# Patient Record
Sex: FEMALE | Race: WHITE | NOT HISPANIC OR LATINO | Employment: FULL TIME | URBAN - METROPOLITAN AREA
[De-identification: names, ages, dates, MRNs, and addresses within clinical notes are randomized per-mention and may not be internally consistent; named-entity substitution may affect disease eponyms.]

---

## 2017-05-08 ENCOUNTER — TRANSFERRED RECORDS (OUTPATIENT)
Dept: HEALTH INFORMATION MANAGEMENT | Facility: CLINIC | Age: 42
End: 2017-05-08

## 2018-02-11 ENCOUNTER — HEALTH MAINTENANCE LETTER (OUTPATIENT)
Age: 43
End: 2018-02-11

## 2018-05-25 ENCOUNTER — OFFICE VISIT (OUTPATIENT)
Dept: FAMILY MEDICINE | Facility: CLINIC | Age: 43
End: 2018-05-25
Payer: COMMERCIAL

## 2018-05-25 VITALS
SYSTOLIC BLOOD PRESSURE: 126 MMHG | OXYGEN SATURATION: 97 % | BODY MASS INDEX: 24.17 KG/M2 | WEIGHT: 154 LBS | DIASTOLIC BLOOD PRESSURE: 80 MMHG | HEART RATE: 95 BPM | HEIGHT: 67 IN | TEMPERATURE: 99.3 F

## 2018-05-25 DIAGNOSIS — L98.9 SKIN LESION: ICD-10-CM

## 2018-05-25 DIAGNOSIS — K58.0 IRRITABLE BOWEL SYNDROME WITH DIARRHEA: Primary | ICD-10-CM

## 2018-05-25 LAB — TSH SERPL DL<=0.005 MIU/L-ACNC: 1.33 MU/L (ref 0.4–4)

## 2018-05-25 PROCEDURE — 36415 COLL VENOUS BLD VENIPUNCTURE: CPT | Performed by: INTERNAL MEDICINE

## 2018-05-25 PROCEDURE — 99213 OFFICE O/P EST LOW 20 MIN: CPT | Performed by: INTERNAL MEDICINE

## 2018-05-25 PROCEDURE — 84443 ASSAY THYROID STIM HORMONE: CPT | Performed by: INTERNAL MEDICINE

## 2018-05-25 RX ORDER — OLOPATADINE HYDROCHLORIDE 1 MG/ML
1 SOLUTION/ DROPS OPHTHALMIC 2 TIMES DAILY
COMMUNITY
End: 2019-07-16

## 2018-05-25 RX ORDER — HYDROXYZINE HYDROCHLORIDE 10 MG/1
10 TABLET, FILM COATED ORAL PRN
COMMUNITY
End: 2019-04-05

## 2018-05-25 RX ORDER — KETOCONAZOLE 20 MG/G
CREAM TOPICAL PRN
COMMUNITY
End: 2022-08-23

## 2018-05-25 RX ORDER — AZELAIC ACID 0.15 G/G
0.5 GEL TOPICAL
COMMUNITY
End: 2019-04-05

## 2018-05-25 RX ORDER — CETIRIZINE HYDROCHLORIDE 10 MG/1
10 TABLET ORAL DAILY
COMMUNITY

## 2018-05-25 RX ORDER — MOMETASONE FUROATE MONOHYDRATE 50 UG/1
2 SPRAY, METERED NASAL PRN
COMMUNITY
End: 2020-09-28

## 2018-05-25 RX ORDER — KETOCONAZOLE 20 MG/ML
SHAMPOO TOPICAL PRN
COMMUNITY
End: 2019-07-16

## 2018-05-25 RX ORDER — LANOLIN ALCOHOL/MO/W.PET/CERES
CREAM (GRAM) TOPICAL
COMMUNITY
End: 2022-08-23

## 2018-05-25 NOTE — PROGRESS NOTES
"  SUBJECTIVE:   Sondra Sorensen is a 42 year old female who presents to clinic today for the following health issues:      New Patient discuss GI issues-Diarrhea,gas,bloating x 4 years ... Symptoms on and off four years.   Colonoscopy 4 years ago, no microscopic colitis.    Has had blood test negative for Sprue.   She still has a gallbladder.      Made this appointment today because she had an \"attack\" where she suddenly had urgency and stool incontinence.   Stools tend to be loose to normal, tends to get bloated when she eats.    Does not think she ever uses sugar free prod       Problem list and histories reviewed & adjusted, as indicated.  Additional history: as documented    There is no problem list on file for this patient.    History reviewed. No pertinent surgical history.    Social History   Substance Use Topics     Smoking status: Never Smoker     Smokeless tobacco: Never Used     Alcohol use Yes      Comment: occasional      Family History   Problem Relation Age of Onset     DIABETES Father          Current Outpatient Prescriptions   Medication Sig Dispense Refill     Azelaic Acid (FINACEA) 15 % gel Apply 0.5 inches topically       cetirizine (ZYRTEC) 10 MG tablet Take 10 mg by mouth daily       Desogestrel-Ethinyl Estradiol (APRI PO) 28 day skipping placebo pills       hydrOXYzine (ATARAX) 10 MG tablet Take 10 mg by mouth as needed for itching       IBUPROFEN PO Take 200 mg by mouth       ketoconazole (NIZORAL) 2 % cream Apply topically as needed for itching       ketoconazole (NIZORAL) 2 % shampoo Apply topically as needed for itching or irritation       Loperamide HCl (IMODIUM OR) Prn       melatonin (CVS MELATONIN) 3 MG tablet Take by mouth nightly as needed for sleep Takes 3-12 mg Nightly       Methylcellulose, Laxative, (CITRUCEL PO) Take by mouth 2 times daily       mometasone (NASONEX) 50 MCG/ACT spray Spray 2 sprays into both nostrils as needed       olopatadine (PATANOL) 0.1 % ophthalmic solution 1 " "drop 2 times daily       Allergies   Allergen Reactions     Doxycycline      Penicillins      No lab results found.   BP Readings from Last 3 Encounters:   05/25/18 126/80    Wt Readings from Last 3 Encounters:   05/25/18 154 lb (69.9 kg)                  Labs reviewed in EPIC    Reviewed and updated as needed this visit by clinical staff  Tobacco  Allergies  Meds  Med Hx  Surg Hx  Fam Hx  Soc Hx      Reviewed and updated as needed this visit by Provider         ROS:  Constitutional, HEENT, cardiovascular, pulmonary, gi and gu systems are negative, except as otherwise noted.    OBJECTIVE:     /80 (BP Location: Left arm, Patient Position: Sitting, Cuff Size: Adult Regular)  Pulse 95  Temp 99.3  F (37.4  C) (Oral)  Ht 5' 6.5\" (1.689 m)  Wt 154 lb (69.9 kg)  SpO2 97%  BMI 24.48 kg/m2  Body mass index is 24.48 kg/(m^2).  GENERAL: healthy, alert and no distress  EYES: Eyes grossly normal to inspection, PERRL and conjunctivae and sclerae normal  NECK: no adenopathy, no asymmetry, masses, or scars and thyroid normal to palpation  RESP: lungs clear to auscultation - no rales, rhonchi or wheezes  CV: regular rate and rhythm, normal S1 S2, no S3 or S4, no murmur, click or rub, no peripheral edema and peripheral pulses strong  ABDOMEN: soft, nontender, no hepatosplenomegaly, no masses and bowel sounds normal  MS: no gross musculoskeletal defects noted, no edema  SKIN: small vascular lesion at nose tip, could be BCC  NEURO: Normal strength and tone, mentation intact and speech normal  PSYCH: mentation appears normal, affect normal/bright    Diagnostic Test Results:   TSH pending   No results found for this or any previous visit (from the past 24 hour(s)).    ASSESSMENT/PLAN:             ICD-10-CM    1. Irritable bowel syndrome with diarrhea K58.0 TSH with free T4 reflex     GASTROENTEROLOGY ADULT REF CONSULT ONLY     GASTROENTEROLOGY ADULT REF CONSULT ONLY   2. Skin lesion L98.9      New patient to system " (insurance change)    Patient new to system with IBS like symptoms, diarrhea predominant.  Per previous evaluations, sprue ruled out (blood tests) and microcytic colitis ruled out (colonoscopy 2014).  She has tried FODMAPs and Gluten free to no avail    She will try to avoid sucrose and high fructose products.  Wants a GI consult, a couple of options are presented to her.      Has skin lesion, should consider removal if colleague thinks suspicious.         Ashley Davey MD  [facility]

## 2018-05-25 NOTE — MR AVS SNAPSHOT
After Visit Summary   5/25/2018    Sondra Sorensen    MRN: [id number]           Patient Information     Date Of Birth          1975        Visit Information        Provider Department      5/25/2018 2:00 PM Ashley Davey MD [facility]        Today's Diagnoses     Irritable bowel syndrome with diarrhea    -  1      Care Instructions    Avoid sucroses (sugar free) and fructose (certain fruits)    Avoid supplements with magnesium    Okay to try daily lomitil    Carry pepto bismol tabs for urgency    GI consult     [facility] [phone]     : [facility] [phone]                    Follow-ups after your visit        Additional Services     GASTROENTEROLOGY ADULT REF CONSULT ONLY       Preferred Location: [facility] [phone]      Please be aware that coverage of these services is subject to the terms and limitations of your health insurance plan.  Call member services at your health plan with any benefit or coverage questions.  Any procedures must be performed at a [facility] facility OR coordinated by your clinic's referral office.    Please bring the following with you to your appointment:    (1) Any X-Rays, CTs or MRIs which have been performed.  Contact the facility where they were done to arrange for  prior to your scheduled appointment.    (2) List of current medications   (3) This referral request   (4) Any documents/labs given to you for this referral            GASTROENTEROLOGY ADULT REF CONSULT ONLY       Preferred Location: [facility] [phone]      Please be aware that coverage of these services is subject to the terms and limitations of your health insurance plan.  Call member services at your health plan with any benefit or coverage questions.  Any procedures must be performed at a [facility] facility OR coordinated by your clinic's referral office.    Please bring the following with you to your appointment:    (1) Any X-Rays, CTs or  MRIs which have been performed.  Contact the facility where they were done to arrange for  prior to your scheduled appointment.    (2) List of current medications   (3) This referral request   (4) Any documents/labs given to you for this referral                  Your next 10 appointments already scheduled     Jul 20, 2018  1:00 PM CDT   New Patient Visit with Radha Weeks MD   [facility])    [address], [address]  [facility] [address]   [phone]              Who to contact     If you have questions or need follow up information about today's clinic visit or your schedule please contact [facility] directly at [phone].  Normal or non-critical lab and imaging results will be communicated to you by Sionexhart, letter or phone within 4 business days after the clinic has received the results. If you do not hear from us within 7 days, please contact the clinic through Sionexhart or phone. If you have a critical or abnormal lab result, we will notify you by phone as soon as possible.  Submit refill requests through Exabre or call your pharmacy and they will forward the refill request to us. Please allow 3 business days for your refill to be completed.          Additional Information About Your Visit        Exabre Information     Exabre gives you secure access to your electronic health record. If you see a primary care provider, you can also send messages to your care team and make appointments. If you have questions, please call your primary care clinic.  If you do not have a primary care provider, please call [phone] and they will assist you.        Care EveryWhere ID     This is your Care EveryWhere ID. This could be used by other organizations to access your [facility] medical records  [id number]        Your Vitals Were     Pulse Temperature Height Pulse Oximetry BMI (Body Mass Index)       95 99.3  F (37.4  " C) (Oral) 5' 6.5\" (1.689 m) 97% 24.48 kg/m2        Blood Pressure from Last 3 Encounters:   05/25/18 126/80    Weight from Last 3 Encounters:   05/25/18 154 lb (69.9 kg)              We Performed the Following     GASTROENTEROLOGY ADULT REF CONSULT ONLY     GASTROENTEROLOGY ADULT REF CONSULT ONLY     TSH with free T4 reflex        Primary Care Provider Office Phone # Fax #    [facility] [phone]       [address] [place] [address]        Equal Access to Services     MICK DAY : Hadii aad ku hadasho Soomaali, waaxda luqadaha, qaybta kaalmada adeegyada, waxivan perrinin hayluis carlos waters . So [facility] [phone].    ATENCIÓN: Si habla español, tiene a acevedo disposición servicios gratuitos de asistencia lingüística. Llame al [phone].    We comply with applicable federal civil rights laws and [place] laws. We do not discriminate on the basis of race, color, national origin, age, disability, sex, sexual orientation, or gender identity.            Thank you!     Thank you for choosing [facility]  for your care. Our goal is always to provide you with excellent care. Hearing back from our patients is one way we can continue to improve our services. Please take a few minutes to complete the written survey that you may receive in the mail after your visit with us. Thank you!             Your Updated Medication List - Protect others around you: Learn how to safely use, store and throw away your medicines at www.disposemymeds.org.          This list is accurate as of 5/25/18  2:50 PM.  Always use your most recent med list.                   Brand Name Dispense Instructions for use Diagnosis    APRI PO      28 day skipping placebo pills        cetirizine 10 MG tablet    zyrTEC     Take 10 mg by mouth daily        CITRUCEL PO      Take by mouth 2 times daily        CVS MELATONIN 3 MG tablet   Generic drug:  melatonin      Take by mouth " nightly as needed for sleep Takes 3-12 mg Nightly        FINACEA 15 % gel   Generic drug:  Azelaic Acid      Apply 0.5 inches topically        hydrOXYzine 10 MG tablet    ATARAX     Take 10 mg by mouth as needed for itching        IBUPROFEN PO      Take 200 mg by mouth        IMODIUM OR      Prn        * ketoconazole 2 % shampoo    NIZORAL     Apply topically as needed for itching or irritation        * ketoconazole 2 % cream    NIZORAL     Apply topically as needed for itching        mometasone 50 MCG/ACT spray    NASONEX     Spray 2 sprays into both nostrils as needed        olopatadine 0.1 % ophthalmic solution    PATANOL     1 drop 2 times daily        * Notice:  This list has 2 medication(s) that are the same as other medications prescribed for you. Read the directions carefully, and ask your doctor or other care provider to review them with you.

## 2018-05-25 NOTE — PATIENT INSTRUCTIONS
Avoid sucroses (sugar free) and fructose (certain fruits)    Avoid supplements with magnesium    Okay to try daily lomitil    Carry pepto bismol tabs for urgency    GI consult     [facility] [phone]     : [facility] [phone]

## 2018-06-01 ENCOUNTER — OFFICE VISIT (OUTPATIENT)
Dept: FAMILY MEDICINE | Facility: CLINIC | Age: 43
End: 2018-06-01
Payer: COMMERCIAL

## 2018-06-01 VITALS
TEMPERATURE: 98.5 F | OXYGEN SATURATION: 96 % | DIASTOLIC BLOOD PRESSURE: 73 MMHG | HEART RATE: 96 BPM | WEIGHT: 155 LBS | SYSTOLIC BLOOD PRESSURE: 120 MMHG | BODY MASS INDEX: 24.64 KG/M2

## 2018-06-01 DIAGNOSIS — L98.9 SKIN LESION OF FACE: Primary | ICD-10-CM

## 2018-06-01 DIAGNOSIS — I78.1 TELANGIECTASIA OF FACE: ICD-10-CM

## 2018-06-01 PROCEDURE — 99000 SPECIMEN HANDLING OFFICE-LAB: CPT | Performed by: FAMILY MEDICINE

## 2018-06-01 PROCEDURE — 88305 TISSUE EXAM BY PATHOLOGIST: CPT | Performed by: FAMILY MEDICINE

## 2018-06-01 PROCEDURE — 99213 OFFICE O/P EST LOW 20 MIN: CPT | Mod: 25 | Performed by: FAMILY MEDICINE

## 2018-06-01 PROCEDURE — 11100 HC BIOPSY SKIN/SUBQ/MUC MEM, SINGLE LESION: CPT | Performed by: FAMILY MEDICINE

## 2018-06-01 ASSESSMENT — PAIN SCALES - GENERAL: PAINLEVEL: NO PAIN (0)

## 2018-06-01 NOTE — PROGRESS NOTES
SUBJECTIVE:                                                    Sondra Sorensen is a 42 year old female who presents to clinic today for the following health issues:      Pt is here to have a spot on her nose checked per Tamica.  The patient has had a small red spot on her nose that has been increasing in recent months.  It has become more prominent.  There is some concern about possible basal cell carcinoma, so she was referred to me for further evaluation.    Problem list and histories reviewed & adjusted, as indicated.  Additional history: as documented    There is no problem list on file for this patient.    History reviewed. No pertinent surgical history.    Social History   Substance Use Topics     Smoking status: Never Smoker     Smokeless tobacco: Never Used     Alcohol use Yes      Comment: occasional      Family History   Problem Relation Age of Onset     DIABETES Father          Current Outpatient Prescriptions   Medication Sig Dispense Refill     Azelaic Acid (FINACEA) 15 % gel Apply 0.5 inches topically       cetirizine (ZYRTEC) 10 MG tablet Take 10 mg by mouth daily       Desogestrel-Ethinyl Estradiol (APRI PO) 28 day skipping placebo pills       hydrOXYzine (ATARAX) 10 MG tablet Take 10 mg by mouth as needed for itching       IBUPROFEN PO Take 200 mg by mouth       ketoconazole (NIZORAL) 2 % cream Apply topically as needed for itching       ketoconazole (NIZORAL) 2 % shampoo Apply topically as needed for itching or irritation       Loperamide HCl (IMODIUM OR) Prn       melatonin (CVS MELATONIN) 3 MG tablet Take by mouth nightly as needed for sleep Takes 3-12 mg Nightly       Methylcellulose, Laxative, (CITRUCEL PO) Take by mouth 2 times daily       mometasone (NASONEX) 50 MCG/ACT spray Spray 2 sprays into both nostrils as needed       olopatadine (PATANOL) 0.1 % ophthalmic solution 1 drop 2 times daily       Allergies   Allergen Reactions     Doxycycline      Penicillins        ROS:  No other skin  lesions of concern.    OBJECTIVE:     /73 (BP Location: Right arm, Patient Position: Chair, Cuff Size: Adult Regular)  Pulse 96  Temp 98.5  F (36.9  C) (Oral)  Wt 155 lb (70.3 kg)  LMP  (LMP Unknown)  SpO2 96%  Breastfeeding? No  BMI 24.64 kg/m2  Body mass index is 24.64 kg/(m^2).  GENERAL: healthy, alert and no distress  SKIN: She has a small area of telangiectasia on the left side near the tip of her nose.  I do not see any definite skin cancer, but after discussion with the patient, we elected to perform a very thin shave biopsy and treat the lesion with electrodesiccation and curettage.  The lesion was cleansed, anesthetized, and then a very thin shave biopsy was obtained.  The base was then treated with electrodesiccation and curettage.    Diagnostic Test Results:  none     ASSESSMENT/PLAN:       ICD-10-CM    1. Skin lesion of face L98.9 Surgical pathology exam     HANDLING CHARGE TO REF LAB     BIOPSY SKIN/SUBQ/MUC MEM, SINGLE LESION   2. 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

## 2018-06-01 NOTE — MR AVS SNAPSHOT
After Visit Summary   6/1/2018    Sondra Sorensen    MRN: [id number]           Patient Information     Date Of Birth          1975        Visit Information        Provider Department      6/1/2018 2:40 PM Alejandro Fontaine MD [facility]        Today's Diagnoses     Skin lesion of face    -  1    Telangiectasia of face           Follow-ups after your visit        Your next 10 appointments already scheduled     Jul 20, 2018  1:00 PM CDT   New Patient Visit with Radha Weeks MD   [facility] ([facility])    [address]  [facility] [address]   [phone]              Who to contact     If you have questions or need follow up information about today's clinic visit or your schedule please contact [facility] directly at [phone].  Normal or non-critical lab and imaging results will be communicated to you by MyChart, letter or phone within 4 business days after the clinic has received the results. If you do not hear from us within 7 days, please contact the clinic through MyChart or phone. If you have a critical or abnormal lab result, we will notify you by phone as soon as possible.  Submit refill requests through Preen.Me or call your pharmacy and they will forward the refill request to us. Please allow 3 business days for your refill to be completed.          Additional Information About Your Visit        MyChart Information     Preen.Me gives you secure access to your electronic health record. If you see a primary care provider, you can also send messages to your care team and make appointments. If you have questions, please call your primary care clinic.  If you do not have a primary care provider, please call [phone] and they will assist you.        Care EveryWhere ID     This is your Care EveryWhere ID. This could be used by other organizations to access your [facility]  records  [id number]        Your Vitals Were     Pulse Temperature Last Period Pulse Oximetry Breastfeeding? BMI (Body Mass Index)    96 98.5  F (36.9  C) (Oral) (LMP Unknown) 96% No 24.64 kg/m2       Blood Pressure from Last 3 Encounters:   06/01/18 120/73   05/25/18 126/80    Weight from Last 3 Encounters:   06/01/18 155 lb (70.3 kg)   05/25/18 154 lb (69.9 kg)              Today, you had the following     No orders found for display       Primary Care Provider Office Phone # Fax #    [facility] [phone]       [address] [place] [address]        Equal Access to Services     MICK DAY : Heather Alonzo, waamelia shore, qaybta kaalmada nandayavale, nedra vo. So [facility] [phone].    ATENCIÓN: Si habla español, tiene a acevedo disposición servicios gratuitos de asistencia lingüística. Llame al [phone].    We comply with applicable federal civil rights laws and [place] laws. We do not discriminate on the basis of race, color, national origin, age, disability, sex, sexual orientation, or gender identity.            Thank you!     Thank you for choosing [facility]  for your care. Our goal is always to provide you with excellent care. Hearing back from our patients is one way we can continue to improve our services. Please take a few minutes to complete the written survey that you may receive in the mail after your visit with us. Thank you!             Your Updated Medication List - Protect others around you: Learn how to safely use, store and throw away your medicines at www.disposemymeds.org.          This list is accurate as of 6/1/18  3:26 PM.  Always use your most recent med list.                   Brand Name Dispense Instructions for use Diagnosis    APRI PO      28 day skipping placebo pills        cetirizine 10 MG tablet    zyrTEC     Take 10 mg by mouth daily        CITRUCEL PO       Take by mouth 2 times daily        CVS MELATONIN 3 MG tablet   Generic drug:  melatonin      Take by mouth nightly as needed for sleep Takes 3-12 mg Nightly        FINACEA 15 % gel   Generic drug:  Azelaic Acid      Apply 0.5 inches topically        hydrOXYzine 10 MG tablet    ATARAX     Take 10 mg by mouth as needed for itching        IBUPROFEN PO      Take 200 mg by mouth        IMODIUM OR      Prn        * ketoconazole 2 % shampoo    NIZORAL     Apply topically as needed for itching or irritation        * ketoconazole 2 % cream    NIZORAL     Apply topically as needed for itching        mometasone 50 MCG/ACT spray    NASONEX     Spray 2 sprays into both nostrils as needed        olopatadine 0.1 % ophthalmic solution    PATANOL     1 drop 2 times daily        * Notice:  This list has 2 medication(s) that are the same as other medications prescribed for you. Read the directions carefully, and ask your doctor or other care provider to review them with you.

## 2018-06-05 LAB — COPATH REPORT: NORMAL

## 2018-06-06 NOTE — PROGRESS NOTES
Sondra,  Good news -- the pathologist did not see any skin cancer in the biopsy sample from your nose.  No further treatment should be necessary for that.  I hope the site is healing up well for you.    Alejandro Fontaine MD

## 2018-06-08 ENCOUNTER — TRANSFERRED RECORDS (OUTPATIENT)
Dept: HEALTH INFORMATION MANAGEMENT | Facility: CLINIC | Age: 43
End: 2018-06-08

## 2018-06-14 ENCOUNTER — TRANSFERRED RECORDS (OUTPATIENT)
Dept: HEALTH INFORMATION MANAGEMENT | Facility: CLINIC | Age: 43
End: 2018-06-14

## 2018-06-29 ENCOUNTER — TELEPHONE (OUTPATIENT)
Dept: OTHER | Facility: CLINIC | Age: 43
End: 2018-06-29

## 2018-07-01 RX ORDER — CHOLESTYRAMINE LIGHT 4 G/5.7G
4 POWDER, FOR SUSPENSION ORAL 2 TIMES DAILY
Status: CANCELLED | OUTPATIENT
Start: 2018-07-01

## 2018-07-05 ENCOUNTER — OFFICE VISIT (OUTPATIENT)
Dept: FAMILY MEDICINE | Facility: CLINIC | Age: 43
End: 2018-07-05
Payer: COMMERCIAL

## 2018-07-05 ENCOUNTER — RADIANT APPOINTMENT (OUTPATIENT)
Dept: GENERAL RADIOLOGY | Facility: CLINIC | Age: 43
End: 2018-07-05
Attending: INTERNAL MEDICINE
Payer: COMMERCIAL

## 2018-07-05 VITALS
HEART RATE: 84 BPM | WEIGHT: 154 LBS | TEMPERATURE: 98.9 F | SYSTOLIC BLOOD PRESSURE: 120 MMHG | DIASTOLIC BLOOD PRESSURE: 77 MMHG | BODY MASS INDEX: 24.48 KG/M2 | OXYGEN SATURATION: 97 %

## 2018-07-05 DIAGNOSIS — L72.3 SEBACEOUS CYST: ICD-10-CM

## 2018-07-05 DIAGNOSIS — M25.551 HIP PAIN, RIGHT: ICD-10-CM

## 2018-07-05 DIAGNOSIS — M16.11 PRIMARY OSTEOARTHRITIS OF RIGHT HIP: Primary | ICD-10-CM

## 2018-07-05 PROCEDURE — 73502 X-RAY EXAM HIP UNI 2-3 VIEWS: CPT | Mod: FY

## 2018-07-05 PROCEDURE — 99214 OFFICE O/P EST MOD 30 MIN: CPT | Performed by: INTERNAL MEDICINE

## 2018-07-05 RX ORDER — CEPHALEXIN 500 MG/1
500 CAPSULE ORAL 3 TIMES DAILY
Qty: 30 CAPSULE | Refills: 0 | Status: SHIPPED | OUTPATIENT
Start: 2018-07-05 | End: 2018-10-03

## 2018-07-05 NOTE — PATIENT INSTRUCTIONS
Low impact exercise     Use anti inflammatories/tylenol/ glucosamine chondroitin (all or a combination) as needed for pain.

## 2018-07-05 NOTE — PROGRESS NOTES
SUBJECTIVE:   Sondra Sorensen is a 42 year old female who presents to clinic today for the following health issues:    41 y/o F with h/o insomnia (recent trial hydroxazine&melatonin) and IBS-diarrhea predominant (recently negative repeat work up), Lactose Intolerance test is pending.      Joint Pain    Onset: 3 months at least    Description:   Location: right hip  Character: Dull ache    Intensity: mild, moderate    Progression of Symptoms: same    Accompanying Signs & Symptoms:  Other symptoms: none    History:   Previous similar pain: no       Precipitating factors:   Trauma or overuse: no     Alleviating factors:  Improved by: low activity,     Therapies Tried and outcome: ibuprofen         Lump in groin area x 3-4 weeks that is a  little sore occasionally     Problem list and histories reviewed & adjusted, as indicated.  Additional history: as documented    There is no problem list on file for this patient.    No past surgical history on file.    Social History   Substance Use Topics     Smoking status: Never Smoker     Smokeless tobacco: Never Used     Alcohol use Yes      Comment: occasional      Family History   Problem Relation Age of Onset     Diabetes Father          Current Outpatient Prescriptions   Medication Sig Dispense Refill     Azelaic Acid (FINACEA) 15 % gel Apply 0.5 inches topically       Calcium Citrate-Vitamin D (CALCIUM + D PO)        cephALEXin (KEFLEX) 500 MG capsule Take 1 capsule (500 mg) by mouth 3 times daily 30 capsule 0     cetirizine (ZYRTEC) 10 MG tablet Take 10 mg by mouth daily       Desogestrel-Ethinyl Estradiol (APRI PO) 28 day skipping placebo pills       hydrOXYzine (ATARAX) 10 MG tablet Take 10 mg by mouth as needed for itching       IBUPROFEN PO Take 200 mg by mouth       ketoconazole (NIZORAL) 2 % cream Apply topically as needed for itching       ketoconazole (NIZORAL) 2 % shampoo Apply topically as needed for itching or irritation       Loperamide HCl (IMODIUM OR) Prn        melatonin (CVS MELATONIN) 3 MG tablet Take by mouth nightly as needed for sleep Takes 3-12 mg Nightly       Methylcellulose, Laxative, (CITRUCEL PO) Take by mouth 2 times daily       mometasone (NASONEX) 50 MCG/ACT spray Spray 2 sprays into both nostrils as needed       olopatadine (PATANOL) 0.1 % ophthalmic solution 1 drop 2 times daily       Probiotic Product (ALIGN) CAPS Take by mouth daily       Allergies   Allergen Reactions     Doxycycline      Penicillins Rash     rash     Recent Labs   Lab Test  05/25/18   1452   TSH  1.33      BP Readings from Last 3 Encounters:   07/05/18 120/77   06/01/18 120/73   05/25/18 126/80    Wt Readings from Last 3 Encounters:   07/05/18 154 lb (69.9 kg)   06/01/18 155 lb (70.3 kg)   05/25/18 154 lb (69.9 kg)                  Labs reviewed in EPIC    Reviewed and updated as needed this visit by clinical staff  Tobacco  Allergies  Meds       Reviewed and updated as needed this visit by Provider         ROS:  Constitutional, HEENT, cardiovascular, pulmonary,   and gu systems are negative, except as otherwise noted.    OBJECTIVE:     /77 (BP Location: Right arm, Patient Position: Sitting, Cuff Size: Adult Regular)  Pulse 84  Temp 98.9  F (37.2  C) (Oral)  Wt 154 lb (69.9 kg)  SpO2 97%  BMI 24.48 kg/m2  Body mass index is 24.48 kg/(m^2).  GENERAL: healthy, alert and no distress  EYES: Eyes grossly normal to inspection, PERRL and conjunctivae and sclerae normal  MS: no gross musculoskeletal defects noted, no edema  :  Small sebaceous cyst at right labia, soft fluctuance.    NEURO: Normal strength and tone, mentation intact and speech normal  PSYCH: mentation appears normal, affect normal/bright    Diagnostic Test Results:  Results for orders placed or performed in visit on 06/01/18   Surgical pathology exam   Result Value Ref Range    Copath Report       Patient Name: RUTH VAZQUEZ  MR#: [id number]  Specimen #: [id number]  Collected: 6/1/2018  Received:  "6/4/2018  Reported: 6/5/2018 17:15  Ordering Phy(s): ANNA RODNEY    For improved result formatting, select 'View Enhanced Report Format' under   Linked Documents section.    SPECIMEN(S):  Skin, biopsy, left side near tip of nose    FINAL DIAGNOSIS:  Skin biopsy from left side near tip of nose:  - Negative for malignancy, benign skin with solar elastosis (see   description).    Electronically signed out by:    SB Farah M.D.    CLINICAL HISTORY:   42-year-old female with small area of telangiectasia, very thin shave   biopsy, rule out basal cell carcinoma.    GROSS:  A single specimen container with formalin is received labeled with the   patient's name, date of birth, and  medical record number. Information on the requisition slip, container, and   associated labels is confirmed.    The specimen is designated \"skin biopsy left side and near tip of nose\"   consisting of a 0.1 x le ss than 0.1 x  less than 0.1 cm minute brown skin fragment.  The resection margin is   inked black.  The specimen is submitted  intact in one  Cassette. (Dictated by: Tessy Patel 6/4/2018 09:12 AM)    MICROSCOPIC:  The sections show portions which are somewhat fragmented showing solar   elastosis without epithelial or  atypical stromal proliferations.  No appreciable inflammation is seen.  A   small amount of blood is present  within the stroma which shows solar elastosis but no discrete   telangiectatic vascular proliferation on initial  deeper levels. (Dictated by: LEXIE Farah MD 06/05/2018)    CPT Codes:  A: 95789-TJ2    TESTING LAB LOCATION:  [address] [id number] [phone]    COLLECTION SITE:  Client: [facility]  Location: [facility] (B)         ASSESSMENT/PLAN:             ICD-10-CM    1. Primary osteoarthritis of right hip M16.11    2. Sebaceous cyst L72.3 cephALEXin (KEFLEX) 500 MG capsule   3. " Hip pain, right M25.551 XR Hip Right 2-3 Views     Later entry:  Hip not read as dejenerative joint arthritis, but normal.  Letter sent to patient, consider sports medicine referral    Patient Instructions   Low impact exercise     Use anti inflammatories/tylenol/ glucosamine chondroitin (all or a combination) as needed for pain.  L hip pain with ambulation      She will apply heat to sebaceous cyst.  If does not improve, trial keflex.     Ashley Davey MD  [facility]

## 2018-07-05 NOTE — MR AVS SNAPSHOT
After Visit Summary   7/5/2018    Sondra Sorensen    MRN: [id number]           Patient Information     Date Of Birth          1975        Visit Information        Provider Department      7/5/2018 6:20 PM Ashley Davey MD [facility]        Today's Diagnoses     Primary osteoarthritis of right hip    -  1    Sebaceous cyst        Hip pain, right          Care Instructions    Low impact exercise     Use anti inflammatories/tylenol/ glucosamine chondroitin (all or a combination) as needed for pain.              Follow-ups after your visit        Your next 10 appointments already scheduled     Jul 20, 2018  1:00 PM CDT   New Patient Visit with Radha Weeks MD   [facility] ([facility])    [address]  [place] [address]   [phone]              Who to contact     If you have questions or need follow up information about today's clinic visit or your schedule please contact [facility] directly at [phone].  Normal or non-critical lab and imaging results will be communicated to you by [employer], letter or phone within 4 business days after the clinic has received the results. If you do not hear from us within 7 days, please contact the clinic through [employer] or phone. If you have a critical or abnormal lab result, we will notify you by phone as soon as possible.  Submit refill requests through [employer] or call your pharmacy and they will forward the refill request to us. Please allow 3 business days for your refill to be completed.          Additional Information About Your Visit        MyChart Information     [employer] gives you secure access to your electronic health record. If you see a primary care provider, you can also send messages to your care team and make appointments. If you have questions, please call your primary care clinic.  If you do not have a primary care provider,  please call [phone] and they will assist you.        Care EveryWhere ID     This is your Care EveryWhere ID. This could be used by other organizations to access your [place] medical records  [id number]        Your Vitals Were     Pulse Temperature Pulse Oximetry BMI (Body Mass Index)          84 98.9  F (37.2  C) (Oral) 97% 24.48 kg/m2         Blood Pressure from Last 3 Encounters:   07/05/18 120/77   06/01/18 120/73   05/25/18 126/80    Weight from Last 3 Encounters:   07/05/18 154 lb (69.9 kg)   06/01/18 155 lb (70.3 kg)   05/25/18 154 lb (69.9 kg)                 Today's Medication Changes          These changes are accurate as of 7/5/18  6:51 PM.  If you have any questions, ask your nurse or doctor.               Start taking these medicines.        Dose/Directions    cephALEXin 500 MG capsule   Commonly known as:  KEFLEX   Used for:  Sebaceous cyst   Started by:  Ashley Davey MD        Dose:  500 mg   Take 1 capsule (500 mg) by mouth 3 times daily   Quantity:  30 capsule   Refills:  0            Where to get your medicines      Some of these will need a paper prescription and others can be bought over the counter.  Ask your nurse if you have questions.     Bring a paper prescription for each of these medications     cephALEXin 500 MG capsule                Primary Care Provider Office Phone # Fax #    [facility] [phone]       [address] [place], [place] [id number]        Equal Access to Services     MICK DAY AH: Heather zelaya Soblaine, waaxda luqadaha, qaybta kaalmada asia, waxivan lucille vo. So [facility] [phone].    ATENCIÓN: Si habla español, tiene a acevedo disposición servicios gratuitos de asistencia lingüística. Llame al [phone].    We comply with applicable federal civil rights laws and [place] laws. We do not discriminate on the basis of race, color, national origin, age, disability, sex, sexual  orientation, or gender identity.            Thank you!     Thank you for choosing [facility]  for your care. Our goal is always to provide you with excellent care. Hearing back from our patients is one way we can continue to improve our services. Please take a few minutes to complete the written survey that you may receive in the mail after your visit with us. Thank you!             Your Updated Medication List - Protect others around you: Learn how to safely use, store and throw away your medicines at www.disposemymeds.org.          This list is accurate as of 7/5/18  6:51 PM.  Always use your most recent med list.                   Brand Name Dispense Instructions for use Diagnosis    ALIGN Caps      Take by mouth daily        APRI PO      28 day skipping placebo pills        CALCIUM + D PO           cephALEXin 500 MG capsule    KEFLEX    30 capsule    Take 1 capsule (500 mg) by mouth 3 times daily    Sebaceous cyst       cetirizine 10 MG tablet    zyrTEC     Take 10 mg by mouth daily        CITRUCEL PO      Take by mouth 2 times daily        CVS MELATONIN 3 MG tablet   Generic drug:  melatonin      Take by mouth nightly as needed for sleep Takes 3-12 mg Nightly        FINACEA 15 % gel   Generic drug:  Azelaic Acid      Apply 0.5 inches topically        hydrOXYzine 10 MG tablet    ATARAX     Take 10 mg by mouth as needed for itching        IBUPROFEN PO      Take 200 mg by mouth        IMODIUM OR      Prn        * ketoconazole 2 % shampoo    NIZORAL     Apply topically as needed for itching or irritation        * ketoconazole 2 % cream    NIZORAL     Apply topically as needed for itching        mometasone 50 MCG/ACT spray    NASONEX     Spray 2 sprays into both nostrils as needed        olopatadine 0.1 % ophthalmic solution    PATANOL     1 drop 2 times daily        * Notice:  This list has 2 medication(s) that are the same as other medications prescribed for you. Read the directions  carefully, and ask your doctor or other care provider to review them with you.

## 2018-07-06 DIAGNOSIS — M25.551 HIP PAIN, RIGHT: Primary | ICD-10-CM

## 2018-07-06 NOTE — PROGRESS NOTES
Sondra Sorensen    The radiologist did not think there is any arthritis in your hip.  If you continue to have problems with activity due to pain in that area, consider referral to sports medicine clinic (we have one in [place]).  The number is  [phone].  I placed a referral in case you decide to do this, and if you don't it is okay (the referral will  in about 3 months)    Sincerely,     MINNIE MITCHELL M.D.

## 2018-07-17 NOTE — TELEPHONE ENCOUNTER
FUTURE VISIT INFORMATION      FUTURE VISIT INFORMATION:    Date: 7/19/18    Time:     Location[place] [facility]  REFERRAL INFORMATION:    Referring provider:  Ashley Davey    Referring providers clinic:      Reason for visit/diagnosis  Internal Referral - Hip pain, right - Schedule Per PT    RECORDS REQUESTED FROM:       Clinic name Comments Records Status Imaging Status   Page Davey internal internal                                   RECORDS STATUS

## 2018-07-19 ENCOUNTER — OFFICE VISIT (OUTPATIENT)
Dept: ORTHOPEDICS | Facility: CLINIC | Age: 43
End: 2018-07-19
Payer: COMMERCIAL

## 2018-07-19 ENCOUNTER — PRE VISIT (OUTPATIENT)
Dept: ORTHOPEDICS | Facility: CLINIC | Age: 43
End: 2018-07-19

## 2018-07-19 VITALS — HEIGHT: 67 IN | RESPIRATION RATE: 16 BRPM | BODY MASS INDEX: 24.17 KG/M2 | WEIGHT: 154 LBS

## 2018-07-19 DIAGNOSIS — M25.851 HIP IMPINGEMENT SYNDROME, RIGHT: Primary | ICD-10-CM

## 2018-07-19 NOTE — MR AVS SNAPSHOT
After Visit Summary   7/19/2018    Sondra Sorensen    MRN: [id number]           Patient Information     Date Of Birth          1975        Visit Information        Provider Department      7/19/2018 7:45 AM August Mayorga MD [facility] Sports Medicine        Today's Diagnoses     Hip impingement syndrome, right    -  1       Follow-ups after your visit        Additional Services     [facility] PT, HAND, AND CHIROPRACTIC REFERRAL       === This order will print in the [facility] Scheduling  Office ===    Physical therapy, hand therapy and chiropractic care are available through:    [facility]  [facility]    Call one easy number to schedule at any of the above locations:  [phone].    Your provider has referred you to Physical Therapy at [facility] or [facility] PT with Sondra Art    Indication/Reason for Referral: Hip Pain  Onset of Illness:  Right hip impingement.  Xray no djd noted.  Therapy Orders:  Evaluate and Treat  Special Programs:  None  Special Request:  None    Additional Comments for the therapist or chiropractor:  Hip impingement protocol.      Please be aware that coverage of these services is subject to the terms and limitations of your health insurance plan.  Call member services at your health plan with any benefit or coverage questions.      Please bring the following to your appointment:    >>   Your personal calendar for scheduling future appointments  >>   Comfortable clothing                  Your next 10 appointments already scheduled     Jul 24, 2018  9:40 AM CDT   (Arrive by 9:25 AM)   [facility] Extremity with BRIANDA Arnold [facility] Physical Therapy [facility] ([facility])    [address] [place] [address] [phone]            Aug 02, 2018  9:40 AM CDT   [facility] Extremity with Sondra Uriarte PT   [facility] Physical Therapy [facility] ([facility])    [address] [address] [phone]            Aug 09, 2018  8:20 AM CDT   [facility] Extremity with Sondra Uriarte PT    [facility] Physical [facility] ([facility])    [address] [place] [address] [phone]              Who to contact     Please call your clinic at [phone] to:    Ask questions about your health    Make or cancel appointments    Discuss your medicines    Learn about your test results    Speak to your doctor            Additional Information About Your Visit        [employer] Information     [employer] gives you secure access to your electronic health record. If you see a primary care provider, you can also send messages to your care team and make appointments. If you have questions, please call your primary care clinic.  If you do not have a primary care provider, please call [phone] and they will assist you.      [employer] is an electronic gateway that provides easy, online access to your medical records. With [employer], you can request a clinic appointment, read your test results, renew a prescription or communicate with your care team.     To access your existing account, please contact your [facility] or call [phone] for assistance.        Care EveryWhere ID     This is your Care EveryWhere ID. This could be used by other organizations to access your [place] medical records  [id number]        Your Vitals Were     Respirations Height BMI (Body Mass Index)             16 1.689 m (5' 6.5\") 24.48 kg/m2          Blood Pressure from Last 3 Encounters:   07/05/18 120/77   06/01/18 120/73   05/25/18 126/80    Weight from Last 3 Encounters:   07/19/18 69.9 kg (154 lb)   07/05/18 69.9 kg (154 lb)   06/01/18 70.3 kg (155 lb)              We Performed the Following     [facility] PT, HAND, AND CHIROPRACTIC REFERRAL        Primary Care Provider Office Phone # Fax # "    [facility] [phone]       [address] [place] [address]        Equal Access to Services     MICK DAY : Hadii aad ku hadrigobertoessie Alonzo, jamesvale adameaureaha, cecilia onelapolinar betancourt, nedra vergaramichael brodyjoann parker evelin vo. So [facility] [phone].    ATENCIÓN: Si habla español, tiene a acevedo disposición servicios gratuitos de asistencia lingüística. Llame al [phone].    We comply with applicable federal civil rights laws and [place] laws. We do not discriminate on the basis of race, color, national origin, age, disability, sex, sexual orientation, or gender identity.            Thank you!     Thank you for choosing [facility]  for your care. Our goal is always to provide you with excellent care. Hearing back from our patients is one way we can continue to improve our services. Please take a few minutes to complete the written survey that you may receive in the mail after your visit with us. Thank you!             Your Updated Medication List - Protect others around you: Learn how to safely use, store and throw away your medicines at www.disposemymeds.org.          This list is accurate as of 7/19/18 11:59 PM.  Always use your most recent med list.                   Brand Name Dispense Instructions for use Diagnosis    ALIGN Caps      Take by mouth daily        APRI PO      28 day skipping placebo pills        CALCIUM + D PO           cephALEXin 500 MG capsule    KEFLEX    30 capsule    Take 1 capsule (500 mg) by mouth 3 times daily    Sebaceous cyst       cetirizine 10 MG tablet    zyrTEC     Take 10 mg by mouth daily        CITRUCEL PO      Take by mouth 2 times daily        CVS MELATONIN 3 MG tablet   Generic drug:  melatonin      Take by mouth nightly as needed for sleep Takes 3-12 mg Nightly        FINACEA 15 % gel   Generic drug:  Azelaic Acid      Apply 0.5 inches topically        hydrOXYzine 10 MG tablet    ATARAX     Take 10 mg by  mouth as needed for itching        IBUPROFEN PO      Take 200 mg by mouth        IMODIUM OR      Prn        * ketoconazole 2 % shampoo    NIZORAL     Apply topically as needed for itching or irritation        * ketoconazole 2 % cream    NIZORAL     Apply topically as needed for itching        mometasone 50 MCG/ACT spray    NASONEX     Spray 2 sprays into both nostrils as needed        olopatadine 0.1 % ophthalmic solution    PATANOL     1 drop 2 times daily        * Notice:  This list has 2 medication(s) that are the same as other medications prescribed for you. Read the directions carefully, and ask your doctor or other care provider to review them with you.

## 2018-07-19 NOTE — PROGRESS NOTES
"Sports Medicine Clinic Visit    PCP: Clinic, [facility]    Sondra Sorensen is a 42 year old female who is seen  in consultation at the request of Dr. Davey presenting with right hip pain. The pt reports that she first started noticing pain after taking long walks this spring, after being sedentary over the winter.  Pain is intermittent, often at right groin.  Notes it rotating hip to exit the car.    HIP RIGHT TWO TO THREE VIEWS   7/5/2018 6:41 PM      HISTORY: Hip pain, right     COMPARISON: None.         IMPRESSION: Normal.     WOJCIECH SELBY MD          Injury: None    Location of Pain: right anterolateral hip  Duration of Pain: 2-3 month(s)  Rating of Pain: 5/10 at worst   Pain is better with: Ibuprofen  Pain is worse with: Abduction, walking  Additional Features: None  Treatment so far consists of: Ibuprofen  Prior History of related problems: None    Resp 16  Ht 5' 6.5\" (1.689 m)  Wt 154 lb (69.9 kg)  BMI 24.48 kg/m2         PMH:    Surgical History    Surgery Date Laterality Comments   LASIK 1/5/09   left eye with Dr. Lemus   CUSTOM LASEK 1/19/09   right eye with Dr. Alejandro Capellan   Medical History    Medical History Date Comments   Anxiety (HRC)       Depression (HRC)   Dental Conversion    History of oral surgery   Dental Conversion    Sensitive dentin   Dental Conversion    Temporomandibular disorder   Dental Conversion    Shingles         Social phobia (HRC) 10/20/2016   Screening for malignant neoplasm of cervix 07/14/2015   Overview:     2009 NILM  2010 NILM  2012 NILM  2015 NILM, neg HPV   Plan: Co-test 7/2020  ICD 10     Major depression, recurrent, full remission (HRC) 11/04/2014   Overview:     Noted at PCP visit.      Generalized anxiety disorder (HRC) 02/07/2012   Insomnia 03/01/2011   Diarrhea 10/26/2009   Blepharitis 05/13/2009   Overview:     Epic      Decreased diffusion capacity of lung 12/09/2008   Restrictive lung disease 12/09/2008   Myopia 11/14/2008   Overview:   "   Myopia - LasEk both 2009     Episodic mood disorder (HRC) 11/15/2004   Overview:     Epic      Allergic rhinitis 2003   Overview:     Epic          Active problem list:  There is no problem list on file for this patient.      FH:  Family History   Problem Relation Age of Onset     Diabetes Father    Family History    Medical History Relation Name Comments   Diabetes, Type II Birth Father       Hypertension Birth Father       Cataract Birth Mother       Anxiety Maternal Grandmother       Bleeding Disorder Maternal Grandmother       Cataract Maternal Grandmother       Diabetes, Type II Maternal Grandmother       Glaucoma No Family History       Macular Degeneration No Family History       Retinal Detachment No Family History       Dementia Paternal Grandfather         Relation Name Status Comments   Birth Father   Alive     Birth Mother   Alive     Brother   Alive     Maternal Grandfather    heart attack   Maternal Grandmother   Alive     No Family History         Paternal Grandfather    dementia   Paternal Grandmother            SH:  Social History     Social History     Marital status: Single     Spouse name: N/A     Number of children: N/A     Years of education: N/A     Occupational History     Not on file.     Social History Main Topics     Smoking status: Never Smoker     Smokeless tobacco: Never Used     Alcohol use Yes      Comment: occasional      Drug use: No     Sexual activity: Not Currently     Other Topics Concern     Not on file     Social History Narrative       MEDS:  See EMR, reviewed  ALL:  See EMR, reviewed    REVIEW OF SYSTEMS:  CONSTITUTIONAL:NEGATIVE for fever, chills, change in weight  INTEGUMENTARY/SKIN: NEGATIVE for worrisome rashes, moles or lesions  EYES: NEGATIVE for vision changes or irritation  ENT/MOUTH: NEGATIVE for ear, mouth and throat problems  RESP:NEGATIVE for significant cough or SOB  BREAST: NEGATIVE for masses, tenderness or discharge  CV: NEGATIVE for  chest pain, palpitations or peripheral edema  GI: NEGATIVE for nausea, abdominal pain, heartburn, or change in bowel habits  :NEGATIVE for frequency, dysuria, or hematuria  :NEGATIVE for frequency, dysuria, or hematuria  NEURO: NEGATIVE for weakness, dizziness or paresthesias  ENDOCRINE: NEGATIVE for temperature intolerance, skin/hair changes  HEME/ALLERGY/IMMUNE: NEGATIVE for bleeding problems  PSYCHIATRIC: NEGATIVE for changes in mood or affect        OBJECTIVE:  In the MARLENI position she does have decreased range of motion on the affected side compared to her non-affected side.  She does have pain-free range of motion in the right hip, however, to internal rotation, external rotation and abduction.  She is nontender over the pubic symphysis or the adductor tubercle, nontender at the anterior superior iliac spine, nontender at the sacroiliac joint.  Spring testing of the lumbar spine is without discomfort.  She is nontender over the greater trochanter on the right.  Straight leg raise is negative.  Lower extremity strength to flexion/extension at hip, knee, ankle and foot is normal, 5 out of 5 bilaterally.  Distal pulses on the right are 2+.  Sensation is normal in the extremity.      IMAGING:  An x-ray shows no DJD at the hip.  There may be some slight fullness at the femoral head-neck junction that could be consistent with some impingement.      ASSESSMENT:  Right-sided impingement syndrome.      PLAN:  We discussed the nature of the labrum and the hip.  It is possible that if an MRI were done with contrast injected it would show some labral wear.  She is looking at maximizing function.  She will see a physical therapist for hip impingement protocol and we discussed options of outpatient training that she can do in addition to the walking and biking that she enjoys.  These training options that focus on some relative hip range of motion, proper stance work, pelvic relaxation and supportive strength.  She  knows if this is not helpful enough, she can return for advanced imaging and if necessary injections could be tried.

## 2018-07-19 NOTE — Clinical Note
Thank you for allowing me to see your patient in Sports Medicine Clinic.  Please see the attached copy of our visit.  Sincerely,  August Mayorga MD

## 2018-07-19 NOTE — LETTER
"  7/19/2018      RE: Sondra Sorensen  [address]  [facility] [address]       Sports Medicine Clinic Visit    PCP: Clinic, [facility]    Sondra Sorensen is a 42 year old female who is seen  in consultation at the request of Dr. Davey presenting with right hip pain. The pt reports that she first started noticing pain after taking long walks this spring, after being sedentary over the winter.  Pain is intermittent, often at right groin.  Notes it rotating hip to exit the car.    HIP RIGHT TWO TO THREE VIEWS   7/5/2018 6:41 PM      HISTORY: Hip pain, right     COMPARISON: None.         IMPRESSION: Normal.     WOJCIECH SELBY MD          Injury: None    Location of Pain: right anterolateral hip  Duration of Pain: 2-3 month(s)  Rating of Pain: 5/10 at worst   Pain is better with: Ibuprofen  Pain is worse with: Abduction, walking  Additional Features: None  Treatment so far consists of: Ibuprofen  Prior History of related problems: None    Resp 16  Ht 5' 6.5\" (1.689 m)  Wt 154 lb (69.9 kg)  BMI 24.48 kg/m2         PMH:    Surgical History    Surgery Date Laterality Comments   LASIK 1/5/09   left eye with Dr. Lemus   CUSTOM LASEK 1/19/09   right eye with Dr. Alejandro Capellan   Medical History    Medical History Date Comments   Anxiety (HRC)       Depression (HRC)   Dental Conversion    History of oral surgery   Dental Conversion    Sensitive dentin   Dental Conversion    Temporomandibular disorder   Dental Conversion    Shingles         Social phobia (HRC) 10/20/2016   Screening for malignant neoplasm of cervix 07/14/2015   Overview:     2009 NILM  2010 NILM  2012 NILM  2015 NILM, neg HPV   Plan: Co-test 7/2020  ICD 10     Major depression, recurrent, full remission (HRC) 11/04/2014   Overview:     Noted at PCP visit.      Generalized anxiety disorder (HRC) 02/07/2012   Insomnia 03/01/2011   Diarrhea 10/26/2009   Blepharitis 05/13/2009   Overview:     Epic      Decreased diffusion capacity of lung " 2008   Restrictive lung disease 2008   Myopia 2008   Overview:     Myopia - LasEk both 2009     Episodic mood disorder (HRC) 11/15/2004   Overview:     Epic      Allergic rhinitis 2003   Overview:     Epic          Active problem list:  There is no problem list on file for this patient.      FH:  Family History   Problem Relation Age of Onset     Diabetes Father    Family History    Medical History Relation Name Comments   Diabetes, Type II Birth Father       Hypertension Birth Father       Cataract Birth Mother       Anxiety Maternal Grandmother       Bleeding Disorder Maternal Grandmother       Cataract Maternal Grandmother       Diabetes, Type II Maternal Grandmother       Glaucoma No Family History       Macular Degeneration No Family History       Retinal Detachment No Family History       Dementia Paternal Grandfather         Relation Name Status Comments   Birth Father   Alive     Birth Mother   Alive     Brother   Alive     Maternal Grandfather    heart attack   Maternal Grandmother   Alive     No Family History         Paternal Grandfather    dementia   Paternal Grandmother            SH:  Social History     Social History     Marital status: Single     Spouse name: N/A     Number of children: N/A     Years of education: N/A     Occupational History     Not on file.     Social History Main Topics     Smoking status: Never Smoker     Smokeless tobacco: Never Used     Alcohol use Yes      Comment: occasional      Drug use: No     Sexual activity: Not Currently     Other Topics Concern     Not on file     Social History Narrative       MEDS:  See EMR, reviewed  ALL:  See EMR, reviewed    REVIEW OF SYSTEMS:  CONSTITUTIONAL:NEGATIVE for fever, chills, change in weight  INTEGUMENTARY/SKIN: NEGATIVE for worrisome rashes, moles or lesions  EYES: NEGATIVE for vision changes or irritation  ENT/MOUTH: NEGATIVE for ear, mouth and throat problems  RESP:NEGATIVE for significant  cough or SOB  BREAST: NEGATIVE for masses, tenderness or discharge  CV: NEGATIVE for chest pain, palpitations or peripheral edema  GI: NEGATIVE for nausea, abdominal pain, heartburn, or change in bowel habits  :NEGATIVE for frequency, dysuria, or hematuria  :NEGATIVE for frequency, dysuria, or hematuria  NEURO: NEGATIVE for weakness, dizziness or paresthesias  ENDOCRINE: NEGATIVE for temperature intolerance, skin/hair changes  HEME/ALLERGY/IMMUNE: NEGATIVE for bleeding problems  PSYCHIATRIC: NEGATIVE for changes in mood or affect        OBJECTIVE:  In the MARLENI position she does have decreased range of motion on the affected side compared to her non-affected side.  She does have pain-free range of motion in the right hip, however, to internal rotation, external rotation and abduction.  She is nontender over the pubic symphysis or the adductor tubercle, nontender at the anterior superior iliac spine, nontender at the sacroiliac joint.  Spring testing of the lumbar spine is without discomfort.  She is nontender over the greater trochanter on the right.  Straight leg raise is negative.  Lower extremity strength to flexion/extension at hip, knee, ankle and foot is normal, 5 out of 5 bilaterally.  Distal pulses on the right are 2+.  Sensation is normal in the extremity.      IMAGING:  An x-ray shows no DJD at the hip.  There may be some slight fullness at the femoral head-neck junction that could be consistent with some impingement.      ASSESSMENT:  Right-sided impingement syndrome.      PLAN:  We discussed the nature of the labrum and the hip.  It is possible that if an MRI were done with contrast injected it would show some labral wear.  She is looking at maximizing function.  She will see a physical therapist for hip impingement protocol and we discussed options of outpatient training that she can do in addition to the walking and biking that she enjoys.  These training options that focus on some relative hip  range of motion, proper stance work, pelvic relaxation and supportive strength.  She knows if this is not helpful enough, she can return for advanced imaging and if necessary injections could be tried.         August Mayorga MD

## 2018-07-24 ENCOUNTER — THERAPY VISIT (OUTPATIENT)
Dept: PHYSICAL THERAPY | Facility: CLINIC | Age: 43
End: 2018-07-24
Payer: COMMERCIAL

## 2018-07-24 DIAGNOSIS — M25.551 HIP PAIN, RIGHT: ICD-10-CM

## 2018-07-24 PROCEDURE — 97530 THERAPEUTIC ACTIVITIES: CPT | Mod: GP | Performed by: PHYSICAL THERAPIST

## 2018-07-24 PROCEDURE — 97161 PT EVAL LOW COMPLEX 20 MIN: CPT | Mod: GP | Performed by: PHYSICAL THERAPIST

## 2018-07-24 ASSESSMENT — ACTIVITIES OF DAILY LIVING (ADL)
WALKING_15_MINUTES_OR_GREATER: MODERATE DIFFICULTY
TWISTING/PIVOTING_ON_INVOLVED_LEG: SLIGHT DIFFICULTY
GOING_DOWN_1_FLIGHT_OF_STAIRS: NO DIFFICULTY AT ALL
LIGHT_TO_MODERATE_WORK: SLIGHT DIFFICULTY
HOW_WOULD_YOU_RATE_YOUR_CURRENT_LEVEL_OF_FUNCTION_DURING_YOUR_USUAL_ACTIVITIES_OF_DAILY_LIVING_FROM_0_TO_100_WITH_100_BEING_YOUR_LEVEL_OF_FUNCTION_PRIOR_TO_YOUR_HIP_PROBLEM_AND_0_BEING_THE_INABILITY_TO_PERFORM_ANY_OF_YOUR_USUAL_DAILY_ACTIVITIES?: 90
HOS_ADL_HIGHEST_POTENTIAL_SCORE: 52
RECREATIONAL_ACTIVITIES: SLIGHT DIFFICULTY
HEAVY_WORK: SLIGHT DIFFICULTY
GETTING_INTO_AND_OUT_OF_AN_AVERAGE_CAR: SLIGHT DIFFICULTY
STEPPING_UP_AND_DOWN_CURBS: NO DIFFICULTY AT ALL
HOS_ADL_COUNT: 13
WALKING_INITIALLY: NO DIFFICULTY AT ALL
ROLLING_OVER_IN_BED: NO DIFFICULTY AT ALL
WALKING_APPROXIMATELY_10_MINUTES: SLIGHT DIFFICULTY
PUTTING_ON_SOCKS_AND_SHOES: NO DIFFICULTY AT ALL
GOING_UP_1_FLIGHT_OF_STAIRS: NO DIFFICULTY AT ALL
STANDING_FOR_15_MINUTES: NO DIFFICULTY AT ALL
HOS_ADL_ITEM_SCORE_TOTAL: 44
SITTING_FOR_15_MINUTES: NO DIFFICULTY AT ALL

## 2018-07-24 NOTE — PROGRESS NOTES
KEY PT FINDINGS:  1) Significant weakness of the proximal hip and core  2) Pain at end range flexion and IR but - FADIR  3) Restricted ROM into IR and ER    Physical Therapy Initial Evaluation: Subjective History     Injury/Condition Details:  Presenting Complaint Right hip   Onset Timing/Date April 2018   Mechanism Was more sedentary over the winter and then began to increase her walking in the spring and began to notice more pain in her right hip with walking.   Has more pain when she goes out to the side.   In the morning and at night, sore.      Symptom Behavior Details    Primary Symptoms Sporadic symptoms; Activity/position dependent, pain (Location: Anterior right hip, groin line, Quality: Aching/Throbbing), stiffness, feels like her pants were too tight around her hip   Worst Pain 4/10 (with walking 3 miles)   Symptom Provocators Walking, biking, being still makes it more stiff   Best Pain 0/10    Symptom Relievers Modifying activities.    Time of day dependent? Worse in evening after activity and Stiffness in morning   Recent symptom change? symptoms worsening slowly     Prior Testing/Intervention for current condition:  Prior Tests  x-ray   Prior Treatment none     Lifestyle & General Medical History:  Employment Clerical work - In writing studies   Usual physical activities  (within past year) Walking to work (1 mile), walking for exercise (3 mile loop - too much). Bike   Orthopaedic history See Epic Chart   Notable medical history See Epic Chart     Lower Extremity Physical Therapy Examination    Dynamic Movement Screen:  2 leg squat: increased trunk flexion    1 leg stance: Right: Proprioceptive deficits; Left: Proprioceptive deficits  1 leg squat:   Right: Poor control of trunk, increased frontal plane trunk and knee motion  Left: Poor control of trunk, increased frontal plane trunk and knee motion    Gait: Non-antalgic gait pattern, increased frontal plane trunk motion           Hip Joint ROM   Flex  ER IR   Right 1000 deg 56 deg 42 deg   Left 100 deg 53 deg 52 deg     Lower Extremity Muscle Strength (x/5)   Hip IR Hip ER Hip ABD Hip Ext   Right  4+/5 4-/5 3+/5 3+/5   Left 4+/5 4-/5 3+/5 3+/5     Basic Muscle Activation:  Transversus Abdominus: Impaired trunk control, note pelvic drop with DL bridge test    Lower Extremity Flexibility Screen:  Hamstrings (Supine SLR): Right: ++; Left: ++  Gastroc (Supine Active DF): Right: +; Left: +  Quadriceps (Prone KF): Right: -; Left: -    Hip Special Testing:  Test Right Left   FADDIR (-) (-)   MARLENI (+) (-)   Log Roll (ER) (-) (-)   Resisted SLR (-) (-)     Resisted Movement Testing:  Test Right Left   Adduction Squeeze (-) (-)   Psoas (-) (-)   TFL (-) (-)   ABD (-) (-)       Assessment/Plan:  Sondra presents to physical therapy with complaints of right hip pain. Her primary findings are noted above. She would benefit from proximal hip strengthening and manual therapy to improve her range of motion.     Patient is a 42 year old female with right side hip complaints.    Patient has the following significant findings with corresponding treatment plan.                Diagnosis 1:  Right hip impingement  Pain -  hot/cold therapy, manual therapy, STS, splint/taping/bracing/orthotics, self management and education  Decreased ROM/flexibility - manual therapy, therapeutic exercise and home program  Decreased joint mobility - manual therapy, therapeutic exercise and home program  Decreased strength - therapeutic exercise, therapeutic activities and home program  Impaired balance - neuro re-education, therapeutic activities and home program  Decreased proprioception - neuro re-education, therapeutic activities and home program  Decreased function - therapeutic activities and home program    Therapy Evaluation Codes:   1) History comprised of:   Personal factors that impact the plan of care:      Overall behavior pattern and Time since onset of symptoms.    Comorbidity factors  that impact the plan of care are:      Weakness.     Medications impacting care: None.  2) Examination of Body Systems comprised of:   Body structures and functions that impact the plan of care:      Hip.   Activity limitations that impact the plan of care are:      Squatting/kneeling and Walking.  3) Clinical presentation characteristics are:   Stable/Uncomplicated.  4) Decision-Making    Low complexity using standardized patient assessment instrument and/or measureable assessment of functional outcome.  Cumulative Therapy Evaluation is: Low complexity.    Previous and current functional limitations:  (See Goal Flow Sheet for this information)    Short term and Long term goals: (See Goal Flow Sheet for this information)     Communication ability:  Patient appears to be able to clearly communicate and understand verbal and written communication and follow directions correctly.  Treatment Explanation - The following has been discussed with the patient:   RX ordered/plan of care  Anticipated outcomes  Possible risks and side effects  This patient would benefit from PT intervention to resume normal activities.   Rehab potential is good.    Frequency:  1 X week, once daily  Duration:  for 6 weeks  Discharge Plan:  Achieve all LTG.  Independent in home treatment program.  Reach maximal therapeutic benefit.    Please refer to the daily flowsheet for treatment today, total treatment time and time spent performing 1:1 timed codes.

## 2018-07-24 NOTE — MR AVS SNAPSHOT
After Visit Summary   7/24/2018    Sondra Sorensen    MRN: [id number]           Patient Information     Date Of Birth          1975        Visit Information        Provider Department      7/24/2018 9:40 AM Sondra Uriarte, PT [facility]        Today's Diagnoses     Hip pain, right           Follow-ups after your visit        Your next 10 appointments already scheduled     Aug 02, 2018  9:40 AM CDT   [facility] Extremity with [facility] ([facility]    [address] [place] [address] [phone]            Aug 09, 2018  8:20 AM CDT   [facility] Extremity with Sondra Uriarte PT   [facility] ([facility])    [address] [place] [address] [phone]              Who to contact     If you have questions or need follow up information about today's clinic visit or your schedule please contact [facility] directly at [phone].  Normal or non-critical lab and imaging results will be communicated to you by [employer], letter or phone within 4 business days after the clinic has received the results. If you do not hear from us within 7 days, please contact the clinic through [employer] or phone. If you have a critical or abnormal lab result, we will notify you by phone as soon as possible.  Submit refill requests through [employer] or call your pharmacy and they will forward the refill request to us. Please allow 3 business days for your refill to be completed.          Additional Information About Your Visit        [employer] Information     [employer] gives you secure access to your electronic health record. If you see a primary care provider, you can also send messages to your care team and make appointments. If you have questions, please call your primary care clinic.  If you do not have a primary care provider,  please call [phone] and they will assist you.        Care EveryWhere ID     This is your Care EveryWhere ID. This could be used by other organizations to access your [facility] medical records  [id number]         Blood Pressure from Last 3 Encounters:   07/05/18 120/77   06/01/18 120/73   05/25/18 126/80    Weight from Last 3 Encounters:   07/19/18 69.9 kg (154 lb)   07/05/18 69.9 kg (154 lb)   06/01/18 70.3 kg (155 lb)              We Performed the Following     HC PT EVAL, LOW COMPLEXITY     [facility] INITIAL EVAL REPORT     THERAPEUTIC ACTIVITIES        Primary Care Provider Office Phone # Fax #    [facility] [place] [phone]       [address] [place] [id number]        Equal Access to Services     MICK DAY : Hadii steve zelaya Soblaine, waaxda luqadaha, qaybta kaalmada adeegyavale, nedra waters . So [facility] [phone].    ATENCIÓN: Si habla español, tiene a acevedo disposición servicios gratuitos de asistencia lingüística. Angela al [phone].    We comply with applicable federal civil rights laws and [place] laws. We do not discriminate on the basis of race, color, national origin, age, disability, sex, sexual orientation, or gender identity.            Thank you!     Thank you for choosing [facility]  for your care. Our goal is always to provide you with excellent care. Hearing back from our patients is one way we can continue to improve our services. Please take a few minutes to complete the written survey that you may receive in the mail after your visit with us. Thank you!             Your Updated Medication List - Protect others around you: Learn how to safely use, store and throw away your medicines at www.disposemymeds.org.          This list is accurate as of 7/24/18  5:46 PM.  Always use your most recent med list.                   Brand Name Dispense Instructions for use Diagnosis    ALIGN Caps      Take  by mouth daily        APRI PO      28 day skipping placebo pills        CALCIUM + D PO           cephALEXin 500 MG capsule    KEFLEX    30 capsule    Take 1 capsule (500 mg) by mouth 3 times daily    Sebaceous cyst       cetirizine 10 MG tablet    zyrTEC     Take 10 mg by mouth daily        CITRUCEL PO      Take by mouth 2 times daily        [employer] MELATONIN 3 MG tablet   Generic drug:  melatonin      Take by mouth nightly as needed for sleep Takes 3-12 mg Nightly        FINACEA 15 % gel   Generic drug:  Azelaic Acid      Apply 0.5 inches topically        hydrOXYzine 10 MG tablet    ATARAX     Take 10 mg by mouth as needed for itching        IBUPROFEN PO      Take 200 mg by mouth        IMODIUM OR      Prn        * ketoconazole 2 % shampoo    NIZORAL     Apply topically as needed for itching or irritation        * ketoconazole 2 % cream    NIZORAL     Apply topically as needed for itching        mometasone 50 MCG/ACT spray    NASONEX     Spray 2 sprays into both nostrils as needed        olopatadine 0.1 % ophthalmic solution    PATANOL     1 drop 2 times daily        * Notice:  This list has 2 medication(s) that are the same as other medications prescribed for you. Read the directions carefully, and ask your doctor or other care provider to review them with you.

## 2018-08-02 ENCOUNTER — THERAPY VISIT (OUTPATIENT)
Dept: PHYSICAL THERAPY | Facility: CLINIC | Age: 43
End: 2018-08-02
Payer: COMMERCIAL

## 2018-08-02 DIAGNOSIS — M25.551 HIP PAIN, RIGHT: ICD-10-CM

## 2018-08-02 PROCEDURE — 97110 THERAPEUTIC EXERCISES: CPT | Mod: GP | Performed by: PHYSICAL THERAPIST

## 2018-08-02 PROCEDURE — 97140 MANUAL THERAPY 1/> REGIONS: CPT | Mod: GP | Performed by: PHYSICAL THERAPIST

## 2018-08-24 ENCOUNTER — THERAPY VISIT (OUTPATIENT)
Dept: PHYSICAL THERAPY | Facility: CLINIC | Age: 43
End: 2018-08-24
Payer: COMMERCIAL

## 2018-08-24 DIAGNOSIS — M25.551 HIP PAIN, RIGHT: ICD-10-CM

## 2018-08-24 PROCEDURE — 97112 NEUROMUSCULAR REEDUCATION: CPT | Mod: GP | Performed by: PHYSICAL THERAPIST

## 2018-08-24 PROCEDURE — 97530 THERAPEUTIC ACTIVITIES: CPT | Mod: GP | Performed by: PHYSICAL THERAPIST

## 2018-08-24 PROCEDURE — 97140 MANUAL THERAPY 1/> REGIONS: CPT | Mod: GP | Performed by: PHYSICAL THERAPIST

## 2018-09-12 ENCOUNTER — HEALTH MAINTENANCE LETTER (OUTPATIENT)
Age: 43
End: 2018-09-12

## 2018-09-19 ENCOUNTER — THERAPY VISIT (OUTPATIENT)
Dept: PHYSICAL THERAPY | Facility: CLINIC | Age: 43
End: 2018-09-19
Payer: COMMERCIAL

## 2018-09-19 DIAGNOSIS — M25.551 HIP PAIN, RIGHT: ICD-10-CM

## 2018-09-19 PROCEDURE — 97530 THERAPEUTIC ACTIVITIES: CPT | Mod: GP | Performed by: PHYSICAL THERAPIST

## 2018-09-19 PROCEDURE — 97110 THERAPEUTIC EXERCISES: CPT | Mod: GP | Performed by: PHYSICAL THERAPIST

## 2018-09-19 ASSESSMENT — ACTIVITIES OF DAILY LIVING (ADL)
TWISTING/PIVOTING_ON_INVOLVED_LEG: NO DIFFICULTY AT ALL
HOS_ADL_COUNT: 17
SITTING_FOR_15_MINUTES: NO DIFFICULTY AT ALL
HEAVY_WORK: NO DIFFICULTY AT ALL
ROLLING_OVER_IN_BED: NO DIFFICULTY AT ALL
WALKING_DOWN_STEEP_HILLS: NO DIFFICULTY AT ALL
RECREATIONAL_ACTIVITIES: NO DIFFICULTY AT ALL
LIGHT_TO_MODERATE_WORK: NO DIFFICULTY AT ALL
HOS_ADL_SCORE(%): 100
GOING_UP_1_FLIGHT_OF_STAIRS: NO DIFFICULTY AT ALL
GETTING_INTO_AND_OUT_OF_A_BATHTUB: NO DIFFICULTY AT ALL
PUTTING_ON_SOCKS_AND_SHOES: NO DIFFICULTY AT ALL
DEEP_SQUATTING: NO DIFFICULTY AT ALL
STEPPING_UP_AND_DOWN_CURBS: NO DIFFICULTY AT ALL
GOING_DOWN_1_FLIGHT_OF_STAIRS: NO DIFFICULTY AT ALL
WALKING_UP_STEEP_HILLS: NO DIFFICULTY AT ALL
WALKING_INITIALLY: NO DIFFICULTY AT ALL
STANDING_FOR_15_MINUTES: NO DIFFICULTY AT ALL
HOW_WOULD_YOU_RATE_YOUR_CURRENT_LEVEL_OF_FUNCTION_DURING_YOUR_USUAL_ACTIVITIES_OF_DAILY_LIVING_FROM_0_TO_100_WITH_100_BEING_YOUR_LEVEL_OF_FUNCTION_PRIOR_TO_YOUR_HIP_PROBLEM_AND_0_BEING_THE_INABILITY_TO_PERFORM_ANY_OF_YOUR_USUAL_DAILY_ACTIVITIES?: 95
WALKING_15_MINUTES_OR_GREATER: NO DIFFICULTY AT ALL
HOS_ADL_ITEM_SCORE_TOTAL: 68
WALKING_APPROXIMATELY_10_MINUTES: NO DIFFICULTY AT ALL
HOS_ADL_HIGHEST_POTENTIAL_SCORE: 68
GETTING_INTO_AND_OUT_OF_AN_AVERAGE_CAR: NO DIFFICULTY AT ALL

## 2018-10-03 PROBLEM — E74.10 FRUCTOSE INTOLERANCE: Status: ACTIVE | Noted: 2018-10-03

## 2018-10-03 PROBLEM — Z13.6 CARDIOVASCULAR SCREENING; LDL GOAL LESS THAN 130: Status: ACTIVE | Noted: 2018-10-03

## 2018-10-03 PROBLEM — K58.0 IRRITABLE BOWEL SYNDROME WITH DIARRHEA: Status: ACTIVE | Noted: 2018-10-03

## 2018-10-04 ENCOUNTER — OFFICE VISIT (OUTPATIENT)
Dept: FAMILY MEDICINE | Facility: CLINIC | Age: 43
End: 2018-10-04
Payer: COMMERCIAL

## 2018-10-04 VITALS
OXYGEN SATURATION: 96 % | WEIGHT: 155 LBS | DIASTOLIC BLOOD PRESSURE: 76 MMHG | SYSTOLIC BLOOD PRESSURE: 125 MMHG | HEIGHT: 67 IN | HEART RATE: 96 BPM | TEMPERATURE: 98.7 F | BODY MASS INDEX: 24.33 KG/M2

## 2018-10-04 DIAGNOSIS — E74.10 FRUCTOSE INTOLERANCE: ICD-10-CM

## 2018-10-04 DIAGNOSIS — Z12.31 VISIT FOR SCREENING MAMMOGRAM: ICD-10-CM

## 2018-10-04 DIAGNOSIS — K58.0 IRRITABLE BOWEL SYNDROME WITH DIARRHEA: ICD-10-CM

## 2018-10-04 DIAGNOSIS — F33.8 SEASONAL AFFECTIVE DISORDER (H): ICD-10-CM

## 2018-10-04 DIAGNOSIS — Z23 NEED FOR PROPHYLACTIC VACCINATION AND INOCULATION AGAINST INFLUENZA: ICD-10-CM

## 2018-10-04 DIAGNOSIS — Z00.00 ROUTINE GENERAL MEDICAL EXAMINATION AT A HEALTH CARE FACILITY: Primary | ICD-10-CM

## 2018-10-04 PROCEDURE — 90471 IMMUNIZATION ADMIN: CPT | Performed by: INTERNAL MEDICINE

## 2018-10-04 PROCEDURE — 99396 PREV VISIT EST AGE 40-64: CPT | Mod: 25 | Performed by: INTERNAL MEDICINE

## 2018-10-04 PROCEDURE — 90686 IIV4 VACC NO PRSV 0.5 ML IM: CPT | Performed by: INTERNAL MEDICINE

## 2018-10-04 ASSESSMENT — ENCOUNTER SYMPTOMS
NAUSEA: 0
HEARTBURN: 0
HEADACHES: 0
JOINT SWELLING: 0
EYE PAIN: 0
PALPITATIONS: 0
DIZZINESS: 0
CHILLS: 0
SORE THROAT: 0
CONSTIPATION: 0
DIARRHEA: 1
NERVOUS/ANXIOUS: 1
DYSURIA: 0
MYALGIAS: 0
ARTHRALGIAS: 0
WEAKNESS: 0
PARESTHESIAS: 0
BREAST MASS: 0
HEMATOCHEZIA: 0
COUGH: 0
ABDOMINAL PAIN: 0
SHORTNESS OF BREATH: 0
HEMATURIA: 0
FREQUENCY: 0
FEVER: 0

## 2018-10-04 NOTE — PATIENT INSTRUCTIONS
[facility] [phone] -- Nutritionist is Leesa Bella. .     Call to schedule imaging   -- mammogram if you would like to start screening.

## 2018-10-04 NOTE — MR AVS SNAPSHOT
After Visit Summary   10/4/2018    Sondra Sorensen    MRN: [id number]           Patient Information     Date Of Birth          1975        Visit Information        Provider Department      10/4/2018 7:20 AM Ashley Davey MD [facility]        Today's Diagnoses     Routine general medical examination at a health care facility    -  1    Fructose intolerance        Irritable bowel syndrome with diarrhea        Need for prophylactic vaccination and inoculation against influenza        Visit for screening mammogram          Care Instructions     [facility] [phone] -- Nutritionist is Leesa Bella. .     Call to schedule imaging   -- mammogram if you would like to start screening.             Follow-ups after your visit        Additional Services     NUTRITION REFERRAL       Your provider has referred you to: [facility]: [facility] [phone]   [email or web address]    Please be aware that coverage of these services is subject to the terms and limitations of your health insurance plan.  Call member services at your health plan with any benefit or coverage questions.      Please bring the following with you to your appointment:    (1) This referral request  (2) Any documents given to you regarding this referral  (3) Any specific questions you have about diet and/or food choices                  Follow-up notes from your care team     Return in about 1 year (around 10/4/2019), or or sooner if needed. .      Future tests that were ordered for you today     Open Future Orders        Priority Expected Expires Ordered    *MA Screening Digital Bilateral Routine  10/4/2019 10/4/2018            Who to contact     If you have questions or need follow up information about today's clinic visit or your schedule please contact [facility] directly  "at [phone].  Normal or non-critical lab and imaging results will be communicated to you by MyChart, letter or phone within 4 business days after the clinic has received the results. If you do not hear from us within 7 days, please contact the clinic through [employer] or phone. If you have a critical or abnormal lab result, we will notify you by phone as soon as possible.  Submit refill requests through [employer] Â® or call your pharmacy and they will forward the refill request to us. Please allow 3 business days for your refill to be completed.          Additional Information About Your Visit        [employer] Information     [employer] Â® gives you secure access to your electronic health record. If you see a primary care provider, you can also send messages to your care team and make appointments. If you have questions, please call your primary care clinic.  If you do not have a primary care provider, please call [phone] and they will assist you.        Care EveryWhere ID     This is your Care EveryWhere ID. This could be used by other organizations to access your [place] medical records  [id number]        Your Vitals Were     Pulse Temperature Height Pulse Oximetry BMI (Body Mass Index)       96 98.7  F (37.1  C) (Oral) 5' 7\" (1.702 m) 96% 24.28 kg/m2        Blood Pressure from Last 3 Encounters:   10/04/18 125/76   07/05/18 120/77   06/01/18 120/73    Weight from Last 3 Encounters:   10/04/18 155 lb (70.3 kg)   07/19/18 154 lb (69.9 kg)   07/05/18 154 lb (69.9 kg)              We Performed the Following     FLU VACCINE, SPLIT VIRUS, IM (QUADRIVALENT) [44814]- >3 YRS     NUTRITION REFERRAL     Vaccine Administration, Initial [[id number]]        Primary Care Provider Office Phone # Fax #    [facility] [phone]       [address] [place] [address]        Equal Access to Services     MICK DAY AH: Heather Alonzo, artemio shore, cecilia montes de oca " nedra betancourtjoann flowersaamichael ah. [facility] [phone].    ATENCIÓN: Si tamrala steve, tiene a acevedo disposición servicios gratuitos de asistencia lingüística. Angela al [phone].    We comply with applicable federal civil rights laws and [place] laws. We do not discriminate on the basis of race, color, national origin, age, disability, sex, sexual orientation, or gender identity.            Thank you!     Thank you for choosing [facility]  for your care. Our goal is always to provide you with excellent care. Hearing back from our patients is one way we can continue to improve our services. Please take a few minutes to complete the written survey that you may receive in the mail after your visit with us. Thank you!             Your Updated Medication List - Protect others around you: Learn how to safely use, store and throw away your medicines at www.disposemymeds.org.          This list is accurate as of 10/4/18  8:33 AM.  Always use your most recent med list.                   Brand Name Dispense Instructions for use Diagnosis    ALIGN Caps      Take by mouth daily        APRI PO      28 day skipping placebo pills        CALCIUM + D PO           cetirizine 10 MG tablet    zyrTEC     Take 10 mg by mouth daily        CITRUCEL PO      Take by mouth 2 times daily        CVS MELATONIN 3 MG tablet   Generic drug:  melatonin      Take by mouth nightly as needed for sleep Takes 3-12 mg Nightly        FINACEA 15 % gel   Generic drug:  Azelaic Acid      Apply 0.5 inches topically        hydrOXYzine 10 MG tablet    ATARAX     Take 10 mg by mouth as needed for itching        IBUPROFEN PO      Take 200 mg by mouth        IMODIUM OR      Prn        * ketoconazole 2 % shampoo    NIZORAL     Apply topically as needed for itching or irritation        * ketoconazole 2 % cream    NIZORAL     Apply topically as needed for itching        mometasone 50 MCG/ACT spray    NASONEX     Spray 2  sprays into both nostrils as needed        olopatadine 0.1 % ophthalmic solution    PATANOL     1 drop 2 times daily        * Notice:  This list has 2 medication(s) that are the same as other medications prescribed for you. Read the directions carefully, and ask your doctor or other care provider to review them with you.

## 2018-10-04 NOTE — PROGRESS NOTES
Answers for HPI/ROS submitted by the patient on 10/4/2018   Annual Exam:  Getting at least 3 servings of Calcium per day:: NO  Bi-annual eye exam:: Yes  Dental care twice a year:: Yes  Sleep apnea or symptoms of sleep apnea:: Daytime drowsiness  Diet:: Other  Frequency of exercise:: 4-5 days/week  Positive for the following: diarrhea, nervous/anxious  Negative for the following: abdominal pain, Blood in stool, Blood in urine, chest pain, chills, congestion, constipation, cough, dizziness, ear pain, eye pain, fever, frequency, genital sores, headaches, hearing loss, heartburn, arthralgias, joint swelling, peripheral edema, mood changes, myalgias, nausea, dysuria, palpitations, Skin sensation changes, sore throat, urgency, rash, shortness of breath, visual disturbance, weakness  pelvic pain: Yes  vaginal bleeding: No  vaginal discharge: No  tenderness: No  breast mass: No  breast discharge: No  Taking medications regularly:: Yes  Medication side effects:: None  Additional concerns today:: YES  PHQ-2 Score: 1  Duration of exercise:: 15-30 minutes      Injectable Influenza Immunization Documentation    1.  Is the person to be vaccinated sick today?   No    2. Does the person to be vaccinated have an allergy to a component   of the vaccine?   No  Egg Allergy Algorithm Link    3. Has the person to be vaccinated ever had a serious reaction   to influenza vaccine in the past?   No    4. Has the person to be vaccinated ever had Guillain-Barré syndrome?   No    Form completed by Judit Rodas ma

## 2018-10-04 NOTE — PROGRESS NOTES
SUBJECTIVE:   CC: Sondra Sorensen is an 42 year old woman who presents for preventive health visit.     41 y/o F with h/o insomnia (recent trial hydroxazine&melatonin) and IBS-diarrhea predominant.  Via GI referral, it was determined she has fructose intolerance.  She would like to see a dietician (referral).......      *     Has not seen nutrition consult yet.  Has avoided fructose and some stools have been formed now.      She is having SAD symptoms and will see psychiatrist soon. Going to therapy regularly.     She is wondering about mammography and is at average risk.      Physical   Annual:     Getting at least 3 servings of Calcium per day:  NO    Bi-annual eye exam:  Yes    Dental care twice a year:  Yes    Sleep apnea or symptoms of sleep apnea:  Daytime drowsiness    Diet:  Other    Frequency of exercise:  4-5 days/week    Duration of exercise:  15-30 minutes    Taking medications regularly:  Yes    Medication side effects:  None    Additional concerns today:  YES        Referral for a dietician     Today's PHQ-2 Score:   PHQ-2 ( 1999 Pfizer) 10/4/2018   Q1: Little interest or pleasure in doing things 1   Q2: Feeling down, depressed or hopeless 0   PHQ-2 Score 1   Q1: Little interest or pleasure in doing things Several days   Q2: Feeling down, depressed or hopeless Not at all   PHQ-2 Score 1       Abuse: Current or Past(Physical, Sexual or Emotional)- No  Do you feel safe in your environment - Yes    Social History   Substance Use Topics     Smoking status: Never Smoker     Smokeless tobacco: Never Used     Alcohol use Yes      Comment: occasional      Alcohol Use 10/4/2018   If you drink alcohol do you typically have greater than 3 drinks per day OR greater than 7 drinks per week? No       Reviewed orders with patient.  Reviewed health maintenance and updated orders accordingly - Yes  Labs reviewed in EPIC  BP Readings from Last 3 Encounters:   10/04/18 125/76   07/05/18 120/77   06/01/18 120/73    Wt  Readings from Last 3 Encounters:   10/04/18 155 lb (70.3 kg)   07/19/18 154 lb (69.9 kg)   07/05/18 154 lb (69.9 kg)                  Patient Active Problem List   Diagnosis     Fructose intolerance     CARDIOVASCULAR SCREENING; LDL GOAL LESS THAN 130     Irritable bowel syndrome with diarrhea     Seasonal affective disorder (H)     History reviewed. No pertinent surgical history.    Social History   Substance Use Topics     Smoking status: Never Smoker     Smokeless tobacco: Never Used     Alcohol use Yes      Comment: occasional      Family History   Problem Relation Age of Onset     Diabetes Father          Current Outpatient Prescriptions   Medication Sig Dispense Refill     Calcium Citrate-Vitamin D (CALCIUM + D PO)        cetirizine (ZYRTEC) 10 MG tablet Take 10 mg by mouth daily       Desogestrel-Ethinyl Estradiol (APRI PO) 28 day skipping placebo pills       hydrOXYzine (ATARAX) 10 MG tablet Take 10 mg by mouth as needed for itching       IBUPROFEN PO Take 200 mg by mouth       ketoconazole (NIZORAL) 2 % cream Apply topically as needed for itching       ketoconazole (NIZORAL) 2 % shampoo Apply topically as needed for itching or irritation       Loperamide HCl (IMODIUM OR) Prn       melatonin (CVS MELATONIN) 3 MG tablet Take by mouth nightly as needed for sleep Takes 3-12 mg Nightly       Methylcellulose, Laxative, (CITRUCEL PO) Take by mouth 2 times daily       mometasone (NASONEX) 50 MCG/ACT spray Spray 2 sprays into both nostrils as needed       olopatadine (PATANOL) 0.1 % ophthalmic solution 1 drop 2 times daily       Probiotic Product (ALIGN) CAPS Take by mouth daily       Azelaic Acid (FINACEA) 15 % gel Apply 0.5 inches topically       Allergies   Allergen Reactions     Doxycycline      Penicillins Rash     rash     Recent Labs   Lab Test  05/25/18   1452   TSH  1.33        Patient under age 50, mutual decision reflected in health maintenance.      Pertinent mammograms are reviewed under the imaging  tab.  History of abnormal Pap smear: NO - age 30-65 PAP every 5 years with negative HPV co-testing recommended     Reviewed and updated as needed this visit by clinical staff  Tobacco  Allergies  Meds  Med Hx  Surg Hx  Fam Hx  Soc Hx        Reviewed and updated as needed this visit by Provider          History reviewed. No pertinent past medical history.     Review of Systems   Constitutional: Negative for chills and fever.   HENT: Negative for congestion, ear pain, hearing loss and sore throat.    Eyes: Negative for pain and visual disturbance.   Respiratory: Negative for cough and shortness of breath.    Cardiovascular: Negative for chest pain, palpitations and peripheral edema.   Gastrointestinal: Positive for diarrhea. Negative for abdominal pain, constipation, heartburn, hematochezia and nausea.   Breasts:  Negative for tenderness, breast mass and discharge.   Genitourinary: Positive for pelvic pain. Negative for dysuria, frequency, genital sores, hematuria, urgency, vaginal bleeding and vaginal discharge.   Musculoskeletal: Negative for arthralgias, joint swelling and myalgias.   Skin: Negative for rash.   Neurological: Negative for dizziness, weakness, headaches and paresthesias.   Psychiatric/Behavioral: Negative for mood changes. The patient is nervous/anxious.      CONSTITUTIONAL: NEGATIVE for fever, chills, change in weight  INTEGUMENTARU/SKIN: NEGATIVE for worrisome rashes, moles or lesions  EYES: NEGATIVE for vision changes or irritation  ENT: NEGATIVE for ear, mouth and throat problems  RESP: NEGATIVE for significant cough or SOB  BREAST: NEGATIVE for masses, tenderness or discharge  CV: NEGATIVE for chest pain, palpitations or peripheral edema  GI: NEGATIVE for nausea, abdominal pain, heartburn, or change in bowel habits--IBS symptoms might be improved with dietary changes...   : NEGATIVE for unusual urinary or vaginal symptoms. Periods are regular. On OCP to help with cramps.     MUSCULOSKELETAL: NEGATIVE for significant arthralgias or myalgia  NEURO: NEGATIVE for weakness, dizziness or paresthesias  ENDOCRINE: NEGATIVE for temperature intolerance, skin/hair changes  PSYCHIATRIC: NEGATIVE for changes in mood or affect  -- tends toward dysthymia in winter months.       OBJECTIVE:   /76 (BP Location: Right arm, Patient Position: Sitting, Cuff Size: Adult Regular)  Pulse 96  Temp 98.7  F (37.1  C) (Oral)  Wt 155 lb (70.3 kg)  SpO2 96%  BMI 24.64 kg/m2  Physical Exam  GENERAL APPEARANCE: healthy, alert and no distress  EYES: Eyes grossly normal to inspection, PERRL and conjunctivae and sclerae normal  HENT: ear canals and TM's normal, nose and mouth without ulcers or lesions, oropharynx clear and oral mucous membranes moist  NECK: no adenopathy, no asymmetry, masses, or scars and thyroid normal to palpation  RESP: lungs clear to auscultation - no rales, rhonchi or wheezes  BREAST: normal without masses, tenderness or nipple discharge and no palpable axillary masses or adenopathy  CV: regular rate and rhythm, normal S1 S2, no S3 or S4, no murmur, click or rub, no peripheral edema and peripheral pulses strong  ABDOMEN: soft, nontender, no hepatosplenomegaly, no masses and bowel sounds normal   (female): normal female external genitalia, normal urethral meatus, vaginal mucosal atrophy noted, normal cervix, adnexae, and uterus without masses or abnormal discharge   (female): bimanual only  MS: no musculoskeletal defects are noted and gait is age appropriate without ataxia  SKIN: no suspicious lesions or rashes  NEURO: Normal strength and tone, sensory exam grossly normal, mentation intact and speech normal  PSYCH: mentation appears normal and affect normal/somewhat flat.     Diagnostic Test Results:  none   Reviewed recent GI work up showing fructose test (+)     ASSESSMENT/PLAN:       ICD-10-CM    1. Routine general medical examination at a health care facility Z00.00    2.  "Fructose intolerance E74.10 NUTRITION REFERRAL   3. Irritable bowel syndrome with diarrhea K58.0    4. Seasonal affective disorder (H) F33.8    5. Need for prophylactic vaccination and inoculation against influenza Z23 FLU VACCINE, SPLIT VIRUS, IM (QUADRIVALENT) [03040]- >3 YRS     Vaccine Administration, Initial [49187]   6. Visit for screening mammogram Z12.31 *MA Screening Digital Bilateral       On OCP, got RF from OBGYN.  This helps with cramping    Encouraged her to use an [employer] phone application to help navigate fructose avoidance.   She will discuss with nutrition (might be oop for her)    She will see psychiatry soon to consider medication for dysthymia.  Going to counselor every 3 weeks.      Agreeable to HIV screening next time we need blood tests.      COUNSELING:  Reviewed preventive health counseling, as reflected in patient instructions       Regular exercise       Healthy diet/nutrition    BP Readings from Last 1 Encounters:   10/04/18 125/76     Estimated body mass index is 24.64 kg/(m^2) as calculated from the following:    Height as of 7/19/18: 5' 6.5\" (1.689 m).    Weight as of this encounter: 155 lb (70.3 kg).           reports that she has never smoked. She has never used smokeless tobacco.      Counseling Resources:  ATP IV Guidelines  Pooled Cohorts Equation Calculator  Breast Cancer Risk Calculator  FRAX Risk Assessment  ICSI Preventive Guidelines  Dietary Guidelines for Americans, 2010  USDA's MyPlate  ASA Prophylaxis  Lung CA Screening    Ashley Davey MD  [facility]  Answers for HPI/ROS submitted by the patient on 10/4/2018   PHQ-2 Score: 1    "

## 2019-02-12 ENCOUNTER — OFFICE VISIT (OUTPATIENT)
Dept: OPHTHALMOLOGY | Facility: CLINIC | Age: 44
End: 2019-02-12
Attending: OPHTHALMOLOGY
Payer: COMMERCIAL

## 2019-02-12 DIAGNOSIS — H04.123 DRY EYE SYNDROME OF BOTH EYES: Primary | ICD-10-CM

## 2019-02-12 PROCEDURE — G0463 HOSPITAL OUTPT CLINIC VISIT: HCPCS | Mod: ZF

## 2019-02-12 ASSESSMENT — CUP TO DISC RATIO
OD_RATIO: 0.5
OS_RATIO: 0.4

## 2019-02-12 ASSESSMENT — TONOMETRY
OS_IOP_MMHG: 12
OD_IOP_MMHG: 12
IOP_METHOD: TONOPEN

## 2019-02-12 ASSESSMENT — EXTERNAL EXAM - RIGHT EYE: OD_EXAM: NORMAL

## 2019-02-12 ASSESSMENT — CONF VISUAL FIELD
OS_NORMAL: 1
OD_NORMAL: 1

## 2019-02-12 ASSESSMENT — VISUAL ACUITY
OS_SC: 20/15
OD_SC: 20/20
METHOD: SNELLEN - LINEAR

## 2019-02-12 ASSESSMENT — EXTERNAL EXAM - LEFT EYE: OS_EXAM: NORMAL

## 2019-02-12 NOTE — NURSING NOTE
Chief Complaints and History of Present Illnesses   Patient presents with     redness     Chief Complaint(s) and History of Present Illness(es)     Scratchy, red, itchy lids LE x 4 days  Used 1 drop of prednisolone yesterday am   Jessa PUGH 8:47 AM February 12, 2019

## 2019-02-12 NOTE — PROGRESS NOTES
HPI:  Sondra Sorensen is a 43 year old female with a history of non-specific keratitis who presents four days of left eye scratchy, redness, itchy eye lids. The right eye also become scratchy and irritated at times. She had a two year old bottle of Prednisolone at home, used one drop yesterday. Denies vision changes, flashes, new floaters.     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, left eye 1/5/2009  Non-specific keratitis  Seasonal conjunctivitis   FHx: no glaucoma    SHx: no smoking    Ocular Meds: none      ASSESSMENT & PLAN:    Sondra Sorensen is a 43 year old female with the following diagnoses:     # Dry eye syndrome both eyes   Significant blepharitis with collarettes consistent with staphylococcus overgrowth; it is likely that her history of non-specific keratitis was instead staph margin keratitis, will start with blepharitis treatment and monitor     - Discussed importance of lid hygiene   - Start lid scrubs twice a day   - Start artificial tears twice a day both eyes   - Start warm compress twice a day   - Start daily fish oil 1,000 mg orally       Patient disposition: Return in about 1 year (around 2/12/2020) for Annual Visit.     Discussed with MD Marie.    Raymond Estrada MD  Ophthalmology Resident, PGY-3  [facility]       Teaching statement:  Complete documentation of historical and exam elements from today's encounter can be found in the full encounter summary report (not reduplicated in this progress note). I personally obtained the chief complaint(s) and history of present illness.  I confirmed and edited as necessary the review of systems, past medical/surgical history, family history, social history, and examination findings as documented by others; and I examined the patient myself. I personally reviewed the relevant tests, images, and reports as documented above.     I formulated and edited as necessary the assessment and plan and discussed the findings  and management plan with the patient and family.    Savana Salazar MD  Comprehensive Ophthalmology & Ocular Pathology  Department of Ophthalmology and Visual Neurosciences  [email or web address]  Pager [phone]

## 2019-04-05 ENCOUNTER — PRE VISIT (OUTPATIENT)
Dept: ORTHOPEDICS | Facility: CLINIC | Age: 44
End: 2019-04-05

## 2019-04-05 ENCOUNTER — OFFICE VISIT (OUTPATIENT)
Dept: ORTHOPEDICS | Facility: CLINIC | Age: 44
End: 2019-04-05
Payer: COMMERCIAL

## 2019-04-05 VITALS — WEIGHT: 150 LBS | BODY MASS INDEX: 23.54 KG/M2 | RESPIRATION RATE: 16 BRPM | HEIGHT: 67 IN

## 2019-04-05 DIAGNOSIS — M79.644 THUMB PAIN, RIGHT: Primary | ICD-10-CM

## 2019-04-05 RX ORDER — DOXEPIN HYDROCHLORIDE 10 MG/1
CAPSULE ORAL
Refills: 0 | COMMUNITY
Start: 2019-03-15 | End: 2019-11-06

## 2019-04-05 ASSESSMENT — MIFFLIN-ST. JEOR: SCORE: 1368.03

## 2019-04-05 NOTE — LETTER
4/5/2019      RE: Sondra Sorensen  [address]  [place] [address]        Subjective:   Sondra Sorensen is a 43 year old female who is RHD with right thumb pain. She works at the [employer], has a desk job.  She states that approximately 5 weeks ago she had insidious onset of some right thumb discomfort.  She denies any injury.  She bought a condominium recently and has been doing more fix up type activities.  She notes that this is more bothersome to her.  When she is gripping or lifting things with a closed fist she notes that she has discomfort.  After she stops the activity then the discomfort wanes.  If she is doing this repeatedly throughout the day than she notes she has aching toward the end of the day.  1-1/2 weeks ago she was seen at [facility] and was provided with a wrist thumb spica splint.  She tried to wear this for several days but noted that it was cumbersome and did not really immobilize her thumb and she was repeatedly removing it.  She has stopped wearing it altogether.  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  She does not have any significant discomfort while on the computer.  She has had no prior injury to this hand.  She is right-hand dominant.    Background:   Date of injury: NA   Duration of symptoms: 5 weeks  Mechanism of Injury: Insidious Onset; Unknown   Aggravating factors: opening jars, lifting objects, starting the car  Relieving Factors: rest, ibuprofen, ice, arnica gel, heat  Prior Evaluation: Prior Physician Evalutation: [place] and brace    PAST MEDICAL, SOCIAL, SURGICAL AND FAMILY HISTORY: She  has no past medical history on file.  She  has no past surgical history on file.  Her family history includes Diabetes in her father.  She reports that  has never smoked. she has never used smokeless tobacco. She reports that she drinks alcohol. She reports that she does not use drugs.    ALLERGIES: She is allergic  "to doxycycline and penicillins.    CURRENT MEDICATIONS: She has a current medication list which includes the following prescription(s): calcium citrate-vitamin d, cetirizine, desogestrel-ethinyl estradiol, doxepin, ibuprofen, ketoconazole, ketoconazole, loperamide hcl, melatonin, mometasone, and olopatadine.     REVIEW OF SYSTEMS: 12 point review of systems is negative except as noted above.     Exam:   Resp 16   Ht 1.702 m (5' 7\")   Wt 68 kg (150 lb)   BMI 23.49 kg/m         CONSTITUTIONAL: healthy, alert and no distress  GAIT: normal  NEUROLOGIC: Non-focal  PSYCHIATRIC: affect flat and mentation appears normal.    MUSCULOSKELETAL: RIGHT THUMB: No swelling and no deformity. Full active and passive ROM at IP and MCP joints. No triggering. Mildly ttp over the flexor tendon at the level of the A1 pulley without a nodule or triggering. IP and MCP joints nontender. No bony tenderness. Mildly ttp at the thumb CMC joint but with a negative grind test. Negative finkelstein's and nontender in the 1st dorsal compartment. No pain with resisted digit extension at the IP joint or MCP joint. No UCL tenderness or laxity.       Assessment/Plan:   (N41.077) Thumb pain, right  (primary encounter diagnosis)  Comment: Unclear diagnosis based on history and exam. May be some mild overuse. Reassured. Offered hand therapy to help to localize symptoms and she will wait for another week and evaluate and may call for referral to hand therapy. Will consider a game keeper's splint. Follow prn.      John Duran MD    "

## 2019-04-05 NOTE — PROGRESS NOTES
Subjective:   Sondra Sorensen is a 43 year old female who is RHD with right thumb pain. She works at the [employer], has a desk job.  She states that approximately 5 weeks ago she had insidious onset of some right thumb discomfort.  She denies any injury.  She bought a condominium recently and has been doing more fix up type activities.  She notes that this is more bothersome to her.  When she is gripping or lifting things with a closed fist she notes that she has discomfort.  After she stops the activity then the discomfort wanes.  If she is doing this repeatedly throughout the day than she notes she has aching toward the end of the day.  1-1/2 weeks ago she was seen at [facility] and was provided with a wrist thumb spica splint.  She tried to wear this for several days but noted that it was cumbersome and did not really immobilize her thumb and she was repeatedly removing it.  She has stopped wearing it altogether.  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  She does not have any significant discomfort while on the computer.  She has had no prior injury to this hand.  She is right-hand dominant.    Background:   Date of injury: NA   Duration of symptoms: 5 weeks  Mechanism of Injury: Insidious Onset; Unknown   Aggravating factors: opening jars, lifting objects, starting the car  Relieving Factors: rest, ibuprofen, ice, arnica gel, heat  Prior Evaluation: Prior Physician Evalutation: [place] and brace    PAST MEDICAL, SOCIAL, SURGICAL AND FAMILY HISTORY: She  has no past medical history on file.  She  has no past surgical history on file.  Her family history includes Diabetes in her father.  She reports that  has never smoked. she has never used smokeless tobacco. She reports that she drinks alcohol. She reports that she does not use drugs.    ALLERGIES: She is allergic to doxycycline and penicillins.    CURRENT MEDICATIONS: She has a current medication list  "which includes the following prescription(s): calcium citrate-vitamin d, cetirizine, desogestrel-ethinyl estradiol, doxepin, ibuprofen, ketoconazole, ketoconazole, loperamide hcl, melatonin, mometasone, and olopatadine.     REVIEW OF SYSTEMS: 12 point review of systems is negative except as noted above.     Exam:   Resp 16   Ht 1.702 m (5' 7\")   Wt 68 kg (150 lb)   BMI 23.49 kg/m        CONSTITUTIONAL: healthy, alert and no distress  GAIT: normal  NEUROLOGIC: Non-focal  PSYCHIATRIC: affect flat and mentation appears normal.    MUSCULOSKELETAL: RIGHT THUMB: No swelling and no deformity. Full active and passive ROM at IP and MCP joints. No triggering. Mildly ttp over the flexor tendon at the level of the A1 pulley without a nodule or triggering. IP and MCP joints nontender. No bony tenderness. Mildly ttp at the thumb CMC joint but with a negative grind test. Negative finkelstein's and nontender in the 1st dorsal compartment. No pain with resisted digit extension at the IP joint or MCP joint. No UCL tenderness or laxity.       Assessment/Plan:   (M22.325) Thumb pain, right  (primary encounter diagnosis)  Comment: Unclear diagnosis based on history and exam. May be some mild overuse. Reassured. Offered hand therapy to help to localize symptoms and she will wait for another week and evaluate and may call for referral to hand therapy. Will consider a game keeper's splint. Follow prn.          "

## 2019-07-12 DIAGNOSIS — Z12.31 VISIT FOR SCREENING MAMMOGRAM: ICD-10-CM

## 2019-07-16 ENCOUNTER — OFFICE VISIT (OUTPATIENT)
Dept: FAMILY MEDICINE | Facility: CLINIC | Age: 44
End: 2019-07-16
Payer: COMMERCIAL

## 2019-07-16 ENCOUNTER — NURSE TRIAGE (OUTPATIENT)
Dept: FAMILY MEDICINE | Facility: CLINIC | Age: 44
End: 2019-07-16

## 2019-07-16 VITALS
WEIGHT: 157 LBS | OXYGEN SATURATION: 95 % | BODY MASS INDEX: 24.59 KG/M2 | SYSTOLIC BLOOD PRESSURE: 124 MMHG | TEMPERATURE: 99.4 F | HEART RATE: 95 BPM | DIASTOLIC BLOOD PRESSURE: 74 MMHG

## 2019-07-16 DIAGNOSIS — L21.9 SEBORRHEIC DERMATITIS: ICD-10-CM

## 2019-07-16 DIAGNOSIS — L28.2 PRURITIC RASH: ICD-10-CM

## 2019-07-16 DIAGNOSIS — H10.10 SEASONAL ALLERGIC CONJUNCTIVITIS: ICD-10-CM

## 2019-07-16 DIAGNOSIS — L50.9 HIVES: Primary | ICD-10-CM

## 2019-07-16 PROCEDURE — 99214 OFFICE O/P EST MOD 30 MIN: CPT | Performed by: INTERNAL MEDICINE

## 2019-07-16 RX ORDER — PREDNISONE 20 MG/1
40 TABLET ORAL DAILY
Qty: 10 TABLET | Refills: 3 | Status: SHIPPED | OUTPATIENT
Start: 2019-07-16 | End: 2019-11-06

## 2019-07-16 RX ORDER — TRIAMCINOLONE ACETONIDE 1 MG/G
CREAM TOPICAL 2 TIMES DAILY
Qty: 80 G | Refills: 3 | Status: SHIPPED | OUTPATIENT
Start: 2019-07-16

## 2019-07-16 RX ORDER — KETOCONAZOLE 20 MG/ML
SHAMPOO TOPICAL PRN
Qty: 120 ML | Refills: 11 | Status: SHIPPED | OUTPATIENT
Start: 2019-07-16 | End: 2019-07-18

## 2019-07-16 RX ORDER — OLOPATADINE HYDROCHLORIDE 1 MG/ML
1 SOLUTION/ DROPS OPHTHALMIC 2 TIMES DAILY
Qty: 5 ML | Refills: 3 | Status: SHIPPED | OUTPATIENT
Start: 2019-07-16 | End: 2021-05-19

## 2019-07-16 NOTE — PROGRESS NOTES
Subjective     Sondra Sorensen is a 43 year old female who presents to clinic today for the following health issues:    44 y/o single F here for hives and stiffness.       H/o insomnia (doxepin qhs and prn gabapentin), anxiety, Fructose intolerance.  Seeing  therapist for anxiety, SAD (hv therapy).       HPI   Hives on both legs x 2-3 days they are itchy and legs feel sore   Hand and feet stiffness worse in the mornings and after sitting for a while x 1 month     She took a 20 mg prednisone this morning from previous supply 5 years ago, may be helping.      Patient Active Problem List   Diagnosis     Fructose intolerance     CARDIOVASCULAR SCREENING; LDL GOAL LESS THAN 130     Irritable bowel syndrome with diarrhea     Seasonal affective disorder (H)     History reviewed. No pertinent surgical history.    Social History     Tobacco Use     Smoking status: Never Smoker     Smokeless tobacco: Never Used   Substance Use Topics     Alcohol use: Yes     Comment: rare     Family History   Problem Relation Age of Onset     Diabetes Father          Current Outpatient Medications   Medication Sig Dispense Refill     Calcium Citrate-Vitamin D (CALCIUM + D PO)        cetirizine (ZYRTEC) 10 MG tablet Take 10 mg by mouth daily       doxepin (SINEQUAN) 10 MG capsule   0     Glucosamine-Chondroitin-MSM (TRIPLE FLEX PO)        IBUPROFEN PO Take 200 mg by mouth       ketoconazole (NIZORAL) 2 % cream Apply topically as needed for itching       ketoconazole (NIZORAL) 2 % external shampoo Apply topically as needed for itching or irritation 120 mL 11     Loperamide HCl (IMODIUM OR) Prn       melatonin ([employer]S MELATONIN) 3 MG tablet Take by mouth nightly as needed for sleep Takes 3-12 mg Nightly       mometasone (NASONEX) 50 MCG/ACT spray Spray 2 sprays into both nostrils as needed       Multiple Vitamins-Minerals (HAIR SKIN AND NAILS FORMULA PO)        olopatadine (PATANOL) 0.1 % ophthalmic solution Place 1 drop into both eyes 2 times daily  As needed 5 mL 3     predniSONE (DELTASONE) 20 MG tablet Take 2 tablets (40 mg) by mouth daily For 5 days   Repeat if needed for hives 10 tablet 3     triamcinolone (KENALOG) 0.1 % external cream Apply topically 2 times daily As needed for rash 80 g 3     Allergies   Allergen Reactions     Doxycycline      Penicillins Rash     rash     Recent Labs   Lab Test 05/25/18  1452   TSH 1.33      BP Readings from Last 3 Encounters:   07/16/19 124/74   10/04/18 125/76   07/05/18 120/77    Wt Readings from Last 3 Encounters:   07/16/19 71.2 kg (157 lb)   04/05/19 68 kg (150 lb)   10/04/18 70.3 kg (155 lb)                       Reviewed and updated as needed this visit by Provider         Review of Systems   ROS COMP: Constitutional, HEENT, cardiovascular, pulmonary, gi and gu systems are negative, except as otherwise noted.      Objective    /74 (BP Location: Right arm, Patient Position: Sitting, Cuff Size: Adult Regular)   Pulse 95   Temp 99.4  F (37.4  C) (Oral)   Wt 71.2 kg (157 lb)   SpO2 95%   BMI 24.59 kg/m    Body mass index is 24.59 kg/m .  Physical Exam   GENERAL: healthy, alert and no distress  EYES: Eyes grossly normal to inspection, PERRL and conjunctivae and sclerae normal  NECK: no adenopathy, no asymmetry, masses, or scars and thyroid normal to palpation  RESP: lungs clear to auscultation - no rales, rhonchi or wheezes  MS: no gross musculoskeletal defects noted, no edema  MS: no ulnar deviation or obvious joint inflammation  SKIN: no suspicious lesions or rashes  SKIN: some subtle, pale raised 1 mm lesions upper thighs, she states this is improved from this morning (took one prednisone tab)  NEURO: Normal strength and tone, mentation intact and speech normal  PSYCH: mentation appears normal, affect normal/bright    Diagnostic Test Results:  Labs reviewed in Epic  none         Assessment & Plan       ICD-10-CM    1. Hives L50.9 predniSONE (DELTASONE) 20 MG tablet   2. Pruritic rash L28.2  triamcinolone (KENALOG) 0.1 % external cream   3. Seasonal allergic conjunctivitis H10.10 olopatadine (PATANOL) 0.1 % ophthalmic solution   4. Seborrheic dermatitis L21.9 ketoconazole (NIZORAL) 2 % external shampoo          Patient Instructions   HIVES:  H1 blocker (Zyrtec), H2 Blocker (Zantac), Prednisone (Rx)   Side effects discussed and questions answered.     Stiffness:  Watchful waiting, consider launching work up for inflammatory disease if persists > 1 month.   At this time may be viral and may run its course.. Watchful waiting      refills of her Rx's for management of other chronic conditions given (Kenalog, patanol, nizoral shampoo)    No follow-ups on file.    Ashley Davey MD  [facility]

## 2019-07-16 NOTE — TELEPHONE ENCOUNTER
Attempt # 1  Called patient at home [phone]  Was call answered?  No answer, left message to call nurse line at [phone]    Leti Reardon RN  [employer] [facility]

## 2019-07-16 NOTE — TELEPHONE ENCOUNTER
Patient scheduled an appointment with Dr Davey at 10:40AM today for the following:    Hives, stiffness    Routing to review symptoms prior to appointment.

## 2019-07-16 NOTE — PATIENT INSTRUCTIONS
HIVES:  H1 blocker (Zyrtec), H2 Blocker (Zantac), Prednisone (Rx)    Stiffness:  Watchful waiting, consider launching work up for inflammatory disease if persists > 1 month.

## 2019-07-18 ENCOUNTER — TELEPHONE (OUTPATIENT)
Dept: FAMILY MEDICINE | Facility: CLINIC | Age: 44
End: 2019-07-18

## 2019-07-18 DIAGNOSIS — L21.9 SEBORRHEIC DERMATITIS: ICD-10-CM

## 2019-07-18 RX ORDER — KETOCONAZOLE 20 MG/ML
SHAMPOO TOPICAL PRN
Qty: 120 ML | Refills: 11 | Status: SHIPPED | OUTPATIENT
Start: 2019-07-18

## 2019-07-18 NOTE — TELEPHONE ENCOUNTER
Received faxed message from [facility]. Ph. [phone] Fax [phone]    Medication: ketoconazole (NIZORAL) 2 % external shampoo      Sig code-missing frequency-how often should patient be using shampoo maximum and duration if applicable.

## 2019-07-25 ENCOUNTER — TELEPHONE (OUTPATIENT)
Dept: FAMILY MEDICINE | Facility: CLINIC | Age: 44
End: 2019-07-25

## 2019-07-25 NOTE — TELEPHONE ENCOUNTER
[facility] Fontaine on 07/30 for the following:    Jittery, shakiness.    Routing to review symptoms prior to appointment.

## 2019-07-25 NOTE — TELEPHONE ENCOUNTER
I called patient back and advised her of [facility]' response.   She will keep her follow up for now and will cancel if she feels she is all normal before then.      Brisa White RN  [facility]

## 2019-07-25 NOTE — TELEPHONE ENCOUNTER
Yes, I am quite sure this could be prednisone Side effect.   It should fade away over the next couple of days

## 2019-07-25 NOTE — TELEPHONE ENCOUNTER
Patient returned call, last dose of prednisone Sunday.   Rash is gone, sleeping a little better. Feels like vibrating on the inside, no physical shaking, no other symptoms. Feels better today then yesterday, thinks is getting better, is wondering if is side affect of Prednisone?    Nurse advised patient to make sure drinking plenty of water, does have appointment scheduled for 7/30 in case does not feel better.     Routing to PCP to review and advise.    Leti Reardon RN  [facility]

## 2019-07-25 NOTE — TELEPHONE ENCOUNTER
Patient was seen 7/16/19 by [facility] of rash.    See meds advised for hives/rash:    Instructions     HIVES:  H1 blocker (Zyrtec), H2 Blocker (Zantac), Prednisone (Rx)              Attempted to call patient at home/mobile number, left message on voicemail; patient was instructed to return call to [facility] RN directly on the RN call back line at [phone]   Brisa White RN  [facility]

## 2019-11-06 ENCOUNTER — OFFICE VISIT (OUTPATIENT)
Dept: FAMILY MEDICINE | Facility: CLINIC | Age: 44
End: 2019-11-06
Payer: COMMERCIAL

## 2019-11-06 VITALS
SYSTOLIC BLOOD PRESSURE: 118 MMHG | BODY MASS INDEX: 24.75 KG/M2 | HEART RATE: 76 BPM | DIASTOLIC BLOOD PRESSURE: 74 MMHG | WEIGHT: 158 LBS | OXYGEN SATURATION: 97 % | TEMPERATURE: 99.1 F

## 2019-11-06 DIAGNOSIS — M65.352 TRIGGER LITTLE FINGER OF LEFT HAND: ICD-10-CM

## 2019-11-06 DIAGNOSIS — M25.649 STIFFNESS OF HAND JOINT, UNSPECIFIED LATERALITY: Primary | ICD-10-CM

## 2019-11-06 LAB — ERYTHROCYTE [SEDIMENTATION RATE] IN BLOOD BY WESTERGREN METHOD: 16 MM/H (ref 0–20)

## 2019-11-06 PROCEDURE — 85652 RBC SED RATE AUTOMATED: CPT | Performed by: FAMILY MEDICINE

## 2019-11-06 PROCEDURE — 86140 C-REACTIVE PROTEIN: CPT | Performed by: FAMILY MEDICINE

## 2019-11-06 PROCEDURE — 36415 COLL VENOUS BLD VENIPUNCTURE: CPT | Performed by: FAMILY MEDICINE

## 2019-11-06 PROCEDURE — 99213 OFFICE O/P EST LOW 20 MIN: CPT | Performed by: FAMILY MEDICINE

## 2019-11-06 PROCEDURE — 86431 RHEUMATOID FACTOR QUANT: CPT | Performed by: FAMILY MEDICINE

## 2019-11-06 RX ORDER — ERGOCALCIFEROL 1.25 MG/1
50000 CAPSULE, LIQUID FILLED ORAL WEEKLY
COMMUNITY
End: 2022-04-12

## 2019-11-06 ASSESSMENT — PAIN SCALES - GENERAL: PAINLEVEL: SEVERE PAIN (6)

## 2019-11-06 NOTE — PROGRESS NOTES
"Subjective     Sondra Sorensen is a 43 year old female who presents to clinic today for the following health issues:    HPI   Joint Pain    Onset: Couple of months, Discussed at 7/16 appointment.    Description:   Location: Hands  Character: Dull ache    Intensity: 6/10 in the morning     Progression of Symptoms: worse    Accompanying Signs & Symptoms:  Other symptoms: swelling    History:   Previous similar pain: no       Precipitating factors:   Trauma or overuse: YES- Could be overuse    Alleviating factors:  Improved by: stretching    Therapies Tried and outcome: Ibuprofen    Both hands are fairly symmetric with his symptoms.  Mainly some stiffness and achiness in the morning that gets better after about 20 minutes.  It feels better during the day.  She is not sure if there is been swelling of her joints, but it has not been dramatic.  No warmth or redness of the joints.  No known family history of rheumatoid arthritis.  She believes her mom has similar symptoms with her hands consistent with osteoarthritis.  It sounds like she does get occasional \"triggering\" of the left fifth finger.    Patient Active Problem List   Diagnosis     Fructose intolerance     CARDIOVASCULAR SCREENING; LDL GOAL LESS THAN 130     Irritable bowel syndrome with diarrhea     Seasonal affective disorder (H)     History reviewed. No pertinent surgical history.    Social History     Tobacco Use     Smoking status: Never Smoker     Smokeless tobacco: Never Used   Substance Use Topics     Alcohol use: Yes     Comment: rare     Family History   Problem Relation Age of Onset     Diabetes Father          Current Outpatient Medications   Medication Sig Dispense Refill     B Complex-C (VITAMIN B COMPLEX W/VITAMIN C) TABS tablet Take 1 tablet by mouth daily       Calcium Citrate-Vitamin D (CALCIUM + D PO)        cetirizine (ZYRTEC) 10 MG tablet Take 10 mg by mouth daily       Glucosamine-Chondroitin-MSM (TRIPLE FLEX PO)        IBUPROFEN PO Take 200 " mg by mouth       ketoconazole (NIZORAL) 2 % cream Apply topically as needed for itching       ketoconazole (NIZORAL) 2 % external shampoo Apply topically as needed for itching or irritation Shampoo/scalp once or twice weekly as needed for seborrhea symptoms 120 mL 11     melatonin ([employer]S MELATONIN) 3 MG tablet Take by mouth nightly as needed for sleep Takes 3-12 mg Nightly       mometasone (NASONEX) 50 MCG/ACT spray Spray 2 sprays into both nostrils as needed       Multiple Vitamins-Minerals (HAIR SKIN AND NAILS FORMULA PO)        olopatadine (PATANOL) 0.1 % ophthalmic solution Place 1 drop into both eyes 2 times daily As needed 5 mL 3     Omega-3 Fatty Acids (FISH OIL PO)        triamcinolone (KENALOG) 0.1 % external cream Apply topically 2 times daily As needed for rash 80 g 3     vitamin D2 (ERGOCALCIFEROL) 82469 units (1250 mcg) capsule Take 50,000 Units by mouth once a week       Allergies   Allergen Reactions     Doxycycline      Penicillins Rash     rash         Reviewed and updated as needed this visit by Provider         Review of Systems   ROS COMP: Constitutional, HEENT, cardiovascular, pulmonary, gi and gu systems are negative, except as otherwise noted.      Objective    /74 (BP Location: Right arm, Patient Position: Sitting, Cuff Size: Adult Regular)   Pulse 76   Temp 99.1  F (37.3  C) (Oral)   Wt 71.7 kg (158 lb)   SpO2 97%   BMI 24.75 kg/m    Body mass index is 24.75 kg/m .  Physical Exam   GENERAL: healthy, alert and no distress  MS: No apparent swelling of any of her fingers or hands or wrists or other joints.  No erythema or warmth.  No triggering of the fingers currently.  She has good range of motion and strength of the joints.    Diagnostic Test Results:  none         Assessment & Plan       ICD-10-CM    1. Stiffness of hand joint, unspecified laterality M25.649 ESR: Erythrocyte sedimentation rate     CRP, inflammation     Rheumatoid factor   2. Trigger little finger of left hand  M65.352      I suspect her hand stiffness represents early osteoarthritis  We will check some lab test to help rule out inflammatory disorders  Discussed symptomatic treatment for osteoarthritis  Discussed possible cortisone injection of the left fifth trigger finger becomes more symptomatic    Return for a recheck if symptoms worsen or fail to improve.    Alejandro Fontaine MD  [facility]

## 2019-11-07 LAB — CRP SERPL-MCNC: 8.1 MG/L (ref 0–8)

## 2019-11-08 LAB — RHEUMATOID FACT SER NEPH-ACNC: <20 IU/ML (ref 0–20)

## 2019-11-08 NOTE — RESULT ENCOUNTER NOTE
Sondra,  These lab results do not show any apparent significant inflammatory arthritis.    Alejandro Fontaine MD

## 2019-12-05 ENCOUNTER — OFFICE VISIT (OUTPATIENT)
Dept: FAMILY MEDICINE | Facility: CLINIC | Age: 44
End: 2019-12-05
Payer: COMMERCIAL

## 2019-12-05 VITALS
WEIGHT: 156 LBS | BODY MASS INDEX: 24.43 KG/M2 | HEART RATE: 85 BPM | DIASTOLIC BLOOD PRESSURE: 70 MMHG | TEMPERATURE: 98.8 F | SYSTOLIC BLOOD PRESSURE: 118 MMHG | OXYGEN SATURATION: 94 %

## 2019-12-05 DIAGNOSIS — N92.0 SPOTTING BETWEEN MENSES: Primary | ICD-10-CM

## 2019-12-05 DIAGNOSIS — Z12.4 SCREENING FOR MALIGNANT NEOPLASM OF CERVIX: ICD-10-CM

## 2019-12-05 DIAGNOSIS — R10.9 ABDOMINAL CRAMPING: ICD-10-CM

## 2019-12-05 DIAGNOSIS — N94.10 DYSPAREUNIA IN FEMALE: ICD-10-CM

## 2019-12-05 DIAGNOSIS — Z11.4 SCREENING FOR HIV (HUMAN IMMUNODEFICIENCY VIRUS): ICD-10-CM

## 2019-12-05 LAB
ALBUMIN UR-MCNC: NEGATIVE MG/DL
ANION GAP SERPL CALCULATED.3IONS-SCNC: 7 MMOL/L (ref 3–14)
APPEARANCE UR: CLEAR
BACTERIA #/AREA URNS HPF: ABNORMAL /HPF
BILIRUB UR QL STRIP: NEGATIVE
BUN SERPL-MCNC: 13 MG/DL (ref 7–30)
CALCIUM SERPL-MCNC: 9.1 MG/DL (ref 8.5–10.1)
CHLORIDE SERPL-SCNC: 105 MMOL/L (ref 94–109)
CO2 SERPL-SCNC: 26 MMOL/L (ref 20–32)
COLOR UR AUTO: YELLOW
CREAT SERPL-MCNC: 0.62 MG/DL (ref 0.52–1.04)
ERYTHROCYTE [DISTWIDTH] IN BLOOD BY AUTOMATED COUNT: 12.5 % (ref 10–15)
GFR SERPL CREATININE-BSD FRML MDRD: >90 ML/MIN/{1.73_M2}
GLUCOSE SERPL-MCNC: 100 MG/DL (ref 70–99)
GLUCOSE UR STRIP-MCNC: NEGATIVE MG/DL
HCT VFR BLD AUTO: 40.4 % (ref 35–47)
HGB BLD-MCNC: 13.5 G/DL (ref 11.7–15.7)
HGB UR QL STRIP: ABNORMAL
KETONES UR STRIP-MCNC: NEGATIVE MG/DL
LEUKOCYTE ESTERASE UR QL STRIP: NEGATIVE
MCH RBC QN AUTO: 30.8 PG (ref 26.5–33)
MCHC RBC AUTO-ENTMCNC: 33.4 G/DL (ref 31.5–36.5)
MCV RBC AUTO: 92 FL (ref 78–100)
NITRATE UR QL: NEGATIVE
NON-SQ EPI CELLS #/AREA URNS LPF: ABNORMAL /LPF
PH UR STRIP: 5.5 PH (ref 5–7)
PLATELET # BLD AUTO: 281 10E9/L (ref 150–450)
POTASSIUM SERPL-SCNC: 4.1 MMOL/L (ref 3.4–5.3)
RBC # BLD AUTO: 4.39 10E12/L (ref 3.8–5.2)
RBC #/AREA URNS AUTO: ABNORMAL /HPF
SODIUM SERPL-SCNC: 138 MMOL/L (ref 133–144)
SOURCE: ABNORMAL
SP GR UR STRIP: 1.02 (ref 1–1.03)
TSH SERPL DL<=0.005 MIU/L-ACNC: 0.98 MU/L (ref 0.4–4)
UROBILINOGEN UR STRIP-ACNC: 0.2 EU/DL (ref 0.2–1)
WBC # BLD AUTO: 9.6 10E9/L (ref 4–11)
WBC #/AREA URNS AUTO: ABNORMAL /HPF

## 2019-12-05 PROCEDURE — 87624 HPV HI-RISK TYP POOLED RSLT: CPT | Performed by: INTERNAL MEDICINE

## 2019-12-05 PROCEDURE — 81001 URINALYSIS AUTO W/SCOPE: CPT | Performed by: INTERNAL MEDICINE

## 2019-12-05 PROCEDURE — 85027 COMPLETE CBC AUTOMATED: CPT | Performed by: INTERNAL MEDICINE

## 2019-12-05 PROCEDURE — 87591 N.GONORRHOEAE DNA AMP PROB: CPT | Performed by: INTERNAL MEDICINE

## 2019-12-05 PROCEDURE — 99214 OFFICE O/P EST MOD 30 MIN: CPT | Performed by: INTERNAL MEDICINE

## 2019-12-05 PROCEDURE — 36415 COLL VENOUS BLD VENIPUNCTURE: CPT | Performed by: INTERNAL MEDICINE

## 2019-12-05 PROCEDURE — 87389 HIV-1 AG W/HIV-1&-2 AB AG IA: CPT | Performed by: INTERNAL MEDICINE

## 2019-12-05 PROCEDURE — 87491 CHLMYD TRACH DNA AMP PROBE: CPT | Performed by: INTERNAL MEDICINE

## 2019-12-05 PROCEDURE — 80048 BASIC METABOLIC PNL TOTAL CA: CPT | Performed by: INTERNAL MEDICINE

## 2019-12-05 PROCEDURE — 84443 ASSAY THYROID STIM HORMONE: CPT | Performed by: INTERNAL MEDICINE

## 2019-12-05 PROCEDURE — G0145 SCR C/V CYTO,THINLAYER,RESCR: HCPCS | Performed by: INTERNAL MEDICINE

## 2019-12-05 NOTE — PROGRESS NOTES
Subjective     Sondra Sorensen is a 44 year old female who presents to clinic today for the following health issues:    Had been on Estrogen BC to help with heavy periods and cramping.   Stopped BC in April due to feeling she was no longer sexually active (6 years) and thought there was a chance of early menopause  Initially no menstruation for 3 months.  Then 2 months, then q 40 days.  However she is now has midcycle spotting and has developed daily mild cramping.     Also h/o pelvic floor dysfunction, dysparunia.  She was referred to pelvic floor clinic in past but she was embarrassed and did not follow through. .      HPI   ABDOMINAL   PAIN     Onset: 2 weeks    Description:   Character: Cramping  Location: pelvic region  Radiation: None    Intensity: moderate    Progression of Symptoms:  same    Accompanying Signs & Symptoms:  Fever/Chills?: no   Gas/Bloating: no   Nausea: no   Vomitting: no   Diarrhea?: no   Constipation:YES  Dysuria or Hematuria: no    History:   Trauma: no   Previous similar pain: no    Previous tests done: none    Precipitating factors:   Does the pain change with:     Food: no      BM: no     Urination: no     Alleviating factors:      Therapies Tried and outcome:      LMP:  11/12/19     Vaginal spotting     Patient Active Problem List   Diagnosis     Fructose intolerance     CARDIOVASCULAR SCREENING; LDL GOAL LESS THAN 130     Irritable bowel syndrome with diarrhea     Seasonal affective disorder (H)     History reviewed. No pertinent surgical history.    Social History     Tobacco Use     Smoking status: Never Smoker     Smokeless tobacco: Never Used   Substance Use Topics     Alcohol use: Yes     Comment: rare     Family History   Problem Relation Age of Onset     Diabetes Father          Current Outpatient Medications   Medication Sig Dispense Refill     Calcium Citrate-Vitamin D (CALCIUM + D PO)        cetirizine (ZYRTEC) 10 MG tablet Take 10 mg by mouth daily       cholecalciferol  (VITAMIN D3) 5000 units (125 mcg) capsule Take by mouth daily       Glucosamine-Chondroitin-MSM (TRIPLE FLEX PO)        IBUPROFEN PO Take 200 mg by mouth       ketoconazole (NIZORAL) 2 % cream Apply topically as needed for itching       ketoconazole (NIZORAL) 2 % external shampoo Apply topically as needed for itching or irritation Shampoo/scalp once or twice weekly as needed for seborrhea symptoms 120 mL 11     melatonin ([employer]S MELATONIN) 3 MG tablet Take by mouth nightly as needed for sleep Takes 3-12 mg Nightly       mometasone (NASONEX) 50 MCG/ACT spray Spray 2 sprays into both nostrils as needed       Multiple Vitamins-Minerals (HAIR SKIN AND NAILS FORMULA PO)        olopatadine (PATANOL) 0.1 % ophthalmic solution Place 1 drop into both eyes 2 times daily As needed 5 mL 3     Omega-3 Fatty Acids (FISH OIL PO)        triamcinolone (KENALOG) 0.1 % external cream Apply topically 2 times daily As needed for rash 80 g 3     B Complex-C (VITAMIN B COMPLEX W/VITAMIN C) TABS tablet Take 1 tablet by mouth daily       vitamin D2 (ERGOCALCIFEROL) 07174 units (1250 mcg) capsule Take 50,000 Units by mouth once a week       Allergies   Allergen Reactions     Doxycycline      Penicillins Rash     rash     Recent Labs   Lab Test 05/25/18  1452   TSH 1.33      BP Readings from Last 3 Encounters:   12/05/19 118/70   11/06/19 118/74   07/16/19 124/74    Wt Readings from Last 3 Encounters:   12/05/19 70.8 kg (156 lb)   11/06/19 71.7 kg (158 lb)   07/16/19 71.2 kg (157 lb)                       Reviewed and updated as needed this visit by Provider         Review of Systems   ROS COMP: Constitutional, HEENT, cardiovascular, pulmonary, gi and gu systems are negative, except as otherwise noted.      Objective    /70 (BP Location: Right arm, Patient Position: Sitting, Cuff Size: Adult Regular)   Pulse 85   Temp 98.8  F (37.1  C) (Oral)   Wt 70.8 kg (156 lb)   LMP 11/12/2019 (Exact Date)   SpO2 94%   Breastfeeding No   BMI  24.43 kg/m    Body mass index is 24.43 kg/m .  Physical Exam   GENERAL: healthy, alert and no distress  EYES: Eyes grossly normal to inspection, PERRL and conjunctivae and sclerae normal  ABDOMEN: soft, nontender, no hepatosplenomegaly, no masses and bowel sounds normal   (female): normal female external genitalia, normal urethral meatus, vaginal mucosa, normal cervix/adnexa/uterus without masses or discharge   (female):  PAP done with good view of cervix.    MS: no gross musculoskeletal defects noted, no edema  SKIN: no suspicious lesions or rashes  NEURO: Normal strength and tone, mentation intact and speech normal  PSYCH: mentation appears normal, affect normal/flat.     Diagnostic Test Results:  Labs reviewed in Epic  Results for orders placed or performed in visit on 12/05/19 (from the past 24 hour(s))   CBC with platelets   Result Value Ref Range    WBC 9.6 4.0 - 11.0 10e9/L    RBC Count 4.39 3.8 - 5.2 10e12/L    Hemoglobin 13.5 11.7 - 15.7 g/dL    Hematocrit 40.4 35.0 - 47.0 %    MCV 92 78 - 100 fl    MCH 30.8 26.5 - 33.0 pg    MCHC 33.4 31.5 - 36.5 g/dL    RDW 12.5 10.0 - 15.0 %    Platelet Count 281 150 - 450 10e9/L   UA with Microscopic reflex to Culture   Result Value Ref Range    Color Urine Yellow     Appearance Urine Clear     Glucose Urine Negative NEG^Negative mg/dL    Bilirubin Urine Negative NEG^Negative    Ketones Urine Negative NEG^Negative mg/dL    Specific Gravity Urine 1.025 1.003 - 1.035    pH Urine 5.5 5.0 - 7.0 pH    Protein Albumin Urine Negative NEG^Negative mg/dL    Urobilinogen Urine 0.2 0.2 - 1.0 EU/dL    Nitrite Urine Negative NEG^Negative    Blood Urine Trace (A) NEG^Negative    Leukocyte Esterase Urine Negative NEG^Negative    Source Midstream Urine     WBC Urine 0 - 5 OTO5^0 - 5 /HPF    RBC Urine O - 2 OTO2^O - 2 /HPF    Squamous Epithelial /LPF Urine Few FEW^Few /LPF    Bacteria Urine Few (A) NEG^Negative /HPF     See orders.          Assessment & Plan       ICD-10-CM    1.  Spotting between menses N92.3 CBC with platelets     TSH with free T4 reflex     Basic metabolic panel     hCG qual urine POCT     NEISSERIA GONORRHOEA PCR     CHLAMYDIA TRACHOMATIS PCR     OB/GYN REFERRAL     Pap imaged thin layer screen with HPV - recommended age 30 - 65 years (select HPV order below)     HPV High Risk Types DNA Cervical     OB/GYN REFERRAL     NEISSERIA GONORRHOEA PCR     CHLAMYDIA TRACHOMATIS PCR   2. Abdominal cramping R10.9 hCG qual urine POCT     NEISSERIA GONORRHOEA PCR     CHLAMYDIA TRACHOMATIS PCR     UA with Microscopic reflex to Culture     Pap imaged thin layer screen with HPV - recommended age 30 - 65 years (select HPV order below)     HPV High Risk Types DNA Cervical     OB/GYN REFERRAL     NEISSERIA GONORRHOEA PCR     CHLAMYDIA TRACHOMATIS PCR   3. Screening for HIV (human immunodeficiency virus) Z11.4 HIV Antigen Antibody Combo   4. Screening for malignant neoplasm of cervix Z12.4 CANCELED: Pap imaged thin layer screen with HPV - recommended age 30 - 65 years (select HPV order below)     CANCELED: HPV High Risk Types DNA Cervical   5. Dyspareunia in female N94.10 [facility] PT, HAND, AND CHIROPRACTIC REFERRAL     OB/GYN REFERRAL          Patient Instructions   If work up negative, consider resuming the BCP regimen and re evaluate    Consider OB GYN consult      [facility] [place] [facility] [phone]                 --OR--    [facility] [phone] .. she would initiate this if she wants to consider changing the BCP, would need specialist with juan pablo in helping her make this choice    Pelvic Floor therapy will call you Hopefully my orders are correct.            Return in about 6 months (around 6/5/2020) for Physical Exam.    Ashley Davey MD  [facility]    Agreeable to HIV screening.

## 2019-12-05 NOTE — PATIENT INSTRUCTIONS
If work up negative, consider resuming the BCP regimen and re evaluate    Consider OB GYN consult      [facility] [place] [phone]                 --OR--    [facility] [place] [phone]     Pelvic Floor therapy will call you

## 2019-12-06 LAB
C TRACH DNA SPEC QL NAA+PROBE: NEGATIVE
HIV 1+2 AB+HIV1 P24 AG SERPL QL IA: NONREACTIVE
N GONORRHOEA DNA SPEC QL NAA+PROBE: NEGATIVE
SPECIMEN SOURCE: NORMAL
SPECIMEN SOURCE: NORMAL

## 2019-12-06 NOTE — RESULT ENCOUNTER NOTE
Sondra oSrensen    So far, labs are negative.  I hope you feel better on the birth control pill, let me know when a refill is needed (if it is needed).  If you feel you need to explore other cyclic estrogen products, then defer to OBGYN (referral placed)    Let me know if there are any issues with our referral to pelvic therapy, okay?  Thank you    Sincerely,     MINNIE MITCHELL M.D.

## 2019-12-09 LAB
COPATH REPORT: NORMAL
PAP: NORMAL

## 2019-12-10 LAB
FINAL DIAGNOSIS: NORMAL
HPV HR 12 DNA CVX QL NAA+PROBE: NEGATIVE
HPV16 DNA SPEC QL NAA+PROBE: NEGATIVE
HPV18 DNA SPEC QL NAA+PROBE: NEGATIVE
SPECIMEN DESCRIPTION: NORMAL
SPECIMEN SOURCE CVX/VAG CYTO: NORMAL

## 2019-12-20 ENCOUNTER — OFFICE VISIT (OUTPATIENT)
Dept: OBGYN | Facility: CLINIC | Age: 44
End: 2019-12-20
Attending: INTERNAL MEDICINE
Payer: COMMERCIAL

## 2019-12-20 VITALS
SYSTOLIC BLOOD PRESSURE: 117 MMHG | HEIGHT: 67 IN | BODY MASS INDEX: 24.48 KG/M2 | WEIGHT: 156 LBS | DIASTOLIC BLOOD PRESSURE: 78 MMHG | HEART RATE: 94 BPM

## 2019-12-20 DIAGNOSIS — N93.9 ABNORMAL UTERINE BLEEDING (AUB): Primary | ICD-10-CM

## 2019-12-20 DIAGNOSIS — N94.10 DYSPAREUNIA, FEMALE: ICD-10-CM

## 2019-12-20 ASSESSMENT — ANXIETY QUESTIONNAIRES
2. NOT BEING ABLE TO STOP OR CONTROL WORRYING: NOT AT ALL
5. BEING SO RESTLESS THAT IT IS HARD TO SIT STILL: NOT AT ALL
6. BECOMING EASILY ANNOYED OR IRRITABLE: NOT AT ALL
GAD7 TOTAL SCORE: 2
1. FEELING NERVOUS, ANXIOUS, OR ON EDGE: SEVERAL DAYS
3. WORRYING TOO MUCH ABOUT DIFFERENT THINGS: SEVERAL DAYS
7. FEELING AFRAID AS IF SOMETHING AWFUL MIGHT HAPPEN: NOT AT ALL

## 2019-12-20 ASSESSMENT — MIFFLIN-ST. JEOR: SCORE: 1390.24

## 2019-12-20 ASSESSMENT — PATIENT HEALTH QUESTIONNAIRE - PHQ9
5. POOR APPETITE OR OVEREATING: NOT AT ALL
SUM OF ALL RESPONSES TO PHQ QUESTIONS 1-9: 6

## 2019-12-20 NOTE — LETTER
2019       RE: Sondra Sorensen  [address]  [place] [address]     Dear Colleague,    Thank you for referring your patient, Sondra Sorensen, to the [facility] at [facility]. Please see a copy of my visit note below.    Progress Note    SUBJECTIVE:  Sondra Sorensen is an 44 year old, , who is here for AUB.  She was seen by Dr. Davey at [facility] 2019.  She had been on BCPs to help with heavy menses and cramps for 20 years and stopped 2019 as she was no longer sexually active and to see if her sex drive improved; which she states it may have just a bit.  She had menses after 3 months, then 2 months, then q 40 days.  Before this menses of 2019 she had a 3 week episode of midcycle spotting barely noted on tissue and daily mild cramping only when wiping after BM. This has not recurred. She is 90% sure this was from her vaginal.  CBC, TSH, BMP, HIV GC/CT done and normal.  She has never had a pelvic U/S.  Her mother went thru menopause early.    She also has a h/o pelvic floor dysfunction, dyspareunia.   She states that this is on initial penetration and sometimes caused a burning sensation. She was referred to pelvic floor clinic in past but she was embarrassed and did not follow through per review of records; today she states that an order was put in but nobody ever called her and the provider was not sure she put the order in the right way.        Menstrual History:  Menstrual History 2019   LAST MENSTRUAL PERIOD 2019       Last    Lab Results   Component Value Date    PAP NIL 2019     History of abnormal Pap smear: no    Last   Lab Results   Component Value Date    HPV16 Negative 2019     Last   Lab Results   Component Value Date    HPV18 Negative 2019     Last   Lab Results   Component Value Date    HRHPV Negative 2019           HISTORY:  Prescription Medications as of  12/20/2019       Rx Number Disp Refills Start End Last Dispensed Date Next Fill Date Owning Pharmacy    Biotin 2500 MCG CAPS    6/20/2019    [facility] [place] [address]    Class: Historical    metroNIDAZOLE (METROCREAM) 0.75 % external cream    12/4/2019    [facility] [place] [address]    Class: Historical    B Complex-C (VITAMIN B COMPLEX W/VITAMIN C) TABS tablet            Sig: Take 1 tablet by mouth daily    Class: Historical    Route: Oral    Calcium Citrate-Vitamin D (CALCIUM + D PO)            Class: Historical    Route: Oral    cetirizine (ZYRTEC) 10 MG tablet            Sig: Take 10 mg by mouth daily    Class: Historical    Route: Oral    cholecalciferol (VITAMIN D3) 5000 units (125 mcg) capsule        [facility] [place] [address]    Sig: Take by mouth daily    Class: Historical    Route: Oral    Glucosamine-Chondroitin-MSM (TRIPLE FLEX PO)        [facility] [place] [address]    Class: Historical    Route: Oral    IBUPROFEN PO            Sig: Take 200 mg by mouth    Class: Historical    Route: Oral    ketoconazole (NIZORAL) 2 % cream            Sig: Apply topically as needed for itching    Class: Historical    Route: Topical    ketoconazole (NIZORAL) 2 % external shampoo  120 mL 11 7/18/2019    [facility] [address] [place] [address]    Sig: Apply topically as needed for itching or irritation Shampoo/scalp once or twice weekly as needed for seborrhea symptoms    Class: E-Prescribe    Route: Topical    melatonin ([facility] MELATONIN) 3 MG tablet            Sig: Take by mouth nightly as needed for sleep Takes 3-12 mg Nightly    Class: Historical    Route: Oral    mometasone (NASONEX) 50 MCG/ACT spray            Sig: Spray 2 sprays into both nostrils as needed    Class: Historical    Route: Both Nostrils    Multiple Vitamins-Minerals (HAIR SKIN AND NAILS FORMULA PO)        [facility]  [address] [place] [address]    Class: Historical    Route: Oral    olopatadine (PATANOL) 0.1 % ophthalmic solution  5 mL 3 2019    [facility] [place] [address]    Sig: Place 1 drop into both eyes 2 times daily As needed    Class: E-Prescribe    Route: Both Eyes    Omega-3 Fatty Acids (FISH OIL PO)            Class: Historical    Route: Oral    triamcinolone (KENALOG) 0.1 % external cream  80 g 3 2019    [facility] [place] [address]    Sig: Apply topically 2 times daily As needed for rash    Class: E-Prescribe    Route: Topical    vitamin D2 (ERGOCALCIFEROL) 24588 units (1250 mcg) capsule            Sig: Take 50,000 Units by mouth once a week    Class: Historical    Route: Oral        Allergies   Allergen Reactions     Doxycycline      Penicillins Rash     rash     Immunization History   Administered Date(s) Administered     DTaP, Unspecified 1976, 1976, 1976, 1977, 1981     HepB-Adult 2010, 2010, 2010     Influenza Vaccine IM > 6 months Valent IIV4 2016, 10/11/2017, 10/04/2018, 2019     MMR 1977, 1995     Polio, Unspecified  1976, 1976, 1976, 1977, 1981     TDAP Vaccine (Boostrix) 2011     Td (Adult), Adsorbed 1991, 2005       OB History    Para Term  AB Living   0 0 0 0 0 0   SAB TAB Ectopic Multiple Live Births   0 0 0 0 0     Past Medical History:   Diagnosis Date     NO ACTIVE PROBLEMS      Past Surgical History:   Procedure Laterality Date     NO HISTORY OF SURGERY       Family History   Problem Relation Age of Onset     Diabetes Father      Social History     Socioeconomic History     Marital status: Single     Spouse name: None     Number of children: None     Years of education: None     Highest education level: None   Occupational History     None   Social Needs     Financial  "resource strain: None     Food insecurity:     Worry: None     Inability: None     Transportation needs:     Medical: None     Non-medical: None   Tobacco Use     Smoking status: Never Smoker     Smokeless tobacco: Never Used   Substance and Sexual Activity     Alcohol use: Yes     Comment: rare     Drug use: No     Sexual activity: Not Currently   Lifestyle     Physical activity:     Days per week: None     Minutes per session: None     Stress: None   Relationships     Social connections:     Talks on phone: None     Gets together: None     Attends Sikh service: None     Active member of club or organization: None     Attends meetings of clubs or organizations: None     Relationship status: None     Intimate partner violence:     Fear of current or ex partner: None     Emotionally abused: None     Physically abused: None     Forced sexual activity: None   Other Topics Concern     Parent/sibling w/ CABG, MI or angioplasty before 65F 55M? Not Asked   Social History Narrative     None       ROS: Hair loss weight gain fatigue insomnia allergies diarrhea bloating abdominal pain stiffness rash anxiety depression nervousness difficulty sleeping    EXAM:  Blood pressure 117/78, pulse 94, height 1.702 m (5' 7\"), weight 70.8 kg (156 lb), not currently breastfeeding. Body mass index is 24.43 kg/m .  General - pleasant female in no acute distress.  Skin - no suspicious lesions or rashes    Pelvic Exam:  EG/BUS: Normal genital architecture without lesions, erythema or abnormal secretions Bartholin's, Urethra, [place]'s normal   Urethral meatus: normal   Urethra: no masses, tenderness, or scarring   Bladder: no masses or tenderness   Vagina: moist, pink, rugae with absnet  secretions  Cervix: no lesions  Uterus: retroverted,   Adnexa: Within normal limits  Rectum:anus normal   Cul de sac: Non tender nodule of left U-S ligament.      ASSESSMENT:  Encounter Diagnoses   Name Primary?     Dyspareunia, female      Abnormal uterine " bleeding (AUB) Yes    nodule in U/S ligament: possible endometriosis    PLAN:   Orders Placed This Encounter   Procedures     PHYSICAL THERAPY REFERRAL      -Irregular menses after discontinuing birth control pills- mostly her menses have been spaced out which is not concerning in a 44-year-old.  She had one isolated episode of barely spotting associated with bowel movements which now appears to be resolved.  Thyroid test has been negative.  At this point I am not too concerned about her bleeding pattern.  She will let me know if her menses come more often than every 2 weeks, are too heavy, or she has another episode of continued spotting.  At that point we will order an ultrasound.  As far as the tender nodule this does suggest a possible diagnosis of endometriosis.  This was discussed with Sondra today.  Again at this point since fertility is not an issue and she is not having significant menstrual cramps I would not necessarily intervene.  As far as her history of dyspareunia, note she is not currently sexually active but is interested in pursuing physical therapy for this.  An order will be placed.    Lety Olivares MD

## 2019-12-20 NOTE — PROGRESS NOTES
Progress Note    SUBJECTIVE:  Sondra Sorensen is an 44 year old, , who is here for AUB.  She was seen by Dr. Davey at [facility] 2019.  She had been on BCPs to help with heavy menses and cramps for 20 years and stopped 2019 as she was no longer sexually active and to see if her sex drive improved; which she states it may have just a bit.  She had menses after 3 months, then 2 months, then q 40 days.  Before this menses of 2019 she had a 3 week episode of midcycle spotting barely noted on tissue and daily mild cramping only when wiping after BM. This has not recurred. She is 90% sure this was from her vaginal.  CBC, TSH, BMP, HIV GC/CT done and normal.  She has never had a pelvic U/S.  Her mother went thru menopause early.    She also has a h/o pelvic floor dysfunction, dyspareunia.  She states that this is on initial penetration and sometimes caused a burning sensation. She was referred to pelvic floor clinic in past but she was embarrassed and did not follow through per review of records; today she states that an order was put in but nobody ever called her and the provider was not sure she put the order in the right way.        Menstrual History:  Menstrual History 2019   LAST MENSTRUAL PERIOD 2019       Last    Lab Results   Component Value Date    PAP NIL 2019     History of abnormal Pap smear: no    Last   Lab Results   Component Value Date    HPV16 Negative 2019     Last   Lab Results   Component Value Date    HPV18 Negative 2019     Last   Lab Results   Component Value Date    HRHPV Negative 2019           HISTORY:  Prescription Medications as of 2019       Rx Number Disp Refills Start End Last Dispensed Date Next Fill Date Owning Pharmacy    Biotin 2500 MCG CAPS    2019    [facility] [place] [facility] [place], [place] - [address]    Class: Historical    metroNIDAZOLE (METROCREAM) 0.75 % external cream    2019    [facility] [place]  [address]    Class: Historical    B Complex-C (VITAMIN B COMPLEX W/VITAMIN C) TABS tablet            Sig: Take 1 tablet by mouth daily    Class: Historical    Route: Oral    Calcium Citrate-Vitamin D (CALCIUM + D PO)            Class: Historical    Route: Oral    cetirizine (ZYRTEC) 10 MG tablet            Sig: Take 10 mg by mouth daily    Class: Historical    Route: Oral    cholecalciferol (VITAMIN D3) 5000 units (125 mcg) capsule        [facility] [place] [address]    Sig: Take by mouth daily    Class: Historical    Route: Oral    Glucosamine-Chondroitin-MSM (TRIPLE FLEX PO)        [facility] [place] [address]    Class: Historical    Route: Oral    IBUPROFEN PO            Sig: Take 200 mg by mouth    Class: Historical    Route: Oral    ketoconazole (NIZORAL) 2 % cream            Sig: Apply topically as needed for itching    Class: Historical    Route: Topical    ketoconazole (NIZORAL) 2 % external shampoo  120 mL 11 7/18/2019    [facility] [place] [address]    Sig: Apply topically as needed for itching or irritation Shampoo/scalp once or twice weekly as needed for seborrhea symptoms    Class: E-Prescribe    Route: Topical    melatonin ([facility] MELATON[facility]) 3 MG tablet            Sig: Take by mouth nightly as needed for sleep Takes 3-12 mg Nightly    Class: Historical    Route: Oral    mometasone (NASONEX) 50 MCG/ACT spray            Sig: Spray 2 sprays into both nostrils as needed    Class: Historical    Route: Both Nostrils    Multiple Vitamins-Minerals (HAIR SKIN AND NAILS FORMULA PO)        [facility] [place] [address]    Class: Historical    Route: Oral    olopatadine (PATANOL) 0.1 % ophthalmic solution  5 mL 3 7/16/2019    [facility] [place] [address]    Sig: Place 1 drop into both eyes 2 times daily As needed    Class: E-Prescribe     Route: Both Eyes    Omega-3 Fatty Acids (FISH OIL PO)            Class: Historical    Route: Oral    triamcinolone (KENALOG) 0.1 % external cream  80 g 3 2019    [facility] [address] [place] [facility] - [place], [place] - [address] [place] [address]    Sig: Apply topically 2 times daily As needed for rash    Class: E-Prescribe    Route: Topical    vitamin D2 (ERGOCALCIFEROL) 76444 units (1250 mcg) capsule            Sig: Take 50,000 Units by mouth once a week    Class: Historical    Route: Oral        Allergies   Allergen Reactions     Doxycycline      Penicillins Rash     rash     Immunization History   Administered Date(s) Administered     DTaP, Unspecified 1976, 1976, 1976, 1977, 1981     HepB-Adult 2010, 2010, 2010     Influenza Vaccine IM > 6 months Valent IIV4 2016, 10/11/2017, 10/04/2018, 2019     MMR 1977, 1995     Polio, Unspecified  1976, 1976, 1976, 1977, 1981     TDAP Vaccine (Boostrix) 2011     Td (Adult), Adsorbed 1991, 2005       OB History    Para Term  AB Living   0 0 0 0 0 0   SAB TAB Ectopic Multiple Live Births   0 0 0 0 0     Past Medical History:   Diagnosis Date     NO ACTIVE PROBLEMS      Past Surgical History:   Procedure Laterality Date     NO HISTORY OF SURGERY       Family History   Problem Relation Age of Onset     Diabetes Father      Social History     Socioeconomic History     Marital status: Single     Spouse name: None     Number of children: None     Years of education: None     Highest education level: None   Occupational History     None   Social Needs     Financial resource strain: None     Food insecurity:     Worry: None     Inability: None     Transportation needs:     Medical: None     Non-medical: None   Tobacco Use     Smoking status: Never Smoker     Smokeless tobacco: Never Used   Substance and Sexual Activity     Alcohol use: Yes     Comment: rare     Drug  "use: No     Sexual activity: Not Currently   Lifestyle     Physical activity:     Days per week: None     Minutes per session: None     Stress: None   Relationships     Social connections:     Talks on phone: None     Gets together: None     Attends Baptist service: None     Active member of club or organization: None     Attends meetings of clubs or organizations: None     Relationship status: None     Intimate partner violence:     Fear of current or ex partner: None     Emotionally abused: None     Physically abused: None     Forced sexual activity: None   Other Topics Concern     Parent/sibling w/ CABG, MI or angioplasty before 65F 55M? Not Asked   Social History Narrative     None       ROS: Hair loss weight gain fatigue insomnia allergies diarrhea bloating abdominal pain stiffness rash anxiety depression nervousness difficulty sleeping    EXAM:  Blood pressure 117/78, pulse 94, height 1.702 m (5' 7\"), weight 70.8 kg (156 lb), not currently breastfeeding. Body mass index is 24.43 kg/m .  General - pleasant female in no acute distress.  Skin - no suspicious lesions or rashes    Pelvic Exam:  EG/BUS: Normal genital architecture without lesions, erythema or abnormal secretions Bartholin's, Urethra, [place]'s normal   Urethral meatus: normal   Urethra: no masses, tenderness, or scarring   Bladder: no masses or tenderness   Vagina: moist, pink, rugae with absnet  secretions  Cervix: no lesions  Uterus: retroverted,   Adnexa: Within normal limits  Rectum:anus normal   Cul de sac: Non tender nodule of left U-S ligament.      ASSESSMENT:  Encounter Diagnoses   Name Primary?     Dyspareunia, female      Abnormal uterine bleeding (AUB) Yes    nodule in U/S ligament: possible endometriosis    PLAN:   Orders Placed This Encounter   Procedures     PHYSICAL THERAPY REFERRAL      -Irregular menses after discontinuing birth control pills- mostly her menses have been spaced out which is not concerning in a 44-year-old.  She had " one isolated episode of barely spotting associated with bowel movements which now appears to be resolved.  Thyroid test has been negative.  At this point I am not too concerned about her bleeding pattern.  She will let me know if her menses come more often than every 2 weeks, are too heavy, or she has another episode of continued spotting.  At that point we will order an ultrasound.  As far as the tender nodule this does suggest a possible diagnosis of endometriosis.  This was discussed with Sondra today.  Again at this point since fertility is not an issue and she is not having significant menstrual cramps I would not necessarily intervene.  As far as her history of dyspareunia, note she is not currently sexually active but is interested in pursuing physical therapy for this.  An order will be placed.

## 2019-12-21 ASSESSMENT — ANXIETY QUESTIONNAIRES: GAD7 TOTAL SCORE: 2

## 2020-01-02 ENCOUNTER — THERAPY VISIT (OUTPATIENT)
Dept: PHYSICAL THERAPY | Facility: CLINIC | Age: 45
End: 2020-01-02
Attending: OBSTETRICS & GYNECOLOGY
Payer: COMMERCIAL

## 2020-01-02 DIAGNOSIS — N94.10 DYSPAREUNIA, FEMALE: ICD-10-CM

## 2020-01-02 PROCEDURE — 97161 PT EVAL LOW COMPLEX 20 MIN: CPT | Mod: GP | Performed by: PHYSICAL THERAPIST

## 2020-01-02 PROCEDURE — 97535 SELF CARE MNGMENT TRAINING: CPT | Mod: GP | Performed by: PHYSICAL THERAPIST

## 2020-01-02 PROCEDURE — 97112 NEUROMUSCULAR REEDUCATION: CPT | Mod: GP | Performed by: PHYSICAL THERAPIST

## 2020-01-02 NOTE — PROGRESS NOTES
[facility] Initial Evaluation  Subjective:  The history is provided by the patient. No  was used.   Sondra Sorensen being seen for Pelvic floor.   Date of Onset: 12/20/19, date of order. Where condition occurred: other.Problem occurred: ?  Pain score: only with intercourse. General health as reported by patient is good. Pertinent medical history includes:  Changes in bowel/bladder and depression.    Surgeries include:  None.  Current medications:  None.   Primary job tasks include:  Computer work and prolonged sitting.  Pain is described as burning, cramping and other and is intermittent.        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            Onset date/MD order: 12/20/19, date of order.    CC/Present symptoms: Patient presents today with dyspareunia. The pt was on birth control for many years due to abdominal cramping and went off of in April. She will have intermittent cramping. Pt also has IBS and avoids fructose, because it triggers her symptoms with IBS.  Conditioning is improving/unchanging/worsening: unchanging    Current activity: somewhat sedentary  Goals: decrease pain with intercourse and reduce urinary leakage/urgency  Red flags:none    Urination:  Do you leak on the way to the bathroom or with a strong urge to void? yes   Do you leak with cough,sneeze, jumping, running? yes  Do you have triggers that make you feel you can't wait to go to the bathroom?  What are they? Cold, proximity to bathroom, anxiety.  Type of pad and number used per day? 0  When you leak what is the amount? small  How long can you delay the need to urinate? As necessary.   Frequency of daytime urination:~4x a day  Frequency of nighttime urination:1-2x  Can you stop the flow of urine when on the toilet? yes  Is the volume of urine passed usually:  (8sec rule= 250ml with average bladder storing 400-600ml) medium  Do you strain to pass urine? no  Do  you have a slow or hesitant urinary stream? no  Do you have difficulty initiating the urine stream? no  Is urination painful? no  How many bladder infections have you had in last 12 months? 0  Fluid intake(one glass is 8oz or one cup) 30-40oz glasses/day, 0-8 caffinated glasses/day  0-8 alcohol glasses/day.    Bowel habits:  Frequency of bowel movements? 1-3 times a day  Consistancy of stool?  [place] Stool Scale varies from type 1-7  Do you ignore the urge to defecate? no  Do you strain to pass stool? No    Pelvic Pain:  Do you have any pelvic pain?  Pain with intercourse, using tampons, pelvic exams  Is initial penetration during intercourse painful? yes  Is deeper penetration painful? no  Do you use lubricant?  yes  Are you sexually active? no  Have you ever been worried for your physical safety? no  Have you practiced the PF(kegel) exercises for 4 or more weeks?no  Marinoff Scale:Level 2  (Level 3: Abstinence from intercourse because of severe pain. Level 2: Painful intercourse which limites frequency of activity. Level 1: Painful intercourse not severe enough to prevent activity.)    Treatment/Education provided this session: please see flow sheet for details                 Objective:  Standing Alignment:      Shoulder/UE:  Rounded shoulders  Lumbar:  Lordosis decr                                                   Pelvic Dysfunction Evaluation:        Flexibility:    Tightness present at:Iliopsoas and Hamstrings    Abdominal Wall:      Trigger Points:  Iliopsoas    Pelvic Clock Exam:    Ischiocavernosis pain:  -  Bulbocavernosis pain:  -    Levator ANI:  -    SI Provocation:    Positive for: ASLR        External Assessment:    Skin Condition:  Normal    Bearing Down/Coughing:  Normal  Tissue Symmetry:  Normal  Introitus:  Normal  Muscle Contraction/Perineal Mobility:  Elevation and urogential triangle descent  Internal Assessment:  Internal assessment pelvic: increased PFM tone of levator ani with slight  tenderness to palpation.  Sensory Exam:  Normal  Contraction/Grade:  Weak squeeze, 2 second hold (2)  Accessory Muscle use-Abdominals:  Present  Accessory Muscle use-Gluteals:  Absent  Accessory Muscle use-Adductors:  Present  Symmetry of Contraction Response:  Symmetrical  SEMG Biofeedback:    Equipment:  Surface EMG  Surface electrode placement--Abdominals: transverse abdominis  Suraface electrode placement--Perianal:  Levator ani  Baseline EMG PM:  2.4mV  Baseline EMG Abdominals:  1.6mV  Peak pelvic muscle contraction:  6mV    EMG interpretation to fatigue:  Less than3 seconds  Position:  Sitting, standing and supineAdditional History:    Number of Pregnancies: 0  Number of Live Births: 0            Hip Evaluation    Hip Strength:            Internal Rotation:  Left: 4+/5   +   Pain:Right: 5-/5   Pain:  External Rotation:  Left: 5-/5   Pain:  Right: 5-/5   Pain:            Hip Special Testing:      Left hip negative for the following special tests:  SLR  Right hip negative for the following special tests:  SLR                 General     ROS    Assessment/Plan:    Patient is a 44 year old female with pelvic complaints.    Patient has the following significant findings with corresponding treatment plan.                Diagnosis 1:  Pelvic floor dysfunction  Pain -  hot/cold therapy, US, electric stimulation, mechanical traction, manual therapy, STS, splint/taping/bracing/orthotics, self management, education, directional preference exercise and home program  Decreased ROM/flexibility - manual therapy, therapeutic exercise, therapeutic activity and home program  Decreased strength - therapeutic exercise, therapeutic activities and home program  Impaired muscle performance - biofeedback, electric stimulation, neuro re-education and home program  Impaired posture - neuro re-education, therapeutic activities and home program    Therapy Evaluation Codes:   Cumulative Therapy Evaluation is: Low complexity.    Previous  and current functional limitations:  (See Goal Flow Sheet for this information)    Short term and Long term goals: (See Goal Flow Sheet for this information)     Communication ability:  Patient appears to be able to clearly communicate and understand verbal and written communication and follow directions correctly.  Treatment Explanation - The following has been discussed with the patient:   RX ordered/plan of care  Anticipated outcomes  Possible risks and side effects  This patient would benefit from PT intervention to resume normal activities.   Rehab potential is good.    Frequency:  2 X a month, once daily  Duration:  for 3 months  Discharge Plan:  Achieve all LTG.  Independent in home treatment program.  Reach maximal therapeutic benefit.    Please refer to the daily flowsheet for treatment today, total treatment time and time spent performing 1:1 timed codes.

## 2020-01-23 ENCOUNTER — THERAPY VISIT (OUTPATIENT)
Dept: PHYSICAL THERAPY | Facility: CLINIC | Age: 45
End: 2020-01-23
Payer: COMMERCIAL

## 2020-01-23 DIAGNOSIS — N94.10 DYSPAREUNIA, FEMALE: Primary | ICD-10-CM

## 2020-01-23 PROCEDURE — 97112 NEUROMUSCULAR REEDUCATION: CPT | Mod: GP | Performed by: PHYSICAL THERAPIST

## 2020-01-23 PROCEDURE — 97110 THERAPEUTIC EXERCISES: CPT | Mod: GP | Performed by: PHYSICAL THERAPIST

## 2020-01-23 PROCEDURE — 97535 SELF CARE MNGMENT TRAINING: CPT | Mod: GP | Performed by: PHYSICAL THERAPIST

## 2020-01-29 ENCOUNTER — OFFICE VISIT (OUTPATIENT)
Dept: FAMILY MEDICINE | Facility: CLINIC | Age: 45
End: 2020-01-29
Payer: COMMERCIAL

## 2020-01-29 VITALS
OXYGEN SATURATION: 94 % | BODY MASS INDEX: 24.75 KG/M2 | RESPIRATION RATE: 16 BRPM | SYSTOLIC BLOOD PRESSURE: 104 MMHG | WEIGHT: 158 LBS | HEART RATE: 89 BPM | DIASTOLIC BLOOD PRESSURE: 70 MMHG | TEMPERATURE: 98.1 F

## 2020-01-29 DIAGNOSIS — J02.9 VIRAL PHARYNGITIS: Primary | ICD-10-CM

## 2020-01-29 DIAGNOSIS — R07.0 THROAT PAIN: ICD-10-CM

## 2020-01-29 LAB
DEPRECATED S PYO AG THROAT QL EIA: NORMAL
SPECIMEN SOURCE: NORMAL

## 2020-01-29 PROCEDURE — 99213 OFFICE O/P EST LOW 20 MIN: CPT | Performed by: NURSE PRACTITIONER

## 2020-01-29 PROCEDURE — 87880 STREP A ASSAY W/OPTIC: CPT | Performed by: NURSE PRACTITIONER

## 2020-01-29 PROCEDURE — 87081 CULTURE SCREEN ONLY: CPT | Performed by: NURSE PRACTITIONER

## 2020-01-29 NOTE — PATIENT INSTRUCTIONS
1.  Rapid strep was negative, will et culture back tomorrow.  Suspect this is a viral infection.  Recommend ibuprofen up to 4 tabs every 6h as needed for pain,  Salt water gargles.  Lots of fluids and rest.  Follow up if worse or not improving in 1 week.  Will call if positive culture       Patient Education     Viral Pharyngitis (Sore Throat)    You or your child have pharyngitis (sore throat). This infection is caused by a virus. It can cause throat pain that is worse when swallowing, aching all over, headache, and fever. The infection may be spread by coughing, kissing, or touching others after touching your mouth or nose. Antibiotic medicines do not work against viruses. They are not used for treating this illness.  Home care    If symptoms are severe, you or your child should rest at home. Return to work or school when you or your child feel well enough.     You or your child should drink plenty of fluids to prevent dehydration.    Use throat lozenges or numbing throat sprays to help reduce pain. Gargling with warm salt water will also help reduce throat pain. Dissolve 1/2 teaspoon of salt in 1 glass of warm water. Children can sip on juice or a popsicle. Children 5 years and older can also suck on a lollipop or hard candy.    Don t eat salty or spicy foods or give them to your child. These can be irritating to the throat.  Medicines for a child: You can give your child acetaminophen for fever, fussiness, or discomfort. In babies over 6 months of age, you may use ibuprofen instead of acetaminophen. If your child has chronic liver or kidney disease or ever had a stomach ulcer or GI bleeding, talk with your child s healthcare provider before giving these medicines. Aspirin should never be used by any child under 18 years of age who has a fever. It may cause severe liver damage.  Medicines for an adult: You may use acetaminophen or ibuprofen to control pain or fever, unless another medicine was prescribed for  this. If you have chronic liver or kidney disease or ever had a stomach ulcer or GI bleeding, talk with your healthcare provider before using these medicines.  Follow-up care  Follow up with a healthcare provider or our staff if you or your child are not getting better over the next week.  When to seek medical advice  Call your healthcare provider right away if any of these occur:    Fever as directed by your healthcare provider.  For children, seek care if:  ? Your child is of any age and has repeated fevers above 104 F (40 C).  ? Your child is younger than 2 years of age and has a fever of 100.4 F (38 C) for more than 1 day.  ? Your child is 2 years old or older and has a fever of 100.4 F (38 C) for more than 3 days.    New or worsening ear pain, sinus pain, or headache    Painful lumps in the back of neck    Stiff neck    Lymph nodes are getting larger    Can t swallow liquids, a lot of drooling, or can t open mouth wide due to throat pain    Signs of dehydration, such as very dark urine or no urine, sunken eyes, dizziness    Trouble breathing or noisy breathing    Muffled voice    New rash    Other symptoms are getting worse  Date Last Reviewed: 10/1/2017    2449-8104 [employer]. [address] [place] [address], [place] [address]. All rights reserved. This information is not intended as a substitute for professional medical care. Always follow your healthcare professional's instructions.

## 2020-01-29 NOTE — PROGRESS NOTES
Subjective     Sondra Sorensen is a 44 year old female who presents to clinic today for the following health issues:    HPI   Acute Illness   Acute illness concerns: Sore Throat   Onset: Started Friday    Fever: YES- mild    Chills/Sweats: no     Headache (location?): YES    Sinus Pressure:YES-     Conjunctivitis:  YES- mild    Ear Pain: YES: both    Rhinorrhea: YES    Congestion: YES    Sore Throat: YES     Cough: no    Wheeze: no    Decreased Appetite: no    Nausea: no     Vomiting: no     Diarrhea:  no     Dysuria/Freq.: no    Fatigue/Achiness: YES    Sick/Strep Exposure: no      Therapies Tried and outcome: ibuprofen, anti mucus med      fri started with scratchy throat.  Sunday coughed up massive glob of phlegm and felt better.  Monday felt sick again.  Tonsils and glands are really swollen.  No cough.  Mild rhinorrhea.  No facial pressure.  Teeth hurt last night.  Mild headache.  No fever.  She is fatigued, has had body aches, no chills.  Unsure if having PND.  Feels like something is stuck in back of throat.  No wheezing or shortness of breath.      Hx enlarged tonsils, strep as a kid.  No known sick contacts.        Patient Active Problem List   Diagnosis     Fructose intolerance     CARDIOVASCULAR SCREENING; LDL GOAL LESS THAN 130     Irritable bowel syndrome with diarrhea     Seasonal affective disorder (H)     Past Surgical History:   Procedure Laterality Date     NO HISTORY OF SURGERY         Social History     Tobacco Use     Smoking status: Never Smoker     Smokeless tobacco: Never Used   Substance Use Topics     Alcohol use: Yes     Comment: rare     Family History   Problem Relation Age of Onset     Diabetes Father          Current Outpatient Medications   Medication Sig Dispense Refill     B Complex-C (VITAMIN B COMPLEX W/VITAMIN C) TABS tablet Take 1 tablet by mouth daily       Biotin 2500 MCG CAPS        Calcium Citrate-Vitamin D (CALCIUM + D PO)        cetirizine (ZYRTEC) 10 MG tablet Take 10 mg by  mouth daily       cholecalciferol (VITAMIN D3) 5000 units (125 mcg) capsule Take by mouth daily       Glucosamine-Chondroitin-MSM (TRIPLE FLEX PO)        IBUPROFEN PO Take 200 mg by mouth       ketoconazole (NIZORAL) 2 % cream Apply topically as needed for itching       ketoconazole (NIZORAL) 2 % external shampoo Apply topically as needed for itching or irritation Shampoo/scalp once or twice weekly as needed for seborrhea symptoms 120 mL 11     melatonin ([employer] MELATONIN) 3 MG tablet Take by mouth nightly as needed for sleep Takes 3-12 mg Nightly       metroNIDAZOLE (METROCREAM) 0.75 % external cream        mometasone (NASONEX) 50 MCG/ACT spray Spray 2 sprays into both nostrils as needed       Multiple Vitamins-Minerals (HAIR SKIN AND NAILS FORMULA PO)        olopatadine (PATANOL) 0.1 % ophthalmic solution Place 1 drop into both eyes 2 times daily As needed 5 mL 3     Omega-3 Fatty Acids (FISH OIL PO)        triamcinolone (KENALOG) 0.1 % external cream Apply topically 2 times daily As needed for rash 80 g 3     vitamin D2 (ERGOCALCIFEROL) 02149 units (1250 mcg) capsule Take 50,000 Units by mouth once a week         Reviewed and updated as needed this visit by Provider         Review of Systems   ROS COMP: Constitutional, HEENT, cardiovascular, pulmonary, gi and gu systems are negative, except as otherwise noted.      Objective    /70 (BP Location: Left arm, Patient Position: Sitting, Cuff Size: Adult Regular)   Pulse 89   Temp 98.1  F (36.7  C) (Oral)   Resp 16   Wt 71.7 kg (158 lb)   SpO2 94%   BMI 24.75 kg/m    Body mass index is 24.75 kg/m .  Physical Exam   GENERAL APPEARANCE: healthy, alert and no distress  EYES: Eyes grossly normal to inspection and conjunctivae and sclerae normal  HENT: ear canals and TM's normal and nose and mouth without ulcers or lesions. tonsils +2 bilaterally with exudate present.  Uvula midline, soft palate symmetrical   NECK: no adenopathy  RESP: lungs clear to auscultation -  no rales, rhonchi or wheezes  CV: regular rates and rhythm, normal S1 S2, no S3 or S4 and no murmur, click or rub  PSYCH: mentation appears normal and affect normal/bright       Diagnostic Test Results:  Results for orders placed or performed in visit on 01/29/20 (from the past 24 hour(s))   Strep, Rapid Screen   Result Value Ref Range    Specimen Description Throat     Rapid Strep A Screen       NEGATIVE: No Group A streptococcal antigen detected by immunoassay, await culture report.           Assessment & Plan     (J02.9) Viral pharyngitis  (primary encounter diagnosis)  Comment: RST neg, suspect viral etiology  Plan: discussed likely viral etiology with self limited course, recommend symptomatic cares, ibuprofen, salt water gargles, see patient instructions.  Follow up if worsening or not improving in 1 week.    (R07.0) Throat pain  Comment:   Plan: Strep, Rapid Screen, Beta strep group A culture                 See Patient Instructions    No follow-ups on file.    JANINE [facility]

## 2020-01-30 LAB
BACTERIA SPEC CULT: NORMAL
SPECIMEN SOURCE: NORMAL

## 2020-02-04 ENCOUNTER — TRANSFERRED RECORDS (OUTPATIENT)
Dept: HEALTH INFORMATION MANAGEMENT | Facility: CLINIC | Age: 45
End: 2020-02-04

## 2020-02-04 ENCOUNTER — MEDICAL CORRESPONDENCE (OUTPATIENT)
Dept: HEALTH INFORMATION MANAGEMENT | Facility: CLINIC | Age: 45
End: 2020-02-04

## 2020-02-04 LAB
CREAT SERPL-MCNC: 0.8 MG/DL (ref 0.6–1)
GFR SERPL CREATININE-BSD FRML MDRD: >60 ML/MIN/1.73M2
POTASSIUM SERPL-SCNC: 4.7 MMOL/L (ref 3.4–5.3)
TSH SERPL-ACNC: 1.25 MIU/L

## 2020-02-07 NOTE — TELEPHONE ENCOUNTER
FUTURE VISIT INFORMATION      FUTURE VISIT INFORMATION:    Date: 4.3.20    Time: 8:30    Location: [facility]  REFERRAL INFORMATION:    Referring provider:  Dr. Bella    Referring providers clinic:  [facility]    Reason for visit/diagnosis  Hair Loss ref by Dr. Shayne Li    RECORDS REQUESTED FROM:       Clinic name Comments Records Status Imaging Status   [facility] 2.4.20 Dr. Bella [facility] N/A

## 2020-02-11 ENCOUNTER — TRANSFERRED RECORDS (OUTPATIENT)
Dept: HEALTH INFORMATION MANAGEMENT | Facility: CLINIC | Age: 45
End: 2020-02-11

## 2020-02-18 ENCOUNTER — TRANSFERRED RECORDS (OUTPATIENT)
Dept: HEALTH INFORMATION MANAGEMENT | Facility: CLINIC | Age: 45
End: 2020-02-18

## 2020-03-05 ENCOUNTER — THERAPY VISIT (OUTPATIENT)
Dept: PHYSICAL THERAPY | Facility: CLINIC | Age: 45
End: 2020-03-05
Payer: COMMERCIAL

## 2020-03-05 DIAGNOSIS — N94.10 DYSPAREUNIA, FEMALE: Primary | ICD-10-CM

## 2020-03-05 PROCEDURE — 97140 MANUAL THERAPY 1/> REGIONS: CPT | Mod: GP | Performed by: PHYSICAL THERAPIST

## 2020-03-05 PROCEDURE — 97535 SELF CARE MNGMENT TRAINING: CPT | Mod: GP | Performed by: PHYSICAL THERAPIST

## 2020-03-05 PROCEDURE — 97110 THERAPEUTIC EXERCISES: CPT | Mod: GP | Performed by: PHYSICAL THERAPIST

## 2020-03-11 ENCOUNTER — HEALTH MAINTENANCE LETTER (OUTPATIENT)
Age: 45
End: 2020-03-11

## 2020-03-17 LAB
ALT SERPL-CCNC: 23 U/L (ref 14–59)
AST SERPL-CCNC: 15 U/L (ref 0–45)
CREAT SERPL-MCNC: 0.8 MG/DL (ref 0.6–1)
GFR SERPL CREATININE-BSD FRML MDRD: >60 ML/MIN/1.73M2
GLUCOSE SERPL-MCNC: 114 MG/DL (ref 70–124)
INR PPP: 1
POTASSIUM SERPL-SCNC: 3.9 MMOL/L (ref 3.4–5.3)

## 2020-03-19 ENCOUNTER — TRANSFERRED RECORDS (OUTPATIENT)
Dept: HEALTH INFORMATION MANAGEMENT | Facility: CLINIC | Age: 45
End: 2020-03-19

## 2020-04-03 ENCOUNTER — PRE VISIT (OUTPATIENT)
Dept: DERMATOLOGY | Facility: CLINIC | Age: 45
End: 2020-04-03

## 2020-04-28 NOTE — PROGRESS NOTES
Discharge Note    Progress reporting period is from last progress note on   to Mar 5, 2020.    Sondra failed to follow up and current status is unknown.  Please see information below for last relevant information on current status.  Patient seen for 3 visits.    SUBJECTIVE  Subjective changes noted by patient:  The pt reports that she has not been as good keeping up with her exercises. The pt has not noticed any changes since her last session.  .  Current pain level is  .     Previous pain level was  0/10.   Changes in function:  Yes (See Goal flowsheet attached for changes in current functional level)  Adverse reaction to treatment or activity: None    OBJECTIVE  Changes noted in objective findings: bristol stool scale: type 5-type1, glute med strength: 4/5 B. Internal PFM assessment: TTP levator ani and bulobocavernosus R>L     ASSESSMENT/PLAN  Diagnosis: pelvic floor dysfunction   Updated problem list and treatment plan:   Decreased ROM/flexibility - HEP  Decreased strength - HEP  Impaired muscle performance - HEP  STG/LTGs have been met or progress has been made towards goals:  Yes, please see goal flowsheet for most current information  Assessment of Progress: current status is unknown.    Last current status: Pt is progressing slower than anticipated(not consistent with HPE)   Self Management Plans:  HEP  I have re-evaluated this patient and find that the nature, scope, duration and intensity of the therapy is appropriate for the medical condition of the patient.  Sondra continues to require the following intervention to meet STG and LTG's:  HEP.    Recommendations:  Discharge with current home program.  Patient to follow up with MD as needed.    Please refer to the daily flowsheet for treatment today, total treatment time and time spent performing 1:1 timed codes.

## 2020-07-28 ENCOUNTER — TRANSCRIBE ORDERS (OUTPATIENT)
Dept: OTHER | Age: 45
End: 2020-07-28

## 2020-07-28 ENCOUNTER — TRANSFERRED RECORDS (OUTPATIENT)
Dept: HEALTH INFORMATION MANAGEMENT | Facility: CLINIC | Age: 45
End: 2020-07-28

## 2020-07-28 DIAGNOSIS — M35.9 CONNECTIVE TISSUE DISEASE (H): Primary | ICD-10-CM

## 2020-07-28 DIAGNOSIS — R49.0 HOARSENESS: ICD-10-CM

## 2020-07-31 NOTE — TELEPHONE ENCOUNTER
FUTURE VISIT INFORMATION      FUTURE VISIT INFORMATION:    Date: 9/25/2020    Time: 10AM    Location: [facility]  REFERRAL INFORMATION:    Referring provider:  Tiara Kaur MD, MD     Referring providers clinic:  [facility]     Reason for visit/diagnosis  Hoarsness // per pt // ref by Dr. Whyte // [facility]     RECORDS REQUESTED FROM:       Clinic name Comments Records Status Imaging Status   [facility] 6/8/18 note from Dr Demetri Mccain Scanned in EPIC    [facility]  [facility]17 P[facility]  req 7/31/2020 - sent to scan     [facility]  7/28/2020, 4/22/2020 rheumatology note from Tiara Kaur MD, MD  req 7/31/2020 - sent to scan                         7/31/2020 10:50AM [facility] a [facility] for recs - Amay   *received PFT from [facility], waiting for [facility] recs - Amay   8/10/2020 12:03Pm recs received from [facility] on 8/4/2020 and sent to scan - amay

## 2020-08-10 ENCOUNTER — NURSE TRIAGE (OUTPATIENT)
Dept: NURSING | Facility: CLINIC | Age: 45
End: 2020-08-10

## 2020-08-10 DIAGNOSIS — U07.1 2019 NOVEL CORONAVIRUS DISEASE (COVID-19): Primary | ICD-10-CM

## 2020-08-10 NOTE — TELEPHONE ENCOUNTER
"Sondra is requesting to antibody testing.    Patient is calling requesting COVID serologic antibody testing.  NOTE: Serologic testing is a blood test for 'antibodies' which are made at 10-14 days after you have had symptoms of COVID or were exposed and had an asymptomatic infection.  This does NOT test you for 'active' infection or tell you if you are contagious.    Are you a healthcare worker? no  Do you currently have a cough, fever, body aches, shortness of breath, or difficulty breathing?  No  Did you previously have cough, fever, body aches, shortness of breath, or difficulty breathing that have now resolved? Has had previous covid symptoms.   Symptoms began 120 days ago.  Symptoms started > 14 days ago. Lab order placed per SARS-CoV-2 Serology test Standing Order using indication \"Previously symptomatic >14d since onset, currently asymptomatic\" and diagnosis code \"Screening for viral disease\" (Z11.59)      The patient was informed: \"Testing is limited each day and it may take time for testing to be available to everyone who has called. You will receive a call within 48-72 hours to schedule the serology testing. Please confirm the best number to reach you is [phone]. If you have any questions about scheduling, call [phone].\"         "

## 2020-08-12 DIAGNOSIS — U07.1 2019 NOVEL CORONAVIRUS DISEASE (COVID-19): ICD-10-CM

## 2020-08-12 PROCEDURE — 36415 COLL VENOUS BLD VENIPUNCTURE: CPT | Performed by: FAMILY MEDICINE

## 2020-08-12 PROCEDURE — 86769 SARS-COV-2 COVID-19 ANTIBODY: CPT | Performed by: FAMILY MEDICINE

## 2020-08-15 LAB
COVID-19 SPIKE RBD ABY TITER: NORMAL
COVID-19 SPIKE RBD ABY: NEGATIVE

## 2020-09-10 ENCOUNTER — TELEPHONE (OUTPATIENT)
Dept: OTOLARYNGOLOGY | Facility: CLINIC | Age: 45
End: 2020-09-10

## 2020-09-11 ENCOUNTER — TELEPHONE (OUTPATIENT)
Dept: OTOLARYNGOLOGY | Facility: CLINIC | Age: 45
End: 2020-09-11

## 2020-09-11 ENCOUNTER — VIRTUAL VISIT (OUTPATIENT)
Dept: OTOLARYNGOLOGY | Facility: CLINIC | Age: 45
End: 2020-09-11
Payer: COMMERCIAL

## 2020-09-11 VITALS — HEIGHT: 67 IN | BODY MASS INDEX: 23.54 KG/M2 | WEIGHT: 150 LBS

## 2020-09-11 DIAGNOSIS — R49.0 DYSPHONIA: Primary | ICD-10-CM

## 2020-09-11 ASSESSMENT — MIFFLIN-ST. JEOR: SCORE: 1355.09

## 2020-09-11 ASSESSMENT — PAIN SCALES - GENERAL: PAINLEVEL: NO PAIN (0)

## 2020-09-11 NOTE — LETTER
9/11/2020       RE: Sondra Sorensen  [address]  [place] [address]     Dear Colleague,    Thank you for referring your patient, Sondra Sorensen, to the  [facility]. Please see a copy of my visit note below.    Due to issues with technology, patient was unable to be seen in conjunction with her evaluation with Dr. Hernandez.  She will be seen in the future when she presents for laryngeal evaluation. This note was created in error, please disregard.    Robin Emery M.M., M.A., CCC-SLP  Speech-Language Pathologist  Certificate of Vocology  [phone]      Again, thank you for allowing me to participate in the care of your patient.      Sincerely,    Carlos Manuel Emery, SLP

## 2020-09-11 NOTE — Clinical Note
Amay   I never know where to look but do you see this arturo Myers office visit note from  July 28th, 2020?    Thank you!  Karla

## 2020-09-11 NOTE — PROGRESS NOTES
"Sondra Sorensen is a 44 year old female who is being evaluated via a billable video visit.      The patient has been notified of following:     \"This video visit will be conducted via a call between you and your physician/provider. We have found that certain health care needs can be provided without the need for an in-person physical exam.  This service lets us provide the care you need with a video conversation.  If a prescription is necessary we can send it directly to your pharmacy.  If lab work is needed we can place an order for that and you can then stop by our lab to have the test done at a later time.    Video visits are billed at different rates depending on your insurance coverage.  Please reach out to your insurance provider with any questions.    If during the course of the call the physician/provider feels a video visit is not appropriate, you will not be charged for this service.\"    Patient has given verbal consent for Video visit? Yes  How would you like to obtain your AVS? MyChart  If you are dropped from the video visit, the video invite should be resent to: Send to e-mail at: [email or web address]  Will anyone else be joining your video visit? No        Video-Visit Details    Type of service:  Video Visit    Video Start Time: 3:10  Video End Time: 3:35    Originating Location (pt. Location): Home    Distant Location (provider location):  [facility] EAR NO[facility] AND [facility]     Platform used for Video Visit: [facility]               [facility]   at the [facility]   Otolaryngology Clinic     Patient: Sondra Sorensen    MRN: [id number]    : 1975    Age/Gender: 44 year old female  Date of Service: 2020  Rendering Provider:   Karla Hernandez MD     Referring Provider   PCP: No Ref-Primary, Physician  Referring Physician: Lucia Dickinson Mayra  Reason for Consultation   Dysphonia  History   HISTORY OF PRESENT ILLNESS: I was asked to consult on Sondra Sorensen, by Lucia Dash Mayra " for evaluation of dysphonia. Ms. Sorensen is a 44 year old female who presents to us today with dysphonia since April.        Today she reports:   Dysphonia  ? she s been hoarse for a few months   ? It started in April  ? She had some  autoimmune things    ? Tried taking hydroxychloroquine   ? And then she started to get hoase  ? She also started working from home and lots of changes happened   ? When she raised the dose she felt a lot of heartburn  ? Both her rheumatologist thought it was acid reflux  ? She s been taking meds for it  ? She s now off hydroxychloroquine  ? It wasn t sudden   ? She thought it was spring allergies and then it didn t go away  ? Didn t pay much attention to it at first  ? It comes and goes   ? Worse with talking - over zoom  ? Not a lot of zoom meetings  ? Not a change in voice demand   ? Denies pain with talking  ? Today is a good day  ? Unclear with time of day  ? Singing voice quality is bad too - it was broken and scratchy  ? Her voice has sounded like this when she s a cold before but only for a day or two   ? In march she had a bad cold - she did have a terrible cough at that point, she did have an inhaler   ? Period of time of good voice between cold and voice change   ? No neck surgeries  ? No intubation   ? No vomiting     Autoimmune problems  ? started with some hair fall out in February  ? Alopecia areata and got steroid injections and then mid march she got dark purple spots on the legs - cutaneous vasculitis  ? Then in April she saw rheumatology   ? Her inflammatory levels are high     Dysphagia  ? Denies  ? Throat is dry if she is eating something dry it is a little bit harder    Throat clearing/coughing  ? denies     GERD/LPRD  ? The worse heartburn was at night   ? Some in the morning   ? LPRD - sore throat, a bit of mucus     PAST MEDICAL HISTORY:   Past Medical History:   Diagnosis Date     Allergic rhinitis 4/24/2003    Epic     Alopecia 9/14/2020     Arthralgia 9/14/2020      Blepharitis 5/13/2009    Epic     Decreased diffusion capacity of lung 12/9/2008     Diarrhea 10/26/2009     Episodic mood disorder (H) 11/15/2004    Epic     Fructose intolerance 10/3/2018     Generalized anxiety disorder 2/7/2012     Insomnia 3/1/2011     Irritable bowel syndrome with diarrhea 10/3/2018     Leucocytoclastic vasculitis (H) 4/22/2020     Major depression, recurrent, full remission (H) 11/4/2014    Noted at PCP visit.     Myopia 11/14/2008    Myopia - LasEk both 2009     NO ACTIVE PROBLEMS      Restrictive lung disease 12/9/2008     Seasonal affective disorder (H) 10/4/2018     Social phobia 10/20/2016       PAST SURGICAL HISTORY:   Past Surgical History:   Procedure Laterality Date     NO HISTORY OF SURGERY         CURRENT MEDICATIONS:   Current Outpatient Medications:      Calcium Citrate-Vitamin D (CALCIUM + D PO), , Disp: , Rfl:      cetirizine (ZYRTEC) 10 MG tablet, Take 10 mg by mouth daily, Disp: , Rfl:      cholecalciferol (VITAMIN D3) 5000 units (125 mcg) capsule, Take by mouth daily, Disp: , Rfl:      IBUPROFEN PO, Take 200 mg by mouth, Disp: , Rfl:      ketoconazole (NIZORAL) 2 % cream, Apply topically as needed for itching, Disp: , Rfl:      ketoconazole (NIZORAL) 2 % external shampoo, Apply topically as needed for itching or irritation Shampoo/scalp once or twice weekly as needed for seborrhea symptoms, Disp: 120 mL, Rfl: 11     melatonin ([employer] MELATONIN) 3 MG tablet, Take by mouth nightly as needed for sleep Takes 3-12 mg Nightly, Disp: , Rfl:      olopatadine (PATANOL) 0.1 % ophthalmic solution, Place 1 drop into both eyes 2 times daily As needed, Disp: 5 mL, Rfl: 3     Omega-3 Fatty Acids (FISH OIL PO), , Disp: , Rfl:      triamcinolone (KENALOG) 0.1 % external cream, Apply topically 2 times daily As needed for rash, Disp: 80 g, Rfl: 3     vitamin D2 (ERGOCALCIFEROL) 53482 units (1250 mcg) capsule, Take 50,000 Units by mouth once a week, Disp: , Rfl:      B Complex-C (VITAMIN B  COMPLEX W/VITAMIN C) TABS tablet, Take 1 tablet by mouth daily, Disp: , Rfl:      Biotin 2500 MCG CAPS, , Disp: , Rfl:      Glucosamine-Chondroitin-MSM (TRIPLE FLEX PO), , Disp: , Rfl:      metroNIDAZOLE (METROCREAM) 0.75 % external cream, , Disp: , Rfl:      mometasone (NASONEX) 50 MCG/ACT spray, Spray 2 sprays into both nostrils as needed, Disp: , Rfl:      Multiple Vitamins-Minerals (HAIR SKIN AND NAILS FORMULA PO), , Disp: , Rfl:     ALLERGIES: Doxycycline and Penicillins    SOCIAL HISTORY:    Social History     Socioeconomic History     Marital status: Single     Spouse name: Not on file     Number of children: Not on file     Years of education: Not on file     Highest education level: Not on file   Occupational History     Not on file   Social Needs     Financial resource strain: Not on file     Food insecurity     Worry: Not on file     Inability: Not on file     Transportation needs     Medical: Not on file     Non-medical: Not on file   Tobacco Use     Smoking status: Never Smoker     Smokeless tobacco: Never Used   Substance and Sexual Activity     Alcohol use: Yes     Comment: rare     Drug use: No     Sexual activity: Not Currently   Lifestyle     Physical activity     Days per week: Not on file     Minutes per session: Not on file     Stress: Not on file   Relationships     Social connections     Talks on phone: Not on file     Gets together: Not on file     Attends [employer] service: Not on file     Active member of club or organization: Not on file     Attends meetings of clubs or organizations: Not on file     Relationship status: Not on file     Intimate partner violence     Fear of current or ex partner: Not on file     Emotionally abused: Not on file     Physically abused: Not on file     Forced sexual activity: Not on file   Other Topics Concern     Parent/sibling w/ CABG, MI or angioplasty before 65F 55M? Not Asked   Social History Narrative     Not on file         FAMILY HISTORY:   Family  History   Problem Relation Age of Onset     Diabetes Father      Non-contributory for problems with anesthesia    REVIEW OF SYSTEMS:   The patient was asked a 14 point review of systems regarding constitutional symptoms, eye symptoms, ears, nose, mouth, throat symptoms, cardiovascular symptoms, respiratory symptoms, gastrointestinal symptoms, genitourinary symptoms, musculoskeletal symptoms, integumentary symptoms, neurological symptoms, psychiatric symptoms, endocrine symptoms, hematologic/lymphatic symptoms, and allergic/ immunologic symptoms.   The pertinent factors have been included in the HPI and below.  Patient Supplied Answers to Review of Systems  No flowsheet data found.    Physical Examination   The patient underwent a physical examination as described below. The pertinent positive and negative findings are summarized after the description of the examination.    Constitutional: The patient's developmental and nutritional status was assessed. The patient's voice quality was assessed.  Head and Face: The head and face were inspected for deformities. Facial muscle strength was assessed bilaterally.  Eyes: Extraocular movements and primary gaze alignment were assessed.  Ears, Nose, Mouth and Throat: The ears and nose were examined for deformities.The lips were examined for abnormalities.   Neck: The neck was visualized for abnormal neck masses  Respiratory: The nature of the breathing was observed.  Extremities: The hand extremities were examined for any clubbing or cyanosis.  Skin: The skin was examined for inflammatory or neoplastic conditions.  Neurologic: The patient's orientation, mood, and affect were noted. The cranial nerve  functions were examined.  Other pertinent positive and negative findings on physical examination:   Breathing comfortably on room air, no stridor with deep inspiration    All other physical examination findings were within normal limits and noncontributory  BEHAVIORAL &  QUALITATIVE EVALUATION OF VOICE AND RESONENCE   Comments: MPT: 10s Vocal Quality: rough    Pitch Range:  Moderately reduced   Phrase Length:  Normal  Vocal Loudness: Mildly reduced  Dysarthria: No    Review of Relevant Clinical Data     Labs:  Lab Results   Component Value Date    TSH 0.98 2019     Lab Results   Component Value Date     2019    CO2 26 2019    BUN 13 2019     Lab Results   Component Value Date    WBC 9.6 2019    HGB 13.5 2019    HCT 40.4 2019    MCV 92 2019     2019     No results found for: PT, PTT, INR  No results found for: MARIAA  No components found for: RHEUMATOIDFACTOR,  RF  Lab Results   Component Value Date    CRP 8.1 (H) 2019     No components found for: CKTOT, URICACID  No components found for: C3, C4, DSDNAAB, NDNAABIFA  No results found for: MPOAB    Patient reported Quality of Life (QOL) Measures   Patient Supplied Answers To VHI Questionnaire  No flowsheet data found.      Patient Supplied Answers To EAT Questionnaire  No flowsheet data found.      Patient Supplied Answers To CSI Questionnaire  No flowsheet data found.      Patient Supplied Answers to Dyspnea Index Questionnaire:  No flowsheet data found.    Impression & Plan     IMPRESSION: Ms. Sorensen is a 44 year old female who is being seen for the followin. 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 GERD/LPRD  - worst heartburn at night and some in th AM  - has AM LPRD with sore throat and  a bit of mucus   - now it is improved  Plan  - scope  - will consider GI  evaluation pending scope findings  RETURN VISIT: office evaluation with voice SLP in 2 week(s), or earlier as needed.     Thank you for the kind referral and for allowing me to share in the care of Ms. Sorensen. If you have any questions, please do not hesitate to contact me.    Karla Hernandez MD    Laryngology    [facility]  Department of  Otolaryngology - Head and Neck Surgery  [facility]  [address] [place] [address]  Appointment line: [phone]  Fax: [phone]

## 2020-09-11 NOTE — LETTER
"2020       RE: Sondra Sorensen  [address]  [place] [address]     Dear Colleague,    Thank you for referring your patient, Sondra Sorensen, to the [facility] at [facility]. Please see a copy of my visit note below.    Sondra Sorensen is a 44 year old female who is being evaluated via a billable video visit.      The patient has been notified of following:     \"This video visit will be conducted via a call between you and your physician/provider. We have found that certain health care needs can be provided without the need for an in-person physical exam.  This service lets us provide the care you need with a video conversation.  If a prescription is necessary we can send it directly to your pharmacy.  If lab work is needed we can place an order for that and you can then stop by our lab to have the test done at a later time.    Video visits are billed at different rates depending on your insurance coverage.  Please reach out to your insurance provider with any questions.    If during the course of the call the physician/provider feels a video visit is not appropriate, you will not be charged for this service.\"    Patient has given verbal consent for Video visit? Yes  How would you like to obtain your AVS? MyChart  If you are dropped from the video visit, the video invite should be resent to: Send to e-mail at: [email or web address]  Will anyone else be joining your video visit? No        Video-Visit Details    Type of service:  Video Visit    Video Start Time: 3:10  Video End Time: 3:35    Originating Location (pt. Location): Home    Distant Location (provider location):  [facility]     Platform used for Video Visit: Doximity               [facility]   at the [facility]   Otolaryngology Clinic     Patient: Sondra Sorensen    MRN: [id number]    : 1975    Age/Gender: 44 year old female  Date of Service: " 9/11/2020  Rendering Provider:   Karla Hernandez MD     Referring Provider   PCP: No Ref-Primary, Physician  Referring Physician: Lucia Dickinson Mayra  Reason for Consultation   Dysphonia  History   HISTORY OF PRESENT ILLNESS: I was asked to consult on Sondra Sorensen, by Lucia Dash Mayra for evaluation of dysphonia. Ms. Sorensen is a 44 year old female who presents to us today with dysphonia since April.        Today she reports:   Dysphonia  ? she s been hoarse for a few months   ? It started in April  ? She had some  autoimmune things    ? Tried taking hydroxychloroquine   ? And then she started to get hoase  ? She also started working from home and lots of changes happened   ? When she raised the dose she felt a lot of heartburn  ? Both her rheumatologist thought it was acid reflux  ? She s been taking meds for it  ? She s now off hydroxychloroquine  ? It wasn t sudden   ? She thought it was spring allergies and then it didn t go away  ? Didn t pay much attention to it at first  ? It comes and goes   ? Worse with talking - over zoom  ? Not a lot of zoom meetings  ? Not a change in voice demand   ? Denies pain with talking  ? Today is a good day  ? Unclear with time of day  ? Singing voice quality is bad too - it was broken and scratchy  ? Her voice has sounded like this when she s a cold before but only for a day or two   ? In march she had a bad cold - she did have a terrible cough at that point, she did have an inhaler   ? Period of time of good voice between cold and voice change   ? No neck surgeries  ? No intubation   ? No vomiting     Autoimmune problems  ? started with some hair fall out in February  ? Alopecia areata and got steroid injections and then mid march she got dark purple spots on the legs - cutaneous vasculitis  ? Then in April she saw rheumatology   ? Her inflammatory levels are high     Dysphagia  ? Denies  ? Throat is dry if she is eating something dry it is a little bit harder    Throat  clearing/coughing  ? denies     GERD/LPRD  ? The worse heartburn was at night   ? Some in the morning   ? LPRD - sore throat, a bit of mucus     PAST MEDICAL HISTORY:   Past Medical History:   Diagnosis Date     Allergic rhinitis 4/24/2003    Epic     Alopecia 9/14/2020     Arthralgia 9/14/2020     Blepharitis 5/13/2009    Epic     Decreased diffusion capacity of lung 12/9/2008     Diarrhea 10/26/2009     Episodic mood disorder (H) 11/15/2004    Epic     Fructose intolerance 10/3/2018     Generalized anxiety disorder 2/7/2012     Insomnia 3/1/2011     Irritable bowel syndrome with diarrhea 10/3/2018     Leucocytoclastic vasculitis (H) 4/22/2020     Major depression, recurrent, full remission (H) 11/4/2014    Noted at PCP visit.     Myopia 11/14/2008    Myopia - LasEk both 2009     NO ACTIVE PROBLEMS      Restrictive lung disease 12/9/2008     Seasonal affective disorder (H) 10/4/2018     Social phobia 10/20/2016       PAST SURGICAL HISTORY:   Past Surgical History:   Procedure Laterality Date     NO HISTORY OF SURGERY         CURRENT MEDICATIONS:   Current Outpatient Medications:      Calcium Citrate-Vitamin D (CALCIUM + D PO), , Disp: , Rfl:      cetirizine (ZYRTEC) 10 MG tablet, Take 10 mg by mouth daily, Disp: , Rfl:      cholecalciferol (VITAMIN D3) 5000 units (125 mcg) capsule, Take by mouth daily, Disp: , Rfl:      IBUPROFEN PO, Take 200 mg by mouth, Disp: , Rfl:      ketoconazole (NIZORAL) 2 % cream, Apply topically as needed for itching, Disp: , Rfl:      ketoconazole (NIZORAL) 2 % external shampoo, Apply topically as needed for itching or irritation Shampoo/scalp once or twice weekly as needed for seborrhea symptoms, Disp: 120 mL, Rfl: 11     melatonin ([employer] MELATONIN) 3 MG tablet, Take by mouth nightly as needed for sleep Takes 3-12 mg Nightly, Disp: , Rfl:      olopatadine (PATANOL) 0.1 % ophthalmic solution, Place 1 drop into both eyes 2 times daily As needed, Disp: 5 mL, Rfl: 3     Omega-3 Fatty Acids  (FISH OIL PO), , Disp: , Rfl:      triamcinolone (KENALOG) 0.1 % external cream, Apply topically 2 times daily As needed for rash, Disp: 80 g, Rfl: 3     vitamin D2 (ERGOCALCIFEROL) 10101 units (1250 mcg) capsule, Take 50,000 Units by mouth once a week, Disp: , Rfl:      B Complex-C (VITAMIN B COMPLEX W/VITAMIN C) TABS tablet, Take 1 tablet by mouth daily, Disp: , Rfl:      Biotin 2500 MCG CAPS, , Disp: , Rfl:      Glucosamine-Chondroitin-MSM (TRIPLE FLEX PO), , Disp: , Rfl:      metroNIDAZOLE (METROCREAM) 0.75 % external cream, , Disp: , Rfl:      mometasone (NASONEX) 50 MCG/ACT spray, Spray 2 sprays into both nostrils as needed, Disp: , Rfl:      Multiple Vitamins-Minerals (HAIR SKIN AND NAILS FORMULA PO), , Disp: , Rfl:     ALLERGIES: Doxycycline and Penicillins    SOCIAL HISTORY:    Social History     Socioeconomic History     Marital status: Single     Spouse name: Not on file     Number of children: Not on file     Years of education: Not on file     Highest education level: Not on file   Occupational History     Not on file   Social Needs     Financial resource strain: Not on file     Food insecurity     Worry: Not on file     Inability: Not on file     Transportation needs     Medical: Not on file     Non-medical: Not on file   Tobacco Use     Smoking status: Never Smoker     Smokeless tobacco: Never Used   Substance and Sexual Activity     Alcohol use: Yes     Comment: rare     Drug use: No     Sexual activity: Not Currently   Lifestyle     Physical activity     Days per week: Not on file     Minutes per session: Not on file     Stress: Not on file   Relationships     Social connections     Talks on phone: Not on file     Gets together: Not on file     Attends Quaker service: Not on file     Active member of club or organization: Not on file     Attends meetings of clubs or organizations: Not on file     Relationship status: Not on file     Intimate partner violence     Fear of current or ex partner: Not  on file     Emotionally abused: Not on file     Physically abused: Not on file     Forced sexual activity: Not on file   Other Topics Concern     Parent/sibling w/ CABG, MI or angioplasty before 65F 55M? Not Asked   Social History Narrative     Not on file         FAMILY HISTORY:   Family History   Problem Relation Age of Onset     Diabetes Father      Non-contributory for problems with anesthesia    REVIEW OF SYSTEMS:   The patient was asked a 14 point review of systems regarding constitutional symptoms, eye symptoms, ears, nose, mouth, throat symptoms, cardiovascular symptoms, respiratory symptoms, gastrointestinal symptoms, genitourinary symptoms, musculoskeletal symptoms, integumentary symptoms, neurological symptoms, psychiatric symptoms, endocrine symptoms, hematologic/lymphatic symptoms, and allergic/ immunologic symptoms.   The pertinent factors have been included in the HPI and below.  Patient Supplied Answers to Review of Systems  No flowsheet data found.    Physical Examination   The patient underwent a physical examination as described below. The pertinent positive and negative findings are summarized after the description of the examination.    Constitutional: The patient's developmental and nutritional status was assessed. The patient's voice quality was assessed.  Head and Face: The head and face were inspected for deformities. Facial muscle strength was assessed bilaterally.  Eyes: Extraocular movements and primary gaze alignment were assessed.  Ears, Nose, Mouth and Throat: The ears and nose were examined for deformities.The lips were examined for abnormalities.   Neck: The neck was visualized for abnormal neck masses  Respiratory: The nature of the breathing was observed.  Extremities: The hand extremities were examined for any clubbing or cyanosis.  Skin: The skin was examined for inflammatory or neoplastic conditions.  Neurologic: The patient's orientation, mood, and affect were noted. The  cranial nerve  functions were examined.  Other pertinent positive and negative findings on physical examination:   Breathing comfortably on room air, no stridor with deep inspiration    All other physical examination findings were within normal limits and noncontributory  BEHAVIORAL & QUALITATIVE EVALUATION OF VOICE AND RESONENCE   Comments: MPT: 10s Vocal Quality: rough    Pitch Range:  Moderately reduced   Phrase Length:  Normal  Vocal Loudness: Mildly reduced  Dysarthria: No    Review of Relevant Clinical Data     Labs:  Lab Results   Component Value Date    TSH 0.98 2019     Lab Results   Component Value Date     2019    CO2 26 2019    BUN 13 2019     Lab Results   Component Value Date    WBC 9.6 2019    HGB 13.5 2019    HCT 40.4 2019    MCV 92 2019     2019     No results found for: PT, PTT, INR  No results found for: MARIAA  No components found for: RHEUMATOIDFACTOR,  RF  Lab Results   Component Value Date    CRP 8.1 (H) 2019     No components found for: CKTOT, URICACID  No components found for: C3, C4, DSDNAAB, NDNAABIFA  No results found for: MPOAB    Patient reported Quality of Life (QOL) Measures   Patient Supplied Answers To VHI Questionnaire  No flowsheet data found.      Patient Supplied Answers To EAT Questionnaire  No flowsheet data found.      Patient Supplied Answers To CSI Questionnaire  No flowsheet data found.      Patient Supplied Answers to Dyspnea Index Questionnaire:  No flowsheet data found.    Impression & Plan     IMPRESSION: Ms. Sorensen is a 44 year old female who is being seen for the followin. 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 GERD/LPRD  - worst heartburn at night and some in th AM  - has AM LPRD with sore throat and  a bit of mucus   - now it is improved  Plan  - scope  - will consider GI evaluation pending scope findings  RETURN VISIT: office evaluation with voice SLP in 2 week(s), or earlier as needed.     Thank you for the kind referral and for allowing me to share in the care of Ms. Sorensen. If you have any questions, please do not hesitate to contact me.    Karla Hernandez MD    Laryngology    [facility]  Department of  Otolaryngology - Head and Neck Surgery  [facility]  [address] [place] [address]  Appointment line: [phone]  Fax: [phone]          Again, thank you for allowing me to participate in the care of your patient.      Sincerely,    Karla Hernandez MD

## 2020-09-14 PROBLEM — M25.50 ARTHRALGIA: Status: ACTIVE | Noted: 2020-09-14

## 2020-09-14 PROBLEM — M31.0 LEUCOCYTOCLASTIC VASCULITIS (H): Status: ACTIVE | Noted: 2020-04-22

## 2020-09-14 PROBLEM — L65.9 ALOPECIA: Status: ACTIVE | Noted: 2020-09-14

## 2020-09-21 ENCOUNTER — DOCUMENTATION ONLY (OUTPATIENT)
Dept: OTOLARYNGOLOGY | Facility: CLINIC | Age: 45
End: 2020-09-21

## 2020-09-21 NOTE — PROGRESS NOTES
Reviewed note  Called office to confirm that she wants me to perform a minor salivary gland lip biopsy

## 2020-09-25 ENCOUNTER — OFFICE VISIT (OUTPATIENT)
Dept: OTOLARYNGOLOGY | Facility: CLINIC | Age: 45
End: 2020-09-25
Attending: INTERNAL MEDICINE
Payer: COMMERCIAL

## 2020-09-25 ENCOUNTER — PRE VISIT (OUTPATIENT)
Dept: OTOLARYNGOLOGY | Facility: CLINIC | Age: 45
End: 2020-09-25

## 2020-09-25 VITALS
HEART RATE: 90 BPM | BODY MASS INDEX: 24.33 KG/M2 | WEIGHT: 155 LBS | HEIGHT: 67 IN | OXYGEN SATURATION: 96 % | TEMPERATURE: 98.2 F

## 2020-09-25 DIAGNOSIS — R49.0 DYSPHONIA: ICD-10-CM

## 2020-09-25 DIAGNOSIS — R49.0 DYSPHONIA: Primary | ICD-10-CM

## 2020-09-25 DIAGNOSIS — K21.9 LPRD (LARYNGOPHARYNGEAL REFLUX DISEASE): ICD-10-CM

## 2020-09-25 ASSESSMENT — MIFFLIN-ST. JEOR: SCORE: 1385.71

## 2020-09-25 ASSESSMENT — PAIN SCALES - GENERAL: PAINLEVEL: NO PAIN (0)

## 2020-09-25 NOTE — NURSING NOTE
"Chief Complaint   Patient presents with     RECHECK     scope and lip biopsy       Pulse 90, temperature 98.2  F (36.8  C), height 1.702 m (5' 7\"), weight 70.3 kg (155 lb), SpO2 96 %, not currently breastfeeding.    Kristan Mason, EMT    "

## 2020-09-25 NOTE — PROGRESS NOTES
Sondra Sorensen is a 44 year old female who is being evaluated via a billable video visit.      The patient has been notified and verbally consented to the following:     This video visit will be conducted between you and your provider.    Patient has opted to conduct today's video visit vs an in-person appointment, and is not able to attend due to possible exposure to COVID-19.      If during the course of the call the provider feels a video visit is not appropriate, you will not be charged for this service.    Call initiated at: 10:01  Type of Video Platform Used: Kaleidoscope  Location of provider: Residence  Location of patient: [facility]  Evaluation report    Clinician: Robin Emery M.M., M.A., CCC/SLP  Seen in conjunction with: Dr. Hernandez  Patient: Sondra Sorensen  Date of Visit: 9/25/2020    HISTORY  Chief complaint: Sondra Sorensen is a 44 year old woman presenting today for evaluation of voice and throat discomfort.    Salient history: Patient seen previously on 9/11/2020 by Dr. Hernandez as part of the virtual clinic.  Speech pathologist was not able to be with her on that evaluation and so this is her first meeting with our discipline.  Please see Dr. Hernandez's report from that date for full details of case history and current voice and throat complaints.  In short patient has been experiencing dysphonia since April of this year, she has had corresponding heartburn symptoms over that same timeframe, and was working with her rheumatologist on management of autoimmune issues.  Full evaluation with video stroboscopy was recommended, and patient presents to clinic today to complete this.  I am joining virtually for that examination to evaluate functional elements of her voice complaints.      Past Medical History:   Diagnosis Date     Allergic rhinitis 4/24/2003    Epic     Alopecia 9/14/2020     Arthralgia 9/14/2020     Blepharitis 5/13/2009    Epic     Decreased diffusion capacity of lung 12/9/2008      "Diarrhea 10/26/2009     Episodic mood disorder (H) 11/15/2004    Epic     Fructose intolerance 10/3/2018     Generalized anxiety disorder 2/7/2012     Insomnia 3/1/2011     Irritable bowel syndrome with diarrhea 10/3/2018     Leucocytoclastic vasculitis (H) 4/22/2020     Major depression, recurrent, full remission (H) 11/4/2014    Noted at PCP visit.     Myopia 11/14/2008    Myopia - LasEk both 2009     NO ACTIVE PROBLEMS      Restrictive lung disease 12/9/2008     Seasonal affective disorder (H) 10/4/2018     Social phobia 10/20/2016     Past Surgical History:   Procedure Laterality Date     NO HISTORY OF SURGERY         OBJECTIVE  PATIENT REPORTED MEASURES    Patient Supplied Answers To VHI Questionnaire  Voice Handicap Index (VHI-10) 9/25/2020   My voice makes it difficult for people to hear me 2   People have difficulty understanding me in a noisy room 2   My voice difficulties restrict my personal and social life.  0   I feel left out of conversations because of my voice 0   My voice problem causes me to lose income 0   I feel as though I have to strain to produce voice 2   The clarity of my voice is unpredictable 2   My voice problem upsets me 2   My voice makes me feel handicapped 0   People ask, \"What's wrong with your voice?\" 2   VHI-10 12     Patient Supplied Answers To CSI Questionnaire  Cough Severity Index (CSI) 9/25/2020   My cough is worse when I lie down 0   My coughing problem causes me to restrict my personal and social life 0   I tend to avoid places because of my cough problem 0   I feel embarrassed because of my coughing problem 0   People ask, ''What's wrong?'' because I cough a lot 1   I run out of air when I cough 0   My coughing problem affects my voice 1   My coughing problem limits my physical activity 0   My coughing problem upsets me 0   People ask me if I am sick because I cough a lot 1   CSI Score 3     Patient Supplied Answers To EAT Questionnaire  Eating Assessment Tool (EAT-10) " 9/25/2020   My swallowing problem has caused me to lose weight 0   My swallowing problem interferes with my ability to go out for meals 0   Swallowing liquids takes extra effort 0   Swallowing solids takes extra effort 1   Swallowing pills takes extra effort 1   Swallowing is painful 1   The pleasure of eating is affected by my swallowing 0   When I swallow food sticks in my throat 1   I cough when I eat 0   Swallowing is stressful 0   EAT-10 4     PERCEPTUAL EVALUATION (CPT 39734)  POSTURE / TENSION:     neck and shoulders    BREATHING:     phonation is not coordinated with respiration    talks to past end of breath stream    VOICE:    Roughness: Mild to moderate Intermittent    Breathiness: Mild Consistent    Strain: Mild to moderate Intermittent    Loudness    Conversational speech:  WNL    Projected speech:  WNL markedly increased strain is noted     Pitch:    Conversational speech:  WNL    Pitch glide:     Adequate access to loft register with increased breathiness and strain on the attempt    Resonance:    Conversational speech:  laryngeal pharyngeal resonance    Singing vs. Speech:  Consistent between contexts. Poor pitch awareness. Decreased roughness on sustained phonation    CAPE-V Overall Severity:  33/100    COUGH/THROAT CLEARING:    Not observed    THERAPY PROBES: Improvement was elicited with use of forward resonant stimuli, use of clear speech protocol and coordination of respiration and phonation    LARYNGEAL EXAMINATION  Procedure: Flexible endoscopy with chip-tip technology with stroboscopy, right nostril; topical anesthesia with 3% Lidocaine and 0.25% phenylephrine was applied.   Performed by: Dr. Hernandez   The laryngeal and pharyngeal structures were evaluated for gross appearance, mobility, function, and focal lesions / abnormalities of the associated mucosa.  Stroboscopy was warranted to evaluate closure, symmetry, and vibratory characteristics of the vocal folds.  All findings were within normal  limits with the exception of the following salient features:     Mild diffusely thickened secretions throughout the supraglottis    No focal lesions or abnormalities    With repetitive tasks sluggish range of motion is noted of the RTVF at times, though full range of motion is noted overall    With phonatory tasks:    Severe AP constricture of the supraglottis    Intermittent appearance of a spindle shaped glottal gap    Decreased hyperfunction is noted with improved phonatory respiratory coordination    Stroboscopy demonstrates mildly reduced mucosal wave of the left true vocal fold with open phase predominate closure particularly with elevated F0.       Supraglottic hyperfunction during running speech      Essentially healthy vocal fold mucosa      Occasional asymmetry of range and briskness of motion during    The laryngeal exam was reviewed with MsRobert Sorensen, and I provided pertinent explanations, as well as written and oral information.    ASSESSMENT / PLAN  IMPRESSIONS: Sondra Sorensen is presenting today with R49.0 (Dysphonia) and R07.0 (Throat Discomfort) in the context of likely Gastroesophageal Reflux Disease, an imbalance in function of the intrinsic and extrinsic muscles of the larynx and nonoptimal phonatory respiratory coordination.  Although subtle disruption of vibratory function and open phase predominant closure is noted during laryngeal evaluation patient's voice quality which is dominated by roughness and strain is largely result of functional patterns of use such as phonatory respiratory coordination and significant supraglottic recruitment versus any structural deficit, and should respond positively to behavioral intervention to address patterns of use.    RECOMMENDATIONS:     A course of speech therapy is recommended to optimize vocal technique, improve voice quality and promote reduced discomfort, effort and fatigue.    She demonstrates a Good prognosis for improvement given adherence to therapeutic  recommendations.     Positive indicators: positive response to therapy probes diagnosis is known to respond to treatment    Negative indicators: None    DURATION / FREQUENCY: 6 biweekly and 2 monthly one-hour sessions  GOALS:  Patient goal:   1. To improve and maintain a healthy voice quality  2. To understand the problem and fix it as much as possible    Short-term goal(s): Within the first 4 sessions, Ms. Sorensen:  1. will demonstrate assigned laryngeal massage techniques with 80% accuracy or better with no clinician support  2. will demonstrate semi-occluded vocal tract (SOVT) exercises with at least 80% accuracy with no clinician support  3. will accurately identify target vs. habitual voice quality during therapy tasks in 4 out of 5 trials with no clinician support  4. will demonstrate the ability to alternate between target and habitual voice quality given clinician cue 75% of the time during therapy tasks    Long-term goal(s): In 6 months, Ms. Sorensen will:  1. Report a week of typical activities, in which Dysphonia does not exceed a level of 2 out of 10, 80% of the time    This treatment plan was developed with the patient who agreed with the recommendations.    TOTAL SERVICE TIME: 40 minutes  EVALUATION OF VOICE AND RESONANCE (10538)  NO CHARGE FACILITY FEE (74165)    Robin Emery M.M., M.A., CCC-SLP  Speech-Language Pathologist  Certificate of Vocology  [phone]    *this report was created in part through the use of computerized dictation software, and though reviewed following completion, some typographic errors may persist.  If there is confusion regarding any of this notes contents, please contact me for clarification.*

## 2020-09-25 NOTE — PROGRESS NOTES
[facility]   at the [facility]   Otolaryngology Clinic     Patient: Sondra Sorensen    MRN: [id number]    : 1975    Age/Gender: 44 year old female  Date of Service: 2020  Rendering Provider:   Karla Hernandez MD     Chief Complaint   Dysphonia  Interval History   HISTORY OF PRESENT ILLNESS: Ms. Sorensen is a 44 year old female is being followed for dysphonia since April. She was initially seen on 20. Please refer to this note for full history.   Of note her symptom of dysphonia started a bit of time after she has started experiencing autoimmune related complaints.     Today, she presents for follow up. She reports persistent dysphonia. She reports that her heartburn is much improved at this time.     Dysphonia: Patient reports dysphonia. This is stable      Dysphagia: Patient denies dysphagia. This is stable     Dyspnea: Patient denies dyspnea. This is stable     Throat clearing/Chronic cough: Patient denies throat clearing/chronic cough. This is stable     LPRD/GERD: Patient reports LPRD/GERD symptoms. This is better than usual    PAST MEDICAL HISTORY:   Past Medical History:   Diagnosis Date     Allergic rhinitis 2003    Epic     Alopecia 2020     Arthralgia 2020     Blepharitis 2009    Epic     Decreased diffusion capacity of lung 2008     Diarrhea 10/26/2009     Episodic mood disorder (H) 11/15/2004    Epic     Fructose intolerance 10/3/2018     Generalized anxiety disorder 2012     Insomnia 3/1/2011     Irritable bowel syndrome with diarrhea 10/3/2018     Leucocytoclastic vasculitis (H) 2020     Major depression, recurrent, full remission (H) 2014    Noted at PCP visit.     Myopia 2008    Myopia - LasEk both      NO ACTIVE PROBLEMS      Restrictive lung disease 2008     Seasonal affective disorder (H) 10/4/2018     Social phobia 10/20/2016       PAST SURGICAL HISTORY:   Past Surgical History:   Procedure Laterality Date      NO HISTORY OF SURGERY         CURRENT MEDICATIONS:   Current Outpatient Medications:      Calcium Citrate-Vitamin D (CALCIUM + D PO), , Disp: , Rfl:      cetirizine (ZYRTEC) 10 MG tablet, Take 10 mg by mouth daily, Disp: , Rfl:      cholecalciferol (VITAMIN D3) 5000 units (125 mcg) capsule, Take by mouth daily, Disp: , Rfl:      IBUPROFEN PO, Take 200 mg by mouth, Disp: , Rfl:      ketoconazole (NIZORAL) 2 % cream, Apply topically as needed for itching, Disp: , Rfl:      ketoconazole (NIZORAL) 2 % external shampoo, Apply topically as needed for itching or irritation Shampoo/scalp once or twice weekly as needed for seborrhea symptoms, Disp: 120 mL, Rfl: 11     melatonin ([employer] MELATONIN) 3 MG tablet, Take by mouth nightly as needed for sleep Takes 3-12 mg Nightly, Disp: , Rfl:      olopatadine (PATANOL) 0.1 % ophthalmic solution, Place 1 drop into both eyes 2 times daily As needed, Disp: 5 mL, Rfl: 3     Omega-3 Fatty Acids (FISH OIL PO), , Disp: , Rfl:      triamcinolone (KENALOG) 0.1 % external cream, Apply topically 2 times daily As needed for rash, Disp: 80 g, Rfl: 3     vitamin D2 (ERGOCALCIFEROL) 08631 units (1250 mcg) capsule, Take 50,000 Units by mouth once a week, Disp: , Rfl:     ALLERGIES: Doxycycline and Penicillins    SOCIAL HISTORY:    Social History     Socioeconomic History     Marital status: Single     Spouse name: Not on file     Number of children: Not on file     Years of education: Not on file     Highest education level: Not on file   Occupational History     Not on file   Social Needs     Financial resource strain: Not on file     Food insecurity     Worry: Not on file     Inability: Not on file     Transportation needs     Medical: Not on file     Non-medical: Not on file   Tobacco Use     Smoking status: Never Smoker     Smokeless tobacco: Never Used   Substance and Sexual Activity     Alcohol use: Yes     Comment: rare     Drug use: No     Sexual activity: Not Currently   Lifestyle      Physical activity     Days per week: Not on file     Minutes per session: Not on file     Stress: Not on file   Relationships     Social connections     Talks on phone: Not on file     Gets together: Not on file     Attends Oriental orthodox service: Not on file     Active member of club or organization: Not on file     Attends meetings of clubs or organizations: Not on file     Relationship status: Not on file     Intimate partner violence     Fear of current or ex partner: Not on file     Emotionally abused: Not on file     Physically abused: Not on file     Forced sexual activity: Not on file   Other Topics Concern     Parent/sibling w/ CABG, MI or angioplasty before 65F 55M? Not Asked   Social History Narrative     Not on file         FAMILY HISTORY:   Family History   Problem Relation Age of Onset     Diabetes Father       Non-contributory for problems with anesthesia    REVIEW OF SYSTEMS:   The patient was asked a 14 point review of systems regarding constitutional symptoms, eye symptoms, ears, nose, mouth, throat symptoms, cardiovascular symptoms, respiratory symptoms, gastrointestinal symptoms, genitourinary symptoms, musculoskeletal symptoms, integumentary symptoms, neurological symptoms, psychiatric symptoms, endocrine symptoms, hematologic/lymphatic symptoms, and allergic/ immunologic symptoms.   The pertinent factors have been included in the HPI and below.  Patient Supplied Answers to Review of Systems  [facility] ROS 9/24/2020   Constitutional Weight gain, Problems with sleep   Neurology Headache   Psychology Frequently feeling depressed or sad, Frequently feeling anxious   Ears, Nose, Throat Sore throat, Hoarseness   Cardiopulmonary Chest pain   Gastrointestinal/Genitourinary Heartburn/indigestion   Musculoskeletal Sore or stiff joints, Back pain, Neck pain   Allergy/Immunology Allergies or hay fever   Other Rash       Physical Examination   The patient underwent a physical examination as described below. The  pertinent positive and negative findings are summarized after the description of the examination.  Constitutional: The patient's developmental and nutritional status was assessed. The patient's voice quality was assessed.  Head and Face: The head and face were inspected for deformities. The sinuses were palpated. The salivary glands were palpated. Facial muscle strength was assessed bilaterally.  Eyes: Extraocular movements and primary gaze alignment were assessed.  Ears, Nose, Mouth and Throat: The ears and nose were examined for deformities. The nasal septum, mucosa, and turbinates were inspected by anterior rhinoscopy. The lips, teeth, and gums were examined for abnormalities. The oral mucosa, tongue, palate, tonsils, lateral and posterior pharynx were inspected for the presence of asymmetry or mucosal lesions.    Neck: The tracheal position was noted, and the neck mass palpated to determine if there were any asymmetries, abnormal neck masses, thyromegally, or thyroid nodules.  Respiratory: The nature of the breathing and chest expansion/symmetry was observed.  Cardiovascular: The patient was examined to determine the presence of any edema or jugular venous distension.  Abdomen: The contour of the abdomen was noted.  Lymphatic: The patient was examined for infraclavicular lymphadenopathy.  Musculoskeletal: The patient was inspected for the presence of skeletal deformities.  Extremities: The extremities were examined for any clubbing or cyanosis.  Skin: The skin was examined for inflammatory or neoplastic conditions.  Neurologic: The patient's orientation, mood, and affect were noted. The cranial nerve  functions were examined.  Other pertinent positive and negative findings on physical examination:   OC/OP: no lesions, uvula midline, soft palate elevates symmetrically, FOM/BOT soft  Neck: no lesions, no TH tenderness to palpation    All other physical examination findings were within normal limits and  noncontributory.    Procedures   Video Laryngoscopy with Stroboscopy (CPT 29727) and Behavioral & Qualitative Evaluation of Voice and Resonence     Preoperative Diagnosis:  Dysphonia and throat symptoms  Postoperative Diagnosis: Dysphonia and throat symptoms  Indication:  Patient has new or persistent dysphonia and throat symptoms.  This requires evaluation by stroboscopy to fully delineate the laryngeal functioning; especially mucosal wave assessment, ultrasharp visualization of lesions on the vocal folds, and overall functioning of the larynx.  Details of Procedure: A 70 degree rigid telescopic laryngoscope or a distal chip flexible scope was used. The lighting was obtained via a light cable connected to a stroboscopic unit. The telescope was inserted either transorally or transnasally until the vocal folds could be visualized. The patient was instructed to sustain the vowel  ee  at a comfortable pitch and loudness as the voice was monitored by a microphone connected to a fundamental frequency tracker. This circuit tracked vocal periodicity, allowing the light to flash in synchrony with the glottal cycles. A setting on the stroboscope was set to change the phase of light strobing with relation to the vocal fundamental frequency, producing an image of 1 to 2 glottal cycles every second. The video images were recorded for analysis. Use of the variable speed, slow and stop scan allowed careful study of pertinent segments of laryngeal function to increase accuracy of clinical assessments of function and dysfunction.  In particular, features of glottal closure, mucosal wave on the vocal fold cover and laryngeal symmetry were analyzed. Lastly, the patient was asked to phonate speech samples and auditory/perceptual evaluation of voice and resonance were performed.  The vocal quality was carefully evaluated for hoarseness, breathiness, loudness, phrase length and intelligibility to determine the source of  dysphonia.    Findings:   A. BEHAVIORAL & QUALITATIVE EVALUATION OF VOICE AND RESONENCE   Comments: F0 220 MPT: 10s  Vocal Quality: mildy strained    Pitch Range:  Normal   Phrase Length:  Normal  Vocal Loudness: Normal  Dysarthria: No    B. LARYNGOVIDEOSTROBOSCOPY   Anatomic/Physiological Deviations:  Right paresis, mild vocal process ulceration, supraglottic hyperfunction    Mucosal wave: Right:  Little restriction     Left: No restriction  Bilateral Vocal Fold Vibration: Asymmetric  Vocal Process: Right: No restriction    Left:  No restriction  Vocal Fold closure: Complete glottal closure    Complication(s): None  Disposition: Patient tolerated the procedure well          Review of Relevant Clinical Data   Notes: AlokCynthiaLucia Tiara 7/28/20    Labs:  Lab Results   Component Value Date    TSH 0.98 12/05/2019     Lab Results   Component Value Date     12/05/2019    CO2 26 12/05/2019    BUN 13 12/05/2019     Lab Results   Component Value Date    WBC 9.6 12/05/2019    HGB 13.5 12/05/2019    HCT 40.4 12/05/2019    MCV 92 12/05/2019     12/05/2019     No results found for: PT, PTT, INR  No results found for: MARIAA  No components found for: RHEUMATOIDFACTOR,  RF  Lab Results   Component Value Date    CRP 8.1 (H) 11/06/2019     No components found for: CKTOT, URICACID  No components found for: C3, C4, DSDNAAB, NDNAABIFA  No results found for: MPOAB    Patient reported Quality of Life (QOL) Measures   Patient Supplied Answers To VHI Questionnaire  Voice Handicap Index (VHI-10) 9/25/2020   My voice makes it difficult for people to hear me 2   People have difficulty understanding me in a noisy room 2   My voice difficulties restrict my personal and social life.  0   I feel left out of conversations because of my voice 0   My voice problem causes me to lose income 0   I feel as though I have to strain to produce voice 2   The clarity of my voice is unpredictable 2   My voice problem upsets me 2   My voice makes  "me feel handicapped 0   People ask, \"What's wrong with your voice?\" 2   VHI-10 12         Patient Supplied Answers To EAT Questionnaire  Eating Assessment Tool (EAT-10) 2020   My swallowing problem has caused me to lose weight 0   My swallowing problem interferes with my ability to go out for meals 0   Swallowing liquids takes extra effort 0   Swallowing solids takes extra effort 1   Swallowing pills takes extra effort 1   Swallowing is painful 1   The pleasure of eating is affected by my swallowing 0   When I swallow food sticks in my throat 1   I cough when I eat 0   Swallowing is stressful 0   EAT-10 4         Patient Supplied Answers To CSI Questionnaire  Cough Severity Index (CSI) 2020   My cough is worse when I lie down 0   My coughing problem causes me to restrict my personal and social life 0   I tend to avoid places because of my cough problem 0   I feel embarrassed because of my coughing problem 0   People ask, ''What's wrong?'' because I cough a lot 1   I run out of air when I cough 0   My coughing problem affects my voice 1   My coughing problem limits my physical activity 0   My coughing problem upsets me 0   People ask me if I am sick because I cough a lot 1   CSI Score 3         Patient Supplied Answers to Dyspnea Index Questionnaire:  No flowsheet data found.    Impression & Plan     IMPRESSION: Ms. Sorensen is a 44 year old female who is being seen for the followin. Dysphonia  - since April   - with singing and speaking voice  - in the context of new autoimmune problems, not fully diagnosed  - with new medication trial hydroxychloroquine and new onset heartburn  - the voice change wasn t sudden   - had URI in March but then had good voice period for a while  - strobe exam with intact vocal fold mucosa, mild right vocal fold paresis and supraglottic hyperfunction - due to either primary or secondary muscle tension dysphonia (from LPRD vs postviral neuropathy)  - discussed etiology and " treatment of muscle tension dysphonia  - no evidence of autoimmune findings on her laryngeal exam - will hold of on minor salivary gland biopsy  Plan  - voice therapy     2. GERD/LPRD  - worst heartburn at night and some in th AM  - has AM LPRD with sore throat and  a bit of mucus   - now it is improved  - exam with mild LPRD changes with mild vocal process ulceration though no evidence of granuloma  Plan  - GI referral to discuss reflux symptoms/association with hydroxycholoroquine    RETURN VISIT: telemedicine evaluation  with voice SLP in 4-6 month(s), or earlier as needed.     Karla Hernandez MD    Laryngology    [facility]  Department of  Otolaryngology - Head and Neck Surgery    [facility]  [address] [place] [address]  Appointment line: [phone]  Fax: [phone]

## 2020-09-25 NOTE — LETTER
2020      RE: Sondra Sorensen  [address]  [place] [address]           [facility]   at the [facility]   Otolaryngology Clinic     Patient: Sondra Sorensen    MRN: [id number]    : 1975    Age/Gender: 44 year old female  Date of Service: 2020  Rendering Provider:   Karla Hernandez MD     Chief Complaint   Dysphonia  Interval History   HISTORY OF PRESENT ILLNESS: Ms. Sorensen is a 44 year old female is being followed for dysphonia since April. She was initially seen on 20. Please refer to this note for full history.   Of note her symptom of dysphonia started a bit of time after she has started experiencing autoimmune related complaints.     Today, she presents for follow up. She reports persistent dysphonia. She reports that her heartburn is much improved at this time.     Dysphonia: Patient reports dysphonia. This is stable      Dysphagia: Patient denies dysphagia. This is stable     Dyspnea: Patient denies dyspnea. This is stable     Throat clearing/Chronic cough: Patient denies throat clearing/chronic cough. This is stable     LPRD/GERD: Patient reports LPRD/GERD symptoms. This is better than usual    PAST MEDICAL HISTORY:   Past Medical History:   Diagnosis Date     Allergic rhinitis 2003    Epic     Alopecia 2020     Arthralgia 2020     Blepharitis 2009    Epic     Decreased diffusion capacity of lung 2008     Diarrhea 10/26/2009     Episodic mood disorder (H) 11/15/2004    Epic     Fructose intolerance 10/3/2018     Generalized anxiety disorder 2012     Insomnia 3/1/2011     Irritable bowel syndrome with diarrhea 10/3/2018     Leucocytoclastic vasculitis (H) 2020     Major depression, recurrent, full remission (H) 2014    Noted at PCP visit.     Myopia 2008    Myopia - LasEk both      NO ACTIVE PROBLEMS      Restrictive lung disease 2008     Seasonal affective disorder (H) 10/4/2018     Social phobia  10/20/2016       PAST SURGICAL HISTORY:   Past Surgical History:   Procedure Laterality Date     NO HISTORY OF SURGERY         CURRENT MEDICATIONS:   Current Outpatient Medications:      B Complex-C (VITAMIN B COMPLEX W/VITAMIN C) TABS tablet, Take 1 tablet by mouth daily, Disp: , Rfl:      Biotin 2500 MCG CAPS, , Disp: , Rfl:      Calcium Citrate-Vitamin D (CALCIUM + D PO), , Disp: , Rfl:      cetirizine (ZYRTEC) 10 MG tablet, Take 10 mg by mouth daily, Disp: , Rfl:      cholecalciferol (VITAMIN D3) 5000 units (125 mcg) capsule, Take by mouth daily, Disp: , Rfl:      Glucosamine-Chondroitin-MSM (TRIPLE FLEX PO), , Disp: , Rfl:      IBUPROFEN PO, Take 200 mg by mouth, Disp: , Rfl:      ketoconazole (NIZORAL) 2 % cream, Apply topically as needed for itching, Disp: , Rfl:      ketoconazole (NIZORAL) 2 % external shampoo, Apply topically as needed for itching or irritation Shampoo/scalp once or twice weekly as needed for seborrhea symptoms, Disp: 120 mL, Rfl: 11     melatonin ([employer] MELATONIN) 3 MG tablet, Take by mouth nightly as needed for sleep Takes 3-12 mg Nightly, Disp: , Rfl:      metroNIDAZOLE (METROCREAM) 0.75 % external cream, , Disp: , Rfl:      mometasone (NASONEX) 50 MCG/ACT spray, Spray 2 sprays into both nostrils as needed, Disp: , Rfl:      Multiple Vitamins-Minerals (HAIR SKIN AND NAILS FORMULA PO), , Disp: , Rfl:      olopatadine (PATANOL) 0.1 % ophthalmic solution, Place 1 drop into both eyes 2 times daily As needed, Disp: 5 mL, Rfl: 3     Omega-3 Fatty Acids (FISH OIL PO), , Disp: , Rfl:      triamcinolone (KENALOG) 0.1 % external cream, Apply topically 2 times daily As needed for rash, Disp: 80 g, Rfl: 3     vitamin D2 (ERGOCALCIFEROL) 66495 units (1250 mcg) capsule, Take 50,000 Units by mouth once a week, Disp: , Rfl:     ALLERGIES: Doxycycline and Penicillins    SOCIAL HISTORY:    Social History     Socioeconomic History     Marital status: Single     Spouse name: Not on file     Number of  children: Not on file     Years of education: Not on file     Highest education level: Not on file   Occupational History     Not on file   Social Needs     Financial resource strain: Not on file     Food insecurity     Worry: Not on file     Inability: Not on file     Transportation needs     Medical: Not on file     Non-medical: Not on file   Tobacco Use     Smoking status: Never Smoker     Smokeless tobacco: Never Used   Substance and Sexual Activity     Alcohol use: Yes     Comment: rare     Drug use: No     Sexual activity: Not Currently   Lifestyle     Physical activity     Days per week: Not on file     Minutes per session: Not on file     Stress: Not on file   Relationships     Social connections     Talks on phone: Not on file     Gets together: Not on file     Attends Rest[employer] service: Not on file     Active member of club or organization: Not on file     Attends meetings of clubs or organizations: Not on file     Relationship status: Not on file     Intimate partner violence     Fear of current or ex partner: Not on file     Emotionally abused: Not on file     Physically abused: Not on file     Forced sexual activity: Not on file   Other Topics Concern     Parent/sibling w/ CABG, MI or angioplasty before 65F 55M? Not Asked   Social History Narrative     Not on file         FAMILY HISTORY:   Family History   Problem Relation Age of Onset     Diabetes Father       Non-contributory for problems with anesthesia    REVIEW OF SYSTEMS:   The patient was asked a 14 point review of systems regarding constitutional symptoms, eye symptoms, ears, nose, mouth, throat symptoms, cardiovascular symptoms, respiratory symptoms, gastrointestinal symptoms, genitourinary symptoms, musculoskeletal symptoms, integumentary symptoms, neurological symptoms, psychiatric symptoms, endocrine symptoms, hematologic/lymphatic symptoms, and allergic/ immunologic symptoms.   The pertinent factors have been included in the HPI and  below.  Patient Supplied Answers to Review of Systems   ENT ROS 9/24/2020   Constitutional Weight gain, Problems with sleep   Neurology Headache   Psychology Frequently feeling depressed or sad, Frequently feeling anxious   Ears, Nose, Throat Sore throat, Hoarseness   Cardiopulmonary Chest pain   Gastrointestinal/Genitourinary Heartburn/indigestion   Musculoskeletal Sore or stiff joints, Back pain, Neck pain   Allergy/Immunology Allergies or hay fever   Other Rash       Physical Examination   The patient underwent a physical examination as described below. The pertinent positive and negative findings are summarized after the description of the examination.  Constitutional: The patient's developmental and nutritional status was assessed. The patient's voice quality was assessed.  Head and Face: The head and face were inspected for deformities. The sinuses were palpated. The salivary glands were palpated. Facial muscle strength was assessed bilaterally.  Eyes: Extraocular movements and primary gaze alignment were assessed.  Ears, Nose, Mouth and Throat: The ears and nose were examined for deformities. The nasal septum, mucosa, and turbinates were inspected by anterior rhinoscopy. The lips, teeth, and gums were examined for abnormalities. The oral mucosa, tongue, palate, tonsils, lateral and posterior pharynx were inspected for the presence of asymmetry or mucosal lesions.    Neck: The tracheal position was noted, and the neck mass palpated to determine if there were any asymmetries, abnormal neck masses, thyromegally, or thyroid nodules.  Respiratory: The nature of the breathing and chest expansion/symmetry was observed.  Cardiovascular: The patient was examined to determine the presence of any edema or jugular venous distension.  Abdomen: The contour of the abdomen was noted.  Lymphatic: The patient was examined for infraclavicular lymphadenopathy.  Musculoskeletal: The patient was inspected for the presence of  skeletal deformities.  Extremities: The extremities were examined for any clubbing or cyanosis.  Skin: The skin was examined for inflammatory or neoplastic conditions.  Neurologic: The patient's orientation, mood, and affect were noted. The cranial nerve  functions were examined.  Other pertinent positive and negative findings on physical examination:   OC/OP: no lesions, uvula midline, soft palate elevates symmetrically, FOM/BOT soft  Neck: no lesions, no TH tenderness to palpation    All other physical examination findings were within normal limits and noncontributory.    Procedures   Video Laryngoscopy with Stroboscopy (CPT 18272) and Behavioral & Qualitative Evaluation of Voice and Resonence     Preoperative Diagnosis:  Dysphonia and throat symptoms  Postoperative Diagnosis: Dysphonia and throat symptoms  Indication:  Patient has new or persistent dysphonia and throat symptoms.  This requires evaluation by stroboscopy to fully delineate the laryngeal functioning; especially mucosal wave assessment, ultrasharp visualization of lesions on the vocal folds, and overall functioning of the larynx.  Details of Procedure: A 70 degree rigid telescopic laryngoscope or a distal chip flexible scope was used. The lighting was obtained via a light cable connected to a stroboscopic unit. The telescope was inserted either transorally or transnasally until the vocal folds could be visualized. The patient was instructed to sustain the vowel  ee  at a comfortable pitch and loudness as the voice was monitored by a microphone connected to a fundamental frequency tracker. This circuit tracked vocal periodicity, allowing the light to flash in synchrony with the glottal cycles. A setting on the stroboscope was set to change the phase of light strobing with relation to the vocal fundamental frequency, producing an image of 1 to 2 glottal cycles every second. The video images were recorded for analysis. Use of the variable speed, slow  and stop scan allowed careful study of pertinent segments of laryngeal function to increase accuracy of clinical assessments of function and dysfunction.  In particular, features of glottal closure, mucosal wave on the vocal fold cover and laryngeal symmetry were analyzed. Lastly, the patient was asked to phonate speech samples and auditory/perceptual evaluation of voice and resonance were performed.  The vocal quality was carefully evaluated for hoarseness, breathiness, loudness, phrase length and intelligibility to determine the source of dysphonia.    Findings:   A. BEHAVIORAL & QUALITATIVE EVALUATION OF VOICE AND RESONENCE   Comments: F0 220 MPT: 10s  Vocal Quality: mildy strained    Pitch Range:  Normal   Phrase Length:  Normal  Vocal Loudness: Normal  Dysarthria: No    B. LARYNGOVIDEOSTROBOSCOPY   Anatomic/Physiological Deviations:  Right paresis, mild vocal process ulceration, supraglottic hyperfunction    Mucosal wave: Right:  Little restriction     Left: No restriction  Bilateral Vocal Fold Vibration: Asymmetric  Vocal Process: Right: No restriction    Left:  No restriction  Vocal Fold closure: Complete glottal closure    Complication(s): None  Disposition: Patient tolerated the procedure well          Review of Relevant Clinical Data   Notes: Tiara Lantigua 7/28/20    Labs:  Lab Results   Component Value Date    TSH 0.98 12/05/2019     Lab Results   Component Value Date     12/05/2019    CO2 26 12/05/2019    BUN 13 12/05/2019     Lab Results   Component Value Date    WBC 9.6 12/05/2019    HGB 13.5 12/05/2019    HCT 40.4 12/05/2019    MCV 92 12/05/2019     12/05/2019     No results found for: PT, PTT, INR  No results found for: MARIAA  No components found for: RHEUMATOIDFACTOR,  RF  Lab Results   Component Value Date    CRP 8.1 (H) 11/06/2019     No components found for: CKTOT, URICACID  No components found for: C3, C4, DSDNAAB, NDNAABIFA  No results found for: MPOAB    Patient reported  "Quality of Life (QOL) Measures   Patient Supplied Answers To VHI Questionnaire  Voice Handicap Index (VHI-10) 2020   My voice makes it difficult for people to hear me 2   People have difficulty understanding me in a noisy room 2   My voice difficulties restrict my personal and social life.  1   I feel left out of conversations because of my voice 1   My voice problem causes me to lose income 0   I feel as though I have to strain to produce voice 2   The clarity of my voice is unpredictable 2   My voice problem upsets me 2   My voice makes me feel handicapped 1   People ask, \"What's wrong with your voice?\" 2   VHI-10 15         Patient Supplied Answers To EAT Questionnaire  No flowsheet data found.      Patient Supplied Answers To CSI Questionnaire  Cough Severity Index (CSI) 2020   My cough is worse when I lie down 0   My coughing problem causes me to restrict my personal and social life 0   I tend to avoid places because of my cough problem 0   I feel embarrassed because of my coughing problem 0   People ask, ''What's wrong?'' because I cough a lot 1   I run out of air when I cough 0   My coughing problem affects my voice 1   My coughing problem limits my physical activity 0   My coughing problem upsets me 0   People ask me if I am sick because I cough a lot 1   CSI Score 3         Patient Supplied Answers to Dyspnea Index Questionnaire:  No flowsheet data found.    Impression & Plan     IMPRESSION: Ms. Sorensen is a 44 year old female who is being seen for the followin. Dysphonia  - since April   - with singing and speaking voice  - in the context of new autoimmune problems, not fully diagnosed  - with new medication trial hydroxychloroquine and new onset heartburn  - the voice change wasn t sudden   - had URI in March but then had good voice period for a while  - strobe exam with intact vocal fold mucosa, mild right vocal fold paresis and supraglottic hyperfunction - due to either primary or " secondary muscle tension dysphonia (from LPRD vs postviral neuropathy)  - discussed etiology and treatment of muscle tension dysphonia  - no evidence of autoimmune findings on her laryngeal exam - will hold of on minor salivary gland biopsy  Plan  - voice therapy     2. GERD/LPRD  - worst heartburn at night and some in th AM  - has AM LPRD with sore throat and  a bit of mucus   - now it is improved  - exam with mild LPRD changes with mild vocal process ulceration though no evidence of granuloma  Plan  - GI referral to discuss reflux symptoms/association with hydroxycholoroquine    RETURN VISIT: telemedicine evaluation  with voice SLP in 4-6 month(s), or earlier as needed.     Karla Hernandez MD    Laryngology    [facility]  Department of  Otolaryngology - Head and Neck Surgery    [facility] [place] [address]  Appointment line: [phone]  Fax: [phone]      Karla Hernandez MD

## 2020-09-25 NOTE — PATIENT INSTRUCTIONS
If you have any questions you may reach out to me at [email or web address] once some level of normalcy has returned you may reach me via my office phone number is [phone].    My colleagues in care coordination will be reaching out to you for scheduling; however, should you wish to move forward with scheduling therapy before you hear from them you may call the main clinic number at [phone].

## 2020-09-25 NOTE — LETTER
2020       RE: Sondra Sorensen  [address]  [place] [address]     Dear Colleague,    Thank you for referring your patient, Sondra Sorensen, to the [facility] EAR NOSE AND THROAT at [facility]. Please see a copy of my visit note below.        [facility]   at the [facility]   Otolaryngology Clinic     Patient: Sondra Sorensen    MRN: [id number]    : 1975    Age/Gender: 44 year old female  Date of Service: 2020  Rendering Provider:   Karla Hernandez MD     Chief Complaint   Dysphonia  Interval History   HISTORY OF PRESENT ILLNESS: Ms. Sorensen is a 44 year old female is being followed for dysphonia since April. She was initially seen on 20. Please refer to this note for full history.   Of note her symptom of dysphonia started a bit of time after she has started experiencing autoimmune related complaints.     Today, she presents for follow up. She reports persistent dysphonia. She reports that her heartburn is much improved at this time.     Dysphonia: Patient reports dysphonia. This is stable      Dysphagia: Patient denies dysphagia. This is stable     Dyspnea: Patient denies dyspnea. This is stable     Throat clearing/Chronic cough: Patient denies throat clearing/chronic cough. This is stable     LPRD/GERD: Patient reports LPRD/GERD symptoms. This is better than usual    PAST MEDICAL HISTORY:   Past Medical History:   Diagnosis Date     Allergic rhinitis 2003    Epic     Alopecia 2020     Arthralgia 2020     Blepharitis 2009    Epic     Decreased diffusion capacity of lung 2008     Diarrhea 10/26/2009     Episodic mood disorder (H) 11/15/2004    Epic     Fructose intolerance 10/3/2018     Generalized anxiety disorder 2012     Insomnia 3/1/2011     Irritable bowel syndrome with diarrhea 10/3/2018     Leucocytoclastic vasculitis (H) 2020     Major depression, recurrent, full remission (H) 2014     Noted at PCP visit.     Myopia 11/14/2008    Myopia - LasEk both 2009     NO ACTIVE PROBLEMS      Restrictive lung disease 12/9/2008     Seasonal affective disorder (H) 10/4/2018     Social phobia 10/20/2016       PAST SURGICAL HISTORY:   Past Surgical History:   Procedure Laterality Date     NO HISTORY OF SURGERY         CURRENT MEDICATIONS:   Current Outpatient Medications:      B Complex-C (VITAMIN B COMPLEX W/VITAMIN C) TABS tablet, Take 1 tablet by mouth daily, Disp: , Rfl:      Biotin 2500 MCG CAPS, , Disp: , Rfl:      Calcium Citrate-Vitamin D (CALCIUM + D PO), , Disp: , Rfl:      cetirizine (ZYRTEC) 10 MG tablet, Take 10 mg by mouth daily, Disp: , Rfl:      cholecalciferol (VITAMIN D3) 5000 units (125 mcg) capsule, Take by mouth daily, Disp: , Rfl:      Glucosamine-Chondroitin-MSM (TRIPLE FLEX PO), , Disp: , Rfl:      IBUPROFEN PO, Take 200 mg by mouth, Disp: , Rfl:      ketoconazole (NIZORAL) 2 % cream, Apply topically as needed for itching, Disp: , Rfl:      ketoconazole (NIZORAL) 2 % external shampoo, Apply topically as needed for itching or irritation Shampoo/scalp once or twice weekly as needed for seborrhea symptoms, Disp: 120 mL, Rfl: 11     melatonin ([employer] MELATONIN) 3 MG tablet, Take by mouth nightly as needed for sleep Takes 3-12 mg Nightly, Disp: , Rfl:      metroNIDAZOLE (METROCREAM) 0.75 % external cream, , Disp: , Rfl:      mometasone (NASONEX) 50 MCG/ACT spray, Spray 2 sprays into both nostrils as needed, Disp: , Rfl:      Multiple Vitamins-Minerals (HAIR SKIN AND NAILS FORMULA PO), , Disp: , Rfl:      olopatadine (PATANOL) 0.1 % ophthalmic solution, Place 1 drop into both eyes 2 times daily As needed, Disp: 5 mL, Rfl: 3     Omega-3 Fatty Acids (FISH OIL PO), , Disp: , Rfl:      triamcinolone (KENALOG) 0.1 % external cream, Apply topically 2 times daily As needed for rash, Disp: 80 g, Rfl: 3     vitamin D2 (ERGOCALCIFEROL) 76874 units (1250 mcg) capsule, Take 50,000 Units by mouth once a week,  Disp: , Rfl:     ALLERGIES: Doxycycline and Penicillins    SOCIAL HISTORY:    Social History     Socioeconomic History     Marital status: Single     Spouse name: Not on file     Number of children: Not on file     Years of education: Not on file     Highest education level: Not on file   Occupational History     Not on file   Social Needs     Financial resource strain: Not on file     Food insecurity     Worry: Not on file     Inability: Not on file     Transportation needs     Medical: Not on file     Non-medical: Not on file   Tobacco Use     Smoking status: Never Smoker     Smokeless tobacco: Never Used   Substance and Sexual Activity     Alcohol use: Yes     Comment: rare     Drug use: No     Sexual activity: Not Currently   Lifestyle     Physical activity     Days per week: Not on file     Minutes per session: Not on file     Stress: Not on file   Relationships     Social connections     Talks on phone: Not on file     Gets together: Not on file     Attends Worship service: Not on file     Active member of club or organization: Not on file     Attends meetings of clubs or organizations: Not on file     Relationship status: Not on file     Intimate partner violence     Fear of current or ex partner: Not on file     Emotionally abused: Not on file     Physically abused: Not on file     Forced sexual activity: Not on file   Other Topics Concern     Parent/sibling w/ CABG, MI or angioplasty before 65F 55M? Not Asked   Social History Narrative     Not on file         FAMILY HISTORY:   Family History   Problem Relation Age of Onset     Diabetes Father       Non-contributory for problems with anesthesia    REVIEW OF SYSTEMS:   The patient was asked a 14 point review of systems regarding constitutional symptoms, eye symptoms, ears, nose, mouth, throat symptoms, cardiovascular symptoms, respiratory symptoms, gastrointestinal symptoms, genitourinary symptoms, musculoskeletal symptoms, integumentary symptoms,  neurological symptoms, psychiatric symptoms, endocrine symptoms, hematologic/lymphatic symptoms, and allergic/ immunologic symptoms.   The pertinent factors have been included in the HPI and below.  Patient Supplied Answers to Review of Systems  [facility] E[facility] ROS 9/24/2020   Constitutional Weight gain, Problems with sleep   Neurology Headache   Psychology Frequently feeling depressed or sad, Frequently feeling anxious   Ears, Nose, Throat Sore throat, Hoarseness   Cardiopulmonary Chest pain   Gastrointestinal/Genitourinary Heartburn/indigestion   Musculoskeletal Sore or stiff joints, Back pain, Neck pain   Allergy/Immunology Allergies or hay fever   Other Rash       Physical Examination   The patient underwent a physical examination as described below. The pertinent positive and negative findings are summarized after the description of the examination.  Constitutional: The patient's developmental and nutritional status was assessed. The patient's voice quality was assessed.  Head and Face: The head and face were inspected for deformities. The sinuses were palpated. The salivary glands were palpated. Facial muscle strength was assessed bilaterally.  Eyes: Extraocular movements and primary gaze alignment were assessed.  Ears, Nose, Mouth and Throat: The ears and nose were examined for deformities. The nasal septum, mucosa, and turbinates were inspected by anterior rhinoscopy. The lips, teeth, and gums were examined for abnormalities. The oral mucosa, tongue, palate, tonsils, lateral and posterior pharynx were inspected for the presence of asymmetry or mucosal lesions.    Neck: The tracheal position was noted, and the neck mass palpated to determine if there were any asymmetries, abnormal neck masses, thyromegally, or thyroid nodules.  Respiratory: The nature of the breathing and chest expansion/symmetry was observed.  Cardiovascular: The patient was examined to determine the presence of any edema or jugular venous  distension.  Abdomen: The contour of the abdomen was noted.  Lymphatic: The patient was examined for infraclavicular lymphadenopathy.  Musculoskeletal: The patient was inspected for the presence of skeletal deformities.  Extremities: The extremities were examined for any clubbing or cyanosis.  Skin: The skin was examined for inflammatory or neoplastic conditions.  Neurologic: The patient's orientation, mood, and affect were noted. The cranial nerve  functions were examined.  Other pertinent positive and negative findings on physical examination:   OC/OP: no lesions, uvula midline, soft palate elevates symmetrically, FOM/BOT soft  Neck: no lesions, no TH tenderness to palpation    All other physical examination findings were within normal limits and noncontributory.    Procedures   Video Laryngoscopy with Stroboscopy (CPT 06152) and Behavioral & Qualitative Evaluation of Voice and Resonence     Preoperative Diagnosis:  Dysphonia and throat symptoms  Postoperative Diagnosis: Dysphonia and throat symptoms  Indication:  Patient has new or persistent dysphonia and throat symptoms.  This requires evaluation by stroboscopy to fully delineate the laryngeal functioning; especially mucosal wave assessment, ultrasharp visualization of lesions on the vocal folds, and overall functioning of the larynx.  Details of Procedure: A 70 degree rigid telescopic laryngoscope or a distal chip flexible scope was used. The lighting was obtained via a light cable connected to a stroboscopic unit. The telescope was inserted either transorally or transnasally until the vocal folds could be visualized. The patient was instructed to sustain the vowel  ee  at a comfortable pitch and loudness as the voice was monitored by a microphone connected to a fundamental frequency tracker. This circuit tracked vocal periodicity, allowing the light to flash in synchrony with the glottal cycles. A setting on the stroboscope was set to change the phase of  light strobing with relation to the vocal fundamental frequency, producing an image of 1 to 2 glottal cycles every second. The video images were recorded for analysis. Use of the variable speed, slow and stop scan allowed careful study of pertinent segments of laryngeal function to increase accuracy of clinical assessments of function and dysfunction.  In particular, features of glottal closure, mucosal wave on the vocal fold cover and laryngeal symmetry were analyzed. Lastly, the patient was asked to phonate speech samples and auditory/perceptual evaluation of voice and resonance were performed.  The vocal quality was carefully evaluated for hoarseness, breathiness, loudness, phrase length and intelligibility to determine the source of dysphonia.    Findings:   A. BEHAVIORAL & QUALITATIVE EVALUATION OF VOICE AND RESONENCE   Comments: F0 220 MPT: 10s  Vocal Quality: mildy strained    Pitch Range:  Normal   Phrase Length:  Normal  Vocal Loudness: Normal  Dysarthria: No    B. LARYNGOVIDEOSTROBOSCOPY   Anatomic/Physiological Deviations:  Right paresis, mild vocal process ulceration, supraglottic hyperfunction    Mucosal wave: Right:  Little restriction     Left: No restriction  Bilateral Vocal Fold Vibration: Asymmetric  Vocal Process: Right: No restriction    Left:  No restriction  Vocal Fold closure: Complete glottal closure    Complication(s): None  Disposition: Patient tolerated the procedure well          Review of Relevant Clinical Data   Notes: Tiara Lantigua 7/28/20    Labs:  Lab Results   Component Value Date    TSH 0.98 12/05/2019     Lab Results   Component Value Date     12/05/2019    CO2 26 12/05/2019    BUN 13 12/05/2019     Lab Results   Component Value Date    WBC 9.6 12/05/2019    HGB 13.5 12/05/2019    HCT 40.4 12/05/2019    MCV 92 12/05/2019     12/05/2019     No results found for: PT, PTT, INR  No results found for: MARIAA  No components found for: RHEUMATOIDFACTOR,  RF  Lab Results  "  Component Value Date    CRP 8.1 (H) 2019     No components found for: CKTOT, URICACID  No components found for: C3, C4, DSDNAAB, NDNAABIFA  No results found for: MPOAB    Patient reported Quality of Life (QOL) Measures   Patient Supplied Answers To VHI Questionnaire  Voice Handicap Index (VHI-10) 2020   My voice makes it difficult for people to hear me 2   People have difficulty understanding me in a noisy room 2   My voice difficulties restrict my personal and social life.  1   I feel left out of conversations because of my voice 1   My voice problem causes me to lose income 0   I feel as though I have to strain to produce voice 2   The clarity of my voice is unpredictable 2   My voice problem upsets me 2   My voice makes me feel handicapped 1   People ask, \"What's wrong with your voice?\" 2   VHI-10 15         Patient Supplied Answers To EAT Questionnaire  No flowsheet data found.      Patient Supplied Answers To CSI Questionnaire  Cough Severity Index (CSI) 2020   My cough is worse when I lie down 0   My coughing problem causes me to restrict my personal and social life 0   I tend to avoid places because of my cough problem 0   I feel embarrassed because of my coughing problem 0   People ask, ''What's wrong?'' because I cough a lot 1   I run out of air when I cough 0   My coughing problem affects my voice 1   My coughing problem limits my physical activity 0   My coughing problem upsets me 0   People ask me if I am sick because I cough a lot 1   CSI Score 3         Patient Supplied Answers to Dyspnea Index Questionnaire:  No flowsheet data found.    Impression & Plan     IMPRESSION: Ms. Sorensen is a 44 year old female who is being seen for the followin. Dysphonia  - since April   - with singing and speaking voice  - in the context of new autoimmune problems, not fully diagnosed  - with new medication trial hydroxychloroquine and new onset heartburn  - the voice change wasn t sudden   - had " URI in March but then had good voice period for a while  - strobe exam with intact vocal fold mucosa, mild right vocal fold paresis and supraglottic hyperfunction - due to either primary or secondary muscle tension dysphonia (from LPRD vs postviral neuropathy)  - discussed etiology and treatment of muscle tension dysphonia  - no evidence of autoimmune findings on her laryngeal exam - will hold of on minor salivary gland biopsy  Plan  - voice therapy     2. GERD/LPRD  - worst heartburn at night and some in th AM  - has AM LPRD with sore throat and  a bit of mucus   - now it is improved  - exam with mild LPRD changes with mild vocal process ulceration though no evidence of granuloma  Plan  - GI referral to discuss reflux symptoms/association with hydroxycholoroquine    RETURN VISIT: telemedicine evaluation  with voice SLP in 4-6 month(s), or earlier as needed.     Karla Hernandez MD    Laryngology    [facility]  Department of  Otolaryngology - Head and Neck Surgery    Clinics & Surgery Center  [address] [place] [address]  Appointment line: [phone]  Fax: [phone]      Again, thank you for allowing me to participate in the care of your patient.      Sincerely,    Karla Hernandez MD

## 2020-10-13 ENCOUNTER — VIRTUAL VISIT (OUTPATIENT)
Dept: OTOLARYNGOLOGY | Facility: CLINIC | Age: 45
End: 2020-10-13
Payer: COMMERCIAL

## 2020-10-13 DIAGNOSIS — R49.0 DYSPHONIA: Primary | ICD-10-CM

## 2020-10-13 DIAGNOSIS — K21.9 LPRD (LARYNGOPHARYNGEAL REFLUX DISEASE): ICD-10-CM

## 2020-10-13 PROCEDURE — 99207 PR NO CHARGE LOS: CPT | Performed by: SPEECH-LANGUAGE PATHOLOGIST

## 2020-10-13 PROCEDURE — 92507 TX SP LANG VOICE COMM INDIV: CPT | Mod: GN | Performed by: SPEECH-LANGUAGE PATHOLOGIST

## 2020-10-13 NOTE — LETTER
"10/13/2020       RE: Sondra Sorensen  [address]  [place] [address]     Dear Colleague,    Thank you for referring your patient, Sondra Sorensen, to the [facility] [place] at [facility]. Please see a copy of my visit note below.    Sondra Sorensen is a 44 year old female who is being evaluated via a billable video visit.      The patient  has been notified and verbally consented to the following:     This video visit will be conducted between you and your provider.    Patient has opted to conduct today's video visit vs an in-person appointment, and is not able to attend due to possible exposure to COVID-19.      If during the course of the call the provider feels a video visit is not appropriate, you will not be charged for this service.    Call initiated at: 9:01  Type of Video Platform Used: [employer]  Location of provider: Residence  Location of patient: [facility] VOICE CLINIC  THERAPY NOTE (CPT 91213)    Patient: Sondra Sorensen  Date of Service: 10/13/2020  Referring physician: Dr. Hernandez  Impressions from most recent evaluation:  \"Sondra Sorensen is presenting today with R49.0 (Dysphonia) and R07.0 (Throat Discomfort) in the context of likely Gastroesophageal Reflux Disease, an imbalance in function of the intrinsic and extrinsic muscles of the larynx and nonoptimal phonatory respiratory coordination.  Although subtle disruption of vibratory function and open phase predominant closure is noted during laryngeal evaluation patient's voice quality which is dominated by roughness and strain is largely result of functional patterns of use such as phonatory respiratory coordination and significant supraglottic recruitment versus any structural deficit, and should respond positively to behavioral intervention to address patterns of use.\"    SUBJECTIVE:  Since the patient's last session, they report the following:     Overall symptoms are " "improved    Still noting some hoarseness    Intermittently without clear timing    Heartburn has improved following stopping the hydroxychlorquine    Clarification of goals:    Reduced throat soreness /discomfort    Decreased effort for voice use    OBJECTIVE:  PATIENT REPORTED MEASURES:  Patient Supplied Answers To VHI Questionnaire  Voice Handicap Index (VHI-10) 10/12/2020   My voice makes it difficult for people to hear me 2   People have difficulty understanding me in a noisy room 2   My voice difficulties restrict my personal and social life.  0   I feel left out of conversations because of my voice 0   My voice problem causes me to lose income 0   I feel as though I have to strain to produce voice 1   The clarity of my voice is unpredictable 2   My voice problem upsets me 1   My voice makes me feel handicapped 0   People ask, \"What's wrong with your voice?\" 2   VHI-10 10     Patient Supplied Answers to David's Voice Assessment:  PPC Assessment 10/12/2020   There isn't much I can do to help myself feel better about this problem. 2   How I deal with the voice problem now is under my control. 2   I don't have much control over my emotional reactions to this problem. 2   When I am upset about the voice problem, I can find a way to feel better. 3   I have control over my day-to-day reactions to the voice problem. 3   There isn't much I can do to keep the voice problem from affecting me. 1   I have control over how I think about the voice problem. 3   My reaction to the voice problem is not under my control. 3   [facility] Scoring Mean 2.38     Patient Specific Goal Metrics:  Dysponia SLP Goals 10/13/2020   How much effort is it to speak, if 0 is no extra effort and 10 is maximum effort? 3   How how severe is your [Throat Discomfort - or use patient specific description], if 0 is no [discomfort] at all and 10 is the worst [discomfort]? 4   How much does your voice problem bother you? A little bit   How much does " your throat discomfort bother you?     Somewhat     THERAPEUTIC ACTIVITIES    Counseling and Education:    Asked many questions about the nature of their symptoms, and I answered all of these thoroughly.    Instructed concepts and techniques for optimal vocal hygiene including:    Systemic hydration, including strategies for increasing daily water intake    Topical hydration - Gargling, saline nasal irrigation, humidification, steam, guaifenesin    Environmental barriers to healthy voicing - noise, inhaled irritants, room acoustics    Use of electronic amplification to reduce vocal fold impact    Awareness and reduction of phonotraumatic behaviors    Moderating voice use    Substituting non-voice alternative behaviors    Avoiding cough and throat clearing    Exercises to promote optimal respiratory mechanics    I provided explanation of the anatomy and physiology of respiration for speech and singing; she found this to be helpful    She demonstrated excessive upper thoracic engagement during inhalation    Demonstrated difficulty allowing abdominal relaxation for inhalation    Practiced in a forward leaning seated posture to demonstrate improved abdominal relaxation and engagement on inhalation    Good accuracy with minimal clinician support    Semi-Occluded Vocal Tract (SOVT) exercises instructed to reduce laryngeal tension, promote vocal fold pliability, and coordinate respiration and phonation    Straw with water resistance was found to be most facilitating     Sustained phonation, and voice vs. voiceless productions used to promote easy voicing and raise awareness of laryngeal tension    Ascending and descending glides utilized to promote vocal fold pliability    Instructed to use these exercises as a warm-up / cooldown, and to re-calibrate the voice throughout the day.    Decreased roughness and strain was noted with the use of these techniques    These principles were applied to flow phonation stimuli with good  effect and this will be advanced in future sessions      A regimen for home practice was instructed.    I provided handouts of today's therapeutic activities to facilitate practice.    ASSESSMENT/PLAN  PROGRESS TOWARD LONG TERM GOALS:   Minimal at this point, as this is first session, but good learning today    IMPRESSIONS: R49.0 (Dysphonia) and R07.0 (Throat Discomfort) in the context of likely Gastroesophageal Reflux Disease, an imbalance in function of the intrinsic and extrinsic muscles of the larynx and nonoptimal phonatory respiratory coordination. Sondra was able to access significantly clear voice quality with the use of flow phonation stimuli and semi-occluded vocal tract exercises.  Vocal hygiene and particular topical hydration regimen was recommended to help address throat soreness.    PLAN: I will see Sondra in approximately 2 weeks, at which point we will continue to advance phonatory respiratory coordination or alter to resonant voice therapy as proves most amenable in the moment.   For practice goals see AVS.       TOTAL SERVICE TIME: 60 minutes  TREATMENT (16709): 60 minutes  NO CHARGE FACILITY FEE (34577)    Robin Emery M.M., M.A., CCC-SLP  Speech-Language Pathologist  Certificate of Vocology  [phone]    *this report was created in part through the use of computerized dictation software, and though reviewed following completion, some typographic errors may persist.  If there is confusion regarding any of this notes contents, please contact me for clarification.*        Again, thank you for allowing me to participate in the care of your patient.      Sincerely,    Carlos Manuel Emery, SLP

## 2020-10-13 NOTE — PROGRESS NOTES
"Sondra Sorensen is a 44 year old female who is being evaluated via a billable video visit.      The patient  has been notified and verbally consented to the following:     This video visit will be conducted between you and your provider.    Patient has opted to conduct today's video visit vs an in-person appointment, and is not able to attend due to possible exposure to COVID-19.      If during the course of the call the provider feels a video visit is not appropriate, you will not be charged for this service.    Call initiated at: 9:01  Type of Video Platform Used: Spootr  Location of provider: Residence  Location of patient: Residence    [facility] VOICE CLINIC  THERAPY NOTE (CPT 49776)    Patient: Sondra Sorensen  Date of Service: 10/13/2020  Referring physician: Dr. Hernandez  Impressions from most recent evaluation:  \"Sondra Sorensen is presenting today with R49.0 (Dysphonia) and R07.0 (Throat Discomfort) in the context of likely Gastroesophageal Reflux Disease, an imbalance in function of the intrinsic and extrinsic muscles of the larynx and nonoptimal phonatory respiratory coordination.  Although subtle disruption of vibratory function and open phase predominant closure is noted during laryngeal evaluation patient's voice quality which is dominated by roughness and strain is largely result of functional patterns of use such as phonatory respiratory coordination and significant supraglottic recruitment versus any structural deficit, and should respond positively to behavioral intervention to address patterns of use.\"    SUBJECTIVE:  Since the patient's last session, they report the following:     Overall symptoms are improved    Still noting some hoarseness    Intermittently without clear timing    Heartburn has improved following stopping the hydroxychlorquine    Clarification of goals:    Reduced throat soreness /discomfort    Decreased effort for voice use    OBJECTIVE:  PATIENT REPORTED MEASURES:  Patient Supplied Answers " "To VHI Questionnaire  Voice Handicap Index (VHI-10) 10/12/2020   My voice makes it difficult for people to hear me 2   People have difficulty understanding me in a noisy room 2   My voice difficulties restrict my personal and social life.  0   I feel left out of conversations because of my voice 0   My voice problem causes me to lose income 0   I feel as though I have to strain to produce voice 1   The clarity of my voice is unpredictable 2   My voice problem upsets me 1   My voice makes me feel handicapped 0   People ask, \"What's wrong with your voice?\" 2   VHI-10 10     Patient Supplied Answers to David's Voice Assessment:  PPC Assessment 10/12/2020   There isn't much I can do to help myself feel better about this problem. 2   How I deal with the voice problem now is under my control. 2   I don't have much control over my emotional reactions to this problem. 2   When I am upset about the voice problem, I can find a way to feel better. 3   I have control over my day-to-day reactions to the voice problem. 3   There isn't much I can do to keep the voice problem from affecting me. 1   I have control over how I think about the voice problem. 3   My reaction to the voice problem is not under my control. 3   Lion's Voice Scoring Mean 2.38     Patient Specific Goal Metrics:  Dysponia SLP Goals 10/13/2020   How much effort is it to speak, if 0 is no extra effort and 10 is maximum effort? 3   How how severe is your [Throat Discomfort - or use patient specific description], if 0 is no [discomfort] at all and 10 is the worst [discomfort]? 4   How much does your voice problem bother you? A little bit   How much does your throat discomfort bother you?     Somewhat     THERAPEUTIC ACTIVITIES    Counseling and Education:    Asked many questions about the nature of their symptoms, and I answered all of these thoroughly.    Instructed concepts and techniques for optimal vocal hygiene including:    Systemic hydration, including " strategies for increasing daily water intake    Topical hydration - Gargling, saline nasal irrigation, humidification, steam, guaifenesin    Environmental barriers to healthy voicing - noise, inhaled irritants, room acoustics    Use of electronic amplification to reduce vocal fold impact    Awareness and reduction of phonotraumatic behaviors    Moderating voice use    Substituting non-voice alternative behaviors    Avoiding cough and throat clearing    Exercises to promote optimal respiratory mechanics    I provided explanation of the anatomy and physiology of respiration for speech and singing; she found this to be helpful    She demonstrated excessive upper thoracic engagement during inhalation    Demonstrated difficulty allowing abdominal relaxation for inhalation    Practiced in a forward leaning seated posture to demonstrate improved abdominal relaxation and engagement on inhalation    Good accuracy with minimal clinician support    Semi-Occluded Vocal Tract (SOVT) exercises instructed to reduce laryngeal tension, promote vocal fold pliability, and coordinate respiration and phonation    Straw with water resistance was found to be most facilitating     Sustained phonation, and voice vs. voiceless productions used to promote easy voicing and raise awareness of laryngeal tension    Ascending and descending glides utilized to promote vocal fold pliability    Instructed to use these exercises as a warm-up / cooldown, and to re-calibrate the voice throughout the day.    Decreased roughness and strain was noted with the use of these techniques    These principles were applied to flow phonation stimuli with good effect and this will be advanced in future sessions      A regimen for home practice was instructed.    I provided handouts of today's therapeutic activities to facilitate practice.    ASSESSMENT/PLAN  PROGRESS TOWARD LONG TERM GOALS:   Minimal at this point, as this is first session, but good learning  today    IMPRESSIONS: R49.0 (Dysphonia) and R07.0 (Throat Discomfort) in the context of likely Gastroesophageal Reflux Disease, an imbalance in function of the intrinsic and extrinsic muscles of the larynx and nonoptimal phonatory respiratory coordination. Sondra was able to access significantly clear voice quality with the use of flow phonation stimuli and semi-occluded vocal tract exercises.  Vocal hygiene and particular topical hydration regimen was recommended to help address throat soreness.    PLAN: I will see Sondra in approximately 2 weeks, at which point we will continue to advance phonatory respiratory coordination or alter to resonant voice therapy as proves most amenable in the moment.   For practice goals see AVS.       TOTAL SERVICE TIME: 60 minutes  TREATMENT (06333): 60 minutes  NO CHARGE FACILITY FEE (78079)    Robin Emery M.M., M.A., CCC-SLP  Speech-Language Pathologist  Certificate of Vocology  [phone]    *this report was created in part through the use of computerized dictation software, and though reviewed following completion, some typographic errors may persist.  If there is confusion regarding any of this notes contents, please contact me for clarification.*

## 2021-01-03 ENCOUNTER — HEALTH MAINTENANCE LETTER (OUTPATIENT)
Age: 46
End: 2021-01-03

## 2021-01-21 ENCOUNTER — TRANSFERRED RECORDS (OUTPATIENT)
Dept: HEALTH INFORMATION MANAGEMENT | Facility: CLINIC | Age: 46
End: 2021-01-21

## 2021-03-06 ENCOUNTER — HEALTH MAINTENANCE LETTER (OUTPATIENT)
Age: 46
End: 2021-03-06

## 2021-03-17 ENCOUNTER — MEDICAL CORRESPONDENCE (OUTPATIENT)
Dept: HEALTH INFORMATION MANAGEMENT | Facility: CLINIC | Age: 46
End: 2021-03-17

## 2021-03-22 ENCOUNTER — TELEPHONE (OUTPATIENT)
Dept: MULTI SPECIALTY CLINIC | Facility: CLINIC | Age: 46
End: 2021-03-22

## 2021-03-22 DIAGNOSIS — I77.6 VASCULITIS (H): Primary | ICD-10-CM

## 2021-03-22 NOTE — TELEPHONE ENCOUNTER
[facility] Call Center    Phone Message    May a detailed message be left on voicemail: yes     Reason for Call: Appointment Intake    Referring Provider Name: Dr Bella  Diagnosis and/or Symptoms: Vasculitis     Internal referral by Dr. Mena Bella from [facility] Ph: [phone] Fx: [phone]. Referral received with records in Rheum consult folder on 3/18/2021 4:46PM for  Vasculitis. 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

## 2021-03-22 NOTE — TELEPHONE ENCOUNTER
Review of chart shows +skin biopsy for LCV, negative ANCA, Cr: 0.8, UA shows +trace protein and +small blood.     Ni Lugo RN  Rheumatology Clinic

## 2021-03-24 NOTE — TELEPHONE ENCOUNTER
Discussed with Dr. Newsome. He would like her to have U/A, Urine pr/cr ratio, ANCA, CMP and CBC with platelets and Diff.  If completely normal, would like her to be scheduled with Dr. Jacobson to be evaluated for possible lupus.  If abnormal, will find a time to get her scheduled with Dr. Newsome.    Ni Lugo RN  Rheumatology Clinic

## 2021-03-26 NOTE — TELEPHONE ENCOUNTER
Called and spoke with pt, she has been being seen by Dr. Alok Myers at [facility]    Pt states that she has not had any new rashes, she was on HCQ for a while, and the rash cleared up.  Was having hair fall out.  Things have quieted down, but her rheumatologist moved and since then she really hasn't had any follow up since over the summer.  She states that she did develop heartburn and it was thought to be from the HCQ, so it was stopped and she was sent to an ENT for follow up.      Will send her an DICK so we can get records from her rheumatologist.      She will get labs done at [place] next week.    Ni Lugo RN  Rheumatology Clinic

## 2021-03-30 ENCOUNTER — TRANSFERRED RECORDS (OUTPATIENT)
Dept: HEALTH INFORMATION MANAGEMENT | Facility: CLINIC | Age: 46
End: 2021-03-30

## 2021-03-30 LAB
ALBUMIN SERPL-MCNC: 4 G/DL (ref 3.3–4.6)
ALP SERPL-CCNC: 96 U/L (ref 40–150)
ALT SERPL-CCNC: 30 U/L (ref 14–59)
ANION GAP SERPL CALCULATED.3IONS-SCNC: NORMAL MMOL/L
AST SERPL-CCNC: 22 U/L (ref 0–45)
BACTERIA URINE: NORMAL
BASOPHILS NFR BLD AUTO: 0 % (ref 0–1)
BILIRUB SERPL-MCNC: 0.5 MG/DL (ref 0.2–1.3)
BILIRUB UR QL: NORMAL
BUN SERPL-MCNC: 17 MG/DL (ref 5–24)
CALCIUM SERPL-MCNC: 8.9 MG/DL (ref 8.2–10.1)
CHLORIDE SERPLBLD-SCNC: 98 MMOL/L (ref 94–109)
CLARITY, URINE: CLEAR
CO2 SERPL-SCNC: 26 MMOL/L (ref 20–32)
COLOR UR: YELLOW
CREAT SERPL-MCNC: 0.87 MG/DL (ref 0.55–1.02)
CREATININE (URINE): 39 MG/DL (ref 20–275)
EOSINOPHIL NFR BLD AUTO: 1 % (ref 0–6)
ERYTHROCYTE [DISTWIDTH] IN BLOOD BY AUTOMATED COUNT: 13 % (ref 10–17)
GFR SERPL CREATININE-BSD FRML MDRD: >60 ML/MIN/1.73M2
GLUCOSE SERPL-MCNC: 92 MG/DL (ref 70–124)
GLUCOSE URINE: NORMAL MG/DL
HCT VFR BLD AUTO: 41 % (ref 35–43)
HEMOGLOBIN: 13.7 G/DL (ref 11.7–15.7)
HGB UR QL: NORMAL
KETONES UR STRIP.AUTO-MCNC: NORMAL MG/DL
LYMPHOCYTES NFR BLD AUTO: 21 % (ref 20–48)
Lab: NORMAL
Lab: NORMAL {TITER}
MCH RBC QN AUTO: 31 PG (ref 27–34)
MCHC RBC AUTO-ENTMCNC: 33 G/DL (ref 32–36)
MCV RBC AUTO: 93 FL (ref 81–100)
MONOCYTES NFR BLD AUTO: 12 % (ref 3–11)
NEUTROPHILS NFR BLD AUTO: 66 % (ref 42–70)
NITRITE UR QL STRIP: NORMAL
PH UR STRIP: 5.5 PH (ref 5–7)
PLATELET COUNT - QUEST: 262 10^9/L (ref 150–450)
POTASSIUM SERPL-SCNC: 4.8 MMOL/L (ref 3.4–5.3)
PROT SERPL-MCNC: 7.8 G/DL (ref 6–8.2)
PROT UR QL: NORMAL MG/DL
PROT UR-MCNC: 0 MG/DL (ref 21–161)
PROT/CREAT UR: 0 MG/G CREAT (ref 0.02–0.16)
RBC # BLD AUTO: 4.46 10^12/L (ref 3.8–5.2)
RBC, UR MICRO: NORMAL (ref ?–2)
SODIUM SERPL-SCNC: 137 MMOL/L (ref 133–144)
SP GR UR STRIP: 1 (ref 1–1.03)
UROBILINOGEN, UR.: NORMAL
WBC # BLD AUTO: 7.7 10^9/L (ref 4–11)
WBC #/AREA URNS HPF: NORMAL /[HPF]
WBC, URINE MICROSCOPIC: NORMAL (ref 0–2)

## 2021-04-07 NOTE — TELEPHONE ENCOUNTER
Discussed with Dr. Jacobson, ANCA -, Normal Creatinine and Ur Pr/Cr ratio.  Have scheduled pt to see Dr. Jacobson in May.    Called and discussed with pt, she will call if her rash or any other symptoms return before appt.    Ni Lugo RN  Rheumatology Clinic

## 2021-04-25 ENCOUNTER — HEALTH MAINTENANCE LETTER (OUTPATIENT)
Age: 46
End: 2021-04-25

## 2021-04-27 ENCOUNTER — RECORDS - HEALTHEAST (OUTPATIENT)
Dept: ADMINISTRATIVE | Facility: OTHER | Age: 46
End: 2021-04-27

## 2021-05-20 NOTE — TELEPHONE ENCOUNTER
NOTES Status Details   OFFICE NOTE from referring provider Received 03.19.2021 Mena Bella MD [facility]   OFFICE NOTE from other specialist N/A    DISCHARGE SUMMARY from hospital N/A    DISCHARGE REPORT from the ER N/A    MEDICATION LIST Care Everywhere /Internal    Received       03.19.2021   LABS (Any and all labs)      Care Everywhere /Internal    Biopsy/pathology (Anything related to diagnoses I.e. fluid aspirations, lip biopsy, muscle biopsy)      N/A     N/A    Imaging (All imaging related to diagnoses)     Echo N/A    HRCT N/A    CXR N/A    EMG N/A                   Scleroderma/Dermatomyositis diagnoses     Previous Cardiology notes  N/A    Previous Pulmonary notes N/A    Previous Dermatology notes N/A    Previous GI notes N/A    Lupus diagnoses     Previous Nephrology notes N/A    Previous Dermatology notes N/A    Previous Cardiology notes N/A

## 2021-05-21 ENCOUNTER — VIRTUAL VISIT (OUTPATIENT)
Dept: RHEUMATOLOGY | Facility: CLINIC | Age: 46
End: 2021-05-21
Attending: INTERNAL MEDICINE
Payer: COMMERCIAL

## 2021-05-21 ENCOUNTER — PRE VISIT (OUTPATIENT)
Dept: RHEUMATOLOGY | Facility: CLINIC | Age: 46
End: 2021-05-21

## 2021-05-21 DIAGNOSIS — M25.59 PAIN IN OTHER JOINT: ICD-10-CM

## 2021-05-21 DIAGNOSIS — J98.4 RESTRICTIVE LUNG DISEASE: ICD-10-CM

## 2021-05-21 DIAGNOSIS — I77.6 VASCULITIS (H): ICD-10-CM

## 2021-05-21 DIAGNOSIS — R76.8 POSITIVE ANA (ANTINUCLEAR ANTIBODY): ICD-10-CM

## 2021-05-21 DIAGNOSIS — M31.0 LEUCOCYTOCLASTIC VASCULITIS (H): Primary | ICD-10-CM

## 2021-05-21 PROCEDURE — 99205 OFFICE O/P NEW HI 60 MIN: CPT | Mod: 95 | Performed by: INTERNAL MEDICINE

## 2021-05-21 RX ORDER — SPIRONOLACTONE 100 MG/1
TABLET, FILM COATED ORAL
COMMUNITY
Start: 2021-04-29 | End: 2022-04-12

## 2021-05-21 RX ORDER — ADAPALENE GEL USP, 0.3% 3 MG/G
GEL TOPICAL
COMMUNITY
Start: 2021-05-05 | End: 2022-08-23

## 2021-05-21 NOTE — PROGRESS NOTES
oSndra is a 45 year old who is being evaluated via a billable video visit.      How would you like to obtain your AVS? MyChart    Will anyone else be joining your video visit? No      Video-Visit Details    Type of service:  Video Visit    Start: 05/21/2021 09:01 am   Stop: 05/21/2021 09:53 am  Originating Location (pt. Location): Home    Distant Location (provider location):  [facility] RHEUMATOLOGY CLINIC [place]     Platform used for Video Visit: [facility]      Outpatient Rheumatology Consultation  This visit was conducted via synchronous video visit due to the current COVID-19 crisis to reduce patient risk.  Verbal consent was obtained and is documented below.    Name: Sondra Sorensen    MRN [id number]   Today's date: 5/21/2021         Reason for consult: Evaluation of CTD   Requesting physician: Mena Bella MD             Assessment & Plan:   Very pleasant 45 year old female with history of bx proven LCV, MINOR 1:80 in a speckled pattern, negative/ normal RF, ANCA, CBC, CMP, UA, ESR, CRP on most recent blood work presents to rheumatology to establish care as had previously followed with an outside rheumatologist Dr Alok Myers at [facility] for 'ill defined' CTD with sicca symptoms, arthralgia, positive minor then rechecked was negative, and LCV. In review of Ms Sorensen's current symptoms and the limited serologies that I have available to me (listed above- presumably additional serologies done by outside rheumatologist that I cannot see at this time. Release signed but pending), I cannot find strong evidence to support an active or progressive systemic autoimmune disease at this time. Her dry mouth symptoms have not been severe to cause dental disease. Her joint symptoms are not accompanied by erythema/edema/warmth.  I do note a prior work-up by pulmonary and cardiology in 2008 where she was noted to have a mild restrictive process by PFTs, though without desaturation. This work-up was done due  to symptoms of dyspnea with mild exertion, none at rest. Neither service was able to find any disease to attribute this to. Has since had PFTs repeated, most recently in 2017 which again showed mild restrictive pulmonary defect with normal DLCO. Respiratory muscle force is decreased, raising the question of neuromuscular process. I do not see a neurology evaluation since that study date.     At this time discussed that without progressive/active symptoms to suggest systemic autoimmune disease, we should obtain additional antibodies and follow-up in 4 months time. Will review antibody results as they return, though Sondra will reach out with development/evolution of symptoms prior to her visit. If no new symptoms/ labs overall unrevealing, likely to start spacing appointments out though will continue to follow given the above history of symptoms.    PLAN  1) DEMI, C3, C4, dsDNA, CCP, ESR, CRP, anti centromere, anti topoisomerase, PM-, CK, Aldolase  2) Follow-up in 4 months    Jesus Jacobson MD  Rheumatology     I spent a total of 60 minutes on the date of service on chart review, patient video encounter, , documentation.      Subjective:   In February developed hair loss. Went in to derm, dx as alopecia areata. Then mid march had small purple spots on legs. Had evaluation with biopsy done which were consistent with LCV. Then referred to rheumatology. Many lesions between ankles/knees. By the time she got in to see rheumatology, had almost resolved/fading without taking any medications. Was started on HCQ, though had significant GERD. Sent to ENT without abnormalities found. HCQ stopped.     Intermittently has stiffness in the AM in left hand 4th and 5th digit. All feel stiff. Occurs 3-4 days per week. Lasts for 10 minutes. Also when still for periods of time. She thinks they feel swollen, no actual swelling. No redness or warmth. Mild soreness. For the fingers that trigger, its the DIPs. For the  others, the PIPs. Feet also sore in the AM when taking first steps, resolves within 10 steps. Also back with twisting. Things loosen up quickly. Has been going on for some years, joint symptoms. No systemic immunosuppressive therapy around the time of her skin lesions with steroids or other.     Reports diffuse joint and muscle sore/aching in the evenings. Takes advil and improves. Some hair thinning, not new or progressive. Other than LCV, prior ?roceasea. No other facial rash. Reports break out on chest and top of back. Has had 2 episodes of hypersensitivity reactions on ankles/wrists exposures to known allergens. Had been treated with prednisone for this. Had eczema as a child. None now. No personal or first degree with psoriasis. Had an episode of pleurisy after URI, which resolved. Has fructose intolerance. For many years had diarrhea. No blood in the stool. Had colonoscopy in 2014 which was normal. No difficulty swallowing. No food stuck in her throat. Has dry eyes, though started after lasik. Has dry mouth in the AM. Has flushed feeling in fingers. No fevers/chills. No weight changes. No new or persistent headache.  No change in vision or hearing.  No inflammatory eye disease history.  No history of miscarriage.  No history of blood clots. No photosensitive rash.  No nasal or oral ulcers.  No recurrent epistaxis or hemoptysis.  No recurrent sinusitis or recurrent pneumonia.  No chest pain or shortness of breath.  No nausea or vomiting.  No foamy or frothy urine.  No bowel or bladder incontinence.  No change in strength or sensation in the arms or legs.  No pitting or brittle nails.  No triphasic color change consistent with Raynaud's.  No skin thickening or tightening consistent with sclerodactyly.  No symptoms consistent with dactylitis. No genital ulcers or lesions.    Past Medical History  Past Medical History:   Diagnosis Date     Allergic rhinitis 4/24/2003    Epic     Alopecia 9/14/2020     Arthralgia  9/14/2020     Blepharitis 5/13/2009    Epic     Decreased diffusion capacity of lung 12/9/2008     Diarrhea 10/26/2009     Episodic mood disorder (H) 11/15/2004    Epic     Fructose intolerance 10/3/2018     Generalized anxiety disorder 2/7/2012     Insomnia 3/1/2011     Irritable bowel syndrome with diarrhea 10/3/2018     Leucocytoclastic vasculitis (H) 4/22/2020     Major depression, recurrent, full remission (H) 11/4/2014    Noted at PCP visit.     Myopia 11/14/2008    Myopia - LasEk both 2009     NO ACTIVE PROBLEMS      Restrictive lung disease 12/9/2008     Seasonal affective disorder (H) 10/4/2018     Social phobia 10/20/2016     Past Surgical History  Past Surgical History:   Procedure Laterality Date     NO HISTORY OF SURGERY       Medications  Current Outpatient Medications   Medication     adapalene (DIFFERIN) 0.3 % external gel     Calcium Citrate-Vitamin D (CALCIUM + D PO)     cetirizine (ZYRTEC) 10 MG tablet     cholecalciferol (VITAMIN D3) 5000 units (125 mcg) capsule     IBUPROFEN PO     ketoconazole (NIZORAL) 2 % cream     ketoconazole (NIZORAL) 2 % external shampoo     melatonin ([employer] MELATONIN) 3 MG tablet     olopatadine (PATANOL) 0.1 % ophthalmic solution     Omega-3 Fatty Acids (FISH OIL PO)     spironolactone (ALDACTONE) 100 MG tablet     triamcinolone (KENALOG) 0.1 % external cream     vitamin D2 (ERGOCALCIFEROL) 93460 units (1250 mcg) capsule     No current facility-administered medications for this visit.      Allergies   Allergies   Allergen Reactions     Doxycycline      Penicillins Rash     rash     Family History: Mother with 'arthritis in her hands'. Mother with 'lung thing' 'sclerosis'. Second cousin with MS.    Social History: Not , no kids. No pregnancies. No blood kids. 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 Vitals reviewed in Epic.  GEN: Sitting up at table. NAD  HEENT: no facial rash, sclera clear, no inflammatory nose/  external ear changes  CV: no upper extremity dependent edema  Pulm: speaking in full sentences, no cough, no audible wheezing, no use of accessory muscles  Abdomen: not distended  Skin: no acute cutaneous lesions  MSK: full active ROM of bilateral shoulders, elbows, wrists, MCPs, PIPs, DIPs. Can make full fist without difficulty. No erythema or edema of these joints.    Labs:  2020  ESR 14  C3 155  CRP 0.4  C4 28  CH50 69    May 2020  IgA 611  IgE 38.1  ESR 16  CRP 0.5    MARIAA 1:80 speckled    Imagin2020  X-ray right hand unremarkable without changes consistent with erosive or degenerative disease.    Pathology:  3/19/2020  Right foot punch bx consistent with LCV

## 2021-05-21 NOTE — LETTER
5/21/2021       RE: Sondra Sorensen  [address]  [place] [address]     Dear Colleague,    Thank you for referring your patient, Sondra Sorensen, to the [facility] [place] [facility]. Please see a copy of my visit note below.    Sondra is a 45 year old who is being evaluated via a billable video visit.      How would you like to obtain your AVS? MyChart    Will anyone else be joining your video visit? No      Video-Visit Details    Type of service:  Video Visit    Start: 05/21/2021 09:01 am   Stop: 05/21/2021 09:53 am  Originating Location (pt. Location): Home    Distant Location (provider location):  [facility]     Platform used for Video Visit: Verbling      Outpatient Rheumatology Consultation  This visit was conducted via synchronous video visit due to the current COVID-19 crisis to reduce patient risk.  Verbal consent was obtained and is documented below.    Name: Sondra Sorensen    MRN [id number]   Today's date: 5/21/2021         Reason for consult: Evaluation of CTD   Requesting physician: Mena Bella MD             Assessment & Plan:   Very pleasant 45 year old female with history of bx proven LCV, MINOR 1:80 in a speckled pattern, negative/ normal RF, ANCA, CBC, CMP, UA, ESR, CRP on most recent blood work presents to rheumatology to establish care as had previously followed with an outside rheumatologist Dr Alok Myers at [facility] for 'ill defined' CTD with sicca symptoms, arthralgia, positive minor then rechecked was negative, and LCV. In review of Ms Sorensen's current symptoms and the limited serologies that I have available to me (listed above- presumably additional serologies done by outside rheumatologist that I cannot see at this time. Release signed but pending), I cannot find strong evidence to support an active or progressive systemic autoimmune disease at  this time. Her dry mouth symptoms have not been severe to cause dental disease. Her joint symptoms are not accompanied by erythema/edema/warmth.  I do note a prior work-up by pulmonary and cardiology in 2008 where she was noted to have a mild restrictive process by PFTs, though without desaturation. This work-up was done due to symptoms of dyspnea with mild exertion, none at rest. Neither service was able to find any disease to attribute this to. Has since had PFTs repeated, most recently in 2017 which again showed mild restrictive pulmonary defect with normal DLCO. Respiratory muscle force is decreased, raising the question of neuromuscular process. I do not see a neurology evaluation since that study date.     At this time discussed that without progressive/active symptoms to suggest systemic autoimmune disease, we should obtain additional antibodies and follow-up in 4 months time. Will review antibody results as they return, though Sondra will reach out with development/evolution of symptoms prior to her visit. If no new symptoms/ labs overall unrevealing, likely to start spacing appointments out though will continue to follow given the above history of symptoms.    PLAN  1) DEMI, C3, C4, dsDNA, CCP, ESR, CRP, anti centromere, anti topoisomerase, PM-, CK, Aldolase  2) Follow-up in 4 months    Jesus Jacobson MD  Rheumatology     I spent a total of 60 minutes on the date of service on chart review, patient video encounter, , documentation.      Subjective:   In February developed hair loss. Went in to derm, dx as alopecia areata. Then mid march had small purple spots on legs. Had evaluation with biopsy done which were consistent with LCV. Then referred to rheumatology. Many lesions between ankles/knees. By the time she got in to see rheumatology, had almost resolved/fading without taking any medications. Was started on HCQ, though had significant GERD. Sent to ENT without abnormalities found.  HCQ stopped.     Intermittently has stiffness in the AM in left hand 4th and 5th digit. All feel stiff. Occurs 3-4 days per week. Lasts for 10 minutes. Also when still for periods of time. She thinks they feel swollen, no actual swelling. No redness or warmth. Mild soreness. For the fingers that trigger, its the DIPs. For the others, the PIPs. Feet also sore in the AM when taking first steps, resolves within 10 steps. Also back with twisting. Things loosen up quickly. Has been going on for some years, joint symptoms. No systemic immunosuppressive therapy around the time of her skin lesions with steroids or other.     Reports diffuse joint and muscle sore/aching in the evenings. Takes advil and improves. Some hair thinning, not new or progressive. Other than LCV, prior ?roceasea. No other facial rash. Reports break out on chest and top of back. Has had 2 episodes of hypersensitivity reactions on ankles/wrists exposures to known allergens. Had been treated with prednisone for this. Had eczema as a child. None now. No personal or first degree with psoriasis. Had an episode of pleurisy after URI, which resolved. Has fructose intolerance. For many years had diarrhea. No blood in the stool. Had colonoscopy in 2014 which was normal. No difficulty swallowing. No food stuck in her throat. Has dry eyes, though started after lasik. Has dry mouth in the AM. Has flushed feeling in fingers. No fevers/chills. No weight changes. No new or persistent headache.  No change in vision or hearing.  No inflammatory eye disease history.  No history of miscarriage.  No history of blood clots. No photosensitive rash.  No nasal or oral ulcers.  No recurrent epistaxis or hemoptysis.  No recurrent sinusitis or recurrent pneumonia.  No chest pain or shortness of breath.  No nausea or vomiting.  No foamy or frothy urine.  No bowel or bladder incontinence.  No change in strength or sensation in the arms or legs.  No pitting or brittle nails.  No  triphasic color change consistent with Raynaud's.  No skin thickening or tightening consistent with sclerodactyly.  No symptoms consistent with dactylitis. No genital ulcers or lesions.    Past Medical History  Past Medical History:   Diagnosis Date     Allergic rhinitis 4/24/2003    Epic     Alopecia 9/14/2020     Arthralgia 9/14/2020     Blepharitis 5/13/2009    Epic     Decreased diffusion capacity of lung 12/9/2008     Diarrhea 10/26/2009     Episodic mood disorder (H) 11/15/2004    Epic     Fructose intolerance 10/3/2018     Generalized anxiety disorder 2/7/2012     Insomnia 3/1/2011     Irritable bowel syndrome with diarrhea 10/3/2018     Leucocytoclastic vasculitis (H) 4/22/2020     Major depression, recurrent, full remission (H) 11/4/2014    Noted at PCP visit.     Myopia 11/14/2008    Myopia - LasEk both 2009     NO ACTIVE PROBLEMS      Restrictive lung disease 12/9/2008     Seasonal affective disorder (H) 10/4/2018     Social phobia 10/20/2016     Past Surgical History  Past Surgical History:   Procedure Laterality Date     NO HISTORY OF SURGERY       Medications  Current Outpatient Medications   Medication     adapalene (DIFFERIN) 0.3 % external gel     Calcium Citrate-Vitamin D (CALCIUM + D PO)     cetirizine (ZYRTEC) 10 MG tablet     cholecalciferol (VITAMIN D3) 5000 units (125 mcg) capsule     IBUPROFEN PO     ketoconazole (NIZORAL) 2 % cream     ketoconazole (NIZORAL) 2 % external shampoo     melatonin ([employer] MELATONIN) 3 MG tablet     olopatadine (PATANOL) 0.1 % ophthalmic solution     Omega-3 Fatty Acids (FISH OIL PO)     spironolactone (ALDACTONE) 100 MG tablet     triamcinolone (KENALOG) 0.1 % external cream     vitamin D2 (ERGOCALCIFEROL) 66674 units (1250 mcg) capsule     No current facility-administered medications for this visit.      Allergies   Allergies   Allergen Reactions     Doxycycline      Penicillins Rash     rash     Family History: Mother with 'arthritis in her hands'. Mother with  'lung thing' 'sclerosis'. Second cousin with MS.    Social History: Not , no kids. No pregnancies. No blood kids. 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 Vitals reviewed in Epic.  GEN: Sitting up at table. NAD  HEENT: no facial rash, sclera clear, no inflammatory nose/ external ear changes  CV: no upper extremity dependent edema  Pulm: speaking in full sentences, no cough, no audible wheezing, no use of accessory muscles  Abdomen: not distended  Skin: no acute cutaneous lesions  MSK: full active ROM of bilateral shoulders, elbows, wrists, MCPs, PIPs, DIPs. Can make full fist without difficulty. No erythema or edema of these joints.    Labs:  2020  ESR 14  C3 155  CRP 0.4  C4 28  CH50 69    May 2020  IgA 611  IgE 38.1  ESR 16  CRP 0.5    MARIAA 1:80 speckled    Imagin2020  X-ray right hand unremarkable without changes consistent with erosive or degenerative disease.    Pathology:  3/19/2020  Right foot punch bx consistent with LCV

## 2021-06-06 NOTE — PATIENT INSTRUCTIONS
1) labs have been ordered. Please have these done when you are able. I will reach out with the results.   2) My clinic will call to schedule a follow-up in 4 months. If you have not yet had your labs done when they call, they can assist with scheduling a lab appointment as well.    Please let me know if anything comes up prior to our next conversation.     Thanks  Jesus Jacobson MD  Rheumatology

## 2021-07-01 ENCOUNTER — ANCILLARY PROCEDURE (OUTPATIENT)
Dept: MAMMOGRAPHY | Facility: CLINIC | Age: 46
End: 2021-07-01
Attending: FAMILY MEDICINE
Payer: COMMERCIAL

## 2021-07-01 DIAGNOSIS — Z12.31 VISIT FOR SCREENING MAMMOGRAM: ICD-10-CM

## 2021-07-01 PROCEDURE — 77063 BREAST TOMOSYNTHESIS BI: CPT | Mod: GC | Performed by: RADIOLOGY

## 2021-07-01 PROCEDURE — 77067 SCR MAMMO BI INCL CAD: CPT | Mod: GC | Performed by: RADIOLOGY

## 2021-10-10 ENCOUNTER — HEALTH MAINTENANCE LETTER (OUTPATIENT)
Age: 46
End: 2021-10-10

## 2022-01-29 ENCOUNTER — OFFICE VISIT (OUTPATIENT)
Dept: OPHTHALMOLOGY | Facility: CLINIC | Age: 47
End: 2022-01-29
Payer: COMMERCIAL

## 2022-01-29 DIAGNOSIS — H02.88B MEIBOMIAN GLAND DYSFUNCTION (MGD), BILATERAL, BOTH UPPER AND LOWER LIDS: Primary | ICD-10-CM

## 2022-01-29 DIAGNOSIS — H02.88A MEIBOMIAN GLAND DYSFUNCTION (MGD), BILATERAL, BOTH UPPER AND LOWER LIDS: Primary | ICD-10-CM

## 2022-01-29 DIAGNOSIS — H16.223 KERATOCONJUNCTIVITIS SICCA OF BOTH EYES NOT SPECIFIED AS SJOGREN'S: ICD-10-CM

## 2022-01-29 PROCEDURE — 99214 OFFICE O/P EST MOD 30 MIN: CPT | Performed by: STUDENT IN AN ORGANIZED HEALTH CARE EDUCATION/TRAINING PROGRAM

## 2022-01-29 RX ORDER — CYCLOSPORINE 0.5 MG/ML
1 EMULSION OPHTHALMIC 2 TIMES DAILY
Qty: 60 EACH | Refills: 11 | Status: SHIPPED | OUTPATIENT
Start: 2022-01-29 | End: 2023-03-07

## 2022-01-29 RX ORDER — FLUOROMETHOLONE 0.1 %
1 SUSPENSION, DROPS(FINAL DOSAGE FORM)(ML) OPHTHALMIC (EYE) 3 TIMES DAILY
Qty: 10 ML | Refills: 0 | Status: SHIPPED | OUTPATIENT
Start: 2022-01-29 | End: 2022-02-19

## 2022-01-29 ASSESSMENT — TONOMETRY
OS_IOP_MMHG: 06
IOP_METHOD: ICARE
OD_IOP_MMHG: 09

## 2022-01-29 ASSESSMENT — EXTERNAL EXAM - LEFT EYE: OS_EXAM: NORMAL

## 2022-01-29 ASSESSMENT — CONF VISUAL FIELD
METHOD: COUNTING FINGERS
OD_NORMAL: 1
OS_NORMAL: 1

## 2022-01-29 ASSESSMENT — EXTERNAL EXAM - RIGHT EYE: OD_EXAM: NORMAL

## 2022-01-29 ASSESSMENT — VISUAL ACUITY
METHOD: SNELLEN - LINEAR
OD_SC: 20/20
OS_SC: 20/25

## 2022-01-29 ASSESSMENT — CUP TO DISC RATIO
OS_RATIO: 0.5
OD_RATIO: 0.5

## 2022-01-29 NOTE — PATIENT INSTRUCTIONS
Eye Drops:    We started you on two drops today:   - Restasis (cyclosporine)   - Fluorometholone (FML)    When you put a drop in:  1. Separate drops by at least 5 min and 15 min from any artificial tear  2. Close your eye for 1min after instilling a drop    For basline dry eye, we discussed:  1. Warm compresses daily  2. Lid washes with baby shampoo or any other lid-specific product  3. Preservative free artificial tears  4. Consider starting a course of olopatadine for your irritation now.

## 2022-01-29 NOTE — NURSING NOTE
Chief Complaints and History of Present Illnesses   Patient presents with     Dry Eye(s) Both Eyes     Chief Complaint(s) and History of Present Illness(es)     Dry Eye(s) Both Eyes               Comments     Dryness in both eyes for the past year. Over the past week both eyes feel more dry. Vision comes and goes with dryness.  Crusting when pt wakes up, sometimes throughout the day.  Pt using artificial tears and Pataday during allergy season for limited relief.  Pt s/p Lasik BE 2009    BETZAIDA Gerber January 29, 2022 9:28 AM

## 2022-01-29 NOTE — PROGRESS NOTES
CC -   Red eyes    INTERVAL HISTORY - Initial visit    PMH -   Sondra Sorensen is a  46 year old year-old patient with POH of MELY who presented for evaluation of red irritated eyes. PMH is significant for seasonal allergies and recent trial of spironolactone for acne and hirsutism. .      PAST OCULAR SURGERY:  Myopic LASIK 2009 OU      ASSESSMENT & PLAN    # MELY+ MGD   - moderate TBUT   - significant MG inspissation present   - no lid abnormalities   - Using ATs and WCs   - significant symptoms: irritation, FBS, burning, itching,   01/29/22 - discussed restasis vs topical allergy drop. Both might work. Sx appear more related to MELY than allergy and pt is on oral antihistamine daily. Discussed and decided to treat w/ restasis. FML induction steroid given w/ Restasis for first 3 weeks.    # Allergic conjunctivitis   - pt takes daily oral antihistamine    return to clinic: cornea 2 months       ATTESTATION     Attending Physician Attestation:      Complete documentation of historical and exam elements from today's encounter can be found in the full encounter summary report (not reduplicated in this progress note).  I personally obtained the chief complaint(s) and history of present illness.  I confirmed and edited as necessary the review of systems, past medical/surgical history, family history, social history, and examination findings as documented by others; and I examined the patient myself.  I personally reviewed the relevant tests, images, and reports as documented above.  I formulated and edited as necessary the assessment and plan and discussed the findings and management plan with the patient and family    Zhen Duvall MD  Retina Fellow  Department of Ophthalmology  [facility]  Pager: [phone]

## 2022-02-10 ENCOUNTER — OFFICE VISIT (OUTPATIENT)
Dept: OBGYN | Facility: CLINIC | Age: 47
End: 2022-02-10
Payer: COMMERCIAL

## 2022-02-10 VITALS
DIASTOLIC BLOOD PRESSURE: 76 MMHG | BODY MASS INDEX: 22.76 KG/M2 | SYSTOLIC BLOOD PRESSURE: 120 MMHG | HEIGHT: 67 IN | WEIGHT: 145 LBS

## 2022-02-10 DIAGNOSIS — N95.1 PERIMENOPAUSE: Primary | ICD-10-CM

## 2022-02-10 PROCEDURE — 99213 OFFICE O/P EST LOW 20 MIN: CPT | Performed by: OBSTETRICS & GYNECOLOGY

## 2022-02-10 RX ORDER — DESOGESTREL AND ETHINYL ESTRADIOL 0.15-0.03
1 KIT ORAL DAILY
Qty: 112 TABLET | Refills: 4 | Status: SHIPPED | OUTPATIENT
Start: 2022-02-10 | End: 2022-04-12

## 2022-02-10 ASSESSMENT — MIFFLIN-ST. JEOR: SCORE: 1330.35

## 2022-02-10 NOTE — PATIENT INSTRUCTIONS
Calcium 5992-6468 mg daily    (viactive connie chews)     The menopause manifesto  Book     If not great in one month, then let me know and I can prescribe gabapentin as needed

## 2022-02-10 NOTE — PROGRESS NOTES
"CC:  Menopause issues, cannot sleep  HPI:  Sondra Sorensen is a 46 year old female No LMP recorded. (Menstrual status: Irregular Periods).  Menses are rare. Had menses 3/27/21 and again 10/27/21 but not since then. Has had sleep issues prior but seems to be worse now and thinks ?due to menopause. Also noting brain fog and some acne.   Mostly trouble sleeping as feels hot then cold and cannot get comfortable. Not the true night sweats, but not good either.  Mother went through menopause in mid 40's as well.    Saw derm and started on spirolactone for acne.     Allergies: Doxycycline and Penicillins    EXAM:  Blood pressure 120/76, height 1.702 m (5' 7\"), weight 65.8 kg (145 lb), not currently breastfeeding.  BMI= Body mass index is 22.71 kg/m .  No LMP recorded. (Menstrual status: Irregular Periods).  General - pleasant female in no acute distress.  Neurological - alert and oriented X 3  Psychiatric - normal mood and affect    prep time day of service 3 min  visit time with the patient 14 min  post visit work including documentation time 5 min  Total time: 22 min    ASSESSMENT/PLAN:  (N95.1) Perimenopause  (primary encounter diagnosis)  Comment: 10/21 LMP  Plan: desogestrel-ethinyl estradiol (APRI) 0.15-30         MG-MCG tablet        We discussed the risks and benefits of hormone replacement therapy and the Women's Health Initiative. We also discussed beneficial effects of osteoporosis prevention and a decreased incidence of ovarian cancer. As she is still perimenopausal would do OCP and not HRT yet. Discussed active tablets only and should note sx improvement within one month. If still issues with sleeping after that time, could add gabapentin to help sleep.    Discussed bone health and given info on book to read about menopause. Answered questions.     Kaelyn Álvarez MD    "

## 2022-02-25 ENCOUNTER — VIRTUAL VISIT (OUTPATIENT)
Dept: RHEUMATOLOGY | Facility: CLINIC | Age: 47
End: 2022-02-25
Attending: INTERNAL MEDICINE
Payer: COMMERCIAL

## 2022-02-25 DIAGNOSIS — M25.59 PAIN IN OTHER JOINT: ICD-10-CM

## 2022-02-25 DIAGNOSIS — M15.0 PRIMARY OSTEOARTHRITIS INVOLVING MULTIPLE JOINTS: Primary | ICD-10-CM

## 2022-02-25 DIAGNOSIS — I77.6 VASCULITIS (H): ICD-10-CM

## 2022-02-25 DIAGNOSIS — J98.4 RESTRICTIVE LUNG DISEASE: ICD-10-CM

## 2022-02-25 DIAGNOSIS — R76.8 POSITIVE ANA (ANTINUCLEAR ANTIBODY): ICD-10-CM

## 2022-02-25 PROCEDURE — 99214 OFFICE O/P EST MOD 30 MIN: CPT | Mod: 95 | Performed by: INTERNAL MEDICINE

## 2022-02-25 RX ORDER — MELOXICAM 15 MG/1
15 TABLET ORAL DAILY
Qty: 90 TABLET | Refills: 1 | Status: SHIPPED | OUTPATIENT
Start: 2022-02-25 | End: 2023-08-16

## 2022-02-25 ASSESSMENT — PAIN SCALES - GENERAL: PAINLEVEL: NO PAIN (0)

## 2022-02-25 NOTE — LETTER
2/25/2022       RE: Sondra Sorensen  [address]  [place] [address]     Dear Colleague,    Thank you for referring your patient, Sondra Sorensen, to the [facility]HEUMATOLOG[facility] [place] at [facility]. Please see a copy of my visit note below.      Outpatient Rheumatology Follow-up  This visit was conducted via synchronous video visit due to the current COVID-19 crisis to reduce patient risk.  Verbal consent was obtained and is documented below.    Name: Sondra Sorensen    MRN [id number]   Today's date: 2/25/22         Reason for follow-up: History of CTD   Requesting physician: Mena Bella MD             Assessment & Plan:   46 year old female with history of bx proven LCV, MINOR 1:80 in a speckled pattern, negative/ normal RF, ANCA, CBC, CMP, UA, ESR, CRP on most recent blood work at that time presented to rheumatology to establish care as had previously followed with an outside rheumatologist Dr Alok Myers at [facility] for 'ill defined' CTD with sicca symptoms, arthralgia, positive minor then rechecked was negative, and LCV. Review of symptoms at time of initial consultation (without additional serologies as not available in her chart), I could not find strong evidence to support an active or progressive systemic autoimmune disease.  Her dry mouth symptoms had not been severe to cause dental disease. Her joint symptoms are not accompanied by erythema/edema/warmth.  I do note a prior work-up by pulmonary and cardiology in 2008 where she was noted to have a mild restrictive process by PFTs, without desaturation. This work-up was done due to symptoms of dyspnea with mild exertion, none at rest. Neither service was able to find any disease to attribute this to. Has since had PFTs repeated, most recently in 2017 which again showed mild restrictive pulmonary defect with normal DLCO. Respiratory muscle force was decreased, raising  the question of neuromuscular process. Has not had follow-up or evaluation by neurology since our last visit. At time of our initial consultation (may 2021), we decided to obtained additional serologies (mostly driven by her restrictive pattern on PFTs) and follow-up in 4 months to assess for new/evolving symptoms.     Ms Sorensen follows up today without new or progressive symptoms. Resolution of her LCV, without return. Her joint pain is rare/only occurring every 3-4 months and not associated with signs/symptoms of inflammatory arthritis. Appears to be related to increased activity (painting her room last week). She did not have labs done which were ordered at the time of her last visit, so will have these done now to ensure the restrictive pattern on PFTs and her dry eyes/mouth is not secondary to underlying autoimmune process, though by symptoms not obvious.     For her non inflammatory arthralgias, discussed the use of NSAID meloxicam. Risks including cardiac, renal, GI, bleeding among others discussed and benefits discussed. She is agreeable to these risks given potential benefit. Will write order today. She understands not to use any other NSAID in the 24 hours after each dose.     PLAN 2/25/22  1) labs from 6/6/21 and today to evaluate for underlying CTD, particularly sjogrens given dry eyes and dry mouth  2) Mobic for non inflammatory joint pain  3) Will reach out with lab results. If all negative will plan for 1 year follow-up. If any require additional discussion, will call to discuss next steps    Jesus Jacobson MD  Rheumatology    I spent a total of 30 minutes on the date of service on chart review, patient video encounter, , documentation.          Subjective:   Interval history 2/25/22  Has had complete resolution of her cutaneous lesions. For 4-5 days in a row, intermittently, will wake up in the AM with feeling that she has been clenching her fists overnight. Needs to pry them open. Feels  that her whole body is stiff. Her hands are difficult to both open and close. She thinks that it is related to painting her bedroom last week. Last time this happened was a few months ago. No erythema/edema/warmth. Has ongoing dry eyes, saw ophthalmology for this. Has dry mouth, though only in the AM and not during the day. Has not affected her dentition. Intentionally lost 20 pounds, has gained back 5 pounds or so. No new fevers, chills, night sweats. No new chest pain/shortness of breath. No recurrent/ interval infections.  No dysphagia. Her ROS was otherwise negative.     HPI from initial consultation May 2021  In February developed hair loss. Went in to derm, dx as alopecia areata. Then mid march had small purple spots on legs. Had evaluation with biopsy done which were consistent with LCV. Then referred to rheumatology. Many lesions between ankles/knees. By the time she got in to see rheumatology, had almost resolved/fading without taking any medications. Was started on HCQ, though had significant GERD. Sent to ENT without abnormalities found. HCQ stopped.     Intermittently has stiffness in the AM in left hand 4th and 5th digit. All feel stiff. Occurs 3-4 days per week. Lasts for 10 minutes. Also when still for periods of time. She thinks they feel swollen, no actual swelling. No redness or warmth. Mild soreness. For the fingers that trigger, its the DIPs. For the others, the PIPs. Feet also sore in the AM when taking first steps, resolves within 10 steps. Also back with twisting. Things loosen up quickly. Has been going on for some years, joint symptoms. No systemic immunosuppressive therapy around the time of her skin lesions with steroids or other.     Reports diffuse joint and muscle sore/aching in the evenings. Takes advil and improves. Some hair thinning, not new or progressive. Other than LCV, prior ?roceasea. No other facial rash. Reports break out on chest and top of back. Has had 2 episodes of  hypersensitivity reactions on ankles/wrists exposures to known allergens. Had been treated with prednisone for this. Had eczema as a child. None now. No personal or first degree with psoriasis. Had an episode of pleurisy after URI, which resolved. Has fructose intolerance. For many years had diarrhea. No blood in the stool. Had colonoscopy in 2014 which was normal. No difficulty swallowing. No food stuck in her throat. Has dry eyes, though started after lasik. Has dry mouth in the AM. Has flushed feeling in fingers. No fevers/chills. No weight changes. No new or persistent headache.  No change in vision or hearing.  No inflammatory eye disease history.  No history of miscarriage.  No history of blood clots. No photosensitive rash.  No nasal or oral ulcers.  No recurrent epistaxis or hemoptysis.  No recurrent sinusitis or recurrent pneumonia.  No chest pain or shortness of breath.  No nausea or vomiting.  No foamy or frothy urine.  No bowel or bladder incontinence.  No change in strength or sensation in the arms or legs.  No pitting or brittle nails.  No triphasic color change consistent with Raynaud's.  No skin thickening or tightening consistent with sclerodactyly.  No symptoms consistent with dactylitis. No genital ulcers or lesions.    Past Medical History  Past Medical History:   Diagnosis Date     Allergic rhinitis 4/24/2003    Epic     Alopecia 9/14/2020     Arthralgia 9/14/2020     Blepharitis 5/13/2009    Epic     Decreased diffusion capacity of lung 12/9/2008     Diarrhea 10/26/2009     Episodic mood disorder (H) 11/15/2004    Epic     Fructose intolerance 10/3/2018     Generalized anxiety disorder 2/7/2012     Insomnia 3/1/2011     Irritable bowel syndrome with diarrhea 10/3/2018     Leucocytoclastic vasculitis (H) 4/22/2020     Major depression, recurrent, full remission (H) 11/4/2014    Noted at PCP visit.     Myopia 11/14/2008    Myopia - LasEk both 2009     NO ACTIVE PROBLEMS      Restrictive lung  disease 12/9/2008     Seasonal affective disorder (H) 10/4/2018     Social phobia 10/20/2016     Past Surgical History  Past Surgical History:   Procedure Laterality Date     LASIK Bilateral 2009     NO HISTORY OF SURGERY       Medications  Current Outpatient Medications   Medication     adapalene (DIFFERIN) 0.3 % external gel     Calcium Citrate-Vitamin D (CALCIUM + D PO)     cetirizine (ZYRTEC) 10 MG tablet     cholecalciferol (VITAMIN D3) 5000 units (125 mcg) capsule     cycloSPORINE (RESTASIS) 0.05 % ophthalmic emulsion     desogestrel-ethinyl estradiol (APRI) 0.15-30 MG-MCG tablet     IBUPROFEN PO     ketoconazole (NIZORAL) 2 % cream     ketoconazole (NIZORAL) 2 % external shampoo     melatonin ([employer] MELATONIN) 3 MG tablet     olopatadine (PATANOL) 0.1 % ophthalmic solution     Omega-3 Fatty Acids (FISH OIL PO)     spironolactone (ALDACTONE) 100 MG tablet     triamcinolone (KENALOG) 0.1 % external cream     vitamin D2 (ERGOCALCIFEROL) 29231 units (1250 mcg) capsule     No current facility-administered medications for this visit.     Allergies   Allergies   Allergen Reactions     Doxycycline      Penicillins Rash     rash     Family History: Mother with 'arthritis in her hands'. Mother with 'lung thing' 'sclerosis'. Second cousin with MS.    Social History: Not , no kids. No pregnancies. No blood kids. 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 Vitals reviewed in Epic.  GEN: Sitting up at table. NAD  HEENT: no facial rash, sclera clear, no inflammatory nose/ external ear changes  CV: no upper extremity dependent edema  Pulm: speaking in full sentences, no cough, no audible wheezing, no use of accessory muscles  Abdomen: not distended  Skin: no acute cutaneous lesions  MSK: full active ROM of bilateral shoulders, elbows, wrists, MCPs, PIPs, DIPs. Can make full fist without difficulty. No erythema or edema of these joints.    Labs:  WBC   Date Value Ref Range  Status   2021 7.7 4.0 - 11.0 10^9/L Final     WBC Count   Date Value Ref Range Status   2022 8.0 4.0 - 11.0 10e3/uL Final     Hemoglobin   Date Value Ref Range Status   2022 13.5 11.7 - 15.7 g/dL Final   2021 13.7 11.7 - 15.7 g/dL Final     Platelet Count   Date Value Ref Range Status   2022 266 150 - 450 10e3/uL Final   2021 262 150 - 450 10^9/L Final   2019 281 150 - 450 10e9/L Final     Creatinine   Date Value Ref Range Status   2021 0.87 0.55 - 1.02 mg/dL Final     Lab Results   Component Value Date    ALKPHOS 96 2021     AST   Date Value Ref Range Status   2021 22 0 - 45 U/L Final     Lab Results   Component Value Date    ALT 30 2021     Sed Rate   Date Value Ref Range Status   2019 16 0 - 20 mm/h Final     CRP Inflammation   Date Value Ref Range Status   2019 8.1 (H) 0.0 - 8.0 mg/L Final     UA RESULTS:  Recent Labs   Lab Test 22  1053   COLOR Yellow   APPEARANCE Clear   URINEGLC Negative   URINEBILI Negative   URINEKETONE Negative   SG 1.015   UBLD Negative   URINEPH 6.0   PROTEIN Negative   UROBILINOGEN 0.2   NITRITE Negative   LEUKEST Negative   RBCU 0-2   WBCU 0-5      Rheumatoid Factor   Date Value Ref Range Status   2019 <20 <20 IU/mL Final     2020  ESR 14  C3 155  CRP 0.4  C4 28  CH50 69    May 2020  IgA 611  IgE 38.1  ESR 16  CRP 0.5    MARIAA 1:80 speckled    Imagin2020  X-ray right hand unremarkable without changes consistent with erosive or degenerative disease.    Pathology:  3/19/2020  Right foot punch bx consistent with LCV            Sondra is a 46 year old who is being evaluated via a billable video visit.      How would you like to obtain your AVS? MyChart  If the video visit is dropped, the invitation should be resent by: Text to cell phone: [phone]  Will anyone else be joining your video visit? No      Video Start Time: 7:50AM  Video-Visit Details    Type of service:  Video  Visit    Video End Time:8:15AM    Originating Location (pt. Location): Home    Distant Location (provider location):  [facility] RHEUMATOLOGY [facility]IN[facility] [place]     Platform used for Video Visit: Adrianna Jacobson MD  Rheumatology

## 2022-02-25 NOTE — PROGRESS NOTES
Sondra is a 46 year old who is being evaluated via a billable video visit.      How would you like to obtain your AVS? MyChart  If the video visit is dropped, the invitation should be resent by: Text to cell phone: [phone]  Will anyone else be joining your video visit? No      Video Start Time: 7:50AM  Video-Visit Details    Type of service:  Video Visit    Video End Time:8:15AM    Originating Location (pt. Location): Home    Distant Location (provider location):  [facility] RHEUMATOLOGY CLIN[facility] [place]     Platform used for Video Visit: Adrianna Jacobson MD  Rheumatology

## 2022-02-25 NOTE — PROGRESS NOTES
Outpatient Rheumatology Follow-up  This visit was conducted via synchronous video visit due to the current COVID-19 crisis to reduce patient risk.  Verbal consent was obtained and is documented below.    Name: Sondra Sorensen    MRN [id number]   Today's date: 2/25/22         Reason for follow-up: History of CTD   Requesting physician: Mena Bella MD             Assessment & Plan:   46 year old female with history of bx proven LCV, MINOR 1:80 in a speckled pattern, negative/ normal RF, ANCA, CBC, CMP, UA, ESR, CRP on most recent blood work at that time presented to rheumatology to establish care as had previously followed with an outside rheumatologist Dr Alok Myers at [facility] for 'ill defined' CTD with sicca symptoms, arthralgia, positive minor then rechecked was negative, and LCV. Review of symptoms at time of initial consultation (without additional serologies as not available in her chart), I could not find strong evidence to support an active or progressive systemic autoimmune disease.  Her dry mouth symptoms had not been severe to cause dental disease. Her joint symptoms are not accompanied by erythema/edema/warmth.  I do note a prior work-up by pulmonary and cardiology in 2008 where she was noted to have a mild restrictive process by PFTs, without desaturation. This work-up was done due to symptoms of dyspnea with mild exertion, none at rest. Neither service was able to find any disease to attribute this to. Has since had PFTs repeated, most recently in 2017 which again showed mild restrictive pulmonary defect with normal DLCO. Respiratory muscle force was decreased, raising the question of neuromuscular process. Has not had follow-up or evaluation by neurology since our last visit. At time of our initial consultation (may 2021), we decided to obtained additional serologies (mostly driven by her restrictive pattern on PFTs) and follow-up in 4 months to assess for new/evolving symptoms.     Ms  Edu follows up today without new or progressive symptoms. Resolution of her LCV, without return. Her joint pain is rare/only occurring every 3-4 months and not associated with signs/symptoms of inflammatory arthritis. Appears to be related to increased activity (painting her room last week). She did not have labs done which were ordered at the time of her last visit, so will have these done now to ensure the restrictive pattern on PFTs and her dry eyes/mouth is not secondary to underlying autoimmune process, though by symptoms not obvious.     For her non inflammatory arthralgias, discussed the use of NSAID meloxicam. Risks including cardiac, renal, GI, bleeding among others discussed and benefits discussed. She is agreeable to these risks given potential benefit. Will write order today. She understands not to use any other NSAID in the 24 hours after each dose.     PLAN 2/25/22  1) labs from 6/6/21 and today to evaluate for underlying CTD, particularly sjogrens given dry eyes and dry mouth  2) Mobic for non inflammatory joint pain  3) Will reach out with lab results. If all negative will plan for 1 year follow-up. If any require additional discussion, will call to discuss next steps    Jesus Jacobson MD  Rheumatology    I spent a total of 30 minutes on the date of service on chart review, patient video encounter, , documentation.          Subjective:   Interval history 2/25/22  Has had complete resolution of her cutaneous lesions. For 4-5 days in a row, intermittently, will wake up in the AM with feeling that she has been clenching her fists overnight. Needs to pry them open. Feels that her whole body is stiff. Her hands are difficult to both open and close. She thinks that it is related to painting her bedroom last week. Last time this happened was a few months ago. No erythema/edema/warmth. Has ongoing dry eyes, saw ophthalmology for this. Has dry mouth, though only in the AM and not during the day.  Has not affected her dentition. Intentionally lost 20 pounds, has gained back 5 pounds or so. No new fevers, chills, night sweats. No new chest pain/shortness of breath. No recurrent/ interval infections.  No dysphagia. Her ROS was otherwise negative.     HPI from initial consultation May 2021  In February developed hair loss. Went in to derm, dx as alopecia areata. Then mid march had small purple spots on legs. Had evaluation with biopsy done which were consistent with LCV. Then referred to rheumatology. Many lesions between ankles/knees. By the time she got in to see rheumatology, had almost resolved/fading without taking any medications. Was started on HCQ, though had significant GERD. Sent to ENT without abnormalities found. HCQ stopped.     Intermittently has stiffness in the AM in left hand 4th and 5th digit. All feel stiff. Occurs 3-4 days per week. Lasts for 10 minutes. Also when still for periods of time. She thinks they feel swollen, no actual swelling. No redness or warmth. Mild soreness. For the fingers that trigger, its the DIPs. For the others, the PIPs. Feet also sore in the AM when taking first steps, resolves within 10 steps. Also back with twisting. Things loosen up quickly. Has been going on for some years, joint symptoms. No systemic immunosuppressive therapy around the time of her skin lesions with steroids or other.     Reports diffuse joint and muscle sore/aching in the evenings. Takes advil and improves. Some hair thinning, not new or progressive. Other than LCV, prior ?roceasea. No other facial rash. Reports break out on chest and top of back. Has had 2 episodes of hypersensitivity reactions on ankles/wrists exposures to known allergens. Had been treated with prednisone for this. Had eczema as a child. None now. No personal or first degree with psoriasis. Had an episode of pleurisy after URI, which resolved. Has fructose intolerance. For many years had diarrhea. No blood in the stool. Had  colonoscopy in 2014 which was normal. No difficulty swallowing. No food stuck in her throat. Has dry eyes, though started after lasik. Has dry mouth in the AM. Has flushed feeling in fingers. No fevers/chills. No weight changes. No new or persistent headache.  No change in vision or hearing.  No inflammatory eye disease history.  No history of miscarriage.  No history of blood clots. No photosensitive rash.  No nasal or oral ulcers.  No recurrent epistaxis or hemoptysis.  No recurrent sinusitis or recurrent pneumonia.  No chest pain or shortness of breath.  No nausea or vomiting.  No foamy or frothy urine.  No bowel or bladder incontinence.  No change in strength or sensation in the arms or legs.  No pitting or brittle nails.  No triphasic color change consistent with Raynaud's.  No skin thickening or tightening consistent with sclerodactyly.  No symptoms consistent with dactylitis. No genital ulcers or lesions.    Past Medical History  Past Medical History:   Diagnosis Date     Allergic rhinitis 4/24/2003    Epic     Alopecia 9/14/2020     Arthralgia 9/14/2020     Blepharitis 5/13/2009    Epic     Decreased diffusion capacity of lung 12/9/2008     Diarrhea 10/26/2009     Episodic mood disorder (H) 11/15/2004    Epic     Fructose intolerance 10/3/2018     Generalized anxiety disorder 2/7/2012     Insomnia 3/1/2011     Irritable bowel syndrome with diarrhea 10/3/2018     Leucocytoclastic vasculitis (H) 4/22/2020     Major depression, recurrent, full remission (H) 11/4/2014    Noted at PCP visit.     Myopia 11/14/2008    Myopia - LasEk both 2009     NO ACTIVE PROBLEMS      Restrictive lung disease 12/9/2008     Seasonal affective disorder (H) 10/4/2018     Social phobia 10/20/2016     Past Surgical History  Past Surgical History:   Procedure Laterality Date     LASIK Bilateral 2009     NO HISTORY OF SURGERY       Medications  Current Outpatient Medications   Medication     adapalene (DIFFERIN) 0.3 % external gel      Calcium Citrate-Vitamin D (CALCIUM + D PO)     cetirizine (ZYRTEC) 10 MG tablet     cholecalciferol (VITAMIN D3) 5000 units (125 mcg) capsule     cycloSPORINE (RESTASIS) 0.05 % ophthalmic emulsion     desogestrel-ethinyl estradiol (APRI) 0.15-30 MG-MCG tablet     IBUPROFEN PO     ketoconazole (NIZORAL) 2 % cream     ketoconazole (NIZORAL) 2 % external shampoo     melatonin ([employer] MELATONIN) 3 MG tablet     olopatadine (PATANOL) 0.1 % ophthalmic solution     Omega-3 Fatty Acids (FISH OIL PO)     spironolactone (ALDACTONE) 100 MG tablet     triamcinolone (KENALOG) 0.1 % external cream     vitamin D2 (ERGOCALCIFEROL) 60976 units (1250 mcg) capsule     No current facility-administered medications for this visit.     Allergies   Allergies   Allergen Reactions     Doxycycline      Penicillins Rash     rash     Family History: Mother with 'arthritis in her hands'. Mother with 'lung thing' 'sclerosis'. Second cousin with MS.    Social History: Not , no kids. No pregnancies. 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 Vitals reviewed in Epic.  GEN: Sitting up at table. NAD  HEENT: no facial rash, sclera clear, no inflammatory nose/ external ear changes  CV: no upper extremity dependent edema  Pulm: speaking in full sentences, no cough, no audible wheezing, no use of accessory muscles  Abdomen: not distended  Skin: no acute cutaneous lesions  MSK: full active ROM of bilateral shoulders, elbows, wrists, MCPs, PIPs, DIPs. Can make full fist without difficulty. No erythema or edema of these joints.    Labs:  WBC   Date Value Ref Range Status   03/30/2021 7.7 4.0 - 11.0 10^9/L Final     WBC Count   Date Value Ref Range Status   02/27/2022 8.0 4.0 - 11.0 10e3/uL Final     Hemoglobin   Date Value Ref Range Status   02/27/2022 13.5 11.7 - 15.7 g/dL Final   03/30/2021 13.7 11.7 - 15.7 g/dL Final     Platelet Count   Date Value Ref Range Status   02/27/2022 266 150 -  450 10e3/uL Final   2021 262 150 - 450 10^9/L Final   2019 281 150 - 450 10e9/L Final     Creatinine   Date Value Ref Range Status   2021 0.87 0.55 - 1.02 mg/dL Final     Lab Results   Component Value Date    ALKPHOS 96 2021     AST   Date Value Ref Range Status   2021 22 0 - 45 U/L Final     Lab Results   Component Value Date    ALT 30 2021     Sed Rate   Date Value Ref Range Status   2019 16 0 - 20 mm/h Final     CRP Inflammation   Date Value Ref Range Status   2019 8.1 (H) 0.0 - 8.0 mg/L Final     UA RESULTS:  Recent Labs   Lab Test 22  1053   COLOR Yellow   APPEARANCE Clear   URINEGLC Negative   URINEBILI Negative   URINEKETONE Negative   SG 1.015   UBLD Negative   URINEPH 6.0   PROTEIN Negative   UROBILINOGEN 0.2   NITRITE Negative   LEUKEST Negative   RBCU 0-2   WBCU 0-5      Rheumatoid Factor   Date Value Ref Range Status   2019 <20 <20 IU/mL Final     2020  ESR 14  C3 155  CRP 0.4  C4 28  CH50 69    May 2020  IgA 611  IgE 38.1  ESR 16  CRP 0.5    MARIAA 1:80 speckled    Imagin2020  X-ray right hand unremarkable without changes consistent with erosive or degenerative disease.    Pathology:  3/19/2020  Right foot punch bx consistent with LCV

## 2022-02-27 ENCOUNTER — LAB (OUTPATIENT)
Dept: LAB | Facility: CLINIC | Age: 47
End: 2022-02-27
Payer: COMMERCIAL

## 2022-02-27 DIAGNOSIS — I77.6 VASCULITIS (H): ICD-10-CM

## 2022-02-27 LAB
ALBUMIN SERPL-MCNC: 3.4 G/DL (ref 3.4–5)
ALBUMIN UR-MCNC: NEGATIVE MG/DL
ALP SERPL-CCNC: 82 U/L (ref 40–150)
ALT SERPL W P-5'-P-CCNC: 22 U/L (ref 0–50)
ANION GAP SERPL CALCULATED.3IONS-SCNC: 5 MMOL/L (ref 3–14)
APPEARANCE UR: CLEAR
AST SERPL W P-5'-P-CCNC: 14 U/L (ref 0–45)
BACTERIA #/AREA URNS HPF: ABNORMAL /HPF
BASOPHILS # BLD AUTO: 0 10E3/UL (ref 0–0.2)
BASOPHILS NFR BLD AUTO: 0 %
BILIRUB SERPL-MCNC: 0.4 MG/DL (ref 0.2–1.3)
BILIRUB UR QL STRIP: NEGATIVE
BUN SERPL-MCNC: 17 MG/DL (ref 7–30)
CALCIUM SERPL-MCNC: 8.8 MG/DL (ref 8.5–10.1)
CHLORIDE BLD-SCNC: 104 MMOL/L (ref 94–109)
CO2 SERPL-SCNC: 24 MMOL/L (ref 20–32)
COLOR UR AUTO: YELLOW
CREAT SERPL-MCNC: 0.67 MG/DL (ref 0.52–1.04)
CREAT UR-MCNC: 120 MG/DL
EOSINOPHIL # BLD AUTO: 0.1 10E3/UL (ref 0–0.7)
EOSINOPHIL NFR BLD AUTO: 1 %
ERYTHROCYTE [DISTWIDTH] IN BLOOD BY AUTOMATED COUNT: 12.3 % (ref 10–15)
GFR SERPL CREATININE-BSD FRML MDRD: >90 ML/MIN/1.73M2
GLUCOSE BLD-MCNC: 90 MG/DL (ref 70–99)
GLUCOSE UR STRIP-MCNC: NEGATIVE MG/DL
HCT VFR BLD AUTO: 40.3 % (ref 35–47)
HGB BLD-MCNC: 13.5 G/DL (ref 11.7–15.7)
HGB UR QL STRIP: NEGATIVE
IMM GRANULOCYTES # BLD: 0 10E3/UL
IMM GRANULOCYTES NFR BLD: 0 %
KETONES UR STRIP-MCNC: NEGATIVE MG/DL
LEUKOCYTE ESTERASE UR QL STRIP: NEGATIVE
LYMPHOCYTES # BLD AUTO: 1.9 10E3/UL (ref 0.8–5.3)
LYMPHOCYTES NFR BLD AUTO: 24 %
MCH RBC QN AUTO: 30.9 PG (ref 26.5–33)
MCHC RBC AUTO-ENTMCNC: 33.5 G/DL (ref 31.5–36.5)
MCV RBC AUTO: 92 FL (ref 78–100)
MONOCYTES # BLD AUTO: 0.5 10E3/UL (ref 0–1.3)
MONOCYTES NFR BLD AUTO: 7 %
NEUTROPHILS # BLD AUTO: 5.5 10E3/UL (ref 1.6–8.3)
NEUTROPHILS NFR BLD AUTO: 69 %
NITRATE UR QL: NEGATIVE
PH UR STRIP: 6 [PH] (ref 5–7)
PLATELET # BLD AUTO: 266 10E3/UL (ref 150–450)
POTASSIUM BLD-SCNC: 4.6 MMOL/L (ref 3.4–5.3)
PROT SERPL-MCNC: 8.1 G/DL (ref 6.8–8.8)
PROT UR-MCNC: 0.12 G/L
PROT/CREAT 24H UR: 0.1 G/G CR (ref 0–0.2)
RBC # BLD AUTO: 4.37 10E6/UL (ref 3.8–5.2)
RBC #/AREA URNS AUTO: ABNORMAL /HPF
SODIUM SERPL-SCNC: 133 MMOL/L (ref 133–144)
SP GR UR STRIP: 1.01 (ref 1–1.03)
UROBILINOGEN UR STRIP-ACNC: 0.2 E.U./DL
WBC # BLD AUTO: 8 10E3/UL (ref 4–11)
WBC #/AREA URNS AUTO: ABNORMAL /HPF

## 2022-02-27 PROCEDURE — 85025 COMPLETE CBC W/AUTO DIFF WBC: CPT

## 2022-02-27 PROCEDURE — 36415 COLL VENOUS BLD VENIPUNCTURE: CPT

## 2022-02-27 PROCEDURE — 87340 HEPATITIS B SURFACE AG IA: CPT

## 2022-02-27 PROCEDURE — 86256 FLUORESCENT ANTIBODY TITER: CPT

## 2022-02-27 PROCEDURE — 86036 ANCA SCREEN EACH ANTIBODY: CPT

## 2022-02-27 PROCEDURE — 84156 ASSAY OF PROTEIN URINE: CPT

## 2022-02-27 PROCEDURE — 87389 HIV-1 AG W/HIV-1&-2 AB AG IA: CPT

## 2022-02-27 PROCEDURE — 81001 URINALYSIS AUTO W/SCOPE: CPT

## 2022-02-27 PROCEDURE — 80053 COMPREHEN METABOLIC PANEL: CPT

## 2022-02-27 PROCEDURE — 86704 HEP B CORE ANTIBODY TOTAL: CPT

## 2022-02-28 LAB
HBV CORE AB SERPL QL IA: NONREACTIVE
HBV SURFACE AG SERPL QL IA: NONREACTIVE
HIV 1+2 AB+HIV1 P24 AG SERPL QL IA: NONREACTIVE

## 2022-03-01 LAB
ANCA AB PATTERN SER IF-IMP: NORMAL
C-ANCA TITR SER IF: NORMAL {TITER}

## 2022-03-03 ENCOUNTER — LAB (OUTPATIENT)
Dept: LAB | Facility: CLINIC | Age: 47
End: 2022-03-03
Payer: COMMERCIAL

## 2022-03-03 DIAGNOSIS — R76.8 POSITIVE ANA (ANTINUCLEAR ANTIBODY): ICD-10-CM

## 2022-03-03 DIAGNOSIS — M31.0 LEUCOCYTOCLASTIC VASCULITIS (H): ICD-10-CM

## 2022-03-03 DIAGNOSIS — J98.4 RESTRICTIVE LUNG DISEASE: ICD-10-CM

## 2022-03-03 LAB
C REACTIVE PROTEIN LHE: 1.3 MG/DL (ref 0–0.8)
C3 SERPL-MCNC: 151 MG/DL (ref 83–177)
C4 SERPL-MCNC: 27 MG/DL (ref 19–59)
CCP AB SER IA-ACNC: 1.1 U/ML
CK SERPL-CCNC: 37 U/L (ref 30–190)
ERYTHROCYTE [SEDIMENTATION RATE] IN BLOOD BY WESTERGREN METHOD: 28 MM/HR (ref 0–20)

## 2022-03-03 PROCEDURE — 99000 SPECIMEN HANDLING OFFICE-LAB: CPT

## 2022-03-03 PROCEDURE — 86160 COMPLEMENT ANTIGEN: CPT | Mod: 59

## 2022-03-03 PROCEDURE — 86140 C-REACTIVE PROTEIN: CPT

## 2022-03-03 PROCEDURE — 36415 COLL VENOUS BLD VENIPUNCTURE: CPT

## 2022-03-03 PROCEDURE — 86235 NUCLEAR ANTIGEN ANTIBODY: CPT | Mod: 59

## 2022-03-03 PROCEDURE — 86225 DNA ANTIBODY NATIVE: CPT

## 2022-03-03 PROCEDURE — 85652 RBC SED RATE AUTOMATED: CPT

## 2022-03-03 PROCEDURE — 86235 NUCLEAR ANTIGEN ANTIBODY: CPT | Mod: 90

## 2022-03-03 PROCEDURE — 82085 ASSAY OF ALDOLASE: CPT | Mod: 90

## 2022-03-03 PROCEDURE — 86200 CCP ANTIBODY: CPT

## 2022-03-03 PROCEDURE — 82550 ASSAY OF CK (CPK): CPT

## 2022-03-04 LAB
CENTROMERE B AB SER-ACNC: <0.7 U/ML
CENTROMERE B AB SER-ACNC: NEGATIVE
DSDNA AB SER-ACNC: 1 IU/ML
ENA SCL70 IGG SER IA-ACNC: 0.8 U/ML
ENA SCL70 IGG SER IA-ACNC: NEGATIVE
ENA SM IGG SER IA-ACNC: 2.6 U/ML
ENA SM IGG SER IA-ACNC: NEGATIVE
ENA SS-A AB SER IA-ACNC: 0.6 U/ML
ENA SS-A AB SER IA-ACNC: NEGATIVE
ENA SS-B IGG SER IA-ACNC: <0.6 U/ML
ENA SS-B IGG SER IA-ACNC: NEGATIVE
U1 SNRNP IGG SER IA-ACNC: 1.5 U/ML
U1 SNRNP IGG SER IA-ACNC: NEGATIVE

## 2022-03-05 LAB
ALDOLASE SERPL-CCNC: 2.8 U/L
PM/SCL-100 AB SER QL LINE BLOT: NEGATIVE

## 2022-04-12 ENCOUNTER — OFFICE VISIT (OUTPATIENT)
Dept: OPHTHALMOLOGY | Facility: CLINIC | Age: 47
End: 2022-04-12
Attending: OPHTHALMOLOGY
Payer: COMMERCIAL

## 2022-04-12 DIAGNOSIS — H16.223 KERATOCONJUNCTIVITIS SICCA OF BOTH EYES NOT SPECIFIED AS SJOGREN'S: Primary | ICD-10-CM

## 2022-04-12 DIAGNOSIS — H02.88B MEIBOMIAN GLAND DYSFUNCTION (MGD), BILATERAL, BOTH UPPER AND LOWER LIDS: ICD-10-CM

## 2022-04-12 DIAGNOSIS — H02.88A MEIBOMIAN GLAND DYSFUNCTION (MGD), BILATERAL, BOTH UPPER AND LOWER LIDS: ICD-10-CM

## 2022-04-12 PROCEDURE — G0463 HOSPITAL OUTPT CLINIC VISIT: HCPCS

## 2022-04-12 PROCEDURE — 99214 OFFICE O/P EST MOD 30 MIN: CPT | Mod: GC | Performed by: OPHTHALMOLOGY

## 2022-04-12 ASSESSMENT — CUP TO DISC RATIO
OS_RATIO: 0.5
OD_RATIO: 0.5

## 2022-04-12 ASSESSMENT — PATIENT HEALTH QUESTIONNAIRE - PHQ9: SUM OF ALL RESPONSES TO PHQ QUESTIONS 1-9: 2

## 2022-04-12 ASSESSMENT — TONOMETRY
OS_IOP_MMHG: 8
IOP_METHOD: ICARE
OD_IOP_MMHG: 7

## 2022-04-12 ASSESSMENT — EXTERNAL EXAM - RIGHT EYE: OD_EXAM: NORMAL

## 2022-04-12 ASSESSMENT — CONF VISUAL FIELD
METHOD: COUNTING FINGERS
OD_NORMAL: 1
OS_NORMAL: 1

## 2022-04-12 ASSESSMENT — VISUAL ACUITY
METHOD: SNELLEN - LINEAR
OD_SC: 20/20
OS_SC: 20/20

## 2022-04-12 ASSESSMENT — EXTERNAL EXAM - LEFT EYE: OS_EXAM: NORMAL

## 2022-04-12 NOTE — PROGRESS NOTES
CC: patient unsure of understanding for follow up care after seeing Dr Duvall, but Dry Eye and MGD each eye.    Referring Provider: Dr Duvall      HPI:    Sondra Sorensen 46 year old White female patient with POH of MELY who presented for evaluation of red irritated eyes. PMH is significant for seasonal allergies and recent trial of spironolactone for acne and hirsutism. .    Interval hx: Pt notes significant improvement in blurry vision as well as left eye redness since starting Restasis less itching as well.      POHx:  Last eye exam: 1/29/2022 (Jadiel)    Prior eye surgery/laser: Myopic LASIK 2009 each eye    CTL wearer: NONE    Glasses: NONE    Family Hx of eye disease: NONE aware    Gtts Currenly Taking:  Restasis BID each eye  Artifical Tears 1/2x/day each eye  Pataday daily each eye (only when flares or increase in allergy season)    Allergies:  Doxycycline  Penicillin    Assessment:  # MELY+ MGD              - moderate TBUT              - significant MG inspissation present              - no lid abnormalities              - Using ATs, no WC   01/29/22 - discussed restasis vs topical allergy drop. Both might work. Sx appear more related to MELY than allergy and pt is on oral antihistamine daily. Discussed and decided to treat w/ restasis. FML induction steroid given w/ Restasis for first 3 weeks.     # Allergic conjunctivitis              - pt takes daily oral     PLAN:  -Continue Restasis BID each eye  -Continue PFATs 1-2 times daily  -Start Eyelid hygiene daily  - discussed again with warm compresses    Follow up Cornea:  3 months w/V,T at next visit, call if problems sooner to clinic.      Zhen Mensah, DO  Fellow, Cornea & External Disease  Department of Ophthalmology  [facility]    Attending Physician Attestation:  Complete documentation of historical and exam elements from today's encounter can be found in the full encounter summary report (not reduplicated in this progress note).  I  personally obtained the chief complaint(s) and history of present illness.  I confirmed and edited as necessary the review of systems, past medical/surgical history, family history, social history, and examination findings as documented by others; and I examined the patient myself.  I personally reviewed the relevant tests, images, and reports as documented above.  I formulated and edited as necessary the assessment and plan and discussed the findings and management plan with the patient and family. - Nasir Loredo MD

## 2022-04-12 NOTE — NURSING NOTE
Chief Complaints and History of Present Illnesses   Patient presents with     Dry Eye Syndrome Evaluation     Chief Complaint(s) and History of Present Illness(es)     Dry Eye Syndrome Evaluation               Comments     Sondra is here for evaluation of dry eyes.  History of Meibomian gland dysfunction (MGD), bilateral, both upper and lower lids History of LASIK  (2009)   She has been using Restasis since last visit here (Dr Duvall 1-29-22). She feels eyes are less dry, but she was told to follow up here.     Mainor Morales COT 12:36 PM April 12, 2022

## 2022-07-05 ENCOUNTER — ANCILLARY PROCEDURE (OUTPATIENT)
Dept: MAMMOGRAPHY | Facility: CLINIC | Age: 47
End: 2022-07-05
Payer: COMMERCIAL

## 2022-07-05 DIAGNOSIS — Z12.31 VISIT FOR SCREENING MAMMOGRAM: ICD-10-CM

## 2022-07-05 PROCEDURE — 77063 BREAST TOMOSYNTHESIS BI: CPT

## 2022-07-05 PROCEDURE — 77067 SCR MAMMO BI INCL CAD: CPT

## 2022-07-28 ENCOUNTER — LAB (OUTPATIENT)
Dept: LAB | Facility: CLINIC | Age: 47
End: 2022-07-28
Payer: COMMERCIAL

## 2022-07-28 DIAGNOSIS — M06.09 RHEUMATOID ARTHRITIS, SERONEGATIVE, MULTIPLE SITES (H): ICD-10-CM

## 2022-07-28 LAB
ALBUMIN SERPL BCG-MCNC: 4.1 G/DL (ref 3.5–5.2)
ALP SERPL-CCNC: 63 U/L (ref 35–104)
ALT SERPL W P-5'-P-CCNC: 13 U/L (ref 10–35)
ANION GAP SERPL CALCULATED.3IONS-SCNC: 16 MMOL/L (ref 7–15)
AST SERPL W P-5'-P-CCNC: 24 U/L (ref 10–35)
BASOPHILS # BLD AUTO: 0 10E3/UL (ref 0–0.2)
BASOPHILS NFR BLD AUTO: 0 %
BILIRUB SERPL-MCNC: 0.2 MG/DL
BUN SERPL-MCNC: 12.7 MG/DL (ref 6–20)
CALCIUM SERPL-MCNC: 9.1 MG/DL (ref 8.6–10)
CHLORIDE SERPL-SCNC: 95 MMOL/L (ref 98–107)
CREAT SERPL-MCNC: 0.71 MG/DL (ref 0.51–0.95)
CRP SERPL-MCNC: 14.3 MG/L
DEPRECATED HCO3 PLAS-SCNC: 20 MMOL/L (ref 22–29)
EOSINOPHIL # BLD AUTO: 0.1 10E3/UL (ref 0–0.7)
EOSINOPHIL NFR BLD AUTO: 1 %
ERYTHROCYTE [DISTWIDTH] IN BLOOD BY AUTOMATED COUNT: 12.1 % (ref 10–15)
ERYTHROCYTE [SEDIMENTATION RATE] IN BLOOD BY WESTERGREN METHOD: 17 MM/HR (ref 0–20)
GFR SERPL CREATININE-BSD FRML MDRD: >90 ML/MIN/1.73M2
GLUCOSE SERPL-MCNC: 126 MG/DL (ref 70–99)
HCT VFR BLD AUTO: 41.4 % (ref 35–47)
HGB BLD-MCNC: 13.7 G/DL (ref 11.7–15.7)
IMM GRANULOCYTES # BLD: 0 10E3/UL
IMM GRANULOCYTES NFR BLD: 0 %
LYMPHOCYTES # BLD AUTO: 2.4 10E3/UL (ref 0.8–5.3)
LYMPHOCYTES NFR BLD AUTO: 25 %
MCH RBC QN AUTO: 30.5 PG (ref 26.5–33)
MCHC RBC AUTO-ENTMCNC: 33.1 G/DL (ref 31.5–36.5)
MCV RBC AUTO: 92 FL (ref 78–100)
MONOCYTES # BLD AUTO: 0.6 10E3/UL (ref 0–1.3)
MONOCYTES NFR BLD AUTO: 7 %
NEUTROPHILS # BLD AUTO: 6.3 10E3/UL (ref 1.6–8.3)
NEUTROPHILS NFR BLD AUTO: 66 %
PLATELET # BLD AUTO: 290 10E3/UL (ref 150–450)
POTASSIUM SERPL-SCNC: 4.1 MMOL/L (ref 3.4–5.3)
PROT SERPL-MCNC: 7.7 G/DL (ref 6.4–8.3)
RBC # BLD AUTO: 4.49 10E6/UL (ref 3.8–5.2)
SODIUM SERPL-SCNC: 131 MMOL/L (ref 136–145)
WBC # BLD AUTO: 9.6 10E3/UL (ref 4–11)

## 2022-07-28 PROCEDURE — 86140 C-REACTIVE PROTEIN: CPT

## 2022-07-28 PROCEDURE — 80053 COMPREHEN METABOLIC PANEL: CPT

## 2022-07-28 PROCEDURE — 85025 COMPLETE CBC W/AUTO DIFF WBC: CPT

## 2022-07-28 PROCEDURE — 36415 COLL VENOUS BLD VENIPUNCTURE: CPT

## 2022-07-28 PROCEDURE — 85652 RBC SED RATE AUTOMATED: CPT

## 2022-08-05 ENCOUNTER — VIRTUAL VISIT (OUTPATIENT)
Dept: RHEUMATOLOGY | Facility: CLINIC | Age: 47
End: 2022-08-05
Attending: INTERNAL MEDICINE
Payer: COMMERCIAL

## 2022-08-05 DIAGNOSIS — M06.09 RHEUMATOID ARTHRITIS, SERONEGATIVE, MULTIPLE SITES (H): Primary | ICD-10-CM

## 2022-08-05 DIAGNOSIS — Z79.899 HIGH RISK MEDICATION USE: ICD-10-CM

## 2022-08-05 PROCEDURE — 99214 OFFICE O/P EST MOD 30 MIN: CPT | Mod: 95 | Performed by: INTERNAL MEDICINE

## 2022-08-05 NOTE — PROGRESS NOTES
Sondra Sorensen  is being evaluated via a billable video visit.      Patient denies any changes since echeck-in regarding medication and allergies and states all information entered during echeck-in remains accurate.    How would you like to obtain your AVS? SafetyWeb  For the video visit, send the invitation by: Text to cell phone: [phone]  Will anyone else be joining your video visit? No      Start: 08/05/2022 08:23 am   Stop: 08/05/2022 08:40 am      Outpatient Rheumatology Follow-up  This visit was conducted via synchronous video visit due to the current COVID-19 crisis to reduce patient risk.  Verbal consent was obtained and is documented below.    Name: Sondra Sorensen    MRN [id number]   Today's date: 8/5/22         Reason for follow-up: History of CTD   Requesting physician: Mena Bella MD             Assessment & Plan:   46 year old female with history of bx proven LCV, MINOR 1:80 in a speckled pattern, negative/ normal RF, ANCA, CBC, CMP, UA, ESR, CRP on most recent blood work at that time presented to rheumatology to establish care as had previously followed with an outside rheumatologist Dr Alok Myers at [facility] for 'ill defined' CTD with sicca symptoms, arthralgia, positive minor then rechecked was negative, and LCV. Review of symptoms at time of initial consultation (without additional serologies as not available in her chart), I could not find strong evidence to support an active or progressive systemic autoimmune disease.  Her dry mouth symptoms had not been severe to cause dental disease. Her joint symptoms are not accompanied by erythema/edema/warmth.  I do note a prior work-up by pulmonary and cardiology in 2008 where she was noted to have a mild restrictive process by PFTs, without desaturation. This work-up was done due to symptoms of dyspnea with mild exertion, none at rest. Neither service was able to find any disease to attribute this to. Has since had PFTs repeated, most  recently in 2017 which again showed mild restrictive pulmonary defect with normal DLCO. Respiratory muscle force was decreased, raising the question of neuromuscular process. Has not had follow-up or evaluation by neurology since our last visit. At time of our initial consultation (may 2021), we decided to obtained additional serologies (mostly driven by her restrictive pattern on PFTs) and follow-up in 4 months to assess for new/evolving symptoms. Broad serologic work-up at that time showed negative/normal C3/C4/SSA/SSB/smith/RNP/CCP/dsDNA. However her ESR was 28 and her CRP was 8.1. I called her on 4/21/22 and discussed the above results and risks/benefits of starting HCQ for what is most likely consistent with seronegative inflammatory arthritis. She started this and follows up today as planned. She also continued on meloxicam.     Overall Sondra has had a positive response since starting HCQ 200mg BID. No side effects noted. Positive improvements have included 1) able to make a full fist upon waking 2) no longer needing to pry her fists open in the AM 3) less frequent/severe flares of pain. Tolerating well without side effects. Will plan to continue HCQ and mobic. Will also refer to hand OT.  PLAN:  -continue HCQ 200mg BID  -Hand OT referral placed  -Follow-up in 6 months with labs appointment around that time  -Continue on mobic     High risk medication use: HCQ  ---Risks and benefits of HCQ discussed today to include rash (including severe rash/SJS/TEN), HA, GI upset, hepatoxicity, retinal toxicity, need for yearly screening OCT exam, need for CBC, CMP, ESR, CRP for routine screening drug toxicity labs among others. Patient is agreeable.   --Most recent labs reviewed from 7/28/22 to include CBC, CMP, esr, crp which did not show any evidence of drug toxicity. Will continue with q6 month routine drug toxicity screening labs  -Has upcoming appointment with ophthalmology on 8/23/22    Jesus Jacobson  MD  Rheumatology     Subjective:   Interval History August 5, 2022  Started on HCQ on 4/21 due to ESR 28/CRP 1.3. Repeat on 7/28 showed ESR 17 and CRP 14. Tolerating without noticeable side effects. Still aching in the bilateral CMCs when driving for an hour, needs to readjust. Has some intermittent stiffness after increased activity the day before. Has no longer had the symptoms where she needed to pry her hands open. Also now able to make full fist upon waking now. Had been having night sweats, has gone on birth control and resolved this. No rashes, SOB, chest pain. Has been having worsening GERD, though more stressed out. Rare bloating/diarrhea, no blood in stool. GERD bothers her at night when going to sleep. Wakes with scratchy throat. Has felt nauseous at night/dry heaves. Feels that she has been having foods getting stuck in her throat. Has been getting tonsils. Has not seen someone for this. Has been using restasis for her dry eyes. Has gone back on birth control which has been helpful. Hair not falling out as it had been as well. So she thinks was hormonal driven. 14 point review of systems collected and negative if not documented above.      Interval history 2/25/22  Has had complete resolution of her cutaneous lesions. For 4-5 days in a row, intermittently, will wake up in the AM with feeling that she has been clenching her fists overnight. Needs to pry them open. Feels that her whole body is stiff. Her hands are difficult to both open and close. She thinks that it is related to painting her bedroom last week. Last time this happened was a few months ago. No erythema/edema/warmth. Has ongoing dry eyes, saw ophthalmology for this. Has dry mouth, though only in the AM and not during the day. Has not affected her dentition. Intentionally lost 20 pounds, has gained back 5 pounds or so. No new fevers, chills, night sweats. No new chest pain/shortness of breath. No recurrent/ interval infections.  No dysphagia.  Her ROS was otherwise negative.     HPI from initial consultation May 2021  In February developed hair loss. Went in to derm, dx as alopecia areata. Then mid march had small purple spots on legs. Had evaluation with biopsy done which were consistent with LCV. Then referred to rheumatology. Many lesions between ankles/knees. By the time she got in to see rheumatology, had almost resolved/fading without taking any medications. Was started on HCQ, though had significant GERD. Sent to ENT without abnormalities found. HCQ stopped.     Intermittently has stiffness in the AM in left hand 4th and 5th digit. All feel stiff. Occurs 3-4 days per week. Lasts for 10 minutes. Also when still for periods of time. She thinks they feel swollen, no actual swelling. No redness or warmth. Mild soreness. For the fingers that trigger, its the DIPs. For the others, the PIPs. Feet also sore in the AM when taking first steps, resolves within 10 steps. Also back with twisting. Things loosen up quickly. Has been going on for some years, joint symptoms. No systemic immunosuppressive therapy around the time of her skin lesions with steroids or other.     Reports diffuse joint and muscle sore/aching in the evenings. Takes advil and improves. Some hair thinning, not new or progressive. Other than LCV, prior ?roceasea. No other facial rash. Reports break out on chest and top of back. Has had 2 episodes of hypersensitivity reactions on ankles/wrists exposures to known allergens. Had been treated with prednisone for this. Had eczema as a child. None now. No personal or first degree with psoriasis. Had an episode of pleurisy after URI, which resolved. Has fructose intolerance. For many years had diarrhea. No blood in the stool. Had colonoscopy in 2014 which was normal. No difficulty swallowing. No food stuck in her throat. Has dry eyes, though started after lasik. Has dry mouth in the AM. Has flushed feeling in fingers. No fevers/chills. No weight  changes. No new or persistent headache.  No change in vision or hearing.  No inflammatory eye disease history.  No history of miscarriage.  No history of blood clots. No photosensitive rash.  No nasal or oral ulcers.  No recurrent epistaxis or hemoptysis.  No recurrent sinusitis or recurrent pneumonia.  No chest pain or shortness of breath.  No nausea or vomiting.  No foamy or frothy urine.  No bowel or bladder incontinence.  No change in strength or sensation in the arms or legs.  No pitting or brittle nails.  No triphasic color change consistent with Raynaud's.  No skin thickening or tightening consistent with sclerodactyly.  No symptoms consistent with dactylitis. No genital ulcers or lesions.    Past Medical History  Past Medical History:   Diagnosis Date     Allergic rhinitis 4/24/2003    Epic     Alopecia 9/14/2020     Arthralgia 9/14/2020     Blepharitis 5/13/2009    Epic     Decreased diffusion capacity of lung 12/9/2008     Diarrhea 10/26/2009     Episodic mood disorder (H) 11/15/2004    Epic     Fructose intolerance 10/3/2018     Generalized anxiety disorder 2/7/2012     Insomnia 3/1/2011     Irritable bowel syndrome with diarrhea 10/3/2018     Leucocytoclastic vasculitis (H) 4/22/2020     Major depression, recurrent, full remission (H) 11/4/2014    Noted at PCP visit.     Myopia 11/14/2008    Myopia - LasEk both 2009     NO ACTIVE PROBLEMS      Restrictive lung disease 12/9/2008     Seasonal affective disorder (H) 10/4/2018     Social phobia 10/20/2016     Past Surgical History  Past Surgical History:   Procedure Laterality Date     LASIK Bilateral 2009     NO HISTORY OF SURGERY       Medications  Current Outpatient Medications   Medication     adapalene (DIFFERIN) 0.3 % external gel     Calcium Citrate-Vitamin D (CALCIUM + D PO)     cetirizine (ZYRTEC) 10 MG tablet     cholecalciferol (VITAMIN D3) 5000 units (125 mcg) capsule     cycloSPORINE (RESTASIS) 0.05 % ophthalmic emulsion     hydroxychloroquine  (PLAQUENIL) 200 MG tablet     IBUPROFEN PO     ketoconazole (NIZORAL) 2 % cream     ketoconazole (NIZORAL) 2 % external shampoo     melatonin ([facility] MELATONIN) 3 MG tablet     meloxicam (MOBIC) 15 MG tablet     norethindrone-ethinyl estradiol (MICROGESTIN 1.5/30) 1.5-30 MG-MCG tablet     olopatadine (PATANOL) 0.1 % ophthalmic solution     Omega-3 Fatty Acids (FISH OIL PO)     triamcinolone (KENALOG) 0.1 % external cream     No current facility-administered medications for this visit.     Allergies   Allergies   Allergen Reactions     Doxycycline      Penicillins Rash     rash     Family History: Mother with 'arthritis in her hands'. Mother with 'lung thing' 'sclerosis'. Second cousin with MS.    Social History: Not , no kids. No pregnancies. No blood kids. 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 Vitals reviewed in Epic.  GEN: Sitting up unassisted. NAD  HEENT: no facial rash, sclera clear, no inflammatory nose/ external ear changes  CV: no upper extremity dependent edema  Pulm: speaking in full sentences, no cough, no audible wheezing, no use of accessory muscles  Abdomen: not distended  Skin: no acute cutaneous lesions  MSK: full active ROM of bilateral shoulders, elbows, wrists, MCPs, PIPs, DIPs. Can make full fist without difficulty. No erythema or edema of these joints.    WBC   Date Value Ref Range Status   03/30/2021 7.7 4.0 - 11.0 10^9/L Final     WBC Count   Date Value Ref Range Status   07/28/2022 9.6 4.0 - 11.0 10e3/uL Final     Hemoglobin   Date Value Ref Range Status   07/28/2022 13.7 11.7 - 15.7 g/dL Final   03/30/2021 13.7 11.7 - 15.7 g/dL Final     Platelet Count   Date Value Ref Range Status   07/28/2022 290 150 - 450 10e3/uL Final   03/30/2021 262 150 - 450 10^9/L Final   12/05/2019 281 150 - 450 10e9/L Final     Creatinine   Date Value Ref Range Status   07/28/2022 0.71 0.51 - 0.95 mg/dL Final   03/30/2021 0.87 0.55 - 1.02 mg/dL Final      Lab Results   Component Value Date    ALKPHOS 63 2022    ALKPHOS 96 2021     AST   Date Value Ref Range Status   2022 24 10 - 35 U/L Final   2021 22 0 - 45 U/L Final     Lab Results   Component Value Date    ALT 13 2022    ALT 30 2021     Sed Rate   Date Value Ref Range Status   2019 16 0 - 20 mm/h Final     Erythrocyte Sedimentation Rate   Date Value Ref Range Status   2022 17 0 - 20 mm/hr Final     CRP Inflammation   Date Value Ref Range Status   2022 14.30 (H) <5.00 mg/L Final   2019 8.1 (H) 0.0 - 8.0 mg/L Final     CRP   Date Value Ref Range Status   2022 1.3 (H) 0.0-<0.8 mg/dL Final     UA RESULTS:  Recent Labs   Lab Test 22  1053   COLOR Yellow   APPEARANCE Clear   URINEGLC Negative   URINEBILI Negative   URINEKETONE Negative   SG 1.015   UBLD Negative   URINEPH 6.0   PROTEIN Negative   UROBILINOGEN 0.2   NITRITE Negative   LEUKEST Negative   RBCU 0-2   WBCU 0-5      DNA (ds) Antibody   Date Value Ref Range Status   2022 1.0 <10.0 IU/mL Final     Comment:     Negative     RNP Antibody IgG   Date Value Ref Range Status   2022 Negative Negative Final     Aldolase   Date Value Ref Range Status   2022 2.8 1.2 - 7.6 U/L Final     Comment:     REFERENCE INTERVAL: Aldolase    Access complete set of age- and/or gender-specific   reference intervals for this test in the [facility]   Test Directory ([email or web address]).  Performed By: [facility]  [address] [place] [id number]  : Angy Persaud MD     Rheumatoid Factor   Date Value Ref Range Status   2019 <20 <20 IU/mL Final     Cyclic Citrullinated Peptide Antibody IgG   Date Value Ref Range Status   2022 1.1 <=4.9 U/mL Final     2020  ESR 14  C3 155  CRP 0.4  C4 28  CH50 69    May 2020  IgA 611  IgE 38.1  ESR 16  CRP 0.5    MARIAA 1:80 speckled    Imagin2020  X-ray right hand unremarkable without changes  consistent with erosive or degenerative disease.    Pathology:  3/19/2020  Right foot punch bx consistent with LCV

## 2022-08-05 NOTE — LETTER
8/5/2022       RE: Sondra Sorensen  [address]  [place] [address]     Dear Colleague,    Thank you for referring your patient, Sondra Sorensen, to the [facility] RHEUM[facility]OLOG[facility] [place] at [facility]. Please see a copy of my visit note below.    Sondra Sorensen  is being evaluated via a billable video visit.      Patient denies any changes since echeck-in regarding medication and allergies and states all information entered during echeck-in remains accurate.    How would you like to obtain your AVS? MyChart  For the video visit, send the invitation by: Text to cell phone: [phone]  Will anyone else be joining your video visit? No      Start: 08/05/2022 08:23 am   Stop: 08/05/2022 08:40 am      Outpatient Rheumatology Follow-up  This visit was conducted via synchronous video visit due to the current COVID-19 crisis to reduce patient risk.  Verbal consent was obtained and is documented below.    Name: Sondra Sorensen    MRN [id number]   Today's date: 8/5/22         Reason for follow-up: History of CTD   Requesting physician: Mena Bella MD             Assessment & Plan:   46 year old female with history of bx proven LCV, MINOR 1:80 in a speckled pattern, negative/ normal RF, ANCA, CBC, CMP, UA, ESR, CRP on most recent blood work at that time presented to rheumatology to establish care as had previously followed with an outside rheumatologist Dr Alok Myers at [facility] for 'ill defined' CTD with sicca symptoms, arthralgia, positive minor then rechecked was negative, and LCV. Review of symptoms at time of initial consultation (without additional serologies as not available in her chart), I could not find strong evidence to support an active or progressive systemic autoimmune disease.  Her dry mouth symptoms had not been severe to cause dental disease. Her joint symptoms are not accompanied by erythema/edema/warmth.  I do note  a prior work-up by pulmonary and cardiology in 2008 where she was noted to have a mild restrictive process by PFTs, without desaturation. This work-up was done due to symptoms of dyspnea with mild exertion, none at rest. Neither service was able to find any disease to attribute this to. Has since had PFTs repeated, most recently in 2017 which again showed mild restrictive pulmonary defect with normal DLCO. Respiratory muscle force was decreased, raising the question of neuromuscular process. Has not had follow-up or evaluation by neurology since our last visit. At time of our initial consultation (may 2021), we decided to obtained additional serologies (mostly driven by her restrictive pattern on PFTs) and follow-up in 4 months to assess for new/evolving symptoms. Broad serologic work-up at that time showed negative/normal C3/C4/SSA/SSB/smith/RNP/CCP/dsDNA. However her ESR was 28 and her CRP was 8.1. I called her on 4/21/22 and discussed the above results and risks/benefits of starting HCQ for what is most likely consistent with seronegative inflammatory arthritis. She started this and follows up today as planned. She also continued on meloxicam.     Overall Sondra has had a positive response since starting HCQ 200mg BID. No side effects noted. Positive improvements have included 1) able to make a full fist upon waking 2) no longer needing to pry her fists open in the AM 3) less frequent/severe flares of pain. Tolerating well without side effects. Will plan to continue HCQ and mobic. Will also refer to hand OT.  PLAN:  -continue HCQ 200mg BID  -Hand OT referral placed  -Follow-up in 6 months with labs appointment around that time  -Continue on mobic     High risk medication use: HCQ  ---Risks and benefits of HCQ discussed today to include rash (including severe rash/SJS/TEN), HA, GI upset, hepatoxicity, retinal toxicity, need for yearly screening OCT exam, need for CBC, CMP, ESR, CRP for routine screening drug  toxicity labs among others. Patient is agreeable.   --Most recent labs reviewed from 7/28/22 to include CBC, CMP, esr, crp which did not show any evidence of drug toxicity. Will continue with q6 month routine drug toxicity screening labs  -Has upcoming appointment with ophthalmology on 8/23/22    Jesus Jacobson MD  Rheumatology     Subjective:   Interval History August 5, 2022  Started on HCQ on 4/21 due to ESR 28/CRP 1.3. Repeat on 7/28 showed ESR 17 and CRP 14. Tolerating without noticeable side effects. Still aching in the bilateral CMCs when driving for an hour, needs to readjust. Has some intermittent stiffness after increased activity the day before. Has no longer had the symptoms where she needed to pry her hands open. Also now able to make full fist upon waking now. Had been having night sweats, has gone on birth control and resolved this. No rashes, SOB, chest pain. Has been having worsening GERD, though more stressed out. Rare bloating/diarrhea, no blood in stool. GERD bothers her at night when going to sleep. Wakes with scratchy throat. Has felt nauseous at night/dry heaves. Feels that she has been having foods getting stuck in her throat. Has been getting tonsils. Has not seen someone for this. Has been using restasis for her dry eyes. Has gone back on birth control which has been helpful. Hair not falling out as it had been as well. So she thinks was hormonal driven. 14 point review of systems collected and negative if not documented above.      Interval history 2/25/22  Has had complete resolution of her cutaneous lesions. For 4-5 days in a row, intermittently, will wake up in the AM with feeling that she has been clenching her fists overnight. Needs to pry them open. Feels that her whole body is stiff. Her hands are difficult to both open and close. She thinks that it is related to painting her bedroom last week. Last time this happened was a few months ago. No erythema/edema/warmth. Has ongoing dry  eyes, saw ophthalmology for this. Has dry mouth, though only in the AM and not during the day. Has not affected her dentition. Intentionally lost 20 pounds, has gained back 5 pounds or so. No new fevers, chills, night sweats. No new chest pain/shortness of breath. No recurrent/ interval infections.  No dysphagia. Her ROS was otherwise negative.     HPI from initial consultation May 2021  In February developed hair loss. Went in to derm, dx as alopecia areata. Then mid march had small purple spots on legs. Had evaluation with biopsy done which were consistent with LCV. Then referred to rheumatology. Many lesions between ankles/knees. By the time she got in to see rheumatology, had almost resolved/fading without taking any medications. Was started on HCQ, though had significant GERD. Sent to ENT without abnormalities found. HCQ stopped.     Intermittently has stiffness in the AM in left hand 4th and 5th digit. All feel stiff. Occurs 3-4 days per week. Lasts for 10 minutes. Also when still for periods of time. She thinks they feel swollen, no actual swelling. No redness or warmth. Mild soreness. For the fingers that trigger, its the DIPs. For the others, the PIPs. Feet also sore in the AM when taking first steps, resolves within 10 steps. Also back with twisting. Things loosen up quickly. Has been going on for some years, joint symptoms. No systemic immunosuppressive therapy around the time of her skin lesions with steroids or other.     Reports diffuse joint and muscle sore/aching in the evenings. Takes advil and improves. Some hair thinning, not new or progressive. Other than LCV, prior ?roceasea. No other facial rash. Reports break out on chest and top of back. Has had 2 episodes of hypersensitivity reactions on ankles/wrists exposures to known allergens. Had been treated with prednisone for this. Had eczema as a child. None now. No personal or first degree with psoriasis. Had an episode of pleurisy after URI, which  resolved. Has fructose intolerance. For many years had diarrhea. No blood in the stool. Had colonoscopy in 2014 which was normal. No difficulty swallowing. No food stuck in her throat. Has dry eyes, though started after lasik. Has dry mouth in the AM. Has flushed feeling in fingers. No fevers/chills. No weight changes. No new or persistent headache.  No change in vision or hearing.  No inflammatory eye disease history.  No history of miscarriage.  No history of blood clots. No photosensitive rash.  No nasal or oral ulcers.  No recurrent epistaxis or hemoptysis.  No recurrent sinusitis or recurrent pneumonia.  No chest pain or shortness of breath.  No nausea or vomiting.  No foamy or frothy urine.  No bowel or bladder incontinence.  No change in strength or sensation in the arms or legs.  No pitting or brittle nails.  No triphasic color change consistent with Raynaud's.  No skin thickening or tightening consistent with sclerodactyly.  No symptoms consistent with dactylitis. No genital ulcers or lesions.    Past Medical History  Past Medical History:   Diagnosis Date     Allergic rhinitis 4/24/2003    Epic     Alopecia 9/14/2020     Arthralgia 9/14/2020     Blepharitis 5/13/2009    Epic     Decreased diffusion capacity of lung 12/9/2008     Diarrhea 10/26/2009     Episodic mood disorder (H) 11/15/2004    Epic     Fructose intolerance 10/3/2018     Generalized anxiety disorder 2/7/2012     Insomnia 3/1/2011     Irritable bowel syndrome with diarrhea 10/3/2018     Leucocytoclastic vasculitis (H) 4/22/2020     Major depression, recurrent, full remission (H) 11/4/2014    Noted at PCP visit.     Myopia 11/14/2008    Myopia - LasEk both 2009     NO ACTIVE PROBLEMS      Restrictive lung disease 12/9/2008     Seasonal affective disorder (H) 10/4/2018     Social phobia 10/20/2016     Past Surgical History  Past Surgical History:   Procedure Laterality Date     LASIK Bilateral 2009     NO HISTORY OF SURGERY        Medications  Current Outpatient Medications   Medication     adapalene (DIFFERIN) 0.3 % external gel     Calcium Citrate-Vitamin D (CALCIUM + D PO)     cetirizine (ZYRTEC) 10 MG tablet     cholecalciferol (VITAMIN D3) 5000 units (125 mcg) capsule     cycloSPORINE (RESTASIS) 0.05 % ophthalmic emulsion     hydroxychloroquine (PLAQUENIL) 200 MG tablet     IBUPROFEN PO     ketoconazole (NIZORAL) 2 % cream     ketoconazole (NIZORAL) 2 % external shampoo     melatonin ([employer] MELATONIN) 3 MG tablet     meloxicam (MOBIC) 15 MG tablet     norethindrone-ethinyl estradiol (MICROGESTIN 1.5/30) 1.5-30 MG-MCG tablet     olopatadine (PATANOL) 0.1 % ophthalmic solution     Omega-3 Fatty Acids (FISH OIL PO)     triamcinolone (KENALOG) 0.1 % external cream     No current facility-administered medications for this visit.     Allergies   Allergies   Allergen Reactions     Doxycycline      Penicillins Rash     rash     Family History: Mother with 'arthritis in her hands'. Mother with 'lung thing' 'sclerosis'. Second cousin with MS.    Social History: Not , no kids. No pregnancies. No blood kids. 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 Vitals reviewed in Epic.  GEN: Sitting up unassisted. NAD  HEENT: no facial rash, sclera clear, no inflammatory nose/ external ear changes  CV: no upper extremity dependent edema  Pulm: speaking in full sentences, no cough, no audible wheezing, no use of accessory muscles  Abdomen: not distended  Skin: no acute cutaneous lesions  MSK: full active ROM of bilateral shoulders, elbows, wrists, MCPs, PIPs, DIPs. Can make full fist without difficulty. No erythema or edema of these joints.    WBC   Date Value Ref Range Status   03/30/2021 7.7 4.0 - 11.0 10^9/L Final     WBC Count   Date Value Ref Range Status   07/28/2022 9.6 4.0 - 11.0 10e3/uL Final     Hemoglobin   Date Value Ref Range Status   07/28/2022 13.7 11.7 - 15.7 g/dL Final   03/30/2021 13.7  11.7 - 15.7 g/dL Final     Platelet Count   Date Value Ref Range Status   07/28/2022 290 150 - 450 10e3/uL Final   03/30/2021 262 150 - 450 10^9/L Final   12/05/2019 281 150 - 450 10e9/L Final     Creatinine   Date Value Ref Range Status   07/28/2022 0.71 0.51 - 0.95 mg/dL Final   03/30/2021 0.87 0.55 - 1.02 mg/dL Final     Lab Results   Component Value Date    ALKPHOS 63 07/28/2022    ALKPHOS 96 03/30/2021     AST   Date Value Ref Range Status   07/28/2022 24 10 - 35 U/L Final   03/30/2021 22 0 - 45 U/L Final     Lab Results   Component Value Date    ALT 13 07/28/2022    ALT 30 03/30/2021     Sed Rate   Date Value Ref Range Status   11/06/2019 16 0 - 20 mm/h Final     Erythrocyte Sedimentation Rate   Date Value Ref Range Status   07/28/2022 17 0 - 20 mm/hr Final     CRP Inflammation   Date Value Ref Range Status   07/28/2022 14.30 (H) <5.00 mg/L Final   11/06/2019 8.1 (H) 0.0 - 8.0 mg/L Final     CRP   Date Value Ref Range Status   03/03/2022 1.3 (H) 0.0-<0.8 mg/dL Final     UA RESULTS:  Recent Labs   Lab Test 02/27/22  1053   COLOR Yellow   APPEARANCE Clear   URINEGLC Negative   URINEBILI Negative   URINEKETONE Negative   SG 1.015   UBLD Negative   URINEPH 6.0   PROTEIN Negative   UROBILINOGEN 0.2   NITRITE Negative   LEUKEST Negative   RBCU 0-2   WBCU 0-5      DNA (ds) Antibody   Date Value Ref Range Status   03/03/2022 1.0 <10.0 IU/mL Final     Comment:     Negative     RNP Antibody IgG   Date Value Ref Range Status   03/03/2022 Negative Negative Final     Aldolase   Date Value Ref Range Status   03/03/2022 2.8 1.2 - 7.6 U/L Final     Comment:     REFERENCE INTERVAL: Aldolase    Access complete set of age- and/or gender-specific   reference intervals for this test in the [facility]   Test Directory ([email or web address]).  Performed By: [facility]  [address] [place] [id number]  : Angy Persaud MD     Rheumatoid Factor   Date Value Ref Range Status   11/06/2019 <20 <20 IU/mL  Final     Cyclic Citrullinated Peptide Antibody IgG   Date Value Ref Range Status   2022 1.1 <=4.9 U/mL Final     2020  ESR 14  C3 155  CRP 0.4  C4 28  CH50 69    May 2020  IgA 611  IgE 38.1  ESR 16  CRP 0.5    MARIAA 1:80 speckled    Imagin2020  X-ray right hand unremarkable without changes consistent with erosive or degenerative disease.    Pathology:  3/19/2020  Right foot punch bx consistent with LCV

## 2022-08-05 NOTE — PATIENT INSTRUCTIONS
-Continue plaquenil one tablet twice daily  -Labs and follow-up in 6 months  -Hand occupational referral placed    Please let me know if anything comes up prior to your follow-up and I will see you sooner.    Thanks  Jesus Jacobson MD  Rheumatology

## 2022-08-23 ENCOUNTER — OFFICE VISIT (OUTPATIENT)
Dept: OPHTHALMOLOGY | Facility: CLINIC | Age: 47
End: 2022-08-23
Attending: OPHTHALMOLOGY
Payer: COMMERCIAL

## 2022-08-23 DIAGNOSIS — H16.223 KERATOCONJUNCTIVITIS SICCA OF BOTH EYES NOT SPECIFIED AS SJOGREN'S: Primary | ICD-10-CM

## 2022-08-23 DIAGNOSIS — H02.88A MEIBOMIAN GLAND DYSFUNCTION (MGD), BILATERAL, BOTH UPPER AND LOWER LIDS: ICD-10-CM

## 2022-08-23 DIAGNOSIS — H02.88B MEIBOMIAN GLAND DYSFUNCTION (MGD), BILATERAL, BOTH UPPER AND LOWER LIDS: ICD-10-CM

## 2022-08-23 PROCEDURE — 99213 OFFICE O/P EST LOW 20 MIN: CPT | Mod: GC | Performed by: OPHTHALMOLOGY

## 2022-08-23 PROCEDURE — G0463 HOSPITAL OUTPT CLINIC VISIT: HCPCS

## 2022-08-23 ASSESSMENT — CONF VISUAL FIELD
OS_NORMAL: 1
METHOD: COUNTING FINGERS
OD_NORMAL: 1

## 2022-08-23 ASSESSMENT — TONOMETRY
IOP_METHOD: ICARE
OD_IOP_MMHG: 09
OS_IOP_MMHG: 10

## 2022-08-23 ASSESSMENT — VISUAL ACUITY
OD_SC: 20/20
OS_SC: 20/20
METHOD: SNELLEN - LINEAR

## 2022-08-23 ASSESSMENT — EXTERNAL EXAM - RIGHT EYE: OD_EXAM: NORMAL

## 2022-08-23 ASSESSMENT — EXTERNAL EXAM - LEFT EYE: OS_EXAM: NORMAL

## 2022-08-23 NOTE — PROGRESS NOTES
CC: Dry Eye follow-up    Referring Provider: Dr Duvall      Initial HPI:    Sondra Sorensen 46 year old White female patient with POH of MELY who presented for evaluation of red irritated eyes. PMH is significant for seasonal allergies and recent trial of spironolactone for acne and hirsutism. .    Interval hx: Feels slightly improved compared to last visit, though notes continued dryness. Notes that she is trying to increase the frequency of warm compresses. Uses ATs and Pataday PRN. Reports that her vision is good. Denies flashes, floaters.     POHx:  Last eye exam 4/2022 (Jadiel)    Prior eye surgery/laser: Myopic LASIK 2009 each eye    CTL wearer: NONE    Glasses: NONE    Family Hx of eye disease: NONE aware    Gtts Currenly Taking:  Restasis BID each eye  Artifical Tears 1/2x/day each eye (uses 2-3 times per day, especially in morning)   Pataday daily each eye (only when flares or increase in allergy season)    Allergies:  Doxycycline  Penicillin    Assessment:  # MELY+ MGD  - moderate TBUT as previously described, sympomatically improving  - significant MG inspissation present  - Using ATs  - continue WC/LH and increase frequency; discussed proper technique  - continue Restasis BID each eye  - Start  Parth q HS each eye.    # Allergic conjunctivitis              - pt takes daily oral     Follow up Cornea:  winter 2023 w/ V,T at next visit, call if problems sooner to clinic.    Jose Garcia MD  Fellow, Cornea, External Disease and Refractive Surgery  Department of Ophthalmology  [facility]      Attending Physician Attestation:  Complete documentation of historical and exam elements from today's encounter can be found in the full encounter summary report (not reduplicated in this progress note).  I personally obtained the chief complaint(s) and history of present illness.  I confirmed and edited as necessary the review of systems, past medical/surgical history, family history, social history, and  examination findings as documented by others; and I examined the patient myself.  I personally reviewed the relevant tests, images, and reports as documented above.  I formulated and edited as necessary the assessment and plan and discussed the findings and management plan with the patient and family. - Nasir Loredo MD

## 2022-08-23 NOTE — NURSING NOTE
Chief Complaints and History of Present Illnesses   Patient presents with     Follow Up     Keratoconjunctivitis sicca of both eyes     Chief Complaint(s) and History of Present Illness(es)     Follow Up     Laterality: both eyes    Course: stable    Associated symptoms: dryness.  Negative for eye pain, tearing, itching and foreign body sensation    Treatments tried: eye drops and artificial tears    Pain scale: 0/10    Comments: Keratoconjunctivitis sicca of both eyes              Comments     She states that her vision has seemed stable in both eyes since her last eye exam.   Both eyes are doing fairly well.       She is using:  -Restasis BID each eye  -PFATs 2+ times daily  -Eyelid hygiene daily (pt just bought a heat mask for eyes)    DEVANG Jason 2:31 PM  August 23, 2022

## 2022-09-01 ENCOUNTER — THERAPY VISIT (OUTPATIENT)
Dept: OCCUPATIONAL THERAPY | Facility: CLINIC | Age: 47
End: 2022-09-01
Payer: COMMERCIAL

## 2022-09-01 ENCOUNTER — TRANSFERRED RECORDS (OUTPATIENT)
Dept: HEALTH INFORMATION MANAGEMENT | Facility: CLINIC | Age: 47
End: 2022-09-01

## 2022-09-01 ENCOUNTER — MEDICAL CORRESPONDENCE (OUTPATIENT)
Dept: HEALTH INFORMATION MANAGEMENT | Facility: CLINIC | Age: 47
End: 2022-09-01

## 2022-09-01 DIAGNOSIS — M79.645 BILATERAL THUMB PAIN: ICD-10-CM

## 2022-09-01 DIAGNOSIS — M79.641 PAIN IN BOTH HANDS: Primary | ICD-10-CM

## 2022-09-01 DIAGNOSIS — M79.644 BILATERAL THUMB PAIN: ICD-10-CM

## 2022-09-01 DIAGNOSIS — R29.898 HAND WEAKNESS: ICD-10-CM

## 2022-09-01 DIAGNOSIS — M79.642 PAIN IN BOTH HANDS: Primary | ICD-10-CM

## 2022-09-01 DIAGNOSIS — M06.09 RHEUMATOID ARTHRITIS, SERONEGATIVE, MULTIPLE SITES (H): ICD-10-CM

## 2022-09-01 PROCEDURE — 97535 SELF CARE MNGMENT TRAINING: CPT | Mod: GO | Performed by: OCCUPATIONAL THERAPIST

## 2022-09-01 PROCEDURE — 97165 OT EVAL LOW COMPLEX 30 MIN: CPT | Mod: GO | Performed by: OCCUPATIONAL THERAPIST

## 2022-09-01 NOTE — PROGRESS NOTES
Hand Therapy Initial Evaluation    Current Date:  9/1/2022    Diagnosis: seronegative RA, B hand, finger, thumb pain; hand weakness  Orders 8/5/22    Precautions: RA    Subjective:  Sondra Sorensen is a 46 year old female.  Pt notes the L hand had been getting some triggering to the SR, RF, that is getting better.   It is mostly affecting the fingers. If I use a drill for example, I will get really sore to the thumb column. Even with driving, it will get pretty sore into the thumb and IF, will need to drive with other fingers. I think the wrists are ok. Elbows seem fine.   Did fall - this is rare - but tripped on the sidewalk, hitthe hips, a little bit ont he L hand but it is ok.   Sometimes the feet in the morning can be stiff, achy for a minute or 2. Did see PT for a bit in the past, but doing fine with that. No neck issues.    At one point, was seen at [facility], was given a splint but it did not support the thumb.    Patient reports symptoms of the bilateral hands  which occurred due to condition as noted. Since onset symptoms are Gradually getting better with meds.  General health as reported by patient is good.   Pertinent medical history includes:  See electronic medical record.  Medical allergies: See electronic medical record.  Surgical history: none to the UEs. Medication history: HCQ 200mg BID, See electronic medical record.    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    Previous treatment: none  Barriers include:none  Mobility: No difficulty  Transportation: drives  Currently working in normal job without restrictions  Leisure activities/hobbies: home projects    Functional Outcome Measure:   Upper  Extremity Functional Index Score:  SCORE:   Column Totals: /80: (P) 76   (A lower score indicates greater disability.)    Objective:  Pain Level (Scale 0-10)   9/1/2022   At Rest *maybe a tiny bit stiff, not bad   With Use With a lot of work at home, gets to a 3, will last a few days, lingers     Pain Description  Date 9/1/2022   Location hand and thumbs, IF.   Sometimes in the morning it feels like I have been making fists all night. Triggering can happen - mainly just annoying.   Pain Quality Aching and Dull   Frequency intermittent     Pain is worst  usually right after the activity for the thumb, IF. Triggering is in the AM   Exacerbated by  overuse   Relieved by meds helping, rest   Progression Improved with meds     Edema  None     Sensation  WNL throughout all nerve distributions; per patient report    ROM  Pain Report:  - none    + mild    ++ moderate    +++ severe   AROM( PROM) 9/1/2022   Wrist Extension R: WNL  L: WNL   Wrist Flexion R: WNL  L: WNL     Observation  - none    + mild    ++ moderate    +++ severe    9/1/2022   MP joints swelling R:-  L:-   Volar subluxation of MP joints R:-  L:-   Ulnar drift of MP joints R:-  L:-   EDC subluxation at MP joints R:-  L:-   Boutonierre deformity R:-  L:-   Highmore neck deformity R:-  L:-   Intrinsic tightness R:-  L:-   Distal volar scaphoid Prominent bilaterally   Wrist ulnar sag Mild, not hypermobile, tends to not be painful. Some ulnar head prominence       Strength:     (Measured in pounds)  Pain Report:  - none    + mild    ++ moderate    +++ severe    9/1/2022 9/1/2022   Trials R L   1 51 48     Lat Pinch 9/1/2022 9/1/2022   Trials R L   1 17.5  Very mild MPj HE noted 16.5     3 Pt Pinch 9/1/2022 9/1/2022   Trials R L   1 14 14     Assessment:  Patient presents with symptoms consistent with diagnosis of bilateral hand pain related to RA, with conservative intervention.     Patient's limitations or Problem List includes:  Pain and Decreased  of the  bilateral hand which interferes with the patient's ability to perform Work Tasks, Recreational Activities, Household Chores and Driving  as compared to previous level of function.    Rehab Potential:  Good - Return to full activity, some limitations    Patient will benefit from skilled Occupational Therapy to increase overall strength and decrease pain to return to previous activity level and resume normal daily tasks and to reach their rehab potential.    Barriers to Learning:  No barrier    Communication Issues:  Patient appears to be able to clearly communicate and understand verbal and written communication and follow directions correctly.    Chart Review: Brief history including review of medical and/or therapy records relating to the presenting problem and Detailed history review with patient    Identified Performance Deficits: home establishment and management and meal preparation and cleanup , as well as driving.  Assessment of Occupational Performance:  3-5 Performance Deficits    Clinical Decision Making (Complexity): Low complexity    Treatment Explanation:  The following has been discussed with the patient:  RX ordered/plan of care  Anticipated outcomes  Possible risks and side effects    Plan:  Frequency:  2 X a month, once daily  Duration:  for 3 months    Treatment Plan:    Modalities:    Paraffin   Therapeutic Exercise:    AROM, Tendon Gliding, Isotonics, Isometrics and Stabilization  Therapeutic Activities:   Functional activities   Neuromuscular re-ed:   Coordination/Dexterity and Proprioceptive Training  Orthotic Fabrication:    Static, Hand based and Forearm based  Self Care:    Self Care Tasks, Ergonomic Considerations and Work Tasks    Discharge Plan:  Achieve all LTG.  Independent in home treatment program.  Reach maximal therapeutic benefit.      Home Program:    Difficult activities noted:  Has been trying to do some DIY home repair - painting, drill use  Opening jars - bigger jars are  harder, small ones she can get a   Driving  Sometimes will drop toothbrush or hairbrush    Pain management  Try paraffin?    Orthoses:  Probably NA    Adaptive aides:  Silicone mitten to help open jars.  Jar popper to break the seal    Joint Protection:  Went over thumb joint protection and tried dycem, nail clip modification    Exercises / stabilization:  Gentle gripping - felt egg    Specific activities and modifications  TBD - gloves for driving?    Home Program: See flowsheet    Next visit/ Plan:   Thumb and wrist stability, gentle strengthening, RA finger walking. Go over issues that tend to happen with RA    Thank you for the opportunity to work with this patient.   If you have questions or concerns, please contact me with an in basket message or via the information below.     Christel Lemus MS, OTR/L, CHT  Certified Hand Therapist    [facility] - Hand Therapy  Clinics and [facility] Center  [address] [place] [address], Room [address] [place], [place] [id number]    Email: [email or web address]   Phone / Voicemail: [phone]   Fax: [phone]

## 2022-09-02 ENCOUNTER — TRANSFERRED RECORDS (OUTPATIENT)
Dept: HEALTH INFORMATION MANAGEMENT | Facility: CLINIC | Age: 47
End: 2022-09-02

## 2022-09-08 ENCOUNTER — TRANSCRIBE ORDERS (OUTPATIENT)
Dept: OTHER | Age: 47
End: 2022-09-08

## 2022-09-08 DIAGNOSIS — J35.8 TONSIL STONE: ICD-10-CM

## 2022-09-08 DIAGNOSIS — J03.90 TONSILLITIS: Primary | ICD-10-CM

## 2022-09-13 NOTE — TELEPHONE ENCOUNTER
FUTURE VISIT INFORMATION      FUTURE VISIT INFORMATION:    Date: 10/12/22    Time: 8:20AM    Location: [facility]  REFERRAL INFORMATION:    Referring provider:  Mena Bella MD    Referring providers clinic:  [facility]     Reason for visit/diagnosis  Sched per pt , self ref // recurrent sore throat // [facility] location verified    RECORDS REQUESTED FROM:       Clinic name Comments Records Status Imaging Status   [facility]  9/2/22 note from Mena Bella MD Scanned in [facility]    procedure 5/8/17 PFT  Scanned in Epic

## 2022-09-15 ENCOUNTER — THERAPY VISIT (OUTPATIENT)
Dept: OCCUPATIONAL THERAPY | Facility: CLINIC | Age: 47
End: 2022-09-15
Payer: COMMERCIAL

## 2022-09-15 DIAGNOSIS — M79.641 PAIN IN BOTH HANDS: ICD-10-CM

## 2022-09-15 DIAGNOSIS — M79.645 BILATERAL THUMB PAIN: ICD-10-CM

## 2022-09-15 DIAGNOSIS — R29.898 HAND WEAKNESS: Primary | ICD-10-CM

## 2022-09-15 DIAGNOSIS — M79.642 PAIN IN BOTH HANDS: ICD-10-CM

## 2022-09-15 DIAGNOSIS — M79.644 BILATERAL THUMB PAIN: ICD-10-CM

## 2022-09-15 PROCEDURE — 97110 THERAPEUTIC EXERCISES: CPT | Mod: GO | Performed by: OCCUPATIONAL THERAPIST

## 2022-09-15 NOTE — PROGRESS NOTES
Please refer to the daily flowsheet for treatment today, total treatment time and time spent performing 1:1 timed codes.        Home Program:    Difficult activities noted:  Has been trying to do some DIY home repair - painting, drill use  Opening jars - bigger jars are harder, small ones she can get a   Driving  Sometimes will drop toothbrush or hairbrush    Pain management  Try paraffin?    Orthoses:  Probably NA, may think about the neoprene one    Adaptive aides:  Silicone mitten to help open jars.  Jar popper to break the seal  [facility]one and flat dycem ordered    Joint Protection:  Went over thumb joint protection and tried dycem, nail clip modification    Exercises / stabilization:  Gentle gripping - felt egg    Specific activities and modifications  Gloves - has info    Home Program: See flowsheet    Next visit/ Plan:   Thumb and wrist stability, gentle strengthening, RA finger walking. Go over issues that tend to happen with RA

## 2022-09-17 ENCOUNTER — HEALTH MAINTENANCE LETTER (OUTPATIENT)
Age: 47
End: 2022-09-17

## 2022-10-12 ENCOUNTER — PRE VISIT (OUTPATIENT)
Dept: OTOLARYNGOLOGY | Facility: CLINIC | Age: 47
End: 2022-10-12

## 2022-10-12 ENCOUNTER — OFFICE VISIT (OUTPATIENT)
Dept: OTOLARYNGOLOGY | Facility: CLINIC | Age: 47
End: 2022-10-12
Payer: COMMERCIAL

## 2022-10-12 VITALS
DIASTOLIC BLOOD PRESSURE: 79 MMHG | TEMPERATURE: 97.2 F | SYSTOLIC BLOOD PRESSURE: 131 MMHG | HEIGHT: 67 IN | HEART RATE: 91 BPM | WEIGHT: 154 LBS | BODY MASS INDEX: 24.17 KG/M2

## 2022-10-12 DIAGNOSIS — J03.90 TONSILLITIS: ICD-10-CM

## 2022-10-12 DIAGNOSIS — J35.8 TONSIL STONE: ICD-10-CM

## 2022-10-12 PROCEDURE — 99203 OFFICE O/P NEW LOW 30 MIN: CPT | Performed by: REGISTERED NURSE

## 2022-10-12 RX ORDER — CHLORHEXIDINE GLUCONATE ORAL RINSE 1.2 MG/ML
15 SOLUTION DENTAL 2 TIMES DAILY
Qty: 473 ML | Refills: 1 | Status: SHIPPED | OUTPATIENT
Start: 2022-10-12

## 2022-10-12 ASSESSMENT — PAIN SCALES - GENERAL: PAINLEVEL: NO PAIN (0)

## 2022-10-12 NOTE — LETTER
Date:October 13, 2022      Patient was self referred, no letter generated. Do not send.         Health Information

## 2022-10-12 NOTE — PROGRESS NOTES
[facility]   Head and Neck Surgery  October 12, 2022     HPI: Sondra Sorensen is a 46 year old female who presents today for evaluation of chronic tonsil stones and sore throat. Patient reports a longstanding history of tonsil stones that have been more frequent and bothersome over the last few months. Sore throat occurs when tonsil stones are present. Stones can be difficult to remove and finds that sneezing is the best way to extract stones. Patient states that tonsil stones have improved over the past couple of weeks.     Patient denies any dysphagia, odynophagia, ear pain, bleeding from the mouth, hemoptysis or lumps/bumps of the neck. Also reports a history of GERD and takes omeprazole as needed with flairs of symptoms.     Past Medical History:  Past Medical History:   Diagnosis Date     Allergic rhinitis 4/24/2003    Epic     Alopecia 9/14/2020     Arthralgia 9/14/2020     Blepharitis 5/13/2009    Epic     Decreased diffusion capacity of lung 12/9/2008     Diarrhea 10/26/2009     Episodic mood disorder (H) 11/15/2004    Epic     Fructose intolerance 10/3/2018     Generalized anxiety disorder 2/7/2012     Insomnia 3/1/2011     Irritable bowel syndrome with diarrhea 10/3/2018     Leucocytoclastic vasculitis (H) 4/22/2020     Major depression, recurrent, full remission (H) 11/4/2014    Noted at PCP visit.     Myopia 11/14/2008    Myopia - LasEk both 2009     NO ACTIVE PROBLEMS      Restrictive lung disease 12/9/2008     Seasonal affective disorder (H) 10/4/2018     Social phobia 10/20/2016       Past Surgical History:  Past Surgical History:   Procedure Laterality Date     LASIK Bilateral 2009     NO HISTORY OF SURGERY         Medications:  Current Outpatient Medications   Medication Sig Dispense Refill     Calcium Citrate-Vitamin D (CALCIUM + D PO)        cetirizine (ZYRTEC) 10 MG tablet Take 10 mg by mouth daily       chlorhexidine (PERIDEX) 0.12 % solution Swish and spit 15 mLs in  "mouth 2 times daily Can use for 2 weeks and should take a 2 week break before restarting. 473 mL 1     cholecalciferol (VITAMIN D3) 5000 units (125 mcg) capsule Take by mouth daily       cycloSPORINE (RESTASIS) 0.05 % ophthalmic emulsion Place 1 drop into both eyes 2 times daily 60 each 11     hydroxychloroquine (PLAQUENIL) 200 MG tablet Take 1 tablet (200 mg) by mouth 2 times daily Annual Plaquenil toxicity eye screening required. 360 tablet 0     ketoconazole (NIZORAL) 2 % external shampoo Apply topically as needed for itching or irritation Shampoo/scalp once or twice weekly as needed for seborrhea symptoms 120 mL 11     norethindrone-ethinyl estradiol (MICROGESTIN 1.5/30) 1.5-30 MG-MCG tablet Take 1 tablet by mouth daily Active tablets for prevention of menses 112 tablet 3     olopatadine (PATANOL) 0.1 % ophthalmic solution PLACE 1 DROP INTO BOTH EYES 2 TIMES DAILY AS NEEDED 5 mL 3     Omega-3 Fatty Acids (FISH OIL PO)        triamcinolone (KENALOG) 0.1 % external cream Apply topically 2 times daily As needed for rash 80 g 3     meloxicam (MOBIC) 15 MG tablet Take 1 tablet (15 mg) by mouth daily 90 tablet 1       Allergies:  Allergies   Allergen Reactions     Doxycycline      Penicillins Rash     rash        Social History:  Social History     Tobacco Use     Smoking status: Never     Smokeless tobacco: Never   Substance Use Topics     Alcohol use: Yes     Comment: rare     Drug use: No     ROS: 10 point ROS neg other than the symptoms noted above in the HPI.    Physical Exam:    /79   Pulse 91   Temp 97.2  F (36.2  C)   Ht 1.702 m (5' 7\")   Wt 69.9 kg (154 lb)   BMI 24.12 kg/m       Constitutional:  The patient was unaccompanied, well-groomed, and in no acute distress.     Neurologic: Alert and oriented x 3.    Psychiatric: The patient's affect was calm, cooperative, and appropriate.     Communication:  Normal; communicates verbally, normal voice quality.    Respiratory: Breathing comfortably without " stridor or exertion of accessory muscles.    Head/Face:  Normocephalic and atraumatic.  No lesions or scars.   Salivary glands -  Normal size, no tenderness, swelling, or palpable masses    Ears: Pinnae and tragus non-tender.  EAC's and TM's were clear.    Oral Cavity: Normal tongue, floor of mouth, buccal mucosa, and palate.  No lesions or masses on inspection or palpation.    Oropharynx: Normal mucosa, palate symmetric with normal elevation. No abnormal lymph tissue in the oropharynx.  2+ cryptic tonsils bilaterally without tonsilliths or exudate. Tonsils are soft and nontender with palpation.   Neck: Supple with normal laryngeal and tracheal landmarks.  The parotid beds were without masses.  No palpable thyroid.  Normal range of motion.    Lymphatic: There is no palpable lymphadenopathy in the neck.     Assessment/Plan:  1.  Tonsil stones  Patient with history of persistent, bothersome tonsil stones.  These have, however, have improved over the past few weeks.  There is no significant erythema of the tonsils or tonsil stones seen on exam today.  Discussed management of tonsil stones with patient including salt and soda gargles, Peridex mouth rinses, and use of WaterPik for extraction of tonsil stones.  Also recommend ongoing management of GERD symptoms as this can worsen throat irritation and tonsil stones.    Discussed with patient that, in severe cases, patients may consider tonsillectomy for management.  I do not believe that patient's symptoms are severe enough for referral to general ENT surgeon at this time.  Patient agrees and would like to avoid a tonsillectomy.    Patient can follow-up as needed if symptoms worsen or fail to improve with management strategies discussed above.  I would be happy to see patient for tonsil stone removal if needed.  Regarding GERD management, patient can consider using omeprazole daily for 6-8 weeks. If symptoms return after completion of omeprazole course, recommend patient  follow-up with primary care for ongoing GERD management.    Mary Ann Ramachandran DNP, APRN, CNP  Otolaryngology  Head & Neck Surgery  [phone]    30 minutes spent on the date of the encounter doing chart review, history and exam, documentation and further activities per the note.

## 2022-10-12 NOTE — PATIENT INSTRUCTIONS
- You were seen in the ENT Clinic today by Mary Ann Ramachandran NP.   - Management of tonsil stones:  Salt and soda gargles twice daily: Mix 1/4 teaspoon of baking soda and 1/8 teaspoon of salt in 1 cup of warm water. Stir it up. Gargle and spit it out.   Peridex Mouthwash prescription as needed for throat irritation with tonsil stones. Can use for 2 weeks and then take a two week break.   Can use waterpick for tonsil stone removal as needed.  Optimize GERD management.  - Please send a Spock message or call the ENT clinic at [phone] with any questions or concerns.    Laryngopharyngeal Reflux (LPR)    Characteristics of LPR  Laryngopharyngeal reflux (LPR) is also known as extraesophageal reflux disease. It results from chronic acid and pepsin exposure to the larynx. Patients are usually unaware of LPR and, unlike Gastroesophageal Reflux Disease (GERD) patients, do not usually complain of heartburn (only 35% do complain). GERD occurs when stomach acid and enzymes backflow into the esophagus, causing heartburn and damage to the esophageal lining. LPR occurs when stomach acid and/or food enzymes backflow all the way back into the lower part of the throat (laryngopharynx).     Many people with LPR do not have symptoms of heartburn. Comped to the esophagus, the voice box and the back of the throat are significantly more sensitive to the effects of acid/pepsin on the surrounding tissues. Acid that passes quickly through the esophagus (food pipe) does not have a chance to irritate the area for too long. However, acid that pools in the throat around the voice box causes prolonged irritation, resulting in the symptoms of LPR.    Common Symptoms of LPR  - Hoarseness  - Chronic throat irritation  - Chronic cough  - Cough that wakes you from your sleep  - Thick or too much mucus  - Chronic throat-clearing  - Voice problems    Treatment of LPR    Diet Changes:    Different foods affect LPR by different mechanisms. These specific  foods should be avoided or reduced drastically, or they will interfere with the healing process:    Caffeine, alcohol, chocolates and peppermints weaken the lower esophageal sphincter, which normally holds in the stomach contents.    Citrus fruits, tomatoes (and other acidic foods), spicy deli meats and hot spices directly irritate the throat lining. This means that even if the medicines are working well, eating these foods will cause direct irration and inflammation of the throat lining.    Carbonated beverages (such as sodas and beer) bring acidic contents into the throat.    Behavior Changes:    Do not increase the pressure within the abdomen for at least 2 hours after eating (no bending over, exercising, or singing) as it will force contents back into the throat.    Do not over-distend the stomach (eat smaller meals throughout the day, instead of 3 larger meals).    Do not lie down within 3 hours after eating a meal. Do not eat a snack or drink before going to sleep.    Medicines    Proton pump inhibitors (PPIs) are the most effective medicines for the treatment of LPR.    When treatment is indicated, we typically start with a low-dose PPI (eg, omeprazole 20 mg) once daily, one half-hour before a meal, preferably in the morning. If symptoms do not start to improve within six to eight weeks of use, contact the ENT clinic to determine if the dose should be increased.     Please keep in mind the lag between the time you take the medicine and the time you start feeling symptom relief. People who stop taking the medicines typically feel fine for 1-3 weeks and then notice gradual return of symptoms. It takes a few weeks to get back to where they were before. Some people successfully come off of the medicines but need to follow a strict diet.

## 2022-10-12 NOTE — NURSING NOTE
"Chief Complaint   Patient presents with     Consult     Recurrent sore throat     Blood pressure 131/79, pulse 91, temperature 97.2  F (36.2  C), height 1.702 m (5' 7\"), weight 69.9 kg (154 lb), not currently breastfeeding.    Raymond Mays LPN    "

## 2022-10-12 NOTE — LETTER
10/12/2022       RE: Sondra Sorensen  [address]  [place] [address]     Dear Colleague,    Thank you for referring your patient, Sondra Sorensen, to the [facility] [place] at [facility]. Please see a copy of my visit note below.    [facility]   Head and Neck Surgery  October 12, 2022     HPI: Sondra Sorensen is a 46 year old female who presents today for evaluation of chronic tonsil stones and sore throat. Patient reports a longstanding history of tonsil stones that have been more frequent and bothersome over the last few months. Sore throat occurs when tonsil stones are present. Stones can be difficult to remove and finds that sneezing is the best way to extract stones. Patient states that tonsil stones have improved over the past couple of weeks.     Patient denies any dysphagia, odynophagia, ear pain, bleeding from the mouth, hemoptysis or lumps/bumps of the neck. Also reports a history of GERD and takes omeprazole as needed with flairs of symptoms.     Past Medical History:  Past Medical History:   Diagnosis Date     Allergic rhinitis 4/24/2003    Epic     Alopecia 9/14/2020     Arthralgia 9/14/2020     Blepharitis 5/13/2009    Epic     Decreased diffusion capacity of lung 12/9/2008     Diarrhea 10/26/2009     Episodic mood disorder (H) 11/15/2004    Epic     Fructose intolerance 10/3/2018     Generalized anxiety disorder 2/7/2012     Insomnia 3/1/2011     Irritable bowel syndrome with diarrhea 10/3/2018     Leucocytoclastic vasculitis (H) 4/22/2020     Major depression, recurrent, full remission (H) 11/4/2014    Noted at PCP visit.     Myopia 11/14/2008    Myopia - LasEk both 2009     NO ACTIVE PROBLEMS      Restrictive lung disease 12/9/2008     Seasonal affective disorder (H) 10/4/2018     Social phobia 10/20/2016       Past Surgical History:  Past Surgical History:   Procedure  "Laterality Date     LASIK Bilateral 2009     NO HISTORY OF SURGERY         Medications:  Current Outpatient Medications   Medication Sig Dispense Refill     Calcium Citrate-Vitamin D (CALCIUM + D PO)        cetirizine (ZYRTEC) 10 MG tablet Take 10 mg by mouth daily       chlorhexidine (PERIDEX) 0.12 % solution Swish and spit 15 mLs in mouth 2 times daily Can use for 2 weeks and should take a 2 week break before restarting. 473 mL 1     cholecalciferol (VITAMIN D3) 5000 units (125 mcg) capsule Take by mouth daily       cycloSPORINE (RESTASIS) 0.05 % ophthalmic emulsion Place 1 drop into both eyes 2 times daily 60 each 11     hydroxychloroquine (PLAQUENIL) 200 MG tablet Take 1 tablet (200 mg) by mouth 2 times daily Annual Plaquenil toxicity eye screening required. 360 tablet 0     ketoconazole (NIZORAL) 2 % external shampoo Apply topically as needed for itching or irritation Shampoo/scalp once or twice weekly as needed for seborrhea symptoms 120 mL 11     norethindrone-ethinyl estradiol (MICROGESTIN 1.5/30) 1.5-30 MG-MCG tablet Take 1 tablet by mouth daily Active tablets for prevention of menses 112 tablet 3     olopatadine (PATANOL) 0.1 % ophthalmic solution PLACE 1 DROP INTO BOTH EYES 2 TIMES DAILY AS NEEDED 5 mL 3     Omega-3 Fatty Acids (FISH OIL PO)        triamcinolone (KENALOG) 0.1 % external cream Apply topically 2 times daily As needed for rash 80 g 3     meloxicam (MOBIC) 15 MG tablet Take 1 tablet (15 mg) by mouth daily 90 tablet 1       Allergies:  Allergies   Allergen Reactions     Doxycycline      Penicillins Rash     rash        Social History:  Social History     Tobacco Use     Smoking status: Never     Smokeless tobacco: Never   Substance Use Topics     Alcohol use: Yes     Comment: rare     Drug use: No     ROS: 10 point ROS neg other than the symptoms noted above in the HPI.    Physical Exam:    /79   Pulse 91   Temp 97.2  F (36.2  C)   Ht 1.702 m (5' 7\")   Wt 69.9 kg (154 lb)   BMI " 24.12 kg/m       Constitutional:  The patient was unaccompanied, well-groomed, and in no acute distress.     Neurologic: Alert and oriented x 3.    Psychiatric: The patient's affect was calm, cooperative, and appropriate.     Communication:  Normal; communicates verbally, normal voice quality.    Respiratory: Breathing comfortably without stridor or exertion of accessory muscles.    Head/Face:  Normocephalic and atraumatic.  No lesions or scars.   Salivary glands -  Normal size, no tenderness, swelling, or palpable masses    Ears: Pinnae and tragus non-tender.  EAC's and TM's were clear.    Oral Cavity: Normal tongue, floor of mouth, buccal mucosa, and palate.  No lesions or masses on inspection or palpation.    Oropharynx: Normal mucosa, palate symmetric with normal elevation. No abnormal lymph tissue in the oropharynx.  2+ cryptic tonsils bilaterally without tonsilliths or exudate. Tonsils are soft and nontender with palpation.   Neck: Supple with normal laryngeal and tracheal landmarks.  The parotid beds were without masses.  No palpable thyroid.  Normal range of motion.    Lymphatic: There is no palpable lymphadenopathy in the neck.     Assessment/Plan:  1.  Tonsil stones  Patient with history of persistent, bothersome tonsil stones.  These have, however, have improved over the past few weeks.  There is no significant erythema of the tonsils or tonsil stones seen on exam today.  Discussed management of tonsil stones with patient including salt and soda gargles, Peridex mouth rinses, and use of WaterPik for extraction of tonsil stones.  Also recommend ongoing management of GERD symptoms as this can worsen throat irritation and tonsil stones.    Discussed with patient that, in severe cases, patients may consider tonsillectomy for management.  I do not believe that patient's symptoms are severe enough for referral to general ENT surgeon at this time.  Patient agrees and would like to avoid a  tonsillectomy.    Patient can follow-up as needed if symptoms worsen or fail to improve with management strategies discussed above.  I would be happy to see patient for tonsil stone removal if needed.  Regarding GERD management, patient can consider using omeprazole daily for 6-8 weeks. If symptoms return after completion of omeprazole course, recommend patient follow-up with primary care for ongoing GERD management.    Mary Ann Ramachandran DNP, APRN, CNP  Otolaryngology  Head & Neck Surgery  [phone]    30 minutes spent on the date of the encounter doing chart review, history and exam, documentation and further activities per the note.        Again, thank you for allowing me to participate in the care of your patient.      Sincerely,    Adrienne Ramachandran, NP

## 2022-10-20 DIAGNOSIS — M06.09 RHEUMATOID ARTHRITIS, SERONEGATIVE, MULTIPLE SITES (H): ICD-10-CM

## 2022-10-25 NOTE — TELEPHONE ENCOUNTER
Plaquenil      NOT ON MEDICATION LIST    Last Office Visit: 8-5-2022  Future Office visit: 2-    Last Eye Exam:    Refill Team has reviewed [facility]aint[facility] and [facility].    Found - date: 4-  Refill for 90+ 3 (from date of eye exam)    Routing refill request to provider/clinic team for review/approval because:  Drug not active on patient's medication list.      Kathleen M Doege RN

## 2022-10-26 RX ORDER — HYDROXYCHLOROQUINE SULFATE 200 MG/1
200 TABLET, FILM COATED ORAL 2 TIMES DAILY
Qty: 360 TABLET | Refills: 0 | Status: SHIPPED | OUTPATIENT
Start: 2022-10-26 | End: 2023-09-27

## 2022-11-22 ENCOUNTER — MYC MEDICAL ADVICE (OUTPATIENT)
Dept: RHEUMATOLOGY | Facility: CLINIC | Age: 47
End: 2022-11-22

## 2022-11-22 DIAGNOSIS — M06.09 RHEUMATOID ARTHRITIS, SERONEGATIVE, MULTIPLE SITES (H): ICD-10-CM

## 2022-11-22 DIAGNOSIS — Z79.899 HIGH RISK MEDICATION USE: Primary | ICD-10-CM

## 2022-11-22 DIAGNOSIS — I77.6 VASCULITIS (H): ICD-10-CM

## 2022-11-22 DIAGNOSIS — R76.8 POSITIVE ANA (ANTINUCLEAR ANTIBODY): ICD-10-CM

## 2022-11-22 DIAGNOSIS — J98.4 RESTRICTIVE LUNG DISEASE: ICD-10-CM

## 2022-11-22 DIAGNOSIS — M31.0 LEUCOCYTOCLASTIC VASCULITIS (H): ICD-10-CM

## 2022-11-22 NOTE — TELEPHONE ENCOUNTER
Detailed MyC message from patient explaining the current symptoms she is experiencing.  MyC message response updating patient her message has been received, requested a few more clarifications regarding the symptoms,  and her message was sent to Dr. Jacobson for review, but responding to the questions would be very beneficial for Dr. Jacobson to have those responses from her.    Zulema Lara RN  Rheumatology Clinic

## 2022-11-23 NOTE — TELEPHONE ENCOUNTER
Patient updating she does not feel the hydroxychloroquine (Plaquenil) done much to improve her symptoms, stating maybe there are subtle improvements but nothing she can put her finger on.  She is not certain if the symptoms started in correlation to her not being able to take her Spirolactone.  She is open to considering other medication options.    Update to Dr. Jacobson.

## 2022-11-25 NOTE — TELEPHONE ENCOUNTER
Per Dr. Jacobson's message call out to patient to offer the [facility] appointment, LVM updating and next available is a VV 12/30 at 10 am.   Updating he would like her to have the following labs done prior to the visit:  CBC, CMP, ESR, CRP    And she should continue taking her hydroxychloroquine (Plaquenil) until the next visit.     MyC message sent with same info as well.    Zulema Lara RN  Rheumatology Clinic

## 2022-11-25 NOTE — TELEPHONE ENCOUNTER
Patient has accepted the VV w/ Dr. Jacobson on 12/30 at 10am.  Clarified with patient she is to take the hydroxychloroquine (Plaquenil) twice daily and I am not finding any reference to taking the medication M-F and not on the weekends.    Also, updating the labs he has ordered can be done anytime between 12/21 and 12/30.    Zulema Lara RN  Rheumatology Clinic

## 2022-12-16 PROBLEM — M79.641 PAIN IN BOTH HANDS: Status: RESOLVED | Noted: 2022-09-01 | Resolved: 2022-12-16

## 2022-12-16 PROBLEM — M79.644 BILATERAL THUMB PAIN: Status: RESOLVED | Noted: 2022-09-01 | Resolved: 2022-12-16

## 2022-12-16 PROBLEM — R29.898 HAND WEAKNESS: Status: RESOLVED | Noted: 2022-09-01 | Resolved: 2022-12-16

## 2022-12-16 PROBLEM — M79.645 BILATERAL THUMB PAIN: Status: RESOLVED | Noted: 2022-09-01 | Resolved: 2022-12-16

## 2022-12-16 PROBLEM — M79.642 PAIN IN BOTH HANDS: Status: RESOLVED | Noted: 2022-09-01 | Resolved: 2022-12-16

## 2022-12-23 ENCOUNTER — LAB (OUTPATIENT)
Dept: LAB | Facility: CLINIC | Age: 47
End: 2022-12-23
Payer: COMMERCIAL

## 2022-12-23 DIAGNOSIS — R76.8 POSITIVE ANA (ANTINUCLEAR ANTIBODY): ICD-10-CM

## 2022-12-23 DIAGNOSIS — M31.0 LEUCOCYTOCLASTIC VASCULITIS (H): ICD-10-CM

## 2022-12-23 DIAGNOSIS — J98.4 RESTRICTIVE LUNG DISEASE: ICD-10-CM

## 2022-12-23 DIAGNOSIS — Z79.899 HIGH RISK MEDICATION USE: ICD-10-CM

## 2022-12-23 DIAGNOSIS — I77.6 VASCULITIS (H): ICD-10-CM

## 2022-12-23 DIAGNOSIS — M06.09 RHEUMATOID ARTHRITIS, SERONEGATIVE, MULTIPLE SITES (H): ICD-10-CM

## 2022-12-23 LAB
ALBUMIN SERPL BCG-MCNC: 4.6 G/DL (ref 3.5–5.2)
ALP SERPL-CCNC: 71 U/L (ref 35–104)
ALT SERPL W P-5'-P-CCNC: 21 U/L (ref 10–35)
ANION GAP SERPL CALCULATED.3IONS-SCNC: 15 MMOL/L (ref 7–15)
AST SERPL W P-5'-P-CCNC: 27 U/L (ref 10–35)
BASOPHILS # BLD AUTO: 0 10E3/UL (ref 0–0.2)
BASOPHILS NFR BLD AUTO: 0 %
BILIRUB SERPL-MCNC: 0.4 MG/DL
BUN SERPL-MCNC: 16 MG/DL (ref 6–20)
CALCIUM SERPL-MCNC: 9.5 MG/DL (ref 8.6–10)
CHLORIDE SERPL-SCNC: 100 MMOL/L (ref 98–107)
CREAT SERPL-MCNC: 0.94 MG/DL (ref 0.51–0.95)
CRP SERPL-MCNC: 20.5 MG/L
DEPRECATED HCO3 PLAS-SCNC: 20 MMOL/L (ref 22–29)
EOSINOPHIL # BLD AUTO: 0.1 10E3/UL (ref 0–0.7)
EOSINOPHIL NFR BLD AUTO: 1 %
ERYTHROCYTE [DISTWIDTH] IN BLOOD BY AUTOMATED COUNT: 11.7 % (ref 10–15)
ERYTHROCYTE [SEDIMENTATION RATE] IN BLOOD BY WESTERGREN METHOD: 11 MM/HR (ref 0–20)
GFR SERPL CREATININE-BSD FRML MDRD: 75 ML/MIN/1.73M2
GLUCOSE SERPL-MCNC: 160 MG/DL (ref 70–99)
HCT VFR BLD AUTO: 45.9 % (ref 35–47)
HGB BLD-MCNC: 15.3 G/DL (ref 11.7–15.7)
IMM GRANULOCYTES # BLD: 0 10E3/UL
IMM GRANULOCYTES NFR BLD: 0 %
LYMPHOCYTES # BLD AUTO: 2.2 10E3/UL (ref 0.8–5.3)
LYMPHOCYTES NFR BLD AUTO: 23 %
MCH RBC QN AUTO: 30.2 PG (ref 26.5–33)
MCHC RBC AUTO-ENTMCNC: 33.3 G/DL (ref 31.5–36.5)
MCV RBC AUTO: 91 FL (ref 78–100)
MONOCYTES # BLD AUTO: 0.7 10E3/UL (ref 0–1.3)
MONOCYTES NFR BLD AUTO: 7 %
NEUTROPHILS # BLD AUTO: 6.7 10E3/UL (ref 1.6–8.3)
NEUTROPHILS NFR BLD AUTO: 69 %
PLATELET # BLD AUTO: 309 10E3/UL (ref 150–450)
POTASSIUM SERPL-SCNC: 4.4 MMOL/L (ref 3.4–5.3)
PROT SERPL-MCNC: 8.4 G/DL (ref 6.4–8.3)
RBC # BLD AUTO: 5.06 10E6/UL (ref 3.8–5.2)
SODIUM SERPL-SCNC: 135 MMOL/L (ref 136–145)
WBC # BLD AUTO: 9.7 10E3/UL (ref 4–11)

## 2022-12-23 PROCEDURE — 85025 COMPLETE CBC W/AUTO DIFF WBC: CPT

## 2022-12-23 PROCEDURE — 80053 COMPREHEN METABOLIC PANEL: CPT

## 2022-12-23 PROCEDURE — 86140 C-REACTIVE PROTEIN: CPT

## 2022-12-23 PROCEDURE — 85652 RBC SED RATE AUTOMATED: CPT

## 2022-12-23 PROCEDURE — 36415 COLL VENOUS BLD VENIPUNCTURE: CPT

## 2022-12-30 ENCOUNTER — TELEPHONE (OUTPATIENT)
Dept: RHEUMATOLOGY | Facility: CLINIC | Age: 47
End: 2022-12-30

## 2022-12-30 ENCOUNTER — VIRTUAL VISIT (OUTPATIENT)
Dept: RHEUMATOLOGY | Facility: CLINIC | Age: 47
End: 2022-12-30
Attending: INTERNAL MEDICINE
Payer: COMMERCIAL

## 2022-12-30 DIAGNOSIS — R76.8 POSITIVE ANA (ANTINUCLEAR ANTIBODY): ICD-10-CM

## 2022-12-30 DIAGNOSIS — Z79.899 HIGH RISK MEDICATION USE: ICD-10-CM

## 2022-12-30 DIAGNOSIS — M06.09 RHEUMATOID ARTHRITIS, SERONEGATIVE, MULTIPLE SITES (H): Primary | ICD-10-CM

## 2022-12-30 DIAGNOSIS — R19.7 DIARRHEA, UNSPECIFIED TYPE: ICD-10-CM

## 2022-12-30 PROCEDURE — 99214 OFFICE O/P EST MOD 30 MIN: CPT | Mod: 95 | Performed by: INTERNAL MEDICINE

## 2022-12-30 RX ORDER — SPIRONOLACTONE 100 MG/1
1 TABLET, FILM COATED ORAL
COMMUNITY
Start: 2022-12-07

## 2022-12-30 NOTE — PATIENT INSTRUCTIONS
1) Continue plaquenil 200mg (one tab) in the AM and 200mg (one tab) in the PM M-F and 200mg (one tab) once daily on Saturday and Sunday  2) MRI right hand has been ordered. Please send me a [employer] message once you have had it done so I can get back to you with the results and next steps- if there is inflammation present on the MRI I will call to discuss starting a new medication.  3) I have placed a referral to GI  4) It looks like our appointment in 2023 is currently scheduled for 2/10/23. That is a little bit sooner than I thought, lets push this out to April 2023. We will call you to coordinate.

## 2022-12-30 NOTE — LETTER
12/30/2022       RE: Sondra Sorensen  [address]  [place] [address]     Dear Colleague,    Thank you for referring your patient, Sondra Sorensen, to the [facility] [place] [facility]. Please see a copy of my visit note below.        Outpatient Rheumatology Follow-up  This visit was conducted via synchronous video visit due to the current COVID-19 crisis to reduce patient risk.  Verbal consent was obtained and is documented below.    Name: Sondra Sorensen    MRN [id number]   Today's date: 12/30/2022         Reason for follow-up:  Seronegative rheumatoid arthritis   Requesting physician: Mena Bella MD             Assessment & Plan:   47 year old female with history of bx proven LCV, MINOR 1:80 in a speckled pattern, negative/ normal RF, ANCA, CBC, CMP, UA previously followed with an outside rheumatologist Dr Alok Myers at [facility] for 'ill defined' CTD with sicca symptoms, arthralgia, positive minor then rechecked was negative, and LCV.  Her dry mouth symptoms had not been severe to cause dental disease. Her joint symptoms are not accompanied by erythema/edema/warmth.  I do note a prior work-up by pulmonary and cardiology in 2008 where she was noted to have a mild restrictive process by PFTs, without desaturation. This work-up was done due to symptoms of dyspnea with mild exertion, none at rest. Neither specialty was able to find any disease to attribute this to. Has since had PFTs repeated, most recently in 2017 which again showed mild restrictive pulmonary defect with normal DLCO. Respiratory muscle force was decreased, raising the question of neuromuscular process. Has not had follow-up or evaluation by neurology. At time of our initial consultation (may 2021), we decided to obtained additional serologies (mostly driven by her restrictive pattern on PFTs) and follow-up in 4 months to assess for  new/evolving symptoms. Broad serologic work-up at that time showed negative/normal C3/C4/SSA/SSB/smith/RNP/CCP/dsDNA. However her ESR was 28 and her CRP was 8.1. I called her on 4/21/22 and discussed the above results and risks/benefits of starting HCQ for what is most likely consistent with seronegative inflammatory arthritis. At her follow-up with me on 8/5/22, she had a positive response to HCQ. No side effects noted. Positive improvements have included 1) able to make a full fist upon waking 2) no longer needing to pry her fists open in the AM 3) less frequent/severe flares of pain. Tolerating well without side effects. Will plan to continue HCQ and mobic. Given ongoing waxing and waning symptoms, particularly with her improvement with HCQ, she has features that continue to raise the question of inflammatory arthritis though not classic. Together have decided to pursue hand MRI to objectively evaluate for inflammatory arthritis. Will continue on HCQ and mobic. Continue to do hand OT exercises as home.    Seronegative rheumatoid arthritis: I suspect that her disease remains active despite hydroxychloroquine and meloxicam therapy  - We will continue on hydroxychloroquine 200 mg twice daily Monday through Friday and 20 mg once daily on Saturday and Sunday.  We will also continue on meloxicam as needed for pain.  - We will pursue an MRI for objective evidence of ongoing active synovitis/tenosynovitis.  If present will need to escalate her immunosuppression.    Diarrhea  She has had ongoing GI symptoms mostly consistent with diarrhea.  Not able to correlate the symptoms with new medication like Plaquenil or other.  Has been present prior to this though is getting worse.  We will ask for GI evaluation for ongoing diarrhea.  - Referral to gastroenterology placed    High risk medication use: HCQ 200mg BID on weekdays (Monday-Friday) and once daily on weekend days (sat and Sunday)  -No side effects by history or exam  today  - Most recent labs reviewed from 12/23/2022 to include CBC, CMP which do not show any evidence of drug toxicity.  - We will continue routine screening drug toxicity monitoring every 6 months with CBC, CMP while on Plaquenil    Follow-up April 2023 I will be in communication after her hand MRIs completed.    Jesus Jacobson MD  Rheumatology     Subjective:   Interval history 12/30/2022  She wrote in towards middle of November reporting reddish blotchy skin changes between her knees and ankles.  No none of the skin lesions consistent with her prior LCV in early 2020.  Also with new pain in her elbows and knees.  Though this was only 1 time and short-lived.  She reports that on 11 2 she woke up in the joints in her hands knees elbows were sore.  She cannot attribute this increased pain to any activity previous to that day.  It lasted only 1 day and then resolved.  Since that time she is had increased low-grade stiffness aches and fatigue.  Also some GI upset and appetite change and headache.  The symptoms wax and wane.  Muscle tenderness and aches worse in the evening.  She reports the only medication change can think of is that she ran out of her spironolactone prescription and cannot get this refilled prior to seeing dermatology.  So she is been off this since about the beginning of November.  Stayed home and slept, possibly took meloxicam.  Symptoms improved. Has been feeling more stiff/sore achy. She feels that her hands have been more stiff throughout the day. Has been dropping items. Went to hand OT x2, has the exercises to do at home though difficult to do them at home due to motivation. She is fructose intolerant, though has been more quiet recently in terms of GI symptoms. No rashes though with dry skin. No fevers/chills/night. Achiness is worse in the evenings in general. Her stiffness is pretty persistent thoughtout the day, maybe some worse in the AM.  Review of systems otherwise  negative.      Interval History August 5, 2022  Started on HCQ on 4/21 due to ESR 28/CRP 1.3. Repeat on 7/28 showed ESR 17 and CRP 14. Tolerating without noticeable side effects. Still aching in the bilateral CMCs when driving for an hour, needs to readjust. Has some intermittent stiffness after increased activity the day before. Has no longer had the symptoms where she needed to pry her hands open. Also now able to make full fist upon waking now. Had been having night sweats, has gone on birth control and resolved this. No rashes, SOB, chest pain. Has been having worsening GERD, though more stressed out. Rare bloating/diarrhea, no blood in stool. GERD bothers her at night when going to sleep. Wakes with scratchy throat. Has felt nauseous at night/dry heaves. Feels that she has been having foods getting stuck in her throat. Has been getting tonsils. Has not seen someone for this. Has been using restasis for her dry eyes. Has gone back on birth control which has been helpful. Hair not falling out as it had been as well. So she thinks was hormonal driven. 14 point review of systems collected and negative if not documented above.      Interval history 2/25/22  Has had complete resolution of her cutaneous lesions. For 4-5 days in a row, intermittently, will wake up in the AM with feeling that she has been clenching her fists overnight. Needs to pry them open. Feels that her whole body is stiff. Her hands are difficult to both open and close. She thinks that it is related to painting her bedroom last week. Last time this happened was a few months ago. No erythema/edema/warmth. Has ongoing dry eyes, saw ophthalmology for this. Has dry mouth, though only in the AM and not during the day. Has not affected her dentition. Intentionally lost 20 pounds, has gained back 5 pounds or so. No new fevers, chills, night sweats. No new chest pain/shortness of breath. No recurrent/ interval infections.  No dysphagia. Her ROS was  otherwise negative.     HPI from initial consultation May 2021  In February developed hair loss. Went in to derm, dx as alopecia areata. Then mid march had small purple spots on legs. Had evaluation with biopsy done which were consistent with LCV. Then referred to rheumatology. Many lesions between ankles/knees. By the time she got in to see rheumatology, had almost resolved/fading without taking any medications. Was started on HCQ, though had significant GERD. Sent to ENT without abnormalities found. HCQ stopped.     Intermittently has stiffness in the AM in left hand 4th and 5th digit. All feel stiff. Occurs 3-4 days per week. Lasts for 10 minutes. Also when still for periods of time. She thinks they feel swollen, no actual swelling. No redness or warmth. Mild soreness. For the fingers that trigger, its the DIPs. For the others, the PIPs. Feet also sore in the AM when taking first steps, resolves within 10 steps. Also back with twisting. Things loosen up quickly. Has been going on for some years, joint symptoms. No systemic immunosuppressive therapy around the time of her skin lesions with steroids or other.     Reports diffuse joint and muscle sore/aching in the evenings. Takes advil and improves. Some hair thinning, not new or progressive. Other than LCV, prior ?roceasea. No other facial rash. Reports break out on chest and top of back. Has had 2 episodes of hypersensitivity reactions on ankles/wrists exposures to known allergens. Had been treated with prednisone for this. Had eczema as a child. None now. No personal or first degree with psoriasis. Had an episode of pleurisy after URI, which resolved. Has fructose intolerance. For many years had diarrhea. No blood in the stool. Had colonoscopy in 2014 which was normal. No difficulty swallowing. No food stuck in her throat. Has dry eyes, though started after lasik. Has dry mouth in the AM. Has flushed feeling in fingers. No fevers/chills. No weight changes. No  new or persistent headache.  No change in vision or hearing.  No inflammatory eye disease history.  No history of miscarriage.  No history of blood clots. No photosensitive rash.  No nasal or oral ulcers.  No recurrent epistaxis or hemoptysis.  No recurrent sinusitis or recurrent pneumonia.  No chest pain or shortness of breath.  No nausea or vomiting.  No foamy or frothy urine.  No bowel or bladder incontinence.  No change in strength or sensation in the arms or legs.  No pitting or brittle nails.  No triphasic color change consistent with Raynaud's.  No skin thickening or tightening consistent with sclerodactyly.  No symptoms consistent with dactylitis. No genital ulcers or lesions.    Past Medical History  Past Medical History:   Diagnosis Date     Allergic rhinitis 4/24/2003    Epic     Alopecia 9/14/2020     Arthralgia 9/14/2020     Blepharitis 5/13/2009    Epic     Decreased diffusion capacity of lung 12/9/2008     Diarrhea 10/26/2009     Episodic mood disorder (H) 11/15/2004    Epic     Fructose intolerance 10/3/2018     Generalized anxiety disorder 2/7/2012     Insomnia 3/1/2011     Irritable bowel syndrome with diarrhea 10/3/2018     Leucocytoclastic vasculitis (H) 4/22/2020     Major depression, recurrent, full remission (H) 11/4/2014    Noted at PCP visit.     Myopia 11/14/2008    Myopia - LasEk both 2009     NO ACTIVE PROBLEMS      Restrictive lung disease 12/9/2008     Seasonal affective disorder (H) 10/4/2018     Social phobia 10/20/2016     Past Surgical History  Past Surgical History:   Procedure Laterality Date     LASIK Bilateral 2009     NO HISTORY OF SURGERY       Medications  Current Outpatient Medications   Medication     Calcium Citrate-Vitamin D (CALCIUM + D PO)     cetirizine (ZYRTEC) 10 MG tablet     chlorhexidine (PERIDEX) 0.12 % solution     cholecalciferol (VITAMIN D3) 5000 units (125 mcg) capsule     cycloSPORINE (RESTASIS) 0.05 % ophthalmic emulsion     hydroxychloroquine (PLAQUENIL)  200 MG tablet     ketoconazole (NIZORAL) 2 % external shampoo     meloxicam (MOBIC) 15 MG tablet     norethindrone-ethinyl estradiol (MICROGESTIN 1.5/30) 1.5-30 MG-MCG tablet     olopatadine (PATANOL) 0.1 % ophthalmic solution     Omega-3 Fatty Acids (FISH OIL PO)     triamcinolone (KENALOG) 0.1 % external cream     No current facility-administered medications for this visit.     Allergies   Allergies   Allergen Reactions     Doxycycline      Penicillins Rash     rash     Family History: Mother with 'arthritis in her hands'. Mother with 'lung thing' 'sclerosis'. Second cousin with MS.    Social History: Not , no kids. No pregnancies. 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 No drug use/smoking/ETOH abuse.        Objective:   Physical exam:  No vitals for this video visit. Vitals reviewed in Epic.  GEN: Sitting up unassisted. NAD, pleasant as always  HEENT: sclera clear, no inflammatory nose/ external ear change  Pulm: speaking in full sentences, no cough, no audible wheezing, no use of accessory muscles  Abdomen: not distended  Skin: no acute cutaneous lesions  MSK: full active ROM of bilateral shoulders, elbows, wrists, MCPs, PIPs, DIPs. Can make full fist without difficulty. No erythema or edema of these joints.    WBC   Date Value Ref Range Status   03/30/2021 7.7 4.0 - 11.0 10^9/L Final     WBC Count   Date Value Ref Range Status   12/23/2022 9.7 4.0 - 11.0 10e3/uL Final     Hemoglobin   Date Value Ref Range Status   12/23/2022 15.3 11.7 - 15.7 g/dL Final   03/30/2021 13.7 11.7 - 15.7 g/dL Final     Platelet Count   Date Value Ref Range Status   12/23/2022 309 150 - 450 10e3/uL Final   03/30/2021 262 150 - 450 10^9/L Final   12/05/2019 281 150 - 450 10e9/L Final     Creatinine   Date Value Ref Range Status   12/23/2022 0.94 0.51 - 0.95 mg/dL Final   03/30/2021 0.87 0.55 - 1.02 mg/dL Final     Lab Results   Component Value Date    ALKPHOS 63 07/28/2022    ALKPHOS 96 03/30/2021     AST   Date Value Ref Range Status   12/23/2022  27 10 - 35 U/L Final   2021 22 0 - 45 U/L Final     Lab Results   Component Value Date    ALT 13 2022    ALT 30 2021     Sed Rate   Date Value Ref Range Status   2019 16 0 - 20 mm/h Final     Erythrocyte Sedimentation Rate   Date Value Ref Range Status   2022 11 0 - 20 mm/hr Final     CRP Inflammation   Date Value Ref Range Status   2022 20.50 (H) <5.00 mg/L Final   2019 8.1 (H) 0.0 - 8.0 mg/L Final     CRP   Date Value Ref Range Status   2022 1.3 (H) 0.0-<0.8 mg/dL Final     UA RESULTS:  Recent Labs   Lab Test 22  1053   COLOR Yellow   APPEARANCE Clear   URINEGLC Negative   URINEBILI Negative   URINEKETONE Negative   SG 1.015   UBLD Negative   URINEPH 6.0   PROTEIN Negative   UROBILINOGEN 0.2   NITRITE Negative   LEUKEST Negative   RBCU 0-2   WBCU 0-5      DNA (ds) Antibody   Date Value Ref Range Status   2022 1.0 <10.0 IU/mL Final     Comment:     Negative     RNP Antibody IgG   Date Value Ref Range Status   2022 Negative Negative Final     Aldolase   Date Value Ref Range Status   2022 2.8 1.2 - 7.6 U/L Final     Comment:     REFERENCE INTERVAL: Aldolase    Access complete set of age- and/or gender-specific   reference intervals for this test in the [facility]   Test Directory ([email or web address]).  Performed By: [facility]  [address] [place] [id number]  : Angy Persaud MD     Rheumatoid Factor   Date Value Ref Range Status   2019 <20 <20 IU/mL Final     Cyclic Citrullinated Peptide Antibody IgG   Date Value Ref Range Status   2022 1.1 <=4.9 U/mL Final     2020  ESR 14  C3 155  CRP 0.4  C4 28  CH50 69    May 2020  IgA 611  IgE 38.1  ESR 16  CRP 0.5    MARIAA 1:80 speckled    Imagin2020  X-ray right hand unremarkable without changes consistent with erosive or degenerative disease.    Pathology:  3/19/2020  Right foot punch bx consistent with LCV          47-year-old female  being seen for billable video visit.  Consent obtained: Yes    Video-Visit Details    Type of service:  Video Visit    Video start time 10:58:01 am.  Video end time 11:22:10 am.    Originating Location (pt. Location): home        Distant Location (provider location):  off site    Mode of Communication:  Video Conference via [employer]          Again, thank you for allowing me to participate in the care of your patient.      Sincerely,    Jesus Jacobson MD

## 2022-12-30 NOTE — LETTER
Date:January 24, 2023      Provider requested that no letter be sent. Do not send.       [facility]

## 2022-12-30 NOTE — PROGRESS NOTES
47-year-old female being seen for billable video visit.  Consent obtained: Yes    Video-Visit Details    Type of service:  Video Visit    Video start time 10:58:01 am.  Video end time 11:22:10 am.    Originating Location (pt. Location): home        Distant Location (provider location):  off site    Mode of Communication:  Video Conference via [employer]

## 2022-12-30 NOTE — PROGRESS NOTES
Outpatient Rheumatology Follow-up  This visit was conducted via synchronous video visit due to the current COVID-19 crisis to reduce patient risk.  Verbal consent was obtained and is documented below.    Name: Sondra Sorensen    MRN [id number]   Today's date: 12/30/2022         Reason for follow-up:  Seronegative rheumatoid arthritis   Requesting physician: Mena Bella MD             Assessment & Plan:   47 year old female with history of bx proven LCV, MINOR 1:80 in a speckled pattern, negative/ normal RF, ANCA, CBC, CMP, UA previously followed with an outside rheumatologist Dr Alok Myers at [facility] for 'ill defined' CTD with sicca symptoms, arthralgia, positive minor then rechecked was negative, and LCV.  Her dry mouth symptoms had not been severe to cause dental disease. Her joint symptoms are not accompanied by erythema/edema/warmth.  I do note a prior work-up by pulmonary and cardiology in 2008 where she was noted to have a mild restrictive process by PFTs, without desaturation. This work-up was done due to symptoms of dyspnea with mild exertion, none at rest. Neither specialty was able to find any disease to attribute this to. Has since had PFTs repeated, most recently in 2017 which again showed mild restrictive pulmonary defect with normal DLCO. Respiratory muscle force was decreased, raising the question of neuromuscular process. Has not had follow-up or evaluation by neurology. At time of our initial consultation (may 2021), we decided to obtained additional serologies (mostly driven by her restrictive pattern on PFTs) and follow-up in 4 months to assess for new/evolving symptoms. Broad serologic work-up at that time showed negative/normal C3/C4/SSA/SSB/smith/RNP/CCP/dsDNA. However her ESR was 28 and her CRP was 8.1. I called her on 4/21/22 and discussed the above results and risks/benefits of starting HCQ for what is most likely consistent with seronegative inflammatory arthritis. At her  follow-up with me on 8/5/22, she had a positive response to HCQ. No side effects noted. Positive improvements have included 1) able to make a full fist upon waking 2) no longer needing to pry her fists open in the AM 3) less frequent/severe flares of pain. Tolerating well without side effects. Will plan to continue HCQ and mobic. Given ongoing waxing and waning symptoms, particularly with her improvement with HCQ, she has features that continue to raise the question of inflammatory arthritis though not classic. Together have decided to pursue hand MRI to objectively evaluate for inflammatory arthritis. Will continue on HCQ and mobic. Continue to do hand OT exercises as home.    Seronegative rheumatoid arthritis: I suspect that her disease remains active despite hydroxychloroquine and meloxicam therapy  - We will continue on hydroxychloroquine 200 mg twice daily Monday through Friday and 20 mg once daily on Saturday and Sunday.  We will also continue on meloxicam as needed for pain.  - We will pursue an MRI for objective evidence of ongoing active synovitis/tenosynovitis.  If present will need to escalate her immunosuppression.    Diarrhea  She has had ongoing GI symptoms mostly consistent with diarrhea.  Not able to correlate the symptoms with new medication like Plaquenil or other.  Has been present prior to this though is getting worse.  We will ask for GI evaluation for ongoing diarrhea.  - Referral to gastroenterology placed    High risk medication use: HCQ 200mg BID on weekdays (Monday-Friday) and once daily on weekend days (sat and Sunday)  -No side effects by history or exam today  - Most recent labs reviewed from 12/23/2022 to include CBC, CMP which do not show any evidence of drug toxicity.  - We will continue routine screening drug toxicity monitoring every 6 months with CBC, CMP while on Plaquenil    Follow-up April 2023 I will be in communication after her hand MRIs completed.    Jesus Jacobson  MD  Rheumatology     Subjective:   Interval history 12/30/2022  She wrote in towards middle of November reporting reddish blotchy skin changes between her knees and ankles.  No none of the skin lesions consistent with her prior LCV in early 2020.  Also with new pain in her elbows and knees.  Though this was only 1 time and short-lived.  She reports that on 11 2 she woke up in the joints in her hands knees elbows were sore.  She cannot attribute this increased pain to any activity previous to that day.  It lasted only 1 day and then resolved.  Since that time she is had increased low-grade stiffness aches and fatigue.  Also some GI upset and appetite change and headache.  The symptoms wax and wane.  Muscle tenderness and aches worse in the evening.  She reports the only medication change can think of is that she ran out of her spironolactone prescription and cannot get this refilled prior to seeing dermatology.  So she is been off this since about the beginning of November.  Stayed home and slept, possibly took meloxicam.  Symptoms improved. Has been feeling more stiff/sore achy. She feels that her hands have been more stiff throughout the day. Has been dropping items. Went to hand OT x2, has the exercises to do at home though difficult to do them at home due to motivation. She is fructose intolerant, though has been more quiet recently in terms of GI symptoms. No rashes though with dry skin. No fevers/chills/night. Achiness is worse in the evenings in general. Her stiffness is pretty persistent thoughtout the day, maybe some worse in the AM.  Review of systems otherwise negative.      Interval History August 5, 2022  Started on HCQ on 4/21 due to ESR 28/CRP 1.3. Repeat on 7/28 showed ESR 17 and CRP 14. Tolerating without noticeable side effects. Still aching in the bilateral CMCs when driving for an hour, needs to readjust. Has some intermittent stiffness after increased activity the day before. Has no longer had  the symptoms where she needed to pry her hands open. Also now able to make full fist upon waking now. Had been having night sweats, has gone on birth control and resolved this. No rashes, SOB, chest pain. Has been having worsening GERD, though more stressed out. Rare bloating/diarrhea, no blood in stool. GERD bothers her at night when going to sleep. Wakes with scratchy throat. Has felt nauseous at night/dry heaves. Feels that she has been having foods getting stuck in her throat. Has been getting tonsils. Has not seen someone for this. Has been using restasis for her dry eyes. Has gone back on birth control which has been helpful. Hair not falling out as it had been as well. So she thinks was hormonal driven. 14 point review of systems collected and negative if not documented above.      Interval history 2/25/22  Has had complete resolution of her cutaneous lesions. For 4-5 days in a row, intermittently, will wake up in the AM with feeling that she has been clenching her fists overnight. Needs to pry them open. Feels that her whole body is stiff. Her hands are difficult to both open and close. She thinks that it is related to painting her bedroom last week. Last time this happened was a few months ago. No erythema/edema/warmth. Has ongoing dry eyes, saw ophthalmology for this. Has dry mouth, though only in the AM and not during the day. Has not affected her dentition. Intentionally lost 20 pounds, has gained back 5 pounds or so. No new fevers, chills, night sweats. No new chest pain/shortness of breath. No recurrent/ interval infections.  No dysphagia. Her ROS was otherwise negative.     HPI from initial consultation May 2021  In February developed hair loss. Went in to derm, dx as alopecia areata. Then mid march had small purple spots on legs. Had evaluation with biopsy done which were consistent with LCV. Then referred to rheumatology. Many lesions between ankles/knees. By the time she got in to see rheumatology,  had almost resolved/fading without taking any medications. Was started on HCQ, though had significant GERD. Sent to ENT without abnormalities found. HCQ stopped.     Intermittently has stiffness in the AM in left hand 4th and 5th digit. All feel stiff. Occurs 3-4 days per week. Lasts for 10 minutes. Also when still for periods of time. She thinks they feel swollen, no actual swelling. No redness or warmth. Mild soreness. For the fingers that trigger, its the DIPs. For the others, the PIPs. Feet also sore in the AM when taking first steps, resolves within 10 steps. Also back with twisting. Things loosen up quickly. Has been going on for some years, joint symptoms. No systemic immunosuppressive therapy around the time of her skin lesions with steroids or other.     Reports diffuse joint and muscle sore/aching in the evenings. Takes advil and improves. Some hair thinning, not new or progressive. Other than LCV, prior ?roceasea. No other facial rash. Reports break out on chest and top of back. Has had 2 episodes of hypersensitivity reactions on ankles/wrists exposures to known allergens. Had been treated with prednisone for this. Had eczema as a child. None now. No personal or first degree with psoriasis. Had an episode of pleurisy after URI, which resolved. Has fructose intolerance. For many years had diarrhea. No blood in the stool. Had colonoscopy in 2014 which was normal. No difficulty swallowing. No food stuck in her throat. Has dry eyes, though started after lasik. Has dry mouth in the AM. Has flushed feeling in fingers. No fevers/chills. No weight changes. No new or persistent headache.  No change in vision or hearing.  No inflammatory eye disease history.  No history of miscarriage.  No history of blood clots. No photosensitive rash.  No nasal or oral ulcers.  No recurrent epistaxis or hemoptysis.  No recurrent sinusitis or recurrent pneumonia.  No chest pain or shortness of breath.  No nausea or vomiting.  No  foamy or frothy urine.  No bowel or bladder incontinence.  No change in strength or sensation in the arms or legs.  No pitting or brittle nails.  No triphasic color change consistent with Raynaud's.  No skin thickening or tightening consistent with sclerodactyly.  No symptoms consistent with dactylitis. No genital ulcers or lesions.    Past Medical History  Past Medical History:   Diagnosis Date     Allergic rhinitis 4/24/2003    Epic     Alopecia 9/14/2020     Arthralgia 9/14/2020     Blepharitis 5/13/2009    Epic     Decreased diffusion capacity of lung 12/9/2008     Diarrhea 10/26/2009     Episodic mood disorder (H) 11/15/2004    Epic     Fructose intolerance 10/3/2018     Generalized anxiety disorder 2/7/2012     Insomnia 3/1/2011     Irritable bowel syndrome with diarrhea 10/3/2018     Leucocytoclastic vasculitis (H) 4/22/2020     Major depression, recurrent, full remission (H) 11/4/2014    Noted at PCP visit.     Myopia 11/14/2008    Myopia - LasEk both 2009     NO ACTIVE PROBLEMS      Restrictive lung disease 12/9/2008     Seasonal affective disorder (H) 10/4/2018     Social phobia 10/20/2016     Past Surgical History  Past Surgical History:   Procedure Laterality Date     LASIK Bilateral 2009     NO HISTORY OF SURGERY       Medications  Current Outpatient Medications   Medication     Calcium Citrate-Vitamin D (CALCIUM + D PO)     cetirizine (ZYRTEC) 10 MG tablet     chlorhexidine (PERIDEX) 0.12 % solution     cholecalciferol (VITAMIN D3) 5000 units (125 mcg) capsule     cycloSPORINE (RESTASIS) 0.05 % ophthalmic emulsion     hydroxychloroquine (PLAQUENIL) 200 MG tablet     ketoconazole (NIZORAL) 2 % external shampoo     meloxicam (MOBIC) 15 MG tablet     norethindrone-ethinyl estradiol (MICROGESTIN 1.5/30) 1.5-30 MG-MCG tablet     olopatadine (PATANOL) 0.1 % ophthalmic solution     Omega-3 Fatty Acids (FISH OIL PO)     triamcinolone (KENALOG) 0.1 % external cream     No current facility-administered  medications for this visit.     Allergies   Allergies   Allergen Reactions     Doxycycline      Penicillins Rash     rash     Family History: Mother with 'arthritis in her hands'. Mother with 'lung thing' 'sclerosis'. Second cousin with MS.    Social History: Not , no kids. No pregnancies. 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 No drug use/smoking/ETOH abuse.        Objective:   Physical exam:  No vitals for this video visit. Vitals reviewed in Epic.  GEN: Sitting up unassisted. NAD, pleasant as always  HEENT: sclera clear, no inflammatory nose/ external ear change  Pulm: speaking in full sentences, no cough, no audible wheezing, no use of accessory muscles  Abdomen: not distended  Skin: no acute cutaneous lesions  MSK: full active ROM of bilateral shoulders, elbows, wrists, MCPs, PIPs, DIPs. Can make full fist without difficulty. No erythema or edema of these joints.    WBC   Date Value Ref Range Status   03/30/2021 7.7 4.0 - 11.0 10^9/L Final     WBC Count   Date Value Ref Range Status   12/23/2022 9.7 4.0 - 11.0 10e3/uL Final     Hemoglobin   Date Value Ref Range Status   12/23/2022 15.3 11.7 - 15.7 g/dL Final   03/30/2021 13.7 11.7 - 15.7 g/dL Final     Platelet Count   Date Value Ref Range Status   12/23/2022 309 150 - 450 10e3/uL Final   03/30/2021 262 150 - 450 10^9/L Final   12/05/2019 281 150 - 450 10e9/L Final     Creatinine   Date Value Ref Range Status   12/23/2022 0.94 0.51 - 0.95 mg/dL Final   03/30/2021 0.87 0.55 - 1.02 mg/dL Final     Lab Results   Component Value Date    ALKPHOS 63 07/28/2022    ALKPHOS 96 03/30/2021     AST   Date Value Ref Range Status   12/23/2022 27 10 - 35 U/L Final   03/30/2021 22 0 - 45 U/L Final     Lab Results   Component Value Date    ALT 13 07/28/2022    ALT 30 03/30/2021     Sed Rate   Date Value Ref Range Status   11/06/2019 16 0 - 20 mm/h Final     Erythrocyte Sedimentation Rate   Date Value Ref Range Status   12/23/2022 11 0 - 20 mm/hr Final     CRP Inflammation   Date Value Ref  Range Status   2022 20.50 (H) <5.00 mg/L Final   2019 8.1 (H) 0.0 - 8.0 mg/L Final     CRP   Date Value Ref Range Status   2022 1.3 (H) 0.0-<0.8 mg/dL Final     UA RESULTS:  Recent Labs   Lab Test 22  1053   COLOR Yellow   APPEARANCE Clear   URINEGLC Negative   URINEBILI Negative   URINEKETONE Negative   SG 1.015   UBLD Negative   URINEPH 6.0   PROTEIN Negative   UROBILINOGEN 0.2   NITRITE Negative   LEUKEST Negative   RBCU 0-2   WBCU 0-5      DNA (ds) Antibody   Date Value Ref Range Status   2022 1.0 <10.0 IU/mL Final     Comment:     Negative     RNP Antibody IgG   Date Value Ref Range Status   2022 Negative Negative Final     Aldolase   Date Value Ref Range Status   2022 2.8 1.2 - 7.6 U/L Final     Comment:     REFERENCE INTERVAL: Aldolase    Access complete set of age- and/or gender-specific   reference intervals for this test in the [facility]   Test Directory ([email or web address]).  Performed By: [facility]  [address] [place] [id number]  : Angy Persaud MD     Rheumatoid Factor   Date Value Ref Range Status   2019 <20 <20 IU/mL Final     Cyclic Citrullinated Peptide Antibody IgG   Date Value Ref Range Status   2022 1.1 <=4.9 U/mL Final     2020  ESR 14  C3 155  CRP 0.4  C4 28  CH50 69    May 2020  IgA 611  IgE 38.1  ESR 16  CRP 0.5    MARIAA 1:80 speckled    Imagin2020  X-ray right hand unremarkable without changes consistent with erosive or degenerative disease.    Pathology:  3/19/2020  Right foot punch bx consistent with LCV

## 2023-01-10 NOTE — TELEPHONE ENCOUNTER
REFERRAL INFORMATION:    Referring Provider:  Indira    Referring Clinic:  [facility]    Reason for Visit/Diagnosis: diarrhrea      FUTURE VISIT INFORMATION:    Appointment Date: 1.30.23    Appointment Time: 8 AM     NOTES STATUS DETAILS   OFFICE NOTE from Referring Provider Internal 12.30.22 [facility]   OFFICE NOTE from Other Specialist     HOSPITAL DISCHARGE SUMMARY/  ED VISITS     OPERATIVE REPORT     MEDICATION LIST Internal         ENDOSCOPY      COLONOSCOPY Internal 5.12.14   ERCP     EUS     STOOL TESTING     PERTINENT LABS Internal    PATHOLOGY REPORTS (RELATED)     IMAGING (CT, MRI, EGD, MRCP, Small Bowel Follow Through/SBT, MR/CT Enterography)

## 2023-01-22 ENCOUNTER — ANCILLARY PROCEDURE (OUTPATIENT)
Dept: MRI IMAGING | Facility: CLINIC | Age: 48
End: 2023-01-22
Attending: INTERNAL MEDICINE
Payer: COMMERCIAL

## 2023-01-22 DIAGNOSIS — M06.09 RHEUMATOID ARTHRITIS, SERONEGATIVE, MULTIPLE SITES (H): ICD-10-CM

## 2023-01-22 PROCEDURE — A9585 GADOBUTROL INJECTION: HCPCS | Performed by: RADIOLOGY

## 2023-01-22 PROCEDURE — 73220 MRI UPPR EXTREMITY W/O&W/DYE: CPT | Mod: RT | Performed by: RADIOLOGY

## 2023-01-22 RX ORDER — GADOBUTROL 604.72 MG/ML
7.5 INJECTION INTRAVENOUS ONCE
Status: COMPLETED | OUTPATIENT
Start: 2023-01-22 | End: 2023-01-22

## 2023-01-22 RX ADMIN — GADOBUTROL 7.5 ML: 604.72 INJECTION INTRAVENOUS at 11:18

## 2023-01-30 ENCOUNTER — VIRTUAL VISIT (OUTPATIENT)
Dept: GASTROENTEROLOGY | Facility: CLINIC | Age: 48
End: 2023-01-30
Payer: COMMERCIAL

## 2023-01-30 ENCOUNTER — PRE VISIT (OUTPATIENT)
Dept: GASTROENTEROLOGY | Facility: CLINIC | Age: 48
End: 2023-01-30

## 2023-01-30 VITALS — HEIGHT: 67 IN | BODY MASS INDEX: 23.39 KG/M2 | WEIGHT: 149 LBS

## 2023-01-30 DIAGNOSIS — R19.7 DIARRHEA, UNSPECIFIED TYPE: Primary | ICD-10-CM

## 2023-01-30 PROCEDURE — 99215 OFFICE O/P EST HI 40 MIN: CPT | Mod: 95 | Performed by: DIETITIAN, REGISTERED

## 2023-01-30 PROCEDURE — 99417 PROLNG OP E/M EACH 15 MIN: CPT | Performed by: DIETITIAN, REGISTERED

## 2023-01-30 ASSESSMENT — PAIN SCALES - GENERAL: PAINLEVEL: NO PAIN (0)

## 2023-01-30 NOTE — LETTER
1/30/2023         RE: Sondra Sorensen  [address]  [place] [address]        Dear Colleague,    Thank you for referring your patient, Sondra Sorensen, to the [facility] [place]. Please see a copy of my visit note below.      GI CLINIC VISIT    CC/REFERRING MD:  Jesus Jacobson  REASON FOR CONSULTATION: diarrhea    ASSESSMENT/PLAN:  #Chronic Diarrhea  Patient with 20+ year history of intermittent loose stools, worsening in past 5 to 10 years with episodes of urgent, explosive diarrhea with intermittent incontinence.  Colonoscopy in 2014 unremarkable endoscopically and histology negative for microscopic colitis at that time.  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  She reports that previous lactose intolerance testing was negative.  Differential for current symptoms remains broad, includes exacerbation of fructose malabsorption with diet, IBS-D(consistent with abdominal cramping improved after defecation and change in stool form), celiac, chronic infection, thyroid dysfunction (though normal when checked last in 2020), pancreatic insufficiency, SIBO, bile acid diarrhea microscopic colitis, less likely IBD or hormonal/secretory.  Given other rheumatologic conditions could consider other more rare rheumatologic cause.  Given variable stool consistency and pattern, it is possible that stool burden potentially contributing to urgent deprecatory symptoms with constipation/overflow though stool pattern history does not fully support.  We will start with stool studies including fecal calprotectin, testing for chronic infections (C. difficile, ova and parasite, Giardia/Cryptosporidium), fecal elastase and spot fecal fat, celiac serologies, and TSH with T4 reflex.  Discussed meeting with registered dietitian to review diet and dietary recommendations for fructose malabsorption.  She would like to hold off until above labs and stool studies are  completed.  Also consider repeat colonoscopy for endoscopic and histologic assessment, is due for dysplasia screening in next year so can complete at same time as well.  Future considerations include SIBO breath testing, abdominal x-ray for stool burden, low FODMAP retrial. In the meantime, plan to slowly increase soluble fiber supplementation with Citrucel fiber tabs (dislikes powder) up to 6 tablets/day or when desired stool consistency and frequency achieved.  Encouraged adequate hydration with additional fiber supplementation.    Discussed differential, outlined options and reviewed medications with patient.  Mutually agreed upon plan outlined below.  -- Labs and stool studies  -- Gradually increase citrucel fiber tablets from 1 tablet daily, to 6 tabs per day.  -- Work on hydration, aim for 64oz daily  -- Consider meeting with our registered dietitian regarding low fructose diet education  -- please see scheduling information provided below     Colorectal cancer screening: last colonoscopy 2014 normal. Cologaurd 2021 years ago negative (per pt). Due for screening 2024.      RTC 2-3 months    Thank you for this consultation.  It was a pleasure to participate in the care of this patient; please contact us with any further questions.     70 Minutes was spent on the date of the encounter during chart review, history and exam, documentation, and further activities as noted       Mirna Roth PA-C, RD  Division of Gastroenterology, Hepatology & Nutrition  [facility]        HALEY Sorensen is a 47 year old female with a history of alopecia, arthralgia, leukoclastic vasculitis, restrictive lung disease, allergic rhinitis, MDD, RAYMOND, IBS-D, fructose malabsorption who presents for evaluation of diarrhea. They are new to the [facility] clinic and this is my first encounter with the patient.      She has been following with rheumatology for seronegative RA with response HCQ, recent hand MRI without signs of  "inflammatory arthritis. She also has history of biopsy proven leukoclastic disease. She was followed previously at outside facility diagnosed \"connective tisusue disease with sicca symtpoms.\" Pertinent rheumatologic labs include MARIAA 1:80 in a speckled pattern, negative/ normal RF, ANCA, Broad serologic work-up that showed negative/normal C3/C4/SSA/SSB/smith/RNP/CCP/dsDNA. Elevated ESR/CRP. Most recent labs 12/23/22 showed normalized ESR and persistent elevation in CRP to 20.    Today she reports 20+ year history GI symptoms, namely urgent loose stools., worse in the last 5 to 10 years. Colonoscopy 2014 with normal appearing ileum and colon. On biopsy no evidence of microscopic colitis or other histopathologic abnormality.  She notes previous work-up with [facility], we do not have these records.  I am able to see positive fructose breath test 6/2018 with [facility].  She also reports negative lactose intolerance testing.    Sondra notes that she has a variable stool pattern.  Most typically she has 2 non-urgent stools per day, soft in consistency.  Some days will have 3-4 stools per day with a mix of hard and soft/liquid stools.   Does note intermittent harder stool that requires straining, though only about once every 1 to 2 weeks.  Typically feels empty after stools.  Rare days without stool.   About once per month will have very urgent loose bowel movement, sometimes in car and not able to pull over easily.  This is preceded by feeling \"sick\" with abdominal grumbling, and cramping,  then sudden urge to defecate.  Abdominal cramping resolved after defecation.  Notes that the stools sometimes do appear greasy.  No blood in stool or melena. No night time awakening. After this episode she is typically done with stool for the day, does not continue for multiple episodes.  She does take 1 tablet of Citrucel fiber per day.  Also uses probiotics on and off, feels she has had some benefit.    Given previous diagnosis of fructose " malabsorption, she has tried to follow a low fructose diet.  Though did not get formal dietary counseling on this, has handouts but in recent months has not followed quite as closely.  Feels like bloating improved with these changes but no real change in stool pattern.  She has met with a registered dietitian to review elimination diet (? Low FODMAP) which she tried but was not able to sustain due to restrictiveness of diet and lack of guidance.  Has tried gluten-free and lactose-free diets without significant improvement.    Denies abdominal pain, nausea, vomiting, hematemesis.    Notes history of reflux, more in the last year or so.  Took omeprazole but stopped in November.  Since course symptoms have been better controlled.  Rarely feels heartburn no, previously 3-4 times per week.  Notes large tonsils and cough which have been thought to be related to reflux in the past. No dysphagia or odynophagia.    Takes Iburopfen 1-2 times per week.    Diet Recall:  Breakfast - yogurt, berries, granola (homemade)  Lunch - leftovers/sandwiches  Dinner - variable -meat, starch. Sandwiches and soup.  -- Tries to steer clear of really high fructose foods/drinks. Rare soda.       ROS:    No fevers or chills  No weight loss  No blurry vision, double vision or change in vision  No sore throat  No lymphadenopathy  No headache, paraesthesias, or weakness in a limb  No shortness of breath or wheezing  No chest pain or pressure  No arthralgias or myalgias  No rashes or skin changes  No odynophagia or dysphagia  No BRBPR, hematochezia, melena  No dysuria, frequency or urgency  No hot/cold intolerance or polyria  No anxiety or depression    PROBLEM LIST  Patient Active Problem List    Diagnosis Date Noted     Alopecia 09/14/2020     Priority: Medium     Arthralgia 09/14/2020     Priority: Medium     Leucocytoclastic vasculitis (H) 04/22/2020     Priority: Medium     Seasonal affective disorder (H) 10/04/2018     Priority: Medium      Fructose intolerance 10/03/2018     Priority: Medium     CARDIOVASCULAR SCREENING; LDL GOAL LESS THAN 130 10/03/2018     Priority: Medium     Irritable bowel syndrome with diarrhea 10/03/2018     Priority: Medium     Social phobia 10/20/2016     Priority: Medium     Major depression, recurrent, full remission (H) 11/04/2014     Priority: Medium     Noted at PCP visit.       Generalized anxiety disorder 02/07/2012     Priority: Medium     Insomnia 03/01/2011     Priority: Medium     Diarrhea 10/26/2009     Priority: Medium     Blepharitis 05/13/2009     Priority: Medium     Epic       Decreased diffusion capacity of lung 12/09/2008     Priority: Medium     Restrictive lung disease 12/09/2008     Priority: Medium     Myopia 11/14/2008     Priority: Medium     Myopia - LasEk both 2009       Episodic mood disorder (H) 11/15/2004     Priority: Medium     Epic       Allergic rhinitis 04/24/2003     Priority: Medium     Epic         PERTINENT PAST MEDICAL HISTORY:  Past Medical History:   Diagnosis Date     Allergic rhinitis 4/24/2003    Epic     Alopecia 9/14/2020     Arthralgia 9/14/2020     Blepharitis 5/13/2009    Epic     Decreased diffusion capacity of lung 12/9/2008     Diarrhea 10/26/2009     Episodic mood disorder (H) 11/15/2004    Epic     Fructose intolerance 10/3/2018     Generalized anxiety disorder 2/7/2012     Insomnia 3/1/2011     Irritable bowel syndrome with diarrhea 10/3/2018     Leucocytoclastic vasculitis (H) 4/22/2020     Major depression, recurrent, full remission (H) 11/4/2014    Noted at PCP visit.     Myopia 11/14/2008    Myopia - LasEk both 2009     NO ACTIVE PROBLEMS      Restrictive lung disease 12/9/2008     Seasonal affective disorder (H) 10/4/2018     Social phobia 10/20/2016       PREVIOUS SURGERIES:  Past Surgical History:   Procedure Laterality Date     LASIK Bilateral 2009     NO HISTORY OF SURGERY         PREVIOUS ENDOSCOPY:  Colonoscopy 05/12/2014:          ALLERGIES:  Allergies    Allergen Reactions     Doxycycline      Penicillins Rash     rash       PERTINENT MEDICATIONS:    Current Outpatient Medications:      Calcium Citrate-Vitamin D (CALCIUM + D PO), , Disp: , Rfl:      cetirizine (ZYRTEC) 10 MG tablet, Take 10 mg by mouth daily, Disp: , Rfl:      chlorhexidine (PERIDEX) 0.12 % solution, Swish and spit 15 mLs in mouth 2 times daily Can use for 2 weeks and should take a 2 week break before restarting., Disp: 473 mL, Rfl: 1     cholecalciferol (VITAMIN D3) 5000 units (125 mcg) capsule, Take by mouth daily, Disp: , Rfl:      cycloSPORINE (RESTASIS) 0.05 % ophthalmic emulsion, Place 1 drop into both eyes 2 times daily, Disp: 60 each, Rfl: 11     hydroxychloroquine (PLAQUENIL) 200 MG tablet, Take 1 tablet (200 mg) by mouth 2 times daily Annual Plaquenil toxicity eye screening required., Disp: 360 tablet, Rfl: 0     ketoconazole (NIZORAL) 2 % external shampoo, Apply topically as needed for itching or irritation Shampoo/scalp once or twice weekly as needed for seborrhea symptoms, Disp: 120 mL, Rfl: 11     meloxicam (MOBIC) 15 MG tablet, Take 1 tablet (15 mg) by mouth daily, Disp: 90 tablet, Rfl: 1     norethindrone-ethinyl estradiol (MICROGESTIN 1.5/30) 1.5-30 MG-MCG tablet, Take 1 tablet by mouth daily Active tablets for prevention of menses, Disp: 112 tablet, Rfl: 3     olopatadine (PATANOL) 0.1 % ophthalmic solution, PLACE 1 DROP INTO BOTH EYES 2 TIMES DAILY AS NEEDED, Disp: 5 mL, Rfl: 3     Omega-3 Fatty Acids (FISH OIL PO), , Disp: , Rfl:      spironolactone (ALDACTONE) 100 MG tablet, Take 1 tablet by mouth daily at 2 pm, Disp: , Rfl:      triamcinolone (KENALOG) 0.1 % external cream, Apply topically 2 times daily As needed for rash, Disp: 80 g, Rfl: 3  Ibuprofen 1-2 times per week  Biotin  Align probiotics on and off    SOCIAL HISTORY:  Tobacco: no  Alcohol: rare, up to two drinks every few months   Drugs Use: no  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 Not Asked   Social History Narrative     Not on file     Social Determinants of Health     Financial Resource Strain: Not on file   Food Insecurity: Not on file   Transportation Needs: Not on file   Physical Activity: Not on file   Stress: Not on file   Social Connections: Not on file   Intimate Partner Violence: Not on file   Housing Stability: Not on file     FAMILY HISTORY:  FH of CRC: no  FH of IBD: no  No Colon/Panc/Esophageal/other GI CA. No IBD or Celiac Disease. No other Autoimmune, Liver, or Thyroid disease.    Family History   Problem Relation Age of Onset     Diabetes Father      Glaucoma No family hx of      Macular Degeneration No family hx of      Past/family/social history reviewed and no changes    PHYSICAL EXAMINATION:  Constitutional: AAOx3, cooperative, pleasant, not dyspneic/diaphoretic, no acute distress  Vitals reviewed: There were no vitals taken for this visit.  Wt:   General appearance: Healthy appearing adult, in no acute distress  Eyes: Sclera anicteric, Pupils round and reactive to light  Ears, nose, mouth and throat: No obvious external lesions of ears and nose, Hearing intact  Neck: Symmetric, No obvious external lesions  Respiratory: Normal respiration, no use of accessory muscles   MSK: Gait normal  Skin: No rashes or jaundice   Psychiatric: Oriented to person, place and time, Appropriate mood and affect.       PERTINENT STUDIES:  Lab on 12/23/2022   Component Date  Value Ref Range Status     Sodium 12/23/2022 135 (L)  136 - 145 mmol/L Final     Potassium 12/23/2022 4.4  3.4 - 5.3 mmol/L Final     Chloride 12/23/2022 100  98 - 107 mmol/L Final     Carbon Dioxide (CO2) 12/23/2022 20 (L)  22 - 29 mmol/L Final     Anion Gap 12/23/2022 15  7 - 15 mmol/L Final     Urea Nitrogen 12/23/2022 16.0  6.0 - 20.0 mg/dL Final     Creatinine 12/23/2022 0.94  0.51 - 0.95 mg/dL Final     Calcium 12/23/2022 9.5  8.6 - 10.0 mg/dL Final     Glucose 12/23/2022 160 (H)  70 - 99 mg/dL Final     Alkaline Phosphatase 12/23/2022 71  35 - 104 U/L Final     AST 12/23/2022 27  10 - 35 U/L Final     ALT 12/23/2022 21  10 - 35 U/L Final     Protein Total 12/23/2022 8.4 (H)  6.4 - 8.3 g/dL Final     Albumin 12/23/2022 4.6  3.5 - 5.2 g/dL Final     Bilirubin Total 12/23/2022 0.4  <=1.2 mg/dL Final     GFR Estimate 12/23/2022 75  >60 mL/min/1.73m2 Final     Erythrocyte Sedimentation Rate 12/23/2022 11  0 - 20 mm/hr Final     CRP Inflammation 12/23/2022 20.50 (H)  <5.00 mg/L Final     WBC Count 12/23/2022 9.7  4.0 - 11.0 10e3/uL Final     RBC Count 12/23/2022 5.06  3.80 - 5.20 10e6/uL Final     Hemoglobin 12/23/2022 15.3  11.7 - 15.7 g/dL Final     Hematocrit 12/23/2022 45.9  35.0 - 47.0 % Final     MCV 12/23/2022 91  78 - 100 fL Final     MCH 12/23/2022 30.2  26.5 - 33.0 pg Final     MCHC 12/23/2022 33.3  31.5 - 36.5 g/dL Final     RDW 12/23/2022 11.7  10.0 - 15.0 % Final     Platelet Count 12/23/2022 309  150 - 450 10e3/uL Final     % Neutrophils 12/23/2022 69  % Final     % Lymphocytes 12/23/2022 23  % Final     % Monocytes 12/23/2022 7  % Final     % Eosinophils 12/23/2022 1  % Final     % Basophils 12/23/2022 0  % Final     % Immature Granulocytes 12/23/2022 0  % Final     Absolute Neutrophils 12/23/2022 6.7  1.6 - 8.3 10e3/uL Final     Absolute Lymphocytes 12/23/2022 2.2  0.8 - 5.3 10e3/uL Final     Absolute Monocytes 12/23/2022 0.7  0.0 - 1.3 10e3/uL Final     Absolute Eosinophils 12/23/2022 0.1  0.0 -  0.7 10e3/uL Final     Absolute Basophils 12/23/2022 0.0  0.0 - 0.2 10e3/uL Final     Absolute Immature Granulocytes 12/23/2022 0.0  <=0.4 10e3/uL Final     Fructose breath test 6/2018 ([facility]):            Sincerely,    Mirna Roth PA-C

## 2023-01-30 NOTE — PROGRESS NOTES
Sondra is a 47 year old who is being evaluated via a billable video visit.      How would you like to obtain your AVS? MyChart  If the video visit is dropped, the invitation should be resent by: Send to e-mail at: [email or web address]  Will anyone else be joining your video visit? No        Video-Visit Details    Type of service:  Video Visit     Originating Location (pt. Location): Home    Distant Location (provider location):  On-site  Platform used for Video Visit: [facility]       GI CLINIC VISIT    CC/REFERRING MD:  Jesus Jacobson  REASON FOR CONSULTATION: diarrhea    ASSESSMENT/PLAN:  #Chronic Diarrhea  Patient with 20+ year history of intermittent loose stools, worsening in past 5 to 10 years with episodes of urgent, explosive diarrhea with intermittent incontinence.  Colonoscopy in 2014 unremarkable endoscopically and histology negative for microscopic colitis at that time.  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  She reports that previous lactose intolerance testing was negative.  Differential for current symptoms remains broad, includes exacerbation of fructose malabsorption with diet, IBS-D(consistent with abdominal cramping improved after defecation and change in stool form), celiac, chronic infection, thyroid dysfunction (though normal when checked last in 2020), pancreatic insufficiency, SIBO, bile acid diarrhea microscopic colitis, less likely IBD or hormonal/secretory.  Given other rheumatologic conditions could consider other more rare rheumatologic cause.  Given variable stool consistency and pattern, it is possible that stool burden potentially contributing to urgent deprecatory symptoms with constipation/overflow though stool pattern history does not fully support.  We will start with stool studies including fecal calprotectin, testing for chronic infections (C. difficile, ova and parasite, Giardia/Cryptosporidium), fecal elastase and spot fecal fat, celiac serologies,  and TSH with T4 reflex.  Discussed meeting with registered dietitian to review diet and dietary recommendations for fructose malabsorption.  She would like to hold off until above labs and stool studies are completed.  Also consider repeat colonoscopy for endoscopic and histologic assessment, is due for dysplasia screening in next year so can complete at same time as well.  Future considerations include SIBO breath testing, abdominal x-ray for stool burden, low FODMAP retrial. In the meantime, plan to slowly increase soluble fiber supplementation with Citrucel fiber tabs (dislikes powder) up to 6 tablets/day or when desired stool consistency and frequency achieved.  Encouraged adequate hydration with additional fiber supplementation.    Discussed differential, outlined options and reviewed medications with patient.  Mutually agreed upon plan outlined below.  -- Labs and stool studies  -- Gradually increase citrucel fiber tablets from 1 tablet daily, to 6 tabs per day.  -- Work on hydration, aim for 64oz daily  -- Consider meeting with our registered dietitian regarding low fructose diet education  -- please see scheduling information provided below     Colorectal cancer screening: last colonoscopy 2014 normal. Cologaurd 2021 years ago negative (per pt). Due for screening 2024.      RTC 2-3 months    Thank you for this consultation.  It was a pleasure to participate in the care of this patient; please contact us with any further questions.     70 Minutes was spent on the date of the encounter during chart review, history and exam, documentation, and further activities as noted       Mirna Roth PA-C, RD  Division of Gastroenterology, Hepatology & Nutrition  [facility]        HALEY Sorensen is a 47 year old female with a history of alopecia, arthralgia, leukoclastic vasculitis, restrictive lung disease, allergic rhinitis, MDD, RAYMOND, IBS-D, fructose malabsorption who presents for evaluation of diarrhea.  "They are new to the [facility] clinic and this is my first encounter with the patient.      She has been following with rheumatology for seronegative RA with response HCQ, recent hand MRI without signs of inflammatory arthritis. She also has history of biopsy proven leukoclastic disease. She was followed previously at outside facility diagnosed \"connective tisusue disease with sicca symtpoms.\" Pertinent rheumatologic labs include MARIAA 1:80 in a speckled pattern, negative/ normal RF, ANCA, Broad serologic work-up that showed negative/normal C3/C4/SSA/SSB/smith/RNP/CCP/dsDNA. Elevated ESR/CRP. Most recent labs 12/23/22 showed normalized ESR and persistent elevation in CRP to 20.    Today she reports 20+ year history GI symptoms, namely urgent loose stools., worse in the last 5 to 10 years. Colonoscopy 2014 with normal appearing ileum and colon. On biopsy no evidence of microscopic colitis or other histopathologic abnormality.  She notes previous work-up with [facility], we do not have these records.  I am able to see positive fructose breath test 6/2018 with [facility].  She also reports negative lactose intolerance testing.    Sondra notes that she has a variable stool pattern.  Most typically she has 2 non-urgent stools per day, soft in consistency.  Some days will have 3-4 stools per day with a mix of hard and soft/liquid stools.   Does note intermittent harder stool that requires straining, though only about once every 1 to 2 weeks.  Typically feels empty after stools.  Rare days without stool.   About once per month will have very urgent loose bowel movement, sometimes in car and not able to pull over easily.  This is preceded by feeling \"sick\" with abdominal grumbling, and cramping,  then sudden urge to defecate.  Abdominal cramping resolved after defecation.  Notes that the stools sometimes do appear greasy.  No blood in stool or melena. No night time awakening. After this episode she is typically done with stool for the " day, does not continue for multiple episodes.  She does take 1 tablet of Citrucel fiber per day.  Also uses probiotics on and off, feels she has had some benefit.    Given previous diagnosis of fructose malabsorption, she has tried to follow a low fructose diet.  Though did not get formal dietary counseling on this, has handouts but in recent months has not followed quite as closely.  Feels like bloating improved with these changes but no real change in stool pattern.  She has met with a registered dietitian to review elimination diet (? Low FODMAP) which she tried but was not able to sustain due to restrictiveness of diet and lack of guidance.  Has tried gluten-free and lactose-free diets without significant improvement.    Denies abdominal pain, nausea, vomiting, hematemesis.    Notes history of reflux, more in the last year or so.  Took omeprazole but stopped in November.  Since course symptoms have been better controlled.  Rarely feels heartburn no, previously 3-4 times per week.  Notes large tonsils and cough which have been thought to be related to reflux in the past. No dysphagia or odynophagia.    Takes Iburopfen 1-2 times per week.    Diet Recall:  Breakfast - yogurt, berries, granola (homemade)  Lunch - leftovers/sandwiches  Dinner - variable -meat, starch. Sandwiches and soup.  -- Tries to steer clear of really high fructose foods/drinks. Rare soda.       ROS:    No fevers or chills  No weight loss  No blurry vision, double vision or change in vision  No sore throat  No lymphadenopathy  No headache, paraesthesias, or weakness in a limb  No shortness of breath or wheezing  No chest pain or pressure  No arthralgias or myalgias  No rashes or skin changes  No odynophagia or dysphagia  No BRBPR, hematochezia, melena  No dysuria, frequency or urgency  No hot/cold intolerance or polyria  No anxiety or depression    PROBLEM LIST  Patient Active Problem List    Diagnosis Date Noted     Alopecia 09/14/2020      Priority: Medium     Arthralgia 09/14/2020     Priority: Medium     Leucocytoclastic vasculitis (H) 04/22/2020     Priority: Medium     Seasonal affective disorder (H) 10/04/2018     Priority: Medium     Fructose intolerance 10/03/2018     Priority: Medium     CARDIOVASCULAR SCREENING; LDL GOAL LESS THAN 130 10/03/2018     Priority: Medium     Irritable bowel syndrome with diarrhea 10/03/2018     Priority: Medium     Social phobia 10/20/2016     Priority: Medium     Major depression, recurrent, full remission (H) 11/04/2014     Priority: Medium     Noted at PCP visit.       Generalized anxiety disorder 02/07/2012     Priority: Medium     Insomnia 03/01/2011     Priority: Medium     Diarrhea 10/26/2009     Priority: Medium     Blepharitis 05/13/2009     Priority: Medium     Epic       Decreased diffusion capacity of lung 12/09/2008     Priority: Medium     Restrictive lung disease 12/09/2008     Priority: Medium     Myopia 11/14/2008     Priority: Medium     Myopia - LasEk both 2009       Episodic mood disorder (H) 11/15/2004     Priority: Medium     Epic       Allergic rhinitis 04/24/2003     Priority: Medium     Epic         PERTINENT PAST MEDICAL HISTORY:  Past Medical History:   Diagnosis Date     Allergic rhinitis 4/24/2003    Epic     Alopecia 9/14/2020     Arthralgia 9/14/2020     Blepharitis 5/13/2009    Epic     Decreased diffusion capacity of lung 12/9/2008     Diarrhea 10/26/2009     Episodic mood disorder (H) 11/15/2004    Epic     Fructose intolerance 10/3/2018     Generalized anxiety disorder 2/7/2012     Insomnia 3/1/2011     Irritable bowel syndrome with diarrhea 10/3/2018     Leucocytoclastic vasculitis (H) 4/22/2020     Major depression, recurrent, full remission (H) 11/4/2014    Noted at PCP visit.     Myopia 11/14/2008    Myopia - LasEk both 2009     NO ACTIVE PROBLEMS      Restrictive lung disease 12/9/2008     Seasonal affective disorder (H) 10/4/2018     Social phobia 10/20/2016       PREVIOUS  SURGERIES:  Past Surgical History:   Procedure Laterality Date     LASIK Bilateral 2009     NO HISTORY OF SURGERY         PREVIOUS ENDOSCOPY:  Colonoscopy 05/12/2014:          ALLERGIES:  Allergies   Allergen Reactions     Doxycycline      Penicillins Rash     rash       PERTINENT MEDICATIONS:    Current Outpatient Medications:      Calcium Citrate-Vitamin D (CALCIUM + D PO), , Disp: , Rfl:      cetirizine (ZYRTEC) 10 MG tablet, Take 10 mg by mouth daily, Disp: , Rfl:      chlorhexidine (PERIDEX) 0.12 % solution, Swish and spit 15 mLs in mouth 2 times daily Can use for 2 weeks and should take a 2 week break before restarting., Disp: 473 mL, Rfl: 1     cholecalciferol (VITAMIN D3) 5000 units (125 mcg) capsule, Take by mouth daily, Disp: , Rfl:      cycloSPORINE (RESTASIS) 0.05 % ophthalmic emulsion, Place 1 drop into both eyes 2 times daily, Disp: 60 each, Rfl: 11     hydroxychloroquine (PLAQUENIL) 200 MG tablet, Take 1 tablet (200 mg) by mouth 2 times daily Annual Plaquenil toxicity eye screening required., Disp: 360 tablet, Rfl: 0     ketoconazole (NIZORAL) 2 % external shampoo, Apply topically as needed for itching or irritation Shampoo/scalp once or twice weekly as needed for seborrhea symptoms, Disp: 120 mL, Rfl: 11     meloxicam (MOBIC) 15 MG tablet, Take 1 tablet (15 mg) by mouth daily, Disp: 90 tablet, Rfl: 1     norethindrone-ethinyl estradiol (MICROGESTIN 1.5/30) 1.5-30 MG-MCG tablet, Take 1 tablet by mouth daily Active tablets for prevention of menses, Disp: 112 tablet, Rfl: 3     olopatadine (PATANOL) 0.1 % ophthalmic solution, PLACE 1 DROP INTO BOTH EYES 2 TIMES DAILY AS NEEDED, Disp: 5 mL, Rfl: 3     Omega-3 Fatty Acids (FISH OIL PO), , Disp: , Rfl:      spironolactone (ALDACTONE) 100 MG tablet, Take 1 tablet by mouth daily at 2 pm, Disp: , Rfl:      triamcinolone (KENALOG) 0.1 % external cream, Apply topically 2 times daily As needed for rash, Disp: 80 g, Rfl: 3  Ibuprofen 1-2 times per  week  Biotin  Align probiotics on and off    SOCIAL HISTORY:  Tobacco: no  Alcohol: rare, up to two drinks every few months   Drugs Use: no  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 Not Asked   Social History Narrative     Not on file     Social Determinants of Health     Financial Resource Strain: Not on file   Food Insecurity: Not on file   Transportation Needs: Not on file   Physical Activity: Not on file   Stress: Not on file   Social Connections: Not on file   Intimate Partner Violence: Not on file   Housing Stability: Not on file     FAMILY HISTORY:  FH of CRC: no  FH of IBD: no  No Colon/Panc/Esophageal/other GI CA. No IBD or Celiac Disease. No other Autoimmune, Liver, or Thyroid disease.    Family History   Problem Relation Age of Onset     Diabetes Father      Glaucoma No family hx of      Macular Degeneration No family hx of      Past/family/social history reviewed and no changes    PHYSICAL EXAMINATION:  Constitutional: AAOx3, cooperative, pleasant, not dyspneic/diaphoretic, no acute distress  Vitals reviewed: There were no vitals taken for this visit.  Wt:   General appearance: Healthy appearing adult, in no acute distress  Eyes: Sclera anicteric, Pupils round and reactive to light  Ears, nose, mouth and throat: No obvious external lesions of ears and nose, Hearing intact  Neck: Symmetric, No obvious external lesions  Respiratory: Normal respiration, no use of  accessory muscles   MSK: Gait normal  Skin: No rashes or jaundice   Psychiatric: Oriented to person, place and time, Appropriate mood and affect.       PERTINENT STUDIES:  Lab on 12/23/2022   Component Date Value Ref Range Status     Sodium 12/23/2022 135 (L)  136 - 145 mmol/L Final     Potassium 12/23/2022 4.4  3.4 - 5.3 mmol/L Final     Chloride 12/23/2022 100  98 - 107 mmol/L Final     Carbon Dioxide (CO2) 12/23/2022 20 (L)  22 - 29 mmol/L Final     Anion Gap 12/23/2022 15  7 - 15 mmol/L Final     Urea Nitrogen 12/23/2022 16.0  6.0 - 20.0 mg/dL Final     Creatinine 12/23/2022 0.94  0.51 - 0.95 mg/dL Final     Calcium 12/23/2022 9.5  8.6 - 10.0 mg/dL Final     Glucose 12/23/2022 160 (H)  70 - 99 mg/dL Final     Alkaline Phosphatase 12/23/2022 71  35 - 104 U/L Final     AST 12/23/2022 27  10 - 35 U/L Final     ALT 12/23/2022 21  10 - 35 U/L Final     Protein Total 12/23/2022 8.4 (H)  6.4 - 8.3 g/dL Final     Albumin 12/23/2022 4.6  3.5 - 5.2 g/dL Final     Bilirubin Total 12/23/2022 0.4  <=1.2 mg/dL Final     GFR Estimate 12/23/2022 75  >60 mL/min/1.73m2 Final     Erythrocyte Sedimentation Rate 12/23/2022 11  0 - 20 mm/hr Final     CRP Inflammation 12/23/2022 20.50 (H)  <5.00 mg/L Final     WBC Count 12/23/2022 9.7  4.0 - 11.0 10e3/uL Final     RBC Count 12/23/2022 5.06  3.80 - 5.20 10e6/uL Final     Hemoglobin 12/23/2022 15.3  11.7 - 15.7 g/dL Final     Hematocrit 12/23/2022 45.9  35.0 - 47.0 % Final     MCV 12/23/2022 91  78 - 100 fL Final     MCH 12/23/2022 30.2  26.5 - 33.0 pg Final     MCHC 12/23/2022 33.3  31.5 - 36.5 g/dL Final     RDW 12/23/2022 11.7  10.0 - 15.0 % Final     Platelet Count 12/23/2022 309  150 - 450 10e3/uL Final     % Neutrophils 12/23/2022 69  % Final     % Lymphocytes 12/23/2022 23  % Final     % Monocytes 12/23/2022 7  % Final     % Eosinophils 12/23/2022 1  % Final     % Basophils 12/23/2022 0  % Final     % Immature Granulocytes 12/23/2022 0  % Final     Absolute Neutrophils 12/23/2022  6.7  1.6 - 8.3 10e3/uL Final     Absolute Lymphocytes 12/23/2022 2.2  0.8 - 5.3 10e3/uL Final     Absolute Monocytes 12/23/2022 0.7  0.0 - 1.3 10e3/uL Final     Absolute Eosinophils 12/23/2022 0.1  0.0 - 0.7 10e3/uL Final     Absolute Basophils 12/23/2022 0.0  0.0 - 0.2 10e3/uL Final     Absolute Immature Granulocytes 12/23/2022 0.0  <=0.4 10e3/uL Final     Fructose breath test 6/2018 ([facility]):

## 2023-01-30 NOTE — PATIENT INSTRUCTIONS
It was a pleasure taking care of you today.  I've included a brief summary of our discussion and care plan from today's visit below.  Please review this information with your primary care provider.  ______________________________________________________________________    My recommendations are summarized as follows:  -- Labs and stool studies  -- Gradually increase citrucel fiber tablets from 1 tablet daily, to 6 tabs per day.  -- Work on hydration, aim for 64oz daily  -- Consider meeting with our registered dietitian regarding low fructose diet education  -- If experiencing reflux, try Pepcid (famotidine) up to twice daily.   -- please see scheduling information provided below     Return to GI Clinic in 2-3 months to review your progress.    ______________________________________________________________________    How do I schedule labs, imaging studies, or procedures that were ordered in clinic today?     Labs: To schedule lab appointment at the [facility], use my chart or call [phone]. If you have a [facility] lab closer to home where you are regularly seen you can give them a call.     Procedures: If a colonoscopy, upper endoscopy, breath test, esophageal manometry, or pH impedence was ordered today, our endoscopy team will call you to schedule this. If you have not heard from our endoscopy team within a week, please call [phone] to schedule.     Imaging Studies: If you were scheduled for a CT scan, X-ray, MRI, ultrasound, HIDA scan or other imaging study, please call [phone] to have this scheduled.     Referral: If a referral to another specialty was ordered, expect a phone call or follow instructions above. If you have not heard from anyone regarding your referral in a week, please call our clinic to check the status.     Who do I call with any questions after my visit?  Please be in touch if there are any further questions that arise following today's visit.  There are multiple  ways to contact your gastroenterology care team.      During business hours, you may reach a Gastroenterology nurse at [phone]    To schedule or reschedule an appointment, please call [phone].     You can always send a secure message through [employer].  [employer] messages are answered by your nurse or doctor typically within 24 hours.  Please allow extra time on weekends and holidays.      For urgent/emergent questions after business hours, you may reach the on-call GI Fellow by contacting the [facility] at [phone].     How will I get the results of any tests ordered?    You will receive all of your results.  If you have signed up for [employer], any tests ordered at your visit will be available to you after your provider reviews them.  Typically this takes 1-2 weeks.  If there are urgent results that require a change in your care plan, your provider or nurse will call you to discuss the next steps.      What is [employer]?  [employer] is a secure way for you to access all of your healthcare records from the [facility].  It is a web based computer program, so you can sign on to it from any location.  It also allows you to send secure messages to your care team.  I recommend signing up for [employer] access if you have not already done so and are comfortable with using a computer.      How to I schedule a follow-up visit?  If you did not schedule a follow-up visit today, please call [phone] to schedule a follow-up office visit.      Sincerely,    Mirna Roth PA-C  Division of Gastroenterology, Hepatology & Nutrition  [facility]

## 2023-02-02 ENCOUNTER — LAB (OUTPATIENT)
Dept: LAB | Facility: CLINIC | Age: 48
End: 2023-02-02
Payer: COMMERCIAL

## 2023-02-02 DIAGNOSIS — R19.7 DIARRHEA, UNSPECIFIED TYPE: ICD-10-CM

## 2023-02-02 LAB — TSH SERPL DL<=0.005 MIU/L-ACNC: 2.92 UIU/ML (ref 0.3–4.2)

## 2023-02-02 PROCEDURE — 86364 TISS TRNSGLTMNASE EA IG CLAS: CPT

## 2023-02-02 PROCEDURE — 36415 COLL VENOUS BLD VENIPUNCTURE: CPT

## 2023-02-02 PROCEDURE — 82784 ASSAY IGA/IGD/IGG/IGM EACH: CPT

## 2023-02-02 PROCEDURE — 86258 DGP ANTIBODY EACH IG CLASS: CPT

## 2023-02-02 PROCEDURE — 84443 ASSAY THYROID STIM HORMONE: CPT

## 2023-02-03 LAB — IGA SERPL-MCNC: 577 MG/DL (ref 84–499)

## 2023-02-06 ENCOUNTER — TELEPHONE (OUTPATIENT)
Dept: GASTROENTEROLOGY | Facility: CLINIC | Age: 48
End: 2023-02-06
Payer: COMMERCIAL

## 2023-02-06 NOTE — TELEPHONE ENCOUNTER
DINORAH and shan sent to schedule GI clinic follow up orders with Mirna Roth    Schedule Return GI visit with Mirna Roth (around 3/30/23)

## 2023-02-08 ENCOUNTER — APPOINTMENT (OUTPATIENT)
Dept: LAB | Facility: CLINIC | Age: 48
End: 2023-02-08
Payer: COMMERCIAL

## 2023-02-08 LAB
GLIADIN IGA SER-ACNC: 1.9 U/ML
GLIADIN IGG SER-ACNC: <0.6 U/ML
TTG IGA SER-ACNC: 1.7 U/ML
TTG IGG SER-ACNC: 0.8 U/ML

## 2023-02-08 PROCEDURE — 82705 FATS/LIPIDS FECES QUAL: CPT | Mod: 90

## 2023-02-08 PROCEDURE — 82653 EL-1 FECAL QUANTITATIVE: CPT

## 2023-02-08 PROCEDURE — 99000 SPECIMEN HANDLING OFFICE-LAB: CPT

## 2023-02-08 PROCEDURE — 87329 GIARDIA AG IA: CPT

## 2023-02-08 PROCEDURE — 87209 SMEAR COMPLEX STAIN: CPT

## 2023-02-08 PROCEDURE — 87328 CRYPTOSPORIDIUM AG IA: CPT

## 2023-02-08 PROCEDURE — 83993 ASSAY FOR CALPROTECTIN FECAL: CPT

## 2023-02-08 PROCEDURE — 87177 OVA AND PARASITES SMEARS: CPT

## 2023-02-09 LAB
C PARVUM AG STL QL IA: NEGATIVE
G LAMBLIA AG STL QL IA: NEGATIVE
O+P STL MICRO: NEGATIVE

## 2023-02-10 LAB
CALPROTECTIN STL-MCNT: 18.4 MG/KG (ref 0–49.9)
ELASTASE PANC STL-MCNT: >800 UG/G
FAT STL QL: NORMAL
NEUTRAL FAT STL QL: NORMAL

## 2023-03-27 ENCOUNTER — TELEPHONE (OUTPATIENT)
Dept: GASTROENTEROLOGY | Facility: CLINIC | Age: 48
End: 2023-03-27
Payer: COMMERCIAL

## 2023-03-27 NOTE — TELEPHONE ENCOUNTER
Called Pt back. Pt confirmed that 4/11/2023 at 2pm will work for the Pt. Writer rescheduled Pt to see Mirna Roth on 4/11/2023 at 2pm for a video visit.

## 2023-03-27 NOTE — TELEPHONE ENCOUNTER
Called and left message for Pt. Called Pt to reschedule Pt's 3/31/2023 appointment with Mirna Roth due to provider's unavailability that afternoon. Writer offered an appointment on 4/11/2023 at 2pm with Mirna Roth, in person or virtual. Writer left call back number.

## 2023-04-07 ENCOUNTER — VIRTUAL VISIT (OUTPATIENT)
Dept: RHEUMATOLOGY | Facility: CLINIC | Age: 48
End: 2023-04-07
Attending: INTERNAL MEDICINE
Payer: COMMERCIAL

## 2023-04-07 DIAGNOSIS — Z79.899 HIGH RISK MEDICATION USE: ICD-10-CM

## 2023-04-07 DIAGNOSIS — M06.09 RHEUMATOID ARTHRITIS, SERONEGATIVE, MULTIPLE SITES (H): Primary | ICD-10-CM

## 2023-04-07 PROCEDURE — 99214 OFFICE O/P EST MOD 30 MIN: CPT | Mod: VID | Performed by: INTERNAL MEDICINE

## 2023-04-07 NOTE — NURSING NOTE
Patient denies any changes since echeck-in regarding medication and allergies and states all information entered during echeck-in remains accurate.    Is the patient currently in the state of [place]? YES    Visit mode:VIDEO    If the visit is dropped, the patient can be reconnected by: VIDEO VISIT: Text to cell phone: [phone]    Will anyone else be joining the visit? NO      How would you like to obtain your AVS? MyChart    Are changes needed to the allergy or medication list? NO    Reason for visit: follow up

## 2023-04-07 NOTE — PROGRESS NOTES
Virtual Visit Details    Type of service:  Video Visit     Originating Location (pt. Location): home    Distant Location (provider location):  off site  Platform used for Video Visit: kathleen      Joined the call at 4/7/2023, 2:32:01 pm.  Left the call at 4/7/2023, 3:04:35 pm.  You were on the call for 32 minutes 34 seconds .

## 2023-04-07 NOTE — PATIENT INSTRUCTIONS
Decrease plaquenil to 200mg once every day of the week  Make eye appointment for august  We will call you to schedule  -lab appointment for the end of June  -Follow-up with me in 6 months

## 2023-04-07 NOTE — LETTER
4/7/2023       RE: Sondra Sorensen  [address]  [place] [address]     Dear Colleague,    Thank you for referring your patient, Sondra Sorensen, to the [facility]HE[facility] [place] at [facility]. Please see a copy of my visit note below.      Outpatient Rheumatology Follow-up  This visit was conducted via synchronous video visit due to the current COVID-19 crisis to reduce patient risk.  Verbal consent was obtained and is documented below.    Name: Sondra Sorensen    MRN [id number]   Today's date: 4/7/23  Date of last visit: 12/30/2022         Reason for follow-up:  Seronegative rheumatoid arthritis   Requesting physician: Mena Bella MD             Assessment & Plan:   47 year old female with history of bx proven LCV, MINOR 1:80 in a speckled pattern, negative/ normal RF, ANCA, CBC, CMP, UA previously followed with an outside rheumatologist Dr Alok Myers at [facility] for 'ill defined' CTD with sicca symptoms, arthralgia, positive minor then rechecked was negative, and LCV.  Her dry mouth symptoms had not been severe to cause dental disease. Her joint symptoms are not accompanied by erythema/edema/warmth.  I do note a prior work-up by pulmonary and cardiology in 2008 where she was noted to have a mild restrictive process by PFTs, without desaturation. This work-up was done due to symptoms of dyspnea with mild exertion, none at rest. Neither specialty was able to find any disease to attribute this to. Has since had PFTs repeated, most recently in 2017 which again showed mild restrictive pulmonary defect with normal DLCO. Respiratory muscle force was decreased, raising the question of neuromuscular process. Has not had follow-up or evaluation by neurology. At time of our initial consultation (may 2021), we decided to obtained additional serologies (mostly driven by her restrictive pattern on PFTs) and follow-up in 4 months  to assess for new/evolving symptoms. Broad serologic work-up at that time showed negative/normal C3/C4/SSA/SSB/smith/RNP/CCP/dsDNA. However her ESR was 28 and her CRP was 8.1. I called her on 4/21/22 and discussed the above results and risks/benefits of starting HCQ for what is most likely consistent with seronegative inflammatory arthritis. At her follow-up with me on 8/5/22, she had a positive response to HCQ. No side effects noted. Positive improvements have included 1) able to make a full fist upon waking 2) no longer needing to pry her fists open in the AM 3) less frequent/severe flares of pain. Tolerating well without side effects.     Seronegative rheumatoid arthritis: Overall disease activity is low. We will plan to reduce HCQ from current dose of 200mg BID on weekdays and 200mg once daily on Saturday/Sunday down to 200mg once daily everyday. Can continue with mobic 15mg once daily prn. The addition of cymbalta will be helpful not only for mood, but also for components of non inflammatory pain.   PLAN  -decrease HCQ down to 200mg once daily every day  -can continue mobic 15mg once daily prn    High risk medication use: HCQ  - Most recent labs reviewed from 12/23/2022 to include CBC, CMP which do not show any evidence of drug toxicity.  - We will continue routine screening drug toxicity monitoring every 6 months with CBC, CMP while on Plaquenil. Labs are due again at the end of June.   -- Due again for yearly routine screening ophthalmology exam in august 2023. Sondra knows to call and schedule.     Will follow-up in 6 months    Jesus Jacobson MD  Rheumatology     Subjective:   4/7/23  -saw GI. Upped her fiber. Not yet met with nutrition. Has not had diarrea for a few weeks. Still periods of gas/cramping. Less loose stools. Has been taking pro-biotics which she thinks has been some helpful  -Feels that her joints have not been bothering her much. Has been started on cymbalta for SAD/depression. Severe  insomnia in January, prior to starting. Cognitive slowing. Felt improved after 2 weeks. Currently on 20mg once daily.   -The additional reason for cymbalta was for potential help with non inflammatory pain symptoms  -has continued on HCQ 200mg BID on weekdays and once daily on weekend days  -no new/progressive fevers/chills/night sweats. No new/progressive unintentional weight loss  -Has been tolerating HCQ without noticeable side effects  -is due for routine screening OCT exam in august for her yearly exam, needs to schedule this.   14 point ROS collected and negative if not documented above    Interval history 12/30/2022  She wrote in towards middle of November reporting reddish blotchy skin changes between her knees and ankles.  No none of the skin lesions consistent with her prior LCV in early 2020.  Also with new pain in her elbows and knees.  Though this was only 1 time and short-lived.  She reports that on 11 2 she woke up in the joints in her hands knees elbows were sore.  She cannot attribute this increased pain to any activity previous to that day.  It lasted only 1 day and then resolved.  Since that time she is had increased low-grade stiffness aches and fatigue.  Also some GI upset and appetite change and headache.  The symptoms wax and wane.  Muscle tenderness and aches worse in the evening.  She reports the only medication change can think of is that she ran out of her spironolactone prescription and cannot get this refilled prior to seeing dermatology.  So she is been off this since about the beginning of November.  Stayed home and slept, possibly took meloxicam.  Symptoms improved. Has been feeling more stiff/sore achy. She feels that her hands have been more stiff throughout the day. Has been dropping items. Went to hand OT x2, has the exercises to do at home though difficult to do them at home due to motivation. She is fructose intolerant, though has been more quiet recently in terms of GI  symptoms. No rashes though with dry skin. No fevers/chills/night. Achiness is worse in the evenings in general. Her stiffness is pretty persistent thoughtout the day, maybe some worse in the AM.  Review of systems otherwise negative.      Interval History August 5, 2022  Started on HCQ on 4/21 due to ESR 28/CRP 1.3. Repeat on 7/28 showed ESR 17 and CRP 14. Tolerating without noticeable side effects. Still aching in the bilateral CMCs when driving for an hour, needs to readjust. Has some intermittent stiffness after increased activity the day before. Has no longer had the symptoms where she needed to pry her hands open. Also now able to make full fist upon waking now. Had been having night sweats, has gone on birth control and resolved this. No rashes, SOB, chest pain. Has been having worsening GERD, though more stressed out. Rare bloating/diarrhea, no blood in stool. GERD bothers her at night when going to sleep. Wakes with scratchy throat. Has felt nauseous at night/dry heaves. Feels that she has been having foods getting stuck in her throat. Has been getting tonsils. Has not seen someone for this. Has been using restasis for her dry eyes. Has gone back on birth control which has been helpful. Hair not falling out as it had been as well. So she thinks was hormonal driven. 14 point review of systems collected and negative if not documented above.      Interval history 2/25/22  Has had complete resolution of her cutaneous lesions. For 4-5 days in a row, intermittently, will wake up in the AM with feeling that she has been clenching her fists overnight. Needs to pry them open. Feels that her whole body is stiff. Her hands are difficult to both open and close. She thinks that it is related to painting her bedroom last week. Last time this happened was a few months ago. No erythema/edema/warmth. Has ongoing dry eyes, saw ophthalmology for this. Has dry mouth, though only in the AM and not during the day. Has not  affected her dentition. Intentionally lost 20 pounds, has gained back 5 pounds or so. No new fevers, chills, night sweats. No new chest pain/shortness of breath. No recurrent/ interval infections.  No dysphagia. Her ROS was otherwise negative.     HPI from initial consultation May 2021  In February developed hair loss. Went in to derm, dx as alopecia areata. Then mid march had small purple spots on legs. Had evaluation with biopsy done which were consistent with LCV. Then referred to rheumatology. Many lesions between ankles/knees. By the time she got in to see rheumatology, had almost resolved/fading without taking any medications. Was started on HCQ, though had significant GERD. Sent to ENT without abnormalities found. HCQ stopped.     Intermittently has stiffness in the AM in left hand 4th and 5th digit. All feel stiff. Occurs 3-4 days per week. Lasts for 10 minutes. Also when still for periods of time. She thinks they feel swollen, no actual swelling. No redness or warmth. Mild soreness. For the fingers that trigger, its the DIPs. For the others, the PIPs. Feet also sore in the AM when taking first steps, resolves within 10 steps. Also back with twisting. Things loosen up quickly. Has been going on for some years, joint symptoms. No systemic immunosuppressive therapy around the time of her skin lesions with steroids or other.     Reports diffuse joint and muscle sore/aching in the evenings. Takes advil and improves. Some hair thinning, not new or progressive. Other than LCV, prior ?roceasea. No other facial rash. Reports break out on chest and top of back. Has had 2 episodes of hypersensitivity reactions on ankles/wrists exposures to known allergens. Had been treated with prednisone for this. Had eczema as a child. None now. No personal or first degree with psoriasis. Had an episode of pleurisy after URI, which resolved. Has fructose intolerance. For many years had diarrhea. No blood in the stool. Had  colonoscopy in 2014 which was normal. No difficulty swallowing. No food stuck in her throat. Has dry eyes, though started after lasik. Has dry mouth in the AM. Has flushed feeling in fingers. No fevers/chills. No weight changes. No new or persistent headache.  No change in vision or hearing.  No inflammatory eye disease history.  No history of miscarriage.  No history of blood clots. No photosensitive rash.  No nasal or oral ulcers.  No recurrent epistaxis or hemoptysis.  No recurrent sinusitis or recurrent pneumonia.  No chest pain or shortness of breath.  No nausea or vomiting.  No foamy or frothy urine.  No bowel or bladder incontinence.  No change in strength or sensation in the arms or legs.  No pitting or brittle nails.  No triphasic color change consistent with Raynaud's.  No skin thickening or tightening consistent with sclerodactyly.  No symptoms consistent with dactylitis. No genital ulcers or lesions.    Past Medical History  Past Medical History:   Diagnosis Date    Allergic rhinitis 4/24/2003    Epic    Alopecia 9/14/2020    Arthralgia 9/14/2020    Blepharitis 5/13/2009    Epic    Decreased diffusion capacity of lung 12/9/2008    Diarrhea 10/26/2009    Episodic mood disorder (H) 11/15/2004    Epic    Fructose intolerance 10/3/2018    Generalized anxiety disorder 2/7/2012    Insomnia 3/1/2011    Irritable bowel syndrome with diarrhea 10/3/2018    Leucocytoclastic vasculitis (H) 4/22/2020    Major depression, recurrent, full remission (H) 11/4/2014    Noted at PCP visit.    Myopia 11/14/2008    Myopia - LasEk both 2009    NO ACTIVE PROBLEMS     Restrictive lung disease 12/9/2008    Seasonal affective disorder (H) 10/4/2018    Social phobia 10/20/2016     Past Surgical History  Past Surgical History:   Procedure Laterality Date    LASIK Bilateral 2009    NO HISTORY OF SURGERY       Medications  Current Outpatient Medications   Medication    Calcium Citrate-Vitamin D (CALCIUM + D PO)    cetirizine (ZYRTEC)  10 MG tablet    chlorhexidine (PERIDEX) 0.12 % solution    cholecalciferol (VITAMIN D3) 5000 units (125 mcg) capsule    cycloSPORINE (RESTASIS) 0.05 % ophthalmic emulsion    hydroxychloroquine (PLAQUENIL) 200 MG tablet    ketoconazole (NIZORAL) 2 % external shampoo    meloxicam (MOBIC) 15 MG tablet    norethindrone-ethinyl estradiol (MICROGESTIN 1.5/30) 1.5-30 MG-MCG tablet    olopatadine (PATANOL) 0.1 % ophthalmic solution    Omega-3 Fatty Acids (FISH OIL PO)    spironolactone (ALDACTONE) 100 MG tablet    triamcinolone (KENALOG) 0.1 % external cream     No current facility-administered medications for this visit.     Allergies   Allergies   Allergen Reactions    Doxycycline     Penicillins Rash     rash     Family History: Mother with 'arthritis in her hands'. Mother with 'lung thing' 'sclerosis'. Second cousin with MS.    Social History: Not , no kids. No pregnancies. 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 No drug use/smoking/ETOH abuse.        Objective:   Physical exam:  Laying in bed. NAD. Pleasant and interactive as always  No facial rash. Sclera appear clear  Speaking in full sentences. No use of accessory muscles. No audible wheeze. No cough.     WBC   Date Value Ref Range Status   03/30/2021 7.7 4.0 - 11.0 10^9/L Final     WBC Count   Date Value Ref Range Status   12/23/2022 9.7 4.0 - 11.0 10e3/uL Final     Hemoglobin   Date Value Ref Range Status   12/23/2022 15.3 11.7 - 15.7 g/dL Final   03/30/2021 13.7 11.7 - 15.7 g/dL Final     Platelet Count   Date Value Ref Range Status   12/23/2022 309 150 - 450 10e3/uL Final   03/30/2021 262 150 - 450 10^9/L Final   12/05/2019 281 150 - 450 10e9/L Final     Creatinine   Date Value Ref Range Status   12/23/2022 0.94 0.51 - 0.95 mg/dL Final   03/30/2021 0.87 0.55 - 1.02 mg/dL Final     Lab Results   Component Value Date    ALKPHOS 71 12/23/2022    ALKPHOS 96 03/30/2021     AST   Date Value Ref Range Status   12/23/2022 27 10 - 35 U/L Final   03/30/2021 22 0 - 45 U/L Final     Lab  Results   Component Value Date    ALT 21 2022    ALT 30 2021     Sed Rate   Date Value Ref Range Status   2019 16 0 - 20 mm/h Final     Erythrocyte Sedimentation Rate   Date Value Ref Range Status   2022 11 0 - 20 mm/hr Final     CRP Inflammation   Date Value Ref Range Status   2019 8.1 (H) 0.0 - 8.0 mg/L Final     CRP   Date Value Ref Range Status   2022 1.3 (H) 0.0-<0.8 mg/dL Final     UA RESULTS:  Recent Labs   Lab Test 22  1053   COLOR Yellow   APPEARANCE Clear   URINEGLC Negative   URINEBILI Negative   URINEKETONE Negative   SG 1.015   UBLD Negative   URINEPH 6.0   PROTEIN Negative   UROBILINOGEN 0.2   NITRITE Negative   LEUKEST Negative   RBCU 0-2   WBCU 0-5      DNA (ds) Antibody   Date Value Ref Range Status   2022 1.0 <10.0 IU/mL Final     Comment:     Negative     RNP Antibody IgG   Date Value Ref Range Status   2022 Negative Negative Final     Aldolase   Date Value Ref Range Status   2022 2.8 1.2 - 7.6 U/L Final     Comment:     REFERENCE INTERVAL: Aldolase    Access complete set of age- and/or gender-specific   reference intervals for this test in the [facility]   Test Directory ([email or web address]).  Performed By: "[facility]"  [address] [place] [id number]  : Angy Persaud MD     Rheumatoid Factor   Date Value Ref Range Status   2019 <20 <20 IU/mL Final     Cyclic Citrullinated Peptide Antibody IgG   Date Value Ref Range Status   2022 1.1 <=4.9 U/mL Final     2020  ESR 14  C3 155  CRP 0.4  C4 28  CH50 69    May 2020  IgA 611  IgE 38.1  ESR 16  CRP 0.5    MARIAA 1:80 speckled    Imagin2020  X-ray right hand unremarkable without changes consistent with erosive or degenerative disease.    Pathology:  3/19/2020  Right foot punch bx consistent with LCV          Virtual Visit Details    Type of service:  Video Visit     Originating Location (pt. Location): home    Distant Location  (provider location):  off site  Platform used for Video Visit: [email or web address]      Joined the call at 4/7/2023, 2:32:01 pm.  Left the call at 4/7/2023, 3:04:35 pm.  You were on the call for 32 minutes 34 seconds .    Jesus Jacobson MD

## 2023-04-07 NOTE — PROGRESS NOTES
Outpatient Rheumatology Follow-up  This visit was conducted via synchronous video visit due to the current COVID-19 crisis to reduce patient risk.  Verbal consent was obtained and is documented below.    Name: Sondra Sorensen    MRN [id number]   Today's date: 4/7/23  Date of last visit: 12/30/2022         Reason for follow-up:  Seronegative rheumatoid arthritis   Requesting physician: Mena Bella MD             Assessment & Plan:   47 year old female with history of bx proven LCV, MINOR 1:80 in a speckled pattern, negative/ normal RF, ANCA, CBC, CMP, UA previously followed with an outside rheumatologist Dr Alok Myers at [facility] for 'ill defined' CTD with sicca symptoms, arthralgia, positive minor then rechecked was negative, and LCV.  Her dry mouth symptoms had not been severe to cause dental disease. Her joint symptoms are not accompanied by erythema/edema/warmth.  I do note a prior work-up by pulmonary and cardiology in 2008 where she was noted to have a mild restrictive process by PFTs, without desaturation. This work-up was done due to symptoms of dyspnea with mild exertion, none at rest. Neither specialty was able to find any disease to attribute this to. Has since had PFTs repeated, most recently in 2017 which again showed mild restrictive pulmonary defect with normal DLCO. Respiratory muscle force was decreased, raising the question of neuromuscular process. Has not had follow-up or evaluation by neurology. At time of our initial consultation (may 2021), we decided to obtained additional serologies (mostly driven by her restrictive pattern on PFTs) and follow-up in 4 months to assess for new/evolving symptoms. Broad serologic work-up at that time showed negative/normal C3/C4/SSA/SSB/smith/RNP/CCP/dsDNA. However her ESR was 28 and her CRP was 8.1. I called her on 4/21/22 and discussed the above results and risks/benefits of starting HCQ for what is most likely consistent with seronegative  inflammatory arthritis. At her follow-up with me on 8/5/22, she had a positive response to HCQ. No side effects noted. Positive improvements have included 1) able to make a full fist upon waking 2) no longer needing to pry her fists open in the AM 3) less frequent/severe flares of pain. Tolerating well without side effects.     Seronegative rheumatoid arthritis: Overall disease activity is low. We will plan to reduce HCQ from current dose of 200mg BID on weekdays and 200mg once daily on Saturday/Sunday down to 200mg once daily everyday. Can continue with mobic 15mg once daily prn. The addition of cymbalta will be helpful not only for mood, but also for components of non inflammatory pain.   PLAN  -decrease HCQ down to 200mg once daily every day  -can continue mobic 15mg once daily prn    High risk medication use: HCQ  - Most recent labs reviewed from 12/23/2022 to include CBC, CMP which do not show any evidence of drug toxicity.  - We will continue routine screening drug toxicity monitoring every 6 months with CBC, CMP while on Plaquenil. Labs are due again at the end of June.   -- Due again for yearly routine screening ophthalmology exam in august 2023. Sondra knows to call and schedule.     Will follow-up in 6 months    Jesus Jacobson MD  Rheumatology     Subjective:   4/7/23  -saw GI. Upped her fiber. Not yet met with nutrition. Has not had diarrea for a few weeks. Still periods of gas/cramping. Less loose stools. Has been taking pro-biotics which she thinks has been some helpful  -Feels that her joints have not been bothering her much. Has been started on cymbalta for SAD/depression. Severe insomnia in January, prior to starting. Cognitive slowing. Felt improved after 2 weeks. Currently on 20mg once daily.   -The additional reason for cymbalta was for potential help with non inflammatory pain symptoms  -has continued on HCQ 200mg BID on weekdays and once daily on weekend days  -no new/progressive  fevers/chills/night sweats. No new/progressive unintentional weight loss  -Has been tolerating HCQ without noticeable side effects  -is due for routine screening OCT exam in august for her yearly exam, needs to schedule this.   14 point ROS collected and negative if not documented above    Interval history 12/30/2022  She wrote in towards middle of November reporting reddish blotchy skin changes between her knees and ankles.  No none of the skin lesions consistent with her prior LCV in early 2020.  Also with new pain in her elbows and knees.  Though this was only 1 time and short-lived.  She reports that on 11 2 she woke up in the joints in her hands knees elbows were sore.  She cannot attribute this increased pain to any activity previous to that day.  It lasted only 1 day and then resolved.  Since that time she is had increased low-grade stiffness aches and fatigue.  Also some GI upset and appetite change and headache.  The symptoms wax and wane.  Muscle tenderness and aches worse in the evening.  She reports the only medication change can think of is that she ran out of her spironolactone prescription and cannot get this refilled prior to seeing dermatology.  So she is been off this since about the beginning of November.  Stayed home and slept, possibly took meloxicam.  Symptoms improved. Has been feeling more stiff/sore achy. She feels that her hands have been more stiff throughout the day. Has been dropping items. Went to hand OT x2, has the exercises to do at home though difficult to do them at home due to motivation. She is fructose intolerant, though has been more quiet recently in terms of GI symptoms. No rashes though with dry skin. No fevers/chills/night. Achiness is worse in the evenings in general. Her stiffness is pretty persistent thoughtout the day, maybe some worse in the AM.  Review of systems otherwise negative.      Interval History August 5, 2022  Started on HCQ on 4/21 due to ESR 28/CRP 1.3.  Repeat on 7/28 showed ESR 17 and CRP 14. Tolerating without noticeable side effects. Still aching in the bilateral CMCs when driving for an hour, needs to readjust. Has some intermittent stiffness after increased activity the day before. Has no longer had the symptoms where she needed to pry her hands open. Also now able to make full fist upon waking now. Had been having night sweats, has gone on birth control and resolved this. No rashes, SOB, chest pain. Has been having worsening GERD, though more stressed out. Rare bloating/diarrhea, no blood in stool. GERD bothers her at night when going to sleep. Wakes with scratchy throat. Has felt nauseous at night/dry heaves. Feels that she has been having foods getting stuck in her throat. Has been getting tonsils. Has not seen someone for this. Has been using restasis for her dry eyes. Has gone back on birth control which has been helpful. Hair not falling out as it had been as well. So she thinks was hormonal driven. 14 point review of systems collected and negative if not documented above.      Interval history 2/25/22  Has had complete resolution of her cutaneous lesions. For 4-5 days in a row, intermittently, will wake up in the AM with feeling that she has been clenching her fists overnight. Needs to pry them open. Feels that her whole body is stiff. Her hands are difficult to both open and close. She thinks that it is related to painting her bedroom last week. Last time this happened was a few months ago. No erythema/edema/warmth. Has ongoing dry eyes, saw ophthalmology for this. Has dry mouth, though only in the AM and not during the day. Has not affected her dentition. Intentionally lost 20 pounds, has gained back 5 pounds or so. No new fevers, chills, night sweats. No new chest pain/shortness of breath. No recurrent/ interval infections.  No dysphagia. Her ROS was otherwise negative.     HPI from initial consultation May 2021  In February developed hair loss. Went  in to derm, dx as alopecia areata. Then mid march had small purple spots on legs. Had evaluation with biopsy done which were consistent with LCV. Then referred to rheumatology. Many lesions between ankles/knees. By the time she got in to see rheumatology, had almost resolved/fading without taking any medications. Was started on HCQ, though had significant GERD. Sent to ENT without abnormalities found. HCQ stopped.     Intermittently has stiffness in the AM in left hand 4th and 5th digit. All feel stiff. Occurs 3-4 days per week. Lasts for 10 minutes. Also when still for periods of time. She thinks they feel swollen, no actual swelling. No redness or warmth. Mild soreness. For the fingers that trigger, its the DIPs. For the others, the PIPs. Feet also sore in the AM when taking first steps, resolves within 10 steps. Also back with twisting. Things loosen up quickly. Has been going on for some years, joint symptoms. No systemic immunosuppressive therapy around the time of her skin lesions with steroids or other.     Reports diffuse joint and muscle sore/aching in the evenings. Takes advil and improves. Some hair thinning, not new or progressive. Other than LCV, prior ?roceasea. No other facial rash. Reports break out on chest and top of back. Has had 2 episodes of hypersensitivity reactions on ankles/wrists exposures to known allergens. Had been treated with prednisone for this. Had eczema as a child. None now. No personal or first degree with psoriasis. Had an episode of pleurisy after URI, which resolved. Has fructose intolerance. For many years had diarrhea. No blood in the stool. Had colonoscopy in 2014 which was normal. No difficulty swallowing. No food stuck in her throat. Has dry eyes, though started after lasik. Has dry mouth in the AM. Has flushed feeling in fingers. No fevers/chills. No weight changes. No new or persistent headache.  No change in vision or hearing.  No inflammatory eye disease history.  No  history of miscarriage.  No history of blood clots. No photosensitive rash.  No nasal or oral ulcers.  No recurrent epistaxis or hemoptysis.  No recurrent sinusitis or recurrent pneumonia.  No chest pain or shortness of breath.  No nausea or vomiting.  No foamy or frothy urine.  No bowel or bladder incontinence.  No change in strength or sensation in the arms or legs.  No pitting or brittle nails.  No triphasic color change consistent with Raynaud's.  No skin thickening or tightening consistent with sclerodactyly.  No symptoms consistent with dactylitis. No genital ulcers or lesions.    Past Medical History  Past Medical History:   Diagnosis Date     Allergic rhinitis 4/24/2003    Epic     Alopecia 9/14/2020     Arthralgia 9/14/2020     Blepharitis 5/13/2009    Epic     Decreased diffusion capacity of lung 12/9/2008     Diarrhea 10/26/2009     Episodic mood disorder (H) 11/15/2004    Epic     Fructose intolerance 10/3/2018     Generalized anxiety disorder 2/7/2012     Insomnia 3/1/2011     Irritable bowel syndrome with diarrhea 10/3/2018     Leucocytoclastic vasculitis (H) 4/22/2020     Major depression, recurrent, full remission (H) 11/4/2014    Noted at PCP visit.     Myopia 11/14/2008    Myopia - LasEk both 2009     NO ACTIVE PROBLEMS      Restrictive lung disease 12/9/2008     Seasonal affective disorder (H) 10/4/2018     Social phobia 10/20/2016     Past Surgical History  Past Surgical History:   Procedure Laterality Date     LASIK Bilateral 2009     NO HISTORY OF SURGERY       Medications  Current Outpatient Medications   Medication     Calcium Citrate-Vitamin D (CALCIUM + D PO)     cetirizine (ZYRTEC) 10 MG tablet     chlorhexidine (PERIDEX) 0.12 % solution     cholecalciferol (VITAMIN D3) 5000 units (125 mcg) capsule     cycloSPORINE (RESTASIS) 0.05 % ophthalmic emulsion     hydroxychloroquine (PLAQUENIL) 200 MG tablet     ketoconazole (NIZORAL) 2 % external shampoo     meloxicam (MOBIC) 15 MG tablet      norethindrone-ethinyl estradiol (MICROGESTIN 1.5/30) 1.5-30 MG-MCG tablet     olopatadine (PATANOL) 0.1 % ophthalmic solution     Omega-3 Fatty Acids (FISH OIL PO)     spironolactone (ALDACTONE) 100 MG tablet     triamcinolone (KENALOG) 0.1 % external cream     No current facility-administered medications for this visit.     Allergies   Allergies   Allergen Reactions     Doxycycline      Penicillins Rash     rash     Family History: Mother with 'arthritis in her hands'. Mother with 'lung thing' 'sclerosis'. Second cousin with MS.    Social History: Not , no kids. No pregnancies. 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 No drug use/smoking/ETOH abuse.        Objective:   Physical exam:  Laying in bed. NAD. Pleasant and interactive as always  No facial rash. Sclera appear clear  Speaking in full sentences. No use of accessory muscles. No audible wheeze. No cough.     WBC   Date Value Ref Range Status   03/30/2021 7.7 4.0 - 11.0 10^9/L Final     WBC Count   Date Value Ref Range Status   12/23/2022 9.7 4.0 - 11.0 10e3/uL Final     Hemoglobin   Date Value Ref Range Status   12/23/2022 15.3 11.7 - 15.7 g/dL Final   03/30/2021 13.7 11.7 - 15.7 g/dL Final     Platelet Count   Date Value Ref Range Status   12/23/2022 309 150 - 450 10e3/uL Final   03/30/2021 262 150 - 450 10^9/L Final   12/05/2019 281 150 - 450 10e9/L Final     Creatinine   Date Value Ref Range Status   12/23/2022 0.94 0.51 - 0.95 mg/dL Final   03/30/2021 0.87 0.55 - 1.02 mg/dL Final     Lab Results   Component Value Date    ALKPHOS 71 12/23/2022    ALKPHOS 96 03/30/2021     AST   Date Value Ref Range Status   12/23/2022 27 10 - 35 U/L Final   03/30/2021 22 0 - 45 U/L Final     Lab Results   Component Value Date    ALT 21 12/23/2022    ALT 30 03/30/2021     Sed Rate   Date Value Ref Range Status   11/06/2019 16 0 - 20 mm/h Final     Erythrocyte Sedimentation Rate   Date Value Ref Range Status   12/23/2022 11 0 - 20 mm/hr Final     CRP Inflammation   Date Value Ref Range  Status   2019 8.1 (H) 0.0 - 8.0 mg/L Final     CRP   Date Value Ref Range Status   2022 1.3 (H) 0.0-<0.8 mg/dL Final     UA RESULTS:  Recent Labs   Lab Test 22  1053   COLOR Yellow   APPEARANCE Clear   URINEGLC Negative   URINEBILI Negative   URINEKETONE Negative   SG 1.015   UBLD Negative   URINEPH 6.0   PROTEIN Negative   UROBILINOGEN 0.2   NITRITE Negative   LEUKEST Negative   RBCU 0-2   WBCU 0-5      DNA (ds) Antibody   Date Value Ref Range Status   2022 1.0 <10.0 IU/mL Final     Comment:     Negative     RNP Antibody IgG   Date Value Ref Range Status   2022 Negative Negative Final     Aldolase   Date Value Ref Range Status   2022 2.8 1.2 - 7.6 U/L Final     Comment:     REFERENCE INTERVAL: Aldolase    Access complete set of age- and/or gender-specific   reference intervals for this test in the [facility]   Test Directory ([email or web address]).  Performed By: [facility]  [address] [place] [id number]  : Angy Persaud MD     Rheumatoid Factor   Date Value Ref Range Status   2019 <20 <20 IU/mL Final     Cyclic Citrullinated Peptide Antibody IgG   Date Value Ref Range Status   2022 1.1 <=4.9 U/mL Final     2020  ESR 14  C3 155  CRP 0.4  C4 28  CH50 69    May 2020  IgA 611  IgE 38.1  ESR 16  CRP 0.5    MARIAA 1:80 speckled    Imagin2020  X-ray right hand unremarkable without changes consistent with erosive or degenerative disease.    Pathology:  3/19/2020  Right foot punch bx consistent with LCV

## 2023-04-11 ENCOUNTER — VIRTUAL VISIT (OUTPATIENT)
Dept: GASTROENTEROLOGY | Facility: CLINIC | Age: 48
End: 2023-04-11
Payer: COMMERCIAL

## 2023-04-11 ENCOUNTER — TELEPHONE (OUTPATIENT)
Dept: RHEUMATOLOGY | Facility: CLINIC | Age: 48
End: 2023-04-11

## 2023-04-11 VITALS — WEIGHT: 140 LBS | BODY MASS INDEX: 21.93 KG/M2

## 2023-04-11 DIAGNOSIS — E74.10 FRUCTOSE INTOLERANCE: ICD-10-CM

## 2023-04-11 DIAGNOSIS — K58.0 IRRITABLE BOWEL SYNDROME WITH DIARRHEA: Primary | ICD-10-CM

## 2023-04-11 PROCEDURE — 99215 OFFICE O/P EST HI 40 MIN: CPT | Mod: VID | Performed by: DIETITIAN, REGISTERED

## 2023-04-11 RX ORDER — DULOXETIN HYDROCHLORIDE 20 MG/1
20 CAPSULE, DELAYED RELEASE ORAL DAILY
COMMUNITY
Start: 2023-03-07

## 2023-04-11 ASSESSMENT — PAIN SCALES - GENERAL: PAINLEVEL: NO PAIN (0)

## 2023-04-11 NOTE — LETTER
4/11/2023         RE: Sondra Sorensen  [address]  [place] [address]        Dear Colleague,    Thank you for referring your patient, Sondra Sorensen, to the [facility] [place]. Please see a copy of my visit note below.      GI CLINIC VISIT    CC/REFERRING MD:  Jesus Jacobson  REASON FOR CONSULTATION: diarrhea    ASSESSMENT/PLAN:  #Chronic Diarrhea  Patient with 20+ year history of intermittent loose stools, worsening in past 5 to 10 years with episodes of urgent, explosive diarrhea with intermittent incontinence.  Colonoscopy in 2014 unremarkable endoscopically and histology negative for microscopic colitis at that time.  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  She reports that previous lactose intolerance testing was negative.   After last visit labs and stool studies completed including fecal calprotectin, ova and parasite, Giardia/Cryptosporidium, fecal elastase and spot fecal fat, celiac serologies, and TSH with T4 reflex were all of which were negative/normal. She has actually had a big improvement in her symptoms since last visit with being more strict with low fructose diet, increasing fiber supplement, water, and starting Cymbalta (note Cymbalta does care 9-10% side effect of constipation).   Differential for intermitent  includes exacerbation of fructose malabsorption with diet, IBS-D (consistent with abdominal cramping improved after defecation and change in stool form), constipation with overflow, less likely SIBO, bile acid diarrhea microscopic colitis, IBD. Given other rheumatologic conditions could consider other more rare rheumatologic cause, though improvement in symptoms is reasuring.    Given improvement in symptoms she would like to continue current course for now with increasing fiber supplement, working on hydration, starting more physcial activity, and continue to meet with therapist/coninchilango Copembalta. Did touch  our DGBI today and that this may be playing role. She is also interested in meeting with RD to discuss low fructose diet with known component of fructose malabsorption.    If symptoms worsen consider repeat colonoscopy for endoscopic and histologic assessment, is due for dysplasia screening in next year so can complete at same time as well.  Future considerations include SIBO breath testing, abdominal x-ray for stool burden, low FODMAP retrial.    Plan:  -- Continue Citrucel fiber caplets - at least 1-2 caplets per day, can further increase up to 6 caplets pending stool consistency  -- Work on hydration, aim for 64oz daily  -- Walking and activity will also help with bowel pattern  -- Referral to our registered dietitian regarding low fructose diet education  -- please see scheduling information provided below     Colorectal cancer screening: last colonoscopy 2014 normal. Cologaurd 2021 years ago negative (per pt). Due for screening 2024. Discussed that we could order this now, would like to hold off scheduling for now.     RTC 6 months    Thank you for this consultation.  It was a pleasure to participate in the care of this patient; please contact us with any further questions.     57 Minutes was spent on the date of the encounter during chart review, history and exam, documentation, and further activities as noted       Mirna Roth PA-C, JAIRO  Division of Gastroenterology, Hepatology & Nutrition  [facility]        HPI  Sondra Sorensen is a 47 year old female with a history of alopecia, arthralgia, leukoclastic vasculitis, restrictive lung disease, allergic rhinitis, MDD, RAYMOND, IBS-D, fructose malabsorption who presents for evaluation of diarrhea. They are new to the [facility] GI clinic and this is my first encounter with the patient.      She has been following with rheumatology for seronegative RA with response HCQ, recent hand MRI without signs of inflammatory arthritis. She also has history of biopsy proven  "leukoclastic disease. She was followed previously at outside facility diagnosed \"connective tisusue disease with sicca symtpoms.\" Pertinent rheumatologic labs include MARIAA 1:80 in a speckled pattern, negative/ normal RF, ANCA, Broad serologic work-up that showed negative/normal C3/C4/SSA/SSB/smith/RNP/CCP/dsDNA. Elevated ESR/CRP. Most recent labs 12/23/22 showed normalized ESR and persistent elevation in CRP to 20.    Today she reports 20+ year history GI symptoms, namely urgent loose stools., worse in the last 5 to 10 years. Colonoscopy 2014 with normal appearing ileum and colon. On biopsy no evidence of microscopic colitis or other histopathologic abnormality.  She notes previous work-up with [facility], we do not have these records.  I am able to see positive fructose breath test 6/2018 with [facility].  She also reports negative lactose intolerance testing.    Sondra notes that she has a variable stool pattern.  Most typically she has 2 non-urgent stools per day, soft in consistency.  Some days will have 3-4 stools per day with a mix of hard and soft/liquid stools.   Does note intermittent harder stool that requires straining, though only about once every 1 to 2 weeks.  Typically feels empty after stools.  Rare days without stool.   About once per month will have very urgent loose bowel movement, sometimes in car and not able to pull over easily.  This is preceded by feeling \"sick\" with abdominal grumbling, and cramping,  then sudden urge to defecate.  Abdominal cramping resolved after defecation.  Notes that the stools sometimes do appear greasy.  No blood in stool or melena. No night time awakening. After this episode she is typically done with stool for the day, does not continue for multiple episodes.  She does take 1 tablet of Citrucel fiber per day.  Also uses probiotics on and off, feels she has had some benefit.    Given previous diagnosis of fructose malabsorption, she has tried to follow a low fructose diet.  " Though did not get formal dietary counseling on this, has handouts but in recent months has not followed quite as closely.  Feels like bloating improved with these changes but no real change in stool pattern.  She has met with a registered dietitian to review elimination diet (? Low FODMAP) which she tried but was not able to sustain due to restrictiveness of diet and lack of guidance.  Has tried gluten-free and lactose-free diets without significant improvement.    Denies abdominal pain, nausea, vomiting, hematemesis.    Notes history of reflux, more in the last year or so.  Took omeprazole but stopped in November.  Since course symptoms have been better controlled.  Rarely feels heartburn no, previously 3-4 times per week.  Notes large tonsils and cough which have been thought to be related to reflux in the past. No dysphagia or odynophagia.    Takes Iburopfen 1-2 times per week.    Diet Recall:  Breakfast - yogurt, berries, granola (homemade)  Lunch - leftovers/sandwiches  Dinner - variable -meat, starch. Sandwiches and soup.  -- Tries to steer clear of really high fructose foods/drinks. Rare soda.     After last visit labs and stool studies completed including fecal calprotectin, ova and parasite, Giardia/Cryptosporidium, fecal elastase and spot fecal fat, celiac serologies, and TSH with T4 reflex were all of which were negative/normal.    Today 4/11/2023:  Today Sondra reports that over past 3-4 months since last visit she has actually not had an episode of diarrhea. Was having 1-2 episodes extremely urgent/loose stools per month and less extreme episodes at least once per week. Now bowel patten with more solid/formed stools - typically 1-2 [place] 3-4 stools per day. No straining, feels empty typically. Occasionally will have harder stool with straining and feeling of being backed up. No blood in stool or melena.     She continues to have bloating most days, but has been better over past few months. Typically  starts day without feeling of bloating, then builds throughout the day. Worse after meals, typically about an hour after. Notes inulin with cause more bloating.     After last visit increased Citrucel supplementation from 1 caplet every other day to 1-2 caplets daily. Working on hydration. Wants to start being more active with walking as weather is getting nicer.  Additionally around time of last visit she started Cymblata low dose, feels this has been helpful for her anxiety. (9-10% side effect of constipation)    She has also been trying to be more strict with diet and avoid high fructose foods/drinks.    ROS:    No fevers or chills  No weight loss  No blurry vision, double vision or change in vision  No sore throat  No lymphadenopathy  No headache, paraesthesias, or weakness in a limb  No shortness of breath or wheezing  No chest pain or pressure  No arthralgias or myalgias  No rashes or skin changes  No odynophagia or dysphagia  No BRBPR, hematochezia, melena  No dysuria, frequency or urgency  No hot/cold intolerance or polyria  No anxiety or depression    PROBLEM LIST  Patient Active Problem List    Diagnosis Date Noted    Alopecia 09/14/2020     Priority: Medium    Arthralgia 09/14/2020     Priority: Medium    Leucocytoclastic vasculitis (H) 04/22/2020     Priority: Medium    Seasonal affective disorder (H) 10/04/2018     Priority: Medium    Fructose intolerance 10/03/2018     Priority: Medium    CARDIOVASCULAR SCREENING; LDL GOAL LESS THAN 130 10/03/2018     Priority: Medium    Irritable bowel syndrome with diarrhea 10/03/2018     Priority: Medium    Social phobia 10/20/2016     Priority: Medium    Major depression, recurrent, full remission (H) 11/04/2014     Priority: Medium     Noted at PCP visit.      Generalized anxiety disorder 02/07/2012     Priority: Medium    Insomnia 03/01/2011     Priority: Medium    Diarrhea 10/26/2009     Priority: Medium    Blepharitis 05/13/2009     Priority: Medium     Epic       Decreased diffusion capacity of lung 12/09/2008     Priority: Medium    Restrictive lung disease 12/09/2008     Priority: Medium    Myopia 11/14/2008     Priority: Medium     Myopia - LasEk both 2009      Episodic mood disorder (H) 11/15/2004     Priority: Medium     Epic      Allergic rhinitis 04/24/2003     Priority: Medium     Epic         PERTINENT PAST MEDICAL HISTORY:  Past Medical History:   Diagnosis Date    Allergic rhinitis 4/24/2003    Epic    Alopecia 9/14/2020    Arthralgia 9/14/2020    Blepharitis 5/13/2009    Epic    Decreased diffusion capacity of lung 12/9/2008    Diarrhea 10/26/2009    Episodic mood disorder (H) 11/15/2004    Epic    Fructose intolerance 10/3/2018    Generalized anxiety disorder 2/7/2012    Insomnia 3/1/2011    Irritable bowel syndrome with diarrhea 10/3/2018    Leucocytoclastic vasculitis (H) 4/22/2020    Major depression, recurrent, full remission (H) 11/4/2014    Noted at PCP visit.    Myopia 11/14/2008    Myopia - LasEk both 2009    NO ACTIVE PROBLEMS     Restrictive lung disease 12/9/2008    Seasonal affective disorder (H) 10/4/2018    Social phobia 10/20/2016       PREVIOUS SURGERIES:  Past Surgical History:   Procedure Laterality Date    LASIK Bilateral 2009    NO HISTORY OF SURGERY         PREVIOUS ENDOSCOPY:  Colonoscopy 05/12/2014:          ALLERGIES:  Allergies   Allergen Reactions    Doxycycline     Penicillins Rash     rash       PERTINENT MEDICATIONS:    Current Outpatient Medications:     Calcium Citrate-Vitamin D (CALCIUM + D PO), , Disp: , Rfl:     cetirizine (ZYRTEC) 10 MG tablet, Take 10 mg by mouth daily, Disp: , Rfl:     chlorhexidine (PERIDEX) 0.12 % solution, Swish and spit 15 mLs in mouth 2 times daily Can use for 2 weeks and should take a 2 week break before restarting., Disp: 473 mL, Rfl: 1    cholecalciferol (VITAMIN D3) 5000 units (125 mcg) capsule, Take by mouth daily, Disp: , Rfl:     cycloSPORINE (RESTASIS) 0.05 % ophthalmic emulsion, Place 1 drop  into both eyes 2 times daily, Disp: 60 each, Rfl: 5    hydroxychloroquine (PLAQUENIL) 200 MG tablet, Take 1 tablet (200 mg) by mouth 2 times daily Annual Plaquenil toxicity eye screening required., Disp: 360 tablet, Rfl: 0    ketoconazole (NIZORAL) 2 % external shampoo, Apply topically as needed for itching or irritation Shampoo/scalp once or twice weekly as needed for seborrhea symptoms, Disp: 120 mL, Rfl: 11    meloxicam (MOBIC) 15 MG tablet, Take 1 tablet (15 mg) by mouth daily, Disp: 90 tablet, Rfl: 1    norethindrone-ethinyl estradiol (MICROGESTIN 1.5/30) 1.5-30 MG-MCG tablet, Take 1 tablet by mouth daily Active tablets for prevention of menses, Disp: 112 tablet, Rfl: 3    olopatadine (PATANOL) 0.1 % ophthalmic solution, PLACE 1 DROP INTO BOTH EYES 2 TIMES DAILY AS NEEDED, Disp: 5 mL, Rfl: 3    Omega-3 Fatty Acids (FISH OIL PO), , Disp: , Rfl:     spironolactone (ALDACTONE) 100 MG tablet, Take 1 tablet by mouth daily at 2 pm, Disp: , Rfl:     triamcinolone (KENALOG) 0.1 % external cream, Apply topically 2 times daily As needed for rash, Disp: 80 g, Rfl: 3  Ibuprofen 1-2 times per week  Biotin  Align probiotics on and off    SOCIAL HISTORY:  Tobacco: no  Alcohol: rare, up to two drinks every few months   Drugs Use: no  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, [employer] program    Social History     Socioeconomic History    Marital status: Single     Spouse name: Not on file    Number of children: Not on file    Years of education: Not on file    Highest education level: Not on file   Occupational History    Not on file   Tobacco Use    Smoking status: Never    Smokeless tobacco: Never   Vaping Use    Vaping status: Not on file   Substance and Sexual Activity    Alcohol use: Yes     Comment: rare    Drug use: No    Sexual activity: Not Currently     Partners: Male   Other Topics Concern    Parent/sibling w/ CABG, MI or angioplasty before 65F 55M? Not Asked   Social History Narrative    Not on file     Social Determinants of  Health     Financial Resource Strain: Not on file   Food Insecurity: Not on file   Transportation Needs: Not on file   Physical Activity: Not on file   Stress: Not on file   Social Connections: Not on file   Intimate Partner Violence: Not on file   Housing Stability: Not on file     FAMILY HISTORY:  FH of CRC: no  FH of IBD: no  No Colon/Panc/Esophageal/other GI CA. No IBD or Celiac Disease. No other Autoimmune, Liver, or Thyroid disease.    Family History   Problem Relation Age of Onset    Diabetes Father     Glaucoma No family hx of     Macular Degeneration No family hx of      Past/family/social history reviewed and no changes    PHYSICAL EXAMINATION:  Constitutional: AAOx3, cooperative, pleasant, not dyspneic/diaphoretic, no acute distress  Vitals reviewed: There were no vitals taken for this visit.  Wt:   General appearance: Healthy appearing adult, in no acute distress  Eyes: Sclera anicteric, Pupils round and reactive to light  Ears, nose, mouth and throat: No obvious external lesions of ears and nose, Hearing intact  Neck: Symmetric, No obvious external lesions  Respiratory: Normal respiration, no use of accessory muscles   MSK: Gait normal  Skin: No rashes or jaundice   Psychiatric: Oriented to person, place and time, Appropriate mood and affect.       PERTINENT STUDIES:  Lab on 12/23/2022   Component Date Value Ref Range Status    Sodium 12/23/2022 135 (L)  136 - 145 mmol/L Final    Potassium 12/23/2022 4.4  3.4 - 5.3 mmol/L Final    Chloride 12/23/2022 100  98 - 107 mmol/L Final    Carbon Dioxide (CO2) 12/23/2022 20 (L)  22 - 29 mmol/L Final    Anion Gap 12/23/2022 15  7 - 15 mmol/L Final    Urea Nitrogen 12/23/2022 16.0  6.0 - 20.0 mg/dL Final    Creatinine 12/23/2022 0.94  0.51 - 0.95 mg/dL Final    Calcium 12/23/2022 9.5  8.6 - 10.0 mg/dL Final    Glucose 12/23/2022 160 (H)  70 - 99 mg/dL Final    Alkaline Phosphatase 12/23/2022 71  35 - 104 U/L Final    AST 12/23/2022 27  10 - 35 U/L Final    ALT  12/23/2022 21  10 - 35 U/L Final    Protein Total 12/23/2022 8.4 (H)  6.4 - 8.3 g/dL Final    Albumin 12/23/2022 4.6  3.5 - 5.2 g/dL Final    Bilirubin Total 12/23/2022 0.4  <=1.2 mg/dL Final    GFR Estimate 12/23/2022 75  >60 mL/min/1.73m2 Final    Erythrocyte Sedimentation Rate 12/23/2022 11  0 - 20 mm/hr Final    CRP Inflammation 12/23/2022 20.50 (H)  <5.00 mg/L Final    WBC Count 12/23/2022 9.7  4.0 - 11.0 10e3/uL Final    RBC Count 12/23/2022 5.06  3.80 - 5.20 10e6/uL Final    Hemoglobin 12/23/2022 15.3  11.7 - 15.7 g/dL Final    Hematocrit 12/23/2022 45.9  35.0 - 47.0 % Final    MCV 12/23/2022 91  78 - 100 fL Final    MCH 12/23/2022 30.2  26.5 - 33.0 pg Final    MCHC 12/23/2022 33.3  31.5 - 36.5 g/dL Final    RDW 12/23/2022 11.7  10.0 - 15.0 % Final    Platelet Count 12/23/2022 309  150 - 450 10e3/uL Final    % Neutrophils 12/23/2022 69  % Final    % Lymphocytes 12/23/2022 23  % Final    % Monocytes 12/23/2022 7  % Final    % Eosinophils 12/23/2022 1  % Final    % Basophils 12/23/2022 0  % Final    % Immature Granulocytes 12/23/2022 0  % Final    Absolute Neutrophils 12/23/2022 6.7  1.6 - 8.3 10e3/uL Final    Absolute Lymphocytes 12/23/2022 2.2  0.8 - 5.3 10e3/uL Final    Absolute Monocytes 12/23/2022 0.7  0.0 - 1.3 10e3/uL Final    Absolute Eosinophils 12/23/2022 0.1  0.0 - 0.7 10e3/uL Final    Absolute Basophils 12/23/2022 0.0  0.0 - 0.2 10e3/uL Final    Absolute Immature Granulocytes 12/23/2022 0.0  <=0.4 10e3/uL Final     Fructose breath test 6/2018 ([facility]):        Again, thank you for allowing me to participate in the care of your patient.      Sincerely,    Mirna Roth PA-C

## 2023-04-11 NOTE — PROGRESS NOTES
Virtual Visit Details    Type of service:  Video Visit     Originating Location (pt. Location): Home  Distant Location (provider location):  On-site  Platform used for Video Visit: [facility]      GI CLINIC VISIT    CC/REFERRING MD:  Jesus Jacobson  REASON FOR CONSULTATION: diarrhea    ASSESSMENT/PLAN:  #Chronic Diarrhea  Patient with 20+ year history of intermittent loose stools, worsening in past 5 to 10 years with episodes of urgent, explosive diarrhea with intermittent incontinence.  Colonoscopy in 2014 unremarkable endoscopically and histology negative for microscopic colitis at that time.  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  She reports that previous lactose intolerance testing was negative.   After last visit labs and stool studies completed including fecal calprotectin, ova and parasite, Giardia/Cryptosporidium, fecal elastase and spot fecal fat, celiac serologies, and TSH with T4 reflex were all of which were negative/normal. She has actually had a big improvement in her symptoms since last visit with being more strict with low fructose diet, increasing fiber supplement, water, and starting Cymbalta (note Cymbalta does care 9-10% side effect of constipation).   Differential for intermitent  includes exacerbation of fructose malabsorption with diet, IBS-D (consistent with abdominal cramping improved after defecation and change in stool form), constipation with overflow, less likely SIBO, bile acid diarrhea microscopic colitis, IBD. Given other rheumatologic conditions could consider other more rare rheumatologic cause, though improvement in symptoms is reasuring.    Given improvement in symptoms she would like to continue current course for now with increasing fiber supplement, working on hydration, starting more physcial activity, and continue to meet with therapist/coninue Cymbalta. Did touch our DGBI today and that this may be playing role. She is also interested in meeting  with RD to discuss low fructose diet with known component of fructose malabsorption.    If symptoms worsen consider repeat colonoscopy for endoscopic and histologic assessment, is due for dysplasia screening in next year so can complete at same time as well.  Future considerations include SIBO breath testing, abdominal x-ray for stool burden, low FODMAP retrial.    Plan:  -- Continue Citrucel fiber caplets - at least 1-2 caplets per day, can further increase up to 6 caplets pending stool consistency  -- Work on hydration, aim for 64oz daily  -- Walking and activity will also help with bowel pattern  -- Referral to our registered dietitian regarding low fructose diet education  -- please see scheduling information provided below     Colorectal cancer screening: last colonoscopy 2014 normal. Cologaurd 2021 years ago negative (per pt). Due for screening 2024. Discussed that we could order this now, would like to hold off scheduling for now.     RTC 6 months    Thank you for this consultation.  It was a pleasure to participate in the care of this patient; please contact us with any further questions.     57 Minutes was spent on the date of the encounter during chart review, history and exam, documentation, and further activities as noted       Mirna Roth PA-C, RD  Division of Gastroenterology, Hepatology & Nutrition  [facility]        HPI  Sondra Sorensen is a 47 year old female with a history of alopecia, arthralgia, leukoclastic vasculitis, restrictive lung disease, allergic rhinitis, MDD, RAYMOND, IBS-D, fructose malabsorption who presents for evaluation of diarrhea. They are new to the [facility] GI clinic and this is my first encounter with the patient.      She has been following with rheumatology for seronegative RA with response HCQ, recent hand MRI without signs of inflammatory arthritis. She also has history of biopsy proven leukoclastic disease. She was followed previously at outside facility diagnosed  "\"connective tisusue disease with sicca symtpoms.\" Pertinent rheumatologic labs include MARIAA 1:80 in a speckled pattern, negative/ normal RF, ANCA, Broad serologic work-up that showed negative/normal C3/C4/SSA/SSB/smith/RNP/CCP/dsDNA. Elevated ESR/CRP. Most recent labs 12/23/22 showed normalized ESR and persistent elevation in CRP to 20.    Today she reports 20+ year history GI symptoms, namely urgent loose stools., worse in the last 5 to 10 years. Colonoscopy 2014 with normal appearing ileum and colon. On biopsy no evidence of microscopic colitis or other histopathologic abnormality.  She notes previous work-up with [facility], we do not have these records.  I am able to see positive fructose breath test 6/2018 with [facility].  She also reports negative lactose intolerance testing.    Sondra notes that she has a variable stool pattern.  Most typically she has 2 non-urgent stools per day, soft in consistency.  Some days will have 3-4 stools per day with a mix of hard and soft/liquid stools.   Does note intermittent harder stool that requires straining, though only about once every 1 to 2 weeks.  Typically feels empty after stools.  Rare days without stool.   About once per month will have very urgent loose bowel movement, sometimes in car and not able to pull over easily.  This is preceded by feeling \"sick\" with abdominal grumbling, and cramping,  then sudden urge to defecate.  Abdominal cramping resolved after defecation.  Notes that the stools sometimes do appear greasy.  No blood in stool or melena. No night time awakening. After this episode she is typically done with stool for the day, does not continue for multiple episodes.  She does take 1 tablet of Citrucel fiber per day.  Also uses probiotics on and off, feels she has had some benefit.    Given previous diagnosis of fructose malabsorption, she has tried to follow a low fructose diet.  Though did not get formal dietary counseling on this, has handouts but in recent " months has not followed quite as closely.  Feels like bloating improved with these changes but no real change in stool pattern.  She has met with a registered dietitian to review elimination diet (? Low FODMAP) which she tried but was not able to sustain due to restrictiveness of diet and lack of guidance.  Has tried gluten-free and lactose-free diets without significant improvement.    Denies abdominal pain, nausea, vomiting, hematemesis.    Notes history of reflux, more in the last year or so.  Took omeprazole but stopped in November.  Since course symptoms have been better controlled.  Rarely feels heartburn no, previously 3-4 times per week.  Notes large tonsils and cough which have been thought to be related to reflux in the past. No dysphagia or odynophagia.    Takes Iburopfen 1-2 times per week.    Diet Recall:  Breakfast - yogurt, berries, granola (homemade)  Lunch - leftovers/sandwiches  Dinner - variable -meat, starch. Sandwiches and soup.  -- Tries to steer clear of really high fructose foods/drinks. Rare soda.     After last visit labs and stool studies completed including fecal calprotectin, ova and parasite, Giardia/Cryptosporidium, fecal elastase and spot fecal fat, celiac serologies, and TSH with T4 reflex were all of which were negative/normal.    Today 4/11/2023:  Today Sondra reports that over past 3-4 months since last visit she has actually not had an episode of diarrhea. Was having 1-2 episodes extremely urgent/loose stools per month and less extreme episodes at least once per week. Now bowel patten with more solid/formed stools - typically 1-2 [place] 3-4 stools per day. No straining, feels empty typically. Occasionally will have harder stool with straining and feeling of being backed up. No blood in stool or melena.     She continues to have bloating most days, but has been better over past few months. Typically starts day without feeling of bloating, then builds throughout the day. Worse  after meals, typically about an hour after. Notes inulin with cause more bloating.     After last visit increased Citrucel supplementation from 1 caplet every other day to 1-2 caplets daily. Working on hydration. Wants to start being more active with walking as weather is getting nicer.  Additionally around time of last visit she started Cymblata low dose, feels this has been helpful for her anxiety. (9-10% side effect of constipation)    She has also been trying to be more strict with diet and avoid high fructose foods/drinks.    ROS:    No fevers or chills  No weight loss  No blurry vision, double vision or change in vision  No sore throat  No lymphadenopathy  No headache, paraesthesias, or weakness in a limb  No shortness of breath or wheezing  No chest pain or pressure  No arthralgias or myalgias  No rashes or skin changes  No odynophagia or dysphagia  No BRBPR, hematochezia, melena  No dysuria, frequency or urgency  No hot/cold intolerance or polyria  No anxiety or depression    PROBLEM LIST  Patient Active Problem List    Diagnosis Date Noted     Alopecia 09/14/2020     Priority: Medium     Arthralgia 09/14/2020     Priority: Medium     Leucocytoclastic vasculitis (H) 04/22/2020     Priority: Medium     Seasonal affective disorder (H) 10/04/2018     Priority: Medium     Fructose intolerance 10/03/2018     Priority: Medium     CARDIOVASCULAR SCREENING; LDL GOAL LESS THAN 130 10/03/2018     Priority: Medium     Irritable bowel syndrome with diarrhea 10/03/2018     Priority: Medium     Social phobia 10/20/2016     Priority: Medium     Major depression, recurrent, full remission (H) 11/04/2014     Priority: Medium     Noted at PCP visit.       Generalized anxiety disorder 02/07/2012     Priority: Medium     Insomnia 03/01/2011     Priority: Medium     Diarrhea 10/26/2009     Priority: Medium     Blepharitis 05/13/2009     Priority: Medium     Epic       Decreased diffusion capacity of lung 12/09/2008     Priority:  Medium     Restrictive lung disease 12/09/2008     Priority: Medium     Myopia 11/14/2008     Priority: Medium     Myopia - LasEk both 2009       Episodic mood disorder (H) 11/15/2004     Priority: Medium     Epic       Allergic rhinitis 04/24/2003     Priority: Medium     Epic         PERTINENT PAST MEDICAL HISTORY:  Past Medical History:   Diagnosis Date     Allergic rhinitis 4/24/2003    Epic     Alopecia 9/14/2020     Arthralgia 9/14/2020     Blepharitis 5/13/2009    Epic     Decreased diffusion capacity of lung 12/9/2008     Diarrhea 10/26/2009     Episodic mood disorder (H) 11/15/2004    Epic     Fructose intolerance 10/3/2018     Generalized anxiety disorder 2/7/2012     Insomnia 3/1/2011     Irritable bowel syndrome with diarrhea 10/3/2018     Leucocytoclastic vasculitis (H) 4/22/2020     Major depression, recurrent, full remission (H) 11/4/2014    Noted at PCP visit.     Myopia 11/14/2008    Myopia - LasEk both 2009     NO ACTIVE PROBLEMS      Restrictive lung disease 12/9/2008     Seasonal affective disorder (H) 10/4/2018     Social phobia 10/20/2016       PREVIOUS SURGERIES:  Past Surgical History:   Procedure Laterality Date     LASIK Bilateral 2009     NO HISTORY OF SURGERY         PREVIOUS ENDOSCOPY:  Colonoscopy 05/12/2014:          ALLERGIES:  Allergies   Allergen Reactions     Doxycycline      Penicillins Rash     rash       PERTINENT MEDICATIONS:    Current Outpatient Medications:      Calcium Citrate-Vitamin D (CALCIUM + D PO), , Disp: , Rfl:      cetirizine (ZYRTEC) 10 MG tablet, Take 10 mg by mouth daily, Disp: , Rfl:      chlorhexidine (PERIDEX) 0.12 % solution, Swish and spit 15 mLs in mouth 2 times daily Can use for 2 weeks and should take a 2 week break before restarting., Disp: 473 mL, Rfl: 1     cholecalciferol (VITAMIN D3) 5000 units (125 mcg) capsule, Take by mouth daily, Disp: , Rfl:      cycloSPORINE (RESTASIS) 0.05 % ophthalmic emulsion, Place 1 drop into both eyes 2 times daily,  Disp: 60 each, Rfl: 5     hydroxychloroquine (PLAQUENIL) 200 MG tablet, Take 1 tablet (200 mg) by mouth 2 times daily Annual Plaquenil toxicity eye screening required., Disp: 360 tablet, Rfl: 0     ketoconazole (NIZORAL) 2 % external shampoo, Apply topically as needed for itching or irritation Shampoo/scalp once or twice weekly as needed for seborrhea symptoms, Disp: 120 mL, Rfl: 11     meloxicam (MOBIC) 15 MG tablet, Take 1 tablet (15 mg) by mouth daily, Disp: 90 tablet, Rfl: 1     norethindrone-ethinyl estradiol (MICROGESTIN 1.5/30) 1.5-30 MG-MCG tablet, Take 1 tablet by mouth daily Active tablets for prevention of menses, Disp: 112 tablet, Rfl: 3     olopatadine (PATANOL) 0.1 % ophthalmic solution, PLACE 1 DROP INTO BOTH EYES 2 TIMES DAILY AS NEEDED, Disp: 5 mL, Rfl: 3     Omega-3 Fatty Acids (FISH OIL PO), , Disp: , Rfl:      spironolactone (ALDACTONE) 100 MG tablet, Take 1 tablet by mouth daily at 2 pm, Disp: , Rfl:      triamcinolone (KENALOG) 0.1 % external cream, Apply topically 2 times daily As needed for rash, Disp: 80 g, Rfl: 3  Ibuprofen 1-2 times per week  Biotin  Align probiotics on and off    SOCIAL HISTORY:  Tobacco: no  Alcohol: rare, up to two drinks every few months   Drugs Use: no  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, [email or web address] program    Social History     Socioeconomic History     Marital status: Single     Spouse name: Not on file     Number of children: Not on file     Years of education: Not on file     Highest education level: Not on file   Occupational History     Not on file   Tobacco Use     Smoking status: Never     Smokeless tobacco: Never   Vaping Use     Vaping status: Not on file   Substance and Sexual Activity     Alcohol use: Yes     Comment: rare     Drug use: No     Sexual activity: Not Currently     Partners: Male   Other Topics Concern     Parent/sibling w/ CABG, MI or angioplasty before 65F 55M? Not Asked   Social History Narrative     Not on file     Social Determinants of Health      Financial Resource Strain: Not on file   Food Insecurity: Not on file   Transportation Needs: Not on file   Physical Activity: Not on file   Stress: Not on file   Social Connections: Not on file   Intimate Partner Violence: Not on file   Housing Stability: Not on file     FAMILY HISTORY:  FH of CRC: no  FH of IBD: no  No Colon/Panc/Esophageal/other GI CA. No IBD or Celiac Disease. No other Autoimmune, Liver, or Thyroid disease.    Family History   Problem Relation Age of Onset     Diabetes Father      Glaucoma No family hx of      Macular Degeneration No family hx of      Past/family/social history reviewed and no changes    PHYSICAL EXAMINATION:  Constitutional: AAOx3, cooperative, pleasant, not dyspneic/diaphoretic, no acute distress  Vitals reviewed: There were no vitals taken for this visit.  Wt:   General appearance: Healthy appearing adult, in no acute distress  Eyes: Sclera anicteric, Pupils round and reactive to light  Ears, nose, mouth and throat: No obvious external lesions of ears and nose, Hearing intact  Neck: Symmetric, No obvious external lesions  Respiratory: Normal respiration, no use of accessory muscles   MSK: Gait normal  Skin: No rashes or jaundice   Psychiatric: Oriented to person, place and time, Appropriate mood and affect.       PERTINENT STUDIES:  Lab on 12/23/2022   Component Date Value Ref Range Status     Sodium 12/23/2022 135 (L)  136 - 145 mmol/L Final     Potassium 12/23/2022 4.4  3.4 - 5.3 mmol/L Final     Chloride 12/23/2022 100  98 - 107 mmol/L Final     Carbon Dioxide (CO2) 12/23/2022 20 (L)  22 - 29 mmol/L Final     Anion Gap 12/23/2022 15  7 - 15 mmol/L Final     Urea Nitrogen 12/23/2022 16.0  6.0 - 20.0 mg/dL Final     Creatinine 12/23/2022 0.94  0.51 - 0.95 mg/dL Final     Calcium 12/23/2022 9.5  8.6 - 10.0 mg/dL Final     Glucose 12/23/2022 160 (H)  70 - 99 mg/dL Final     Alkaline Phosphatase 12/23/2022 71  35 - 104 U/L Final     AST 12/23/2022 27  10 - 35 U/L Final      ALT 12/23/2022 21  10 - 35 U/L Final     Protein Total 12/23/2022 8.4 (H)  6.4 - 8.3 g/dL Final     Albumin 12/23/2022 4.6  3.5 - 5.2 g/dL Final     Bilirubin Total 12/23/2022 0.4  <=1.2 mg/dL Final     GFR Estimate 12/23/2022 75  >60 mL/min/1.73m2 Final     Erythrocyte Sedimentation Rate 12/23/2022 11  0 - 20 mm/hr Final     CRP Inflammation 12/23/2022 20.50 (H)  <5.00 mg/L Final     WBC Count 12/23/2022 9.7  4.0 - 11.0 10e3/uL Final     RBC Count 12/23/2022 5.06  3.80 - 5.20 10e6/uL Final     Hemoglobin 12/23/2022 15.3  11.7 - 15.7 g/dL Final     Hematocrit 12/23/2022 45.9  35.0 - 47.0 % Final     MCV 12/23/2022 91  78 - 100 fL Final     MCH 12/23/2022 30.2  26.5 - 33.0 pg Final     MCHC 12/23/2022 33.3  31.5 - 36.5 g/dL Final     RDW 12/23/2022 11.7  10.0 - 15.0 % Final     Platelet Count 12/23/2022 309  150 - 450 10e3/uL Final     % Neutrophils 12/23/2022 69  % Final     % Lymphocytes 12/23/2022 23  % Final     % Monocytes 12/23/2022 7  % Final     % Eosinophils 12/23/2022 1  % Final     % Basophils 12/23/2022 0  % Final     % Immature Granulocytes 12/23/2022 0  % Final     Absolute Neutrophils 12/23/2022 6.7  1.6 - 8.3 10e3/uL Final     Absolute Lymphocytes 12/23/2022 2.2  0.8 - 5.3 10e3/uL Final     Absolute Monocytes 12/23/2022 0.7  0.0 - 1.3 10e3/uL Final     Absolute Eosinophils 12/23/2022 0.1  0.0 - 0.7 10e3/uL Final     Absolute Basophils 12/23/2022 0.0  0.0 - 0.2 10e3/uL Final     Absolute Immature Granulocytes 12/23/2022 0.0  <=0.4 10e3/uL Final     Fructose breath test 6/2018 ([facility]):

## 2023-04-11 NOTE — NURSING NOTE
Is the patient currently in the state of [place]? YES    Visit mode:VIDEO    If the visit is dropped, the patient can be reconnected by: VIDEO VISIT: Text to cell phone: [phone]    Will anyone else be joining the visit? NO      How would you like to obtain your AVS? MyChart    Are changes needed to the allergy or medication list? NO    Reason for visit:

## 2023-04-11 NOTE — TELEPHONE ENCOUNTER
EP called 4/11 to sched labs for end of June and 6 month follow up with Dr. Jacobson per checkout notes from 4/7.  Lab: 6/23 and follow up 10/13 via video; pt stated that she will be in [place] for this appt.

## 2023-04-14 ENCOUNTER — TELEPHONE (OUTPATIENT)
Dept: GASTROENTEROLOGY | Facility: CLINIC | Age: 48
End: 2023-04-14
Payer: COMMERCIAL

## 2023-04-20 ENCOUNTER — TELEPHONE (OUTPATIENT)
Dept: GASTROENTEROLOGY | Facility: CLINIC | Age: 48
End: 2023-04-20
Payer: COMMERCIAL

## 2023-04-20 NOTE — TELEPHONE ENCOUNTER
"DINORAH and shan sent (1st attempt) regarding GI follow up orders     Schedule:  \"Return GI\" visit with Mirna Roth for 6 mo follow up (Around 10/14/23)  "

## 2023-06-05 DIAGNOSIS — Z30.011 ORAL CONTRACEPTIVE PRESCRIBED: ICD-10-CM

## 2023-06-05 RX ORDER — NORETHINDRONE ACETATE AND ETHINYL ESTRADIOL .03; 1.5 MG/1; MG/1
1 TABLET ORAL DAILY
Qty: 112 TABLET | Refills: 1 | Status: SHIPPED | OUTPATIENT
Start: 2023-06-05 | End: 2023-07-18

## 2023-06-05 NOTE — TELEPHONE ENCOUNTER
Requested Prescriptions   Pending Prescriptions Disp Refills     norethindrone-ethinyl estradiol (MICROGESTIN 1.5/30) 1.5-30 MG-MCG tablet 112 tablet 3     Sig: Take 1 tablet by mouth daily Active tablets for prevention of menses       There is no refill protocol information for this order        Last Written Prescription Date:  4/6/22  Last Fill Quantity: 112,  # refills: 3   Last office visit: 2/10/22  Future Office Visit:    None    Mychart sent to schedule annual  Medication is being filled for 1 time refill only due to:  Patient needs to be seen because it has been more than one year since last visit.   Marianne Guajardo RN on 6/5/2023 at 3:47 PM

## 2023-06-23 ENCOUNTER — LAB (OUTPATIENT)
Dept: LAB | Facility: CLINIC | Age: 48
End: 2023-06-23
Payer: COMMERCIAL

## 2023-06-23 DIAGNOSIS — M06.09 RHEUMATOID ARTHRITIS, SERONEGATIVE, MULTIPLE SITES (H): ICD-10-CM

## 2023-06-23 DIAGNOSIS — Z79.899 HIGH RISK MEDICATION USE: ICD-10-CM

## 2023-06-23 LAB
ALBUMIN SERPL BCG-MCNC: 4.2 G/DL (ref 3.5–5.2)
ALP SERPL-CCNC: 61 U/L (ref 35–104)
ALT SERPL W P-5'-P-CCNC: 11 U/L (ref 0–50)
ANION GAP SERPL CALCULATED.3IONS-SCNC: 14 MMOL/L (ref 7–15)
AST SERPL W P-5'-P-CCNC: 18 U/L (ref 0–45)
BASOPHILS # BLD AUTO: 0 10E3/UL (ref 0–0.2)
BASOPHILS NFR BLD AUTO: 1 %
BILIRUB SERPL-MCNC: 0.2 MG/DL
BUN SERPL-MCNC: 16.6 MG/DL (ref 6–20)
CALCIUM SERPL-MCNC: 9.1 MG/DL (ref 8.6–10)
CHLORIDE SERPL-SCNC: 105 MMOL/L (ref 98–107)
CREAT SERPL-MCNC: 0.85 MG/DL (ref 0.51–0.95)
CRP SERPL-MCNC: 19.3 MG/L
DEPRECATED HCO3 PLAS-SCNC: 21 MMOL/L (ref 22–29)
EOSINOPHIL # BLD AUTO: 0.1 10E3/UL (ref 0–0.7)
EOSINOPHIL NFR BLD AUTO: 2 %
ERYTHROCYTE [DISTWIDTH] IN BLOOD BY AUTOMATED COUNT: 12.4 % (ref 10–15)
ERYTHROCYTE [SEDIMENTATION RATE] IN BLOOD BY WESTERGREN METHOD: 17 MM/HR (ref 0–20)
GFR SERPL CREATININE-BSD FRML MDRD: 85 ML/MIN/1.73M2
GLUCOSE SERPL-MCNC: 94 MG/DL (ref 70–99)
HCT VFR BLD AUTO: 42 % (ref 35–47)
HGB BLD-MCNC: 13.4 G/DL (ref 11.7–15.7)
IMM GRANULOCYTES # BLD: 0 10E3/UL
IMM GRANULOCYTES NFR BLD: 0 %
LYMPHOCYTES # BLD AUTO: 2.5 10E3/UL (ref 0.8–5.3)
LYMPHOCYTES NFR BLD AUTO: 29 %
MCH RBC QN AUTO: 30.1 PG (ref 26.5–33)
MCHC RBC AUTO-ENTMCNC: 31.9 G/DL (ref 31.5–36.5)
MCV RBC AUTO: 94 FL (ref 78–100)
MONOCYTES # BLD AUTO: 0.8 10E3/UL (ref 0–1.3)
MONOCYTES NFR BLD AUTO: 10 %
NEUTROPHILS # BLD AUTO: 5.1 10E3/UL (ref 1.6–8.3)
NEUTROPHILS NFR BLD AUTO: 60 %
PLATELET # BLD AUTO: 303 10E3/UL (ref 150–450)
POTASSIUM SERPL-SCNC: 4.2 MMOL/L (ref 3.4–5.3)
PROT SERPL-MCNC: 7.4 G/DL (ref 6.4–8.3)
RBC # BLD AUTO: 4.45 10E6/UL (ref 3.8–5.2)
SODIUM SERPL-SCNC: 140 MMOL/L (ref 136–145)
WBC # BLD AUTO: 8.6 10E3/UL (ref 4–11)

## 2023-06-23 PROCEDURE — 36415 COLL VENOUS BLD VENIPUNCTURE: CPT

## 2023-06-23 PROCEDURE — 85652 RBC SED RATE AUTOMATED: CPT

## 2023-06-23 PROCEDURE — 85025 COMPLETE CBC W/AUTO DIFF WBC: CPT

## 2023-06-23 PROCEDURE — 86140 C-REACTIVE PROTEIN: CPT

## 2023-06-23 PROCEDURE — 80053 COMPREHEN METABOLIC PANEL: CPT

## 2023-07-18 ENCOUNTER — OFFICE VISIT (OUTPATIENT)
Dept: OBGYN | Facility: CLINIC | Age: 48
End: 2023-07-18
Payer: COMMERCIAL

## 2023-07-18 VITALS
HEART RATE: 84 BPM | BODY MASS INDEX: 23.86 KG/M2 | DIASTOLIC BLOOD PRESSURE: 72 MMHG | OXYGEN SATURATION: 97 % | HEIGHT: 67 IN | SYSTOLIC BLOOD PRESSURE: 112 MMHG | WEIGHT: 152 LBS

## 2023-07-18 DIAGNOSIS — Z30.011 ORAL CONTRACEPTIVE PRESCRIBED: ICD-10-CM

## 2023-07-18 PROCEDURE — 99213 OFFICE O/P EST LOW 20 MIN: CPT | Performed by: OBSTETRICS & GYNECOLOGY

## 2023-07-18 RX ORDER — NORETHINDRONE ACETATE AND ETHINYL ESTRADIOL .03; 1.5 MG/1; MG/1
1 TABLET ORAL DAILY
Qty: 112 TABLET | Refills: 4 | Status: SHIPPED | OUTPATIENT
Start: 2023-07-18 | End: 2023-11-22

## 2023-07-18 ASSESSMENT — ANXIETY QUESTIONNAIRES
GAD7 TOTAL SCORE: 3
IF YOU CHECKED OFF ANY PROBLEMS ON THIS QUESTIONNAIRE, HOW DIFFICULT HAVE THESE PROBLEMS MADE IT FOR YOU TO DO YOUR WORK, TAKE CARE OF THINGS AT HOME, OR GET ALONG WITH OTHER PEOPLE: NOT DIFFICULT AT ALL
3. WORRYING TOO MUCH ABOUT DIFFERENT THINGS: SEVERAL DAYS
5. BEING SO RESTLESS THAT IT IS HARD TO SIT STILL: NOT AT ALL
7. FEELING AFRAID AS IF SOMETHING AWFUL MIGHT HAPPEN: NOT AT ALL
GAD7 TOTAL SCORE: 3
2. NOT BEING ABLE TO STOP OR CONTROL WORRYING: NOT AT ALL
6. BECOMING EASILY ANNOYED OR IRRITABLE: SEVERAL DAYS
1. FEELING NERVOUS, ANXIOUS, OR ON EDGE: SEVERAL DAYS

## 2023-07-18 ASSESSMENT — PATIENT HEALTH QUESTIONNAIRE - PHQ9
5. POOR APPETITE OR OVEREATING: NOT AT ALL
SUM OF ALL RESPONSES TO PHQ QUESTIONS 1-9: 5

## 2023-07-18 NOTE — PROGRESS NOTES
"CC:   Recheck OCP  HPI:  Sondra Sorensen is a 47 year old female No LMP recorded. (Menstrual status: Birth Control).  Prescribed OCP February 2022 for perimenopausal symptoms and here to follow-up.  Is taking it continuously and rarely has any bleeding.  Notes that her sleep is better but still not perfect.  Her anxiety seems to increase over the winter.  Still has some episodes of forgetting but feels the brain fog is better.  No hot flashes or night sweats noted.  Has a slight stress incontinence and noticing some hair loss.  Has been seen by a lot of doctors this year and will schedule her annual with her PCP for next year.    Allergies: Doxycycline and Penicillins    EXAM:  Blood pressure 112/72, pulse 84, height 1.702 m (5' 7\"), weight 68.9 kg (152 lb), SpO2 97 %, not currently breastfeeding.  BMI= Body mass index is 23.81 kg/m .  No LMP recorded. (Menstrual status: Birth Control).  General - pleasant female in no acute distress.  Neurological - alert and oriented X 3  Psychiatric - normal mood and affect    prep time day of service 3 min  visit time with the patient 11 min  post visit work including documentation time 5 min  Total time: 19 min    ASSESSMENT/PLAN:  (Z30.011) Oral contraceptive prescribed  Comment: Perimenopausal symptoms  Plan: norethindrone-ethinyl estradiol (MICROGESTIN         1.5/30) 1.5-30 MG-MCG tablet        Refill of OCP was done for another year.  discussed likely should stay on this pill until she is at least 51-55 and then consider hormone replacement.  Again discussed possible gabapentin at night to help with sleep as she just started her On this as well.  We will consider but needs to follow-up with rheumatology and does not want to be changing medications at this time.  Discussed PT for the pelvic floor for her stress incontinence and Kegels.  She will let me know if wants referral      Kaelyn Álvarez MD    "

## 2023-07-20 ENCOUNTER — ANCILLARY PROCEDURE (OUTPATIENT)
Dept: MAMMOGRAPHY | Facility: CLINIC | Age: 48
End: 2023-07-20
Payer: COMMERCIAL

## 2023-07-20 DIAGNOSIS — Z12.31 VISIT FOR SCREENING MAMMOGRAM: ICD-10-CM

## 2023-07-20 PROCEDURE — 77063 BREAST TOMOSYNTHESIS BI: CPT

## 2023-07-20 PROCEDURE — 77067 SCR MAMMO BI INCL CAD: CPT

## 2023-08-01 ENCOUNTER — OFFICE VISIT (OUTPATIENT)
Dept: OPHTHALMOLOGY | Facility: CLINIC | Age: 48
End: 2023-08-01
Attending: OPHTHALMOLOGY
Payer: COMMERCIAL

## 2023-08-01 DIAGNOSIS — H02.88B MEIBOMIAN GLAND DYSFUNCTION (MGD), BILATERAL, BOTH UPPER AND LOWER LIDS: Primary | ICD-10-CM

## 2023-08-01 DIAGNOSIS — H02.88A MEIBOMIAN GLAND DYSFUNCTION (MGD), BILATERAL, BOTH UPPER AND LOWER LIDS: Primary | ICD-10-CM

## 2023-08-01 DIAGNOSIS — H16.223 KERATOCONJUNCTIVITIS SICCA OF BOTH EYES NOT SPECIFIED AS SJOGREN'S: ICD-10-CM

## 2023-08-01 PROCEDURE — G0463 HOSPITAL OUTPT CLINIC VISIT: HCPCS | Performed by: OPHTHALMOLOGY

## 2023-08-01 PROCEDURE — 99214 OFFICE O/P EST MOD 30 MIN: CPT | Mod: GC | Performed by: OPHTHALMOLOGY

## 2023-08-01 ASSESSMENT — CONF VISUAL FIELD
OD_INFERIOR_TEMPORAL_RESTRICTION: 0
OD_SUPERIOR_TEMPORAL_RESTRICTION: 0
OS_SUPERIOR_TEMPORAL_RESTRICTION: 0
OS_INFERIOR_TEMPORAL_RESTRICTION: 0
METHOD: COUNTING FINGERS
OD_SUPERIOR_NASAL_RESTRICTION: 0
OS_SUPERIOR_NASAL_RESTRICTION: 0
OD_INFERIOR_NASAL_RESTRICTION: 0
OS_INFERIOR_NASAL_RESTRICTION: 0
OD_NORMAL: 1
OS_NORMAL: 1

## 2023-08-01 ASSESSMENT — EXTERNAL EXAM - LEFT EYE: OS_EXAM: NORMAL

## 2023-08-01 ASSESSMENT — TONOMETRY
OS_IOP_MMHG: 10
OD_IOP_MMHG: 9
IOP_METHOD: ICARE

## 2023-08-01 ASSESSMENT — VISUAL ACUITY
METHOD: SNELLEN - LINEAR
OD_SC+: -1
OD_SC: 20/20
OS_SC: 20/20

## 2023-08-01 ASSESSMENT — EXTERNAL EXAM - RIGHT EYE: OD_EXAM: NORMAL

## 2023-08-01 NOTE — NURSING NOTE
Chief Complaints and History of Present Illnesses   Patient presents with    Dry Eye(s) Follow-Up     11 month follow up MELY + MGD both eyes.      Chief Complaint(s) and History of Present Illness(es)       Dry Eye(s) Follow-Up              Laterality: both eyes    Associated signs and symptoms: Negative for eye pain, burning, redness and blurred vision    Treatments tried: artificial tears and Restasis    Comments: 11 month follow up MELY + MGD both eyes.               Comments    Pt states dryness has improved since last visit.   Compliant with Restasis, but not using warm compress.   Uses AT PRN both eyes. No vision changes.   Has questions regarding Plaquenil use, beginning 1+yr ago.    Jonn Gabriel 12:38 PM August 1, 2023

## 2023-08-01 NOTE — PROGRESS NOTES
CC: Dry Eye follow-up    Referring Provider: Dr Duvall      Initial HPI:    Sondra Sorensen 47 year old White female patient with POH of MELY who presented for evaluation of red irritated eyes. PMH is significant for seasonal allergies and recent trial of spironolactone for acne and hirsutism. .    Interval hx 8/1/23: Patient reports symptoms are mostly better but some episodes of dryness.       POHx:  Last eye exam 4/2022 (Jadiel)    Prior eye surgery/laser: Myopic LASIK 2009 each eye    CTL wearer: NONE    Glasses: NONE    Family Hx of eye disease: NONE aware    Gtts Currenly Taking:  Restasis BID each eye  Artifical Tears 1/2x/day each eye (uses 2-3 times per day, especially in morning)   Vanicream eyelash scrubs  Artificial tear ointment - tried but does not tolerate because blurs vision   Pataday daily each eye (only when flares or increase in allergy season)    Allergies:  Doxycycline  Penicillin    Assessment:  # MELY+ MGD  - moderate TBUT as previously described, sympomatically improving  - significant MG inspissation present - discussed warm compress or cleaning in shower.  - Using ATs  - START WC, continue LH and increase frequency; discussed proper technique  - continue Restasis BID each eye  - Start Parth q HS each eye    # Allergic conjunctivitis              - pt takes daily oral     #Plaquenil-use  Takes for generalized inflammation, being worked up with rheumatology  Took plaquenil briefly in 4/2020 ~4-5 months - she is uncertain dose  Restarted 5/2021-4/2023 400mg daily   4/2023-present 200mg daily  Weight 147 lbs   Will schedule follow up with comprehensive ophthalmology to obtain baseline testing    Follow up Cornea:  Follow up comprehensive for plaquenil screening and controlled blepharitis.  Cornea as needed    Oly Edwadr MD  Cornea and External Disease Fellow  [facility]         Attending Physician Attestation:  Complete documentation of historical and exam elements from  today's encounter can be found in the full encounter summary report (not reduplicated in this progress note).  I personally obtained the chief complaint(s) and history of present illness.  I confirmed and edited as necessary the review of systems, past medical/surgical history, family history, social history, and examination findings as documented by others; and I examined the patient myself.  I personally reviewed the relevant tests, images, and reports as documented above.  I formulated and edited as necessary the assessment and plan and discussed the findings and management plan with the patient and family. - Nasir Loredo MD

## 2023-08-16 ENCOUNTER — OFFICE VISIT (OUTPATIENT)
Dept: OPHTHALMOLOGY | Facility: CLINIC | Age: 48
End: 2023-08-16
Attending: STUDENT IN AN ORGANIZED HEALTH CARE EDUCATION/TRAINING PROGRAM
Payer: COMMERCIAL

## 2023-08-16 DIAGNOSIS — H02.88A MEIBOMIAN GLAND DYSFUNCTION (MGD), BILATERAL, BOTH UPPER AND LOWER LIDS: ICD-10-CM

## 2023-08-16 DIAGNOSIS — Z98.890 HX OF LASIK: ICD-10-CM

## 2023-08-16 DIAGNOSIS — H02.88B MEIBOMIAN GLAND DYSFUNCTION (MGD), BILATERAL, BOTH UPPER AND LOWER LIDS: ICD-10-CM

## 2023-08-16 DIAGNOSIS — Z79.899 HIGH RISK MEDICATION USE: Primary | ICD-10-CM

## 2023-08-16 PROCEDURE — G0463 HOSPITAL OUTPT CLINIC VISIT: HCPCS | Performed by: STUDENT IN AN ORGANIZED HEALTH CARE EDUCATION/TRAINING PROGRAM

## 2023-08-16 PROCEDURE — 92014 COMPRE OPH EXAM EST PT 1/>: CPT | Performed by: STUDENT IN AN ORGANIZED HEALTH CARE EDUCATION/TRAINING PROGRAM

## 2023-08-16 PROCEDURE — 92250 FUNDUS PHOTOGRAPHY W/I&R: CPT | Performed by: STUDENT IN AN ORGANIZED HEALTH CARE EDUCATION/TRAINING PROGRAM

## 2023-08-16 PROCEDURE — 92082 INTERMEDIATE VISUAL FIELD XM: CPT | Performed by: STUDENT IN AN ORGANIZED HEALTH CARE EDUCATION/TRAINING PROGRAM

## 2023-08-16 PROCEDURE — 92134 CPTRZ OPH DX IMG PST SGM RTA: CPT | Performed by: STUDENT IN AN ORGANIZED HEALTH CARE EDUCATION/TRAINING PROGRAM

## 2023-08-16 PROCEDURE — 99207 FUNDUS AUTOFLUORESCENCE IMAGE (FAF) OU (BOTH EYES): CPT | Mod: 26 | Performed by: STUDENT IN AN ORGANIZED HEALTH CARE EDUCATION/TRAINING PROGRAM

## 2023-08-16 ASSESSMENT — VISUAL ACUITY
OS_SC: 20/15
METHOD: SNELLEN - LINEAR
OD_SC: 20/20

## 2023-08-16 ASSESSMENT — CONF VISUAL FIELD
OD_NORMAL: 1
OS_INFERIOR_NASAL_RESTRICTION: 0
OD_SUPERIOR_NASAL_RESTRICTION: 0
OS_INFERIOR_TEMPORAL_RESTRICTION: 0
OS_SUPERIOR_NASAL_RESTRICTION: 0
OD_INFERIOR_NASAL_RESTRICTION: 0
OD_INFERIOR_TEMPORAL_RESTRICTION: 0
OD_SUPERIOR_TEMPORAL_RESTRICTION: 0
OS_NORMAL: 1
OS_SUPERIOR_TEMPORAL_RESTRICTION: 0

## 2023-08-16 ASSESSMENT — CUP TO DISC RATIO
OD_RATIO: 0.5
OS_RATIO: 0.4

## 2023-08-16 ASSESSMENT — TONOMETRY
IOP_METHOD: TONOPEN
OD_IOP_MMHG: 10
OS_IOP_MMHG: 14

## 2023-08-16 ASSESSMENT — EXTERNAL EXAM - RIGHT EYE: OD_EXAM: NORMAL

## 2023-08-16 ASSESSMENT — EXTERNAL EXAM - LEFT EYE: OS_EXAM: NORMAL

## 2023-08-16 NOTE — PROGRESS NOTES
"HPI       Annual Eye Exam    In both eyes.  Associated symptoms include Negative for eye pain, pain with eye movement, flashes and floaters. Additional comments: Plaquenil monitoring  Took plaquenil briefly in 4/2020 ~4-5 months - she is uncertain dose  Restarted 5/2021-4/2023 400mg daily   4/2023-present 200mg daily  Weight 152 lbs   Height 5'6.5\"  Jessa Crabtree COA 12:35 PM August 16, 2023             Last edited by Iman Mendoza MD on 8/18/2023  8:21 AM.          Review of systems for the eyes was negative other than the pertinent positives/negatives listed in the HPI.    Ocular Meds: restasis BID OU; ATs prn OU; warm compresses prn    Ocular Hx: Myopic LASIK OU (2009)    FOHx: no family history of glaucoma or blindness    PMHx:   Past Medical History:   Diagnosis Date    Allergic rhinitis 04/24/2003    Epic    Alopecia 09/14/2020    Arthralgia 09/14/2020    Blepharitis 05/13/2009    Epic    Decreased diffusion capacity of lung 12/09/2008    Diarrhea 10/26/2009    Episodic mood disorder (H) 11/15/2004    Epic    Fructose intolerance 10/03/2018    Generalized anxiety disorder 02/07/2012    Insomnia 03/01/2011    Irritable bowel syndrome with diarrhea 10/03/2018    Leucocytoclastic vasculitis (H) 04/22/2020    Major depression, recurrent, full remission (H) 11/04/2014    Noted at PCP visit.    Menopause 04/2022    Myopia 11/14/2008    Myopia - LasEk both 2009    NO ACTIVE PROBLEMS     Restrictive lung disease 12/09/2008    Seasonal affective disorder (H) 10/04/2018    Social phobia 10/20/2016     Assessment & Plan      Sondra Sorensen is a 47 year old female with the following diagnoses:    High Risk Medication Use  - patient presents for examination for plaquenil use  - Using plaquenil for: chronic inflammatory process, being worked up by rheumatology  - Started plaquenil: Took plaquenil briefly in 4/2020 ~4-5 months - she is uncertain dose  Restarted 5/2021-4/2023 400mg daily   4/2023-present 200mg daily  - " Current Plaquenil dose: 200 mg daily  - Patient's current weight: 68.9 kg  - no concurrent tamoxifen use  - Fundus autofluorescence, OCT Macula, and 10-2 visual field testing obtained today all were grossly unremarkable including a normal dilated fundus exam without any signs of bull's eye maculopathy  - Per American Academy of Ophthalmology guidelines to minimize risk of developing retinal toxicity from plaquenil use, patient to take an average of < 5 mg/kg/day; patient understands that there is a cumulative dose that results in toxicity and will notify their PCP/rheumatologist/ophthalmologist ASAP for any new ocular or visual complaints  - Ophthalmology to monitor with dilated fundus exam, visual field testing, and diagnostic imaging routinely  - letter sent to patient's rheumatologist, Dr Jacobson    MGD/Dry Eyes OU  - warm compresses BID OU x 5-10 min each time  - lid scrubs BID OU with baby shampoo or lid scrubs with products such as OcuSOFT   - PFATs QID and prn OU  - restasis BID OU    LASIK OU  - excellent vision without correction for distance    Counseled return/RD precautions    Patient disposition:   Return in about 1 year (around 8/16/2024) for Annual Visit, FAF, OCT Macula, OVF 10-2, or sooner changes.      Attending Physician Attestation:  Complete documentation of historical and exam elements from today's encounter can be found in the full encounter summary report (not reduplicated in this progress note).  I personally obtained the chief complaint(s) and history of present illness.  I confirmed and edited as necessary the review of systems, past medical/surgical history, family history, social history, and examination findings as documented by others; and I examined the patient myself.  I personally reviewed the relevant tests, images, and reports as documented above.  I formulated and edited as necessary the assessment and plan and discussed the findings and management plan with the patient and  family. . - Iman Mendoza MD

## 2023-08-16 NOTE — NURSING NOTE
"Chief Complaints and History of Present Illnesses   Patient presents with    Annual Eye Exam     Plaquenil monitoring  Took plaquenil briefly in 4/2020 ~4-5 months - she is uncertain dose  Restarted 5/2021-4/2023 400mg daily   4/2023-present 200mg daily  Weight 147 lbs   Height 5'6.5\"  Jessa PUGH 12:35 PM August 16, 2023        Chief Complaint(s) and History of Present Illness(es)       Annual Eye Exam              Laterality: both eyes    Associated symptoms: Negative for eye pain, pain with eye movement, flashes and floaters    Comments: Plaquenil monitoring  Took plaquenil briefly in 4/2020 ~4-5 months - she is uncertain dose  Restarted 5/2021-4/2023 400mg daily   4/2023-present 200mg daily  Weight 147 lbs   Height 5'6.5\"  Jessa PUGH 12:35 PM August 16, 2023                       "

## 2023-08-16 NOTE — LETTER
"8/16/2023       RE: Sondra Sorensen  [address]  [place] [address]     Dear Colleague,    Thank you for referring your patient, Sondra Sorensen, to the [facility] [place] at [facility]. Please see a copy of my visit note below.    HPI       Annual Eye Exam    In both eyes.  Associated symptoms include Negative for eye pain, pain with eye movement, flashes and floaters. Additional comments: Plaquenil monitoring  Took plaquenil briefly in 4/2020 ~4-5 months - she is uncertain dose  Restarted 5/2021-4/2023 400mg daily   4/2023-present 200mg daily  Weight 152 lbs   Height 5'6.5\"  Jessa Crabtree COA 12:35 PM August 16, 2023             Last edited by Iman Mendoza MD on 8/18/2023  8:21 AM.          Review of systems for the eyes was negative other than the pertinent positives/negatives listed in the HPI.    Ocular Meds: restasis BID OU; ATs prn OU; warm compresses prn    Ocular Hx: Myopic LASIK OU (2009)    FOHx: no family history of glaucoma or blindness    PMHx:   Past Medical History:   Diagnosis Date     Allergic rhinitis 04/24/2003    Epic     Alopecia 09/14/2020     Arthralgia 09/14/2020     Blepharitis 05/13/2009    Epic     Decreased diffusion capacity of lung 12/09/2008     Diarrhea 10/26/2009     Episodic mood disorder (H) 11/15/2004    Epic     Fructose intolerance 10/03/2018     Generalized anxiety disorder 02/07/2012     Insomnia 03/01/2011     Irritable bowel syndrome with diarrhea 10/03/2018     Leucocytoclastic vasculitis (H) 04/22/2020     Major depression, recurrent, full remission (H) 11/04/2014    Noted at PCP visit.     Menopause 04/2022     Myopia 11/14/2008    Myopia - LasEk both 2009     NO ACTIVE PROBLEMS      Restrictive lung disease 12/09/2008     Seasonal affective disorder (H) 10/04/2018     Social phobia 10/20/2016     Assessment & Plan      Sondra Sorensen is a 47 year old female with the following " diagnoses:    High Risk Medication Use  - patient presents for examination for plaquenil use  - Using plaquenil for: chronic inflammatory process, being worked up by rheumatology  - Started plaquenil: Took plaquenil briefly in 4/2020 ~4-5 months - she is uncertain dose  Restarted 5/2021-4/2023 400mg daily   4/2023-present 200mg daily  - Current Plaquenil dose: 200 mg daily  - Patient's current weight: 68.9 kg  - no concurrent tamoxifen use  - Fundus autofluorescence, OCT Macula, and 10-2 visual field testing obtained today all were grossly unremarkable including a normal dilated fundus exam without any signs of bull's eye maculopathy  - Per American Academy of Ophthalmology guidelines to minimize risk of developing retinal toxicity from plaquenil use, patient to take an average of < 5 mg/kg/day; patient understands that there is a cumulative dose that results in toxicity and will notify their PCP/rheumatologist/ophthalmologist ASAP for any new ocular or visual complaints  - Ophthalmology to monitor with dilated fundus exam, visual field testing, and diagnostic imaging routinely  - letter sent to patient's rheumatologist, Dr Jacobson    MGD/Dry Eyes OU  - warm compresses BID OU x 5-10 min each time  - lid scrubs BID OU with baby shampoo or lid scrubs with products such as OcuSOFT   - PFATs QID and prn OU  - restasis BID OU    LASIK OU  - excellent vision without correction for distance    Counseled return/RD precautions    Patient disposition:   Return in about 1 year (around 8/16/2024) for Annual Visit, FAF, OCT Macula, OVF 10-2, or sooner changes.      Attending Physician Attestation:  Complete documentation of historical and exam elements from today's encounter can be found in the full encounter summary report (not reduplicated in this progress note).  I personally obtained the chief complaint(s) and history of present illness.  I confirmed and edited as necessary the review of systems, past medical/surgical  history, family history, social history, and examination findings as documented by others; and I examined the patient myself.  I personally reviewed the relevant tests, images, and reports as documented above.  I formulated and edited as necessary the assessment and plan and discussed the findings and management plan with the patient and family. . - Iman Mendoza MD     Again, thank you for allowing me to participate in the care of your patient.      Sincerely,    Iman Mendoza MD

## 2023-09-24 DIAGNOSIS — M06.09 RHEUMATOID ARTHRITIS, SERONEGATIVE, MULTIPLE SITES (H): ICD-10-CM

## 2023-09-27 RX ORDER — HYDROXYCHLOROQUINE SULFATE 200 MG/1
200 TABLET, FILM COATED ORAL 2 TIMES DAILY
Qty: 60 TABLET | Refills: 10 | Status: SHIPPED | OUTPATIENT
Start: 2023-09-27 | End: 2024-06-25

## 2023-09-27 NOTE — TELEPHONE ENCOUNTER
hydroxychloroquine (PLAQUENIL) 200 MG tablet     Plaquenil      Last Written Prescription Date:  10-  Last Fill Quantity: 360,   # refills: 0  Last Office Visit 4-7-2023  Future Office visit: 10-    Last Eye Exam:    Refill Team has reviewed [facility] and Media.    Found - date:     8/16/2023  [facility]     Iman Mendoza MD  Ophthalmology High risk medication use +2 more  Dx Annual Eye Exam ; Referred by Nasir Loredo MD     Labs completed on :6-    Creatinine 0.51 - 0.95 mg/dL 0.85

## 2023-10-08 ENCOUNTER — HEALTH MAINTENANCE LETTER (OUTPATIENT)
Age: 48
End: 2023-10-08

## 2023-10-11 ENCOUNTER — MYC MEDICAL ADVICE (OUTPATIENT)
Dept: GASTROENTEROLOGY | Facility: CLINIC | Age: 48
End: 2023-10-11
Payer: COMMERCIAL

## 2023-10-11 DIAGNOSIS — K58.0 IRRITABLE BOWEL SYNDROME WITH DIARRHEA: Primary | ICD-10-CM

## 2023-10-11 DIAGNOSIS — E74.10 FRUCTOSE INTOLERANCE: ICD-10-CM

## 2023-10-13 ENCOUNTER — VIRTUAL VISIT (OUTPATIENT)
Dept: RHEUMATOLOGY | Facility: CLINIC | Age: 48
End: 2023-10-13
Attending: INTERNAL MEDICINE
Payer: COMMERCIAL

## 2023-10-13 VITALS — WEIGHT: 147 LBS | BODY MASS INDEX: 23.07 KG/M2 | HEIGHT: 67 IN

## 2023-10-13 DIAGNOSIS — M06.09 RHEUMATOID ARTHRITIS, SERONEGATIVE, MULTIPLE SITES (H): Primary | ICD-10-CM

## 2023-10-13 DIAGNOSIS — Z79.899 HIGH RISK MEDICATION USE: ICD-10-CM

## 2023-10-13 PROCEDURE — 99214 OFFICE O/P EST MOD 30 MIN: CPT | Mod: VID | Performed by: INTERNAL MEDICINE

## 2023-10-13 ASSESSMENT — PAIN SCALES - GENERAL: PAINLEVEL: NO PAIN (0)

## 2023-10-13 NOTE — LETTER
10/13/2023       RE: Sondra Sorensen  [address]  [place] [address]     Dear Colleague,    Thank you for referring your patient, Sondra Sorensen, to the [facility] [place] [facility]. Please see a copy of my visit note below.    Virtual Visit Details    Type of service:  Video Visit   Joined the call at 10/13/2023, 8:26:04 am.  Left the call at 10/13/2023, 8:43:17 am.  You were on the call for 17 minutes 13 seconds .  Originating Location (pt. Location): Home    Distant Location (provider location):  Off-site  Platform used for Video Visit: [facility]      Outpatient Rheumatology Follow-up  This visit was conducted via synchronous video visit due to the current COVID-19 crisis to reduce patient risk.  Verbal consent was obtained and is documented below.    Name: Sondra Sorensen    MRN [id number]   Today's date: 10/13/23  Date of last visit: 4/7/23  Date of last visit: 12/30/2022         Reason for follow-up:  Seronegative rheumatoid arthritis   Requesting physician: Mena Bella MD             Assessment & Plan:   47 year old female with history of bx proven LCV, MINOR 1:80 in a speckled pattern, negative/ normal RF, ANCA, CBC, CMP, UA previously followed with an outside rheumatologist Dr Alok Myers at [facility] for 'ill defined' CTD with sicca symptoms, arthralgia, positive minor then rechecked was negative, and LCV.  Her dry mouth symptoms had not been severe to cause dental disease. Her joint symptoms are not accompanied by erythema/edema/warmth.  I do note a prior work-up by pulmonary and cardiology in 2008 where she was noted to have a mild restrictive process by PFTs, without desaturation. This work-up was done due to symptoms of dyspnea with mild exertion, none at rest. Neither specialty was able to find any disease to attribute this to. Has since had PFTs repeated, most recently in 2017 which again showed  mild restrictive pulmonary defect with normal DLCO. Respiratory muscle force was decreased, raising the question of neuromuscular process. Has not had follow-up or evaluation by neurology. At time of our initial consultation (may 2021), we decided to obtained additional serologies (mostly driven by her restrictive pattern on PFTs) and follow-up in 4 months to assess for new/evolving symptoms. Broad serologic work-up at that time showed negative/normal C3/C4/SSA/SSB/smith/RNP/CCP/dsDNA. However her ESR was 28 and her CRP was 8.1. I called her on 4/21/22 and discussed the above results and risks/benefits of starting HCQ for what is most likely consistent with seronegative inflammatory arthritis. At her follow-up with me on 8/5/22, she had a positive response to HCQ. No side effects noted. Positive improvements have included 1) able to make a full fist upon waking 2) no longer needing to pry her fists open in the AM 3) less frequent/severe flares of pain. Tolerating well without side effects. She presents today for follow-up on HCQ 200mg once daily.     Seronegative rheumatoid arthritis: Overall disease activity is low. She continues to derive benefit from cymbalta, which was restarted recently. Will continue on HCQ 200mg once daily.   -200mg once daily every day  -can continue mobic 15mg once daily prn    High risk medication use: HCQ  - Most recent labs reviewed from 6/23/23 to include CBC, CMP which do not show any evidence of drug toxicity.  - We will continue routine screening drug toxicity monitoring every 6 months with CBC, CMP while on Plaquenil. Labs are due again at the end of Dec 2023.   -- Due again for yearly routine screening ophthalmology exam in august 2024. Had most recent eye exam for routine screening HCQ retinal tox on 8/16/23 which did not show evidence of HCQ toxicity.     Will follow-up in 6 months    Jesus Jacobson MD  Rheumatology     Subjective:   10/13/23  The patient reports that she  stopped taking Cymbalta around April 2 due to trouble sleeping and increased energy levels. She experienced joint pain and pain in her elbows after stopping the medication. The patient restarted Cymbalta approximately two months ago and has since noticed an improvement in her symptoms. She is unsure if Plaquenil is providing any benefit.    The patient mentions that she was recently diagnosed as pre-diabetic. She does not experience significant morning stiffness, but occasionally experiences soreness in her elbows and stiffness in her fingers. In the past couple of months, she has only had a handful of days with symptoms consistent with rheumatoid arthritis.    The patient reports occasional stomach aches and mild nausea after taking Plaquenil, but these symptoms have mostly subsided. She denies any fevers, chills, night sweats, rashes, ulcers, or sores in the nose or mouth. She has dry eyes and is using artificial tears and a more stasis for management.    The patient is planning to see a nutritionist for both fructose and pre-diabetes management. She has been taking ibuprofen for pain relief but is considering switching to Tylenol due to a potential interaction with Cymbalta. She still has a supply of Mobic available.    14 point review of systems collected and negative if not documented above.    4/7/23  -saw GI. Upped her fiber. Not yet met with nutrition. Has not had diarrea for a few weeks. Still periods of gas/cramping. Less loose stools. Has been taking pro-biotics which she thinks has been some helpful  -Feels that her joints have not been bothering her much. Has been started on cymbalta for SAD/depression. Severe insomnia in January, prior to starting. Cognitive slowing. Felt improved after 2 weeks. Currently on 20mg once daily.   -The additional reason for cymbalta was for potential help with non inflammatory pain symptoms  -has continued on HCQ 200mg BID on weekdays and once daily on weekend days  -no  new/progressive fevers/chills/night sweats. No new/progressive unintentional weight loss  -Has been tolerating HCQ without noticeable side effects  -is due for routine screening OCT exam in august for her yearly exam, needs to schedule this.   14 point ROS collected and negative if not documented above    Interval history 12/30/2022  She wrote in towards middle of November reporting reddish blotchy skin changes between her knees and ankles.  No none of the skin lesions consistent with her prior LCV in early 2020.  Also with new pain in her elbows and knees.  Though this was only 1 time and short-lived.  She reports that on 11 2 she woke up in the joints in her hands knees elbows were sore.  She cannot attribute this increased pain to any activity previous to that day.  It lasted only 1 day and then resolved.  Since that time she is had increased low-grade stiffness aches and fatigue.  Also some GI upset and appetite change and headache.  The symptoms wax and wane.  Muscle tenderness and aches worse in the evening.  She reports the only medication change can think of is that she ran out of her spironolactone prescription and cannot get this refilled prior to seeing dermatology.  So she is been off this since about the beginning of November.  Stayed home and slept, possibly took meloxicam.  Symptoms improved. Has been feeling more stiff/sore achy. She feels that her hands have been more stiff throughout the day. Has been dropping items. Went to hand OT x2, has the exercises to do at home though difficult to do them at home due to motivation. She is fructose intolerant, though has been more quiet recently in terms of GI symptoms. No rashes though with dry skin. No fevers/chills/night. Achiness is worse in the evenings in general. Her stiffness is pretty persistent thoughtout the day, maybe some worse in the AM.  Review of systems otherwise negative.      Interval History August 5, 2022  Started on HCQ on 4/21 due to  ESR 28/CRP 1.3. Repeat on 7/28 showed ESR 17 and CRP 14. Tolerating without noticeable side effects. Still aching in the bilateral CMCs when driving for an hour, needs to readjust. Has some intermittent stiffness after increased activity the day before. Has no longer had the symptoms where she needed to pry her hands open. Also now able to make full fist upon waking now. Had been having night sweats, has gone on birth control and resolved this. No rashes, SOB, chest pain. Has been having worsening GERD, though more stressed out. Rare bloating/diarrhea, no blood in stool. GERD bothers her at night when going to sleep. Wakes with scratchy throat. Has felt nauseous at night/dry heaves. Feels that she has been having foods getting stuck in her throat. Has been getting tonsils. Has not seen someone for this. Has been using restasis for her dry eyes. Has gone back on birth control which has been helpful. Hair not falling out as it had been as well. So she thinks was hormonal driven. 14 point review of systems collected and negative if not documented above.      Interval history 2/25/22  Has had complete resolution of her cutaneous lesions. For 4-5 days in a row, intermittently, will wake up in the AM with feeling that she has been clenching her fists overnight. Needs to pry them open. Feels that her whole body is stiff. Her hands are difficult to both open and close. She thinks that it is related to painting her bedroom last week. Last time this happened was a few months ago. No erythema/edema/warmth. Has ongoing dry eyes, saw ophthalmology for this. Has dry mouth, though only in the AM and not during the day. Has not affected her dentition. Intentionally lost 20 pounds, has gained back 5 pounds or so. No new fevers, chills, night sweats. No new chest pain/shortness of breath. No recurrent/ interval infections.  No dysphagia. Her ROS was otherwise negative.     HPI from initial consultation May 2021  In February developed  hair loss. Went in to derm, dx as alopecia areata. Then mid march had small purple spots on legs. Had evaluation with biopsy done which were consistent with LCV. Then referred to rheumatology. Many lesions between ankles/knees. By the time she got in to see rheumatology, had almost resolved/fading without taking any medications. Was started on HCQ, though had significant GERD. Sent to ENT without abnormalities found. HCQ stopped.     Intermittently has stiffness in the AM in left hand 4th and 5th digit. All feel stiff. Occurs 3-4 days per week. Lasts for 10 minutes. Also when still for periods of time. She thinks they feel swollen, no actual swelling. No redness or warmth. Mild soreness. For the fingers that trigger, its the DIPs. For the others, the PIPs. Feet also sore in the AM when taking first steps, resolves within 10 steps. Also back with twisting. Things loosen up quickly. Has been going on for some years, joint symptoms. No systemic immunosuppressive therapy around the time of her skin lesions with steroids or other.     Reports diffuse joint and muscle sore/aching in the evenings. Takes advil and improves. Some hair thinning, not new or progressive. Other than LCV, prior ?roceasea. No other facial rash. Reports break out on chest and top of back. Has had 2 episodes of hypersensitivity reactions on ankles/wrists exposures to known allergens. Had been treated with prednisone for this. Had eczema as a child. None now. No personal or first degree with psoriasis. Had an episode of pleurisy after URI, which resolved. Has fructose intolerance. For many years had diarrhea. No blood in the stool. Had colonoscopy in 2014 which was normal. No difficulty swallowing. No food stuck in her throat. Has dry eyes, though started after lasik. Has dry mouth in the AM. Has flushed feeling in fingers. No fevers/chills. No weight changes. No new or persistent headache.  No change in vision or hearing.  No inflammatory eye  disease history.  No history of miscarriage.  No history of blood clots. No photosensitive rash.  No nasal or oral ulcers.  No recurrent epistaxis or hemoptysis.  No recurrent sinusitis or recurrent pneumonia.  No chest pain or shortness of breath.  No nausea or vomiting.  No foamy or frothy urine.  No bowel or bladder incontinence.  No change in strength or sensation in the arms or legs.  No pitting or brittle nails.  No triphasic color change consistent with Raynaud's.  No skin thickening or tightening consistent with sclerodactyly.  No symptoms consistent with dactylitis. No genital ulcers or lesions.    Past Medical History  Past Medical History:   Diagnosis Date    Allergic rhinitis 04/24/2003    Epic    Alopecia 09/14/2020    Arthralgia 09/14/2020    Blepharitis 05/13/2009    Epic    Decreased diffusion capacity of lung 12/09/2008    Diarrhea 10/26/2009    Episodic mood disorder (H) 11/15/2004    Epic    Fructose intolerance 10/03/2018    Generalized anxiety disorder 02/07/2012    Insomnia 03/01/2011    Irritable bowel syndrome with diarrhea 10/03/2018    Leucocytoclastic vasculitis (H) 04/22/2020    Major depression, recurrent, full remission (H) 11/04/2014    Noted at PCP visit.    Menopause 04/2022    Myopia 11/14/2008    Myopia - LasEk both 2009    NO ACTIVE PROBLEMS     Restrictive lung disease 12/09/2008    Seasonal affective disorder (H) 10/04/2018    Social phobia 10/20/2016     Past Surgical History  Past Surgical History:   Procedure Laterality Date    LASIK Bilateral 2009    NO HISTORY OF SURGERY       Medications  Current Outpatient Medications   Medication    Calcium Citrate-Vitamin D (CALCIUM + D PO)    cetirizine (ZYRTEC) 10 MG tablet    chlorhexidine (PERIDEX) 0.12 % solution    cholecalciferol (VITAMIN D3) 5000 units (125 mcg) capsule    cycloSPORINE (RESTASIS) 0.05 % ophthalmic emulsion    DULoxetine (CYMBALTA) 20 MG capsule    hydroxychloroquine (PLAQUENIL) 200 MG tablet    ketoconazole  (NIZORAL) 2 % external shampoo    meloxicam (MOBIC) 15 MG tablet    norethindrone-ethinyl estradiol (MICROGESTIN 1.5/30) 1.5-30 MG-MCG tablet    olopatadine (PATANOL) 0.1 % ophthalmic solution    Omega-3 Fatty Acids (FISH OIL PO)    spironolactone (ALDACTONE) 100 MG tablet    triamcinolone (KENALOG) 0.1 % external cream     No current facility-administered medications for this visit.     Allergies   Allergies   Allergen Reactions    Doxycycline     Penicillins Rash     rash     Family History: Mother with 'arthritis in her hands'. Mother with 'lung thing' 'sclerosis'. Second cousin with MS.    Social History: Not , no kids. No pregnancies. 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 No drug use/smoking/ETOH abuse.        Objective:   Physical exam:  Sitting on couch. NAD. Pleasant and interactive as always  No facial rash. Sclera appear clear  Speaking in full sentences. No use of accessory muscles. No audible wheeze. No cough.   Fluid movement of bilateral shoulders elbows wrists MCPs PIPs Dips throughout the encounter    WBC   Date Value Ref Range Status   03/30/2021 7.7 4.0 - 11.0 10^9/L Final     WBC Count   Date Value Ref Range Status   06/23/2023 8.6 4.0 - 11.0 10e3/uL Final     Hemoglobin   Date Value Ref Range Status   06/23/2023 13.4 11.7 - 15.7 g/dL Final   03/30/2021 13.7 11.7 - 15.7 g/dL Final     Platelet Count   Date Value Ref Range Status   06/23/2023 303 150 - 450 10e3/uL Final   03/30/2021 262 150 - 450 10^9/L Final   12/05/2019 281 150 - 450 10e9/L Final     Creatinine   Date Value Ref Range Status   06/23/2023 0.85 0.51 - 0.95 mg/dL Final   03/30/2021 0.87 0.55 - 1.02 mg/dL Final     Lab Results   Component Value Date    ALKPHOS 71 12/23/2022    ALKPHOS 96 03/30/2021     AST   Date Value Ref Range Status   06/23/2023 18 0 - 45 U/L Final     Comment:     Reference intervals for this test were updated on 6/12/2023 to more accurately reflect our healthy population. There may be differences in the flagging of prior  results with similar values performed with this method. Interpretation of those prior results can be made in the context of the updated reference intervals.   2021 22 0 - 45 U/L Final     Lab Results   Component Value Date    ALT 21 2022    ALT 30 2021     Sed Rate   Date Value Ref Range Status   2019 16 0 - 20 mm/h Final     Erythrocyte Sedimentation Rate   Date Value Ref Range Status   2023 17 0 - 20 mm/hr Final     CRP Inflammation   Date Value Ref Range Status   2019 8.1 (H) 0.0 - 8.0 mg/L Final     CRP   Date Value Ref Range Status   2022 1.3 (H) 0.0-<0.8 mg/dL Final     UA RESULTS:  Recent Labs   Lab Test 22  1053   COLOR Yellow   APPEARANCE Clear   URINEGLC Negative   URINEBILI Negative   URINEKETONE Negative   SG 1.015   UBLD Negative   URINEPH 6.0   PROTEIN Negative   UROBILINOGEN 0.2   NITRITE Negative   LEUKEST Negative   RBCU 0-2   WBCU 0-5      DNA (ds) Antibody   Date Value Ref Range Status   2022 1.0 <10.0 IU/mL Final     Comment:     Negative     RNP Antibody IgG   Date Value Ref Range Status   2022 Negative Negative Final     Aldolase   Date Value Ref Range Status   2022 2.8 1.2 - 7.6 U/L Final     Comment:     REFERENCE INTERVAL: Aldolase    Access complete set of age- and/or gender-specific   reference intervals for this test in the [facility]   Test Directory ([email or web address]).  Performed By: [facility]  [address] [place] [id number]  : Angy Persaud MD     Rheumatoid Factor   Date Value Ref Range Status   2019 <20 <20 IU/mL Final     Cyclic Citrullinated Peptide Antibody IgG   Date Value Ref Range Status   2022 1.1 <=4.9 U/mL Final     2020  ESR 14  C3 155  CRP 0.4  C4 28  CH50 69    May 2020  IgA 611  IgE 38.1  ESR 16  CRP 0.5    MARIAA 1:80 speckled    Imagin2020  X-ray right hand unremarkable without changes consistent with erosive or degenerative  disease.    Pathology:  3/19/2020  Right foot punch bx consistent with LCV        Jesus Jacobson MD

## 2023-10-13 NOTE — PROGRESS NOTES
Virtual Visit Details    Type of service:  Video Visit   Joined the call at 10/13/2023, 8:26:04 am.  Left the call at 10/13/2023, 8:43:17 am.  You were on the call for 17 minutes 13 seconds .  Originating Location (pt. Location): Home    Distant Location (provider location):  Off-site  Platform used for Video Visit: [facility]      Outpatient Rheumatology Follow-up  This visit was conducted via synchronous video visit due to the current COVID-19 crisis to reduce patient risk.  Verbal consent was obtained and is documented below.    Name: Sondra Sorensen    MRN [id number]   Today's date: 10/13/23  Date of last visit: 4/7/23  Date of last visit: 12/30/2022         Reason for follow-up:  Seronegative rheumatoid arthritis   Requesting physician: Mena Bella MD             Assessment & Plan:   47 year old female with history of bx proven LCV, MINOR 1:80 in a speckled pattern, negative/ normal RF, ANCA, CBC, CMP, UA previously followed with an outside rheumatologist Dr Alok Myers at [facility] for 'ill defined' CTD with sicca symptoms, arthralgia, positive minor then rechecked was negative, and LCV.  Her dry mouth symptoms had not been severe to cause dental disease. Her joint symptoms are not accompanied by erythema/edema/warmth.  I do note a prior work-up by pulmonary and cardiology in 2008 where she was noted to have a mild restrictive process by PFTs, without desaturation. This work-up was done due to symptoms of dyspnea with mild exertion, none at rest. Neither specialty was able to find any disease to attribute this to. Has since had PFTs repeated, most recently in 2017 which again showed mild restrictive pulmonary defect with normal DLCO. Respiratory muscle force was decreased, raising the question of neuromuscular process. Has not had follow-up or evaluation by neurology. At time of our initial consultation (may 2021), we decided to obtained additional serologies (mostly driven by her restrictive pattern on  PFTs) and follow-up in 4 months to assess for new/evolving symptoms. Broad serologic work-up at that time showed negative/normal C3/C4/SSA/SSB/smith/RNP/CCP/dsDNA. However her ESR was 28 and her CRP was 8.1. I called her on 4/21/22 and discussed the above results and risks/benefits of starting HCQ for what is most likely consistent with seronegative inflammatory arthritis. At her follow-up with me on 8/5/22, she had a positive response to HCQ. No side effects noted. Positive improvements have included 1) able to make a full fist upon waking 2) no longer needing to pry her fists open in the AM 3) less frequent/severe flares of pain. Tolerating well without side effects. She presents today for follow-up on HCQ 200mg once daily.     Seronegative rheumatoid arthritis: Overall disease activity is low. She continues to derive benefit from cymbalta, which was restarted recently. Will continue on HCQ 200mg once daily.   -200mg once daily every day  -can continue mobic 15mg once daily prn    High risk medication use: HCQ  - Most recent labs reviewed from 6/23/23 to include CBC, CMP which do not show any evidence of drug toxicity.  - We will continue routine screening drug toxicity monitoring every 6 months with CBC, CMP while on Plaquenil. Labs are due again at the end of Dec 2023.   -- Due again for yearly routine screening ophthalmology exam in august 2024. Had most recent eye exam for routine screening HCQ retinal tox on 8/16/23 which did not show evidence of HCQ toxicity.     Will follow-up in 6 months    Jesus Jacobson MD  Rheumatology     Subjective:   10/13/23  The patient reports that she stopped taking Cymbalta around April 2 due to trouble sleeping and increased energy levels. She experienced joint pain and pain in her elbows after stopping the medication. The patient restarted Cymbalta approximately two months ago and has since noticed an improvement in her symptoms. She is unsure if Plaquenil is providing any  benefit.    The patient mentions that she was recently diagnosed as pre-diabetic. She does not experience significant morning stiffness, but occasionally experiences soreness in her elbows and stiffness in her fingers. In the past couple of months, she has only had a handful of days with symptoms consistent with rheumatoid arthritis.    The patient reports occasional stomach aches and mild nausea after taking Plaquenil, but these symptoms have mostly subsided. She denies any fevers, chills, night sweats, rashes, ulcers, or sores in the nose or mouth. She has dry eyes and is using artificial tears and a more stasis for management.    The patient is planning to see a nutritionist for both fructose and pre-diabetes management. She has been taking ibuprofen for pain relief but is considering switching to Tylenol due to a potential interaction with Cymbalta. She still has a supply of Mobic available.    14 point review of systems collected and negative if not documented above.    4/7/23  -saw GI. Upped her fiber. Not yet met with nutrition. Has not had diarrea for a few weeks. Still periods of gas/cramping. Less loose stools. Has been taking pro-biotics which she thinks has been some helpful  -Feels that her joints have not been bothering her much. Has been started on cymbalta for SAD/depression. Severe insomnia in January, prior to starting. Cognitive slowing. Felt improved after 2 weeks. Currently on 20mg once daily.   -The additional reason for cymbalta was for potential help with non inflammatory pain symptoms  -has continued on HCQ 200mg BID on weekdays and once daily on weekend days  -no new/progressive fevers/chills/night sweats. No new/progressive unintentional weight loss  -Has been tolerating HCQ without noticeable side effects  -is due for routine screening OCT exam in august for her yearly exam, needs to schedule this.   14 point ROS collected and negative if not documented above    Interval history  12/30/2022  She wrote in towards middle of November reporting reddish blotchy skin changes between her knees and ankles.  No none of the skin lesions consistent with her prior LCV in early 2020.  Also with new pain in her elbows and knees.  Though this was only 1 time and short-lived.  She reports that on 11 2 she woke up in the joints in her hands knees elbows were sore.  She cannot attribute this increased pain to any activity previous to that day.  It lasted only 1 day and then resolved.  Since that time she is had increased low-grade stiffness aches and fatigue.  Also some GI upset and appetite change and headache.  The symptoms wax and wane.  Muscle tenderness and aches worse in the evening.  She reports the only medication change can think of is that she ran out of her spironolactone prescription and cannot get this refilled prior to seeing dermatology.  So she is been off this since about the beginning of November.  Stayed home and slept, possibly took meloxicam.  Symptoms improved. Has been feeling more stiff/sore achy. She feels that her hands have been more stiff throughout the day. Has been dropping items. Went to hand OT x2, has the exercises to do at home though difficult to do them at home due to motivation. She is fructose intolerant, though has been more quiet recently in terms of GI symptoms. No rashes though with dry skin. No fevers/chills/night. Achiness is worse in the evenings in general. Her stiffness is pretty persistent thoughtout the day, maybe some worse in the AM.  Review of systems otherwise negative.      Interval History August 5, 2022  Started on HCQ on 4/21 due to ESR 28/CRP 1.3. Repeat on 7/28 showed ESR 17 and CRP 14. Tolerating without noticeable side effects. Still aching in the bilateral CMCs when driving for an hour, needs to readjust. Has some intermittent stiffness after increased activity the day before. Has no longer had the symptoms where she needed to pry her hands open.  Also now able to make full fist upon waking now. Had been having night sweats, has gone on birth control and resolved this. No rashes, SOB, chest pain. Has been having worsening GERD, though more stressed out. Rare bloating/diarrhea, no blood in stool. GERD bothers her at night when going to sleep. Wakes with scratchy throat. Has felt nauseous at night/dry heaves. Feels that she has been having foods getting stuck in her throat. Has been getting tonsils. Has not seen someone for this. Has been using restasis for her dry eyes. Has gone back on birth control which has been helpful. Hair not falling out as it had been as well. So she thinks was hormonal driven. 14 point review of systems collected and negative if not documented above.      Interval history 2/25/22  Has had complete resolution of her cutaneous lesions. For 4-5 days in a row, intermittently, will wake up in the AM with feeling that she has been clenching her fists overnight. Needs to pry them open. Feels that her whole body is stiff. Her hands are difficult to both open and close. She thinks that it is related to painting her bedroom last week. Last time this happened was a few months ago. No erythema/edema/warmth. Has ongoing dry eyes, saw ophthalmology for this. Has dry mouth, though only in the AM and not during the day. Has not affected her dentition. Intentionally lost 20 pounds, has gained back 5 pounds or so. No new fevers, chills, night sweats. No new chest pain/shortness of breath. No recurrent/ interval infections.  No dysphagia. Her ROS was otherwise negative.     HPI from initial consultation May 2021  In February developed hair loss. Went in to derm, dx as alopecia areata. Then mid march had small purple spots on legs. Had evaluation with biopsy done which were consistent with LCV. Then referred to rheumatology. Many lesions between ankles/knees. By the time she got in to see rheumatology, had almost resolved/fading without taking any  medications. Was started on HCQ, though had significant GERD. Sent to ENT without abnormalities found. HCQ stopped.     Intermittently has stiffness in the AM in left hand 4th and 5th digit. All feel stiff. Occurs 3-4 days per week. Lasts for 10 minutes. Also when still for periods of time. She thinks they feel swollen, no actual swelling. No redness or warmth. Mild soreness. For the fingers that trigger, its the DIPs. For the others, the PIPs. Feet also sore in the AM when taking first steps, resolves within 10 steps. Also back with twisting. Things loosen up quickly. Has been going on for some years, joint symptoms. No systemic immunosuppressive therapy around the time of her skin lesions with steroids or other.     Reports diffuse joint and muscle sore/aching in the evenings. Takes advil and improves. Some hair thinning, not new or progressive. Other than LCV, prior ?roceasea. No other facial rash. Reports break out on chest and top of back. Has had 2 episodes of hypersensitivity reactions on ankles/wrists exposures to known allergens. Had been treated with prednisone for this. Had eczema as a child. None now. No personal or first degree with psoriasis. Had an episode of pleurisy after URI, which resolved. Has fructose intolerance. For many years had diarrhea. No blood in the stool. Had colonoscopy in 2014 which was normal. No difficulty swallowing. No food stuck in her throat. Has dry eyes, though started after lasik. Has dry mouth in the AM. Has flushed feeling in fingers. No fevers/chills. No weight changes. No new or persistent headache.  No change in vision or hearing.  No inflammatory eye disease history.  No history of miscarriage.  No history of blood clots. No photosensitive rash.  No nasal or oral ulcers.  No recurrent epistaxis or hemoptysis.  No recurrent sinusitis or recurrent pneumonia.  No chest pain or shortness of breath.  No nausea or vomiting.  No foamy or frothy urine.  No bowel or bladder  incontinence.  No change in strength or sensation in the arms or legs.  No pitting or brittle nails.  No triphasic color change consistent with Raynaud's.  No skin thickening or tightening consistent with sclerodactyly.  No symptoms consistent with dactylitis. No genital ulcers or lesions.    Past Medical History  Past Medical History:   Diagnosis Date    Allergic rhinitis 04/24/2003    Epic    Alopecia 09/14/2020    Arthralgia 09/14/2020    Blepharitis 05/13/2009    Epic    Decreased diffusion capacity of lung 12/09/2008    Diarrhea 10/26/2009    Episodic mood disorder (H) 11/15/2004    Epic    Fructose intolerance 10/03/2018    Generalized anxiety disorder 02/07/2012    Insomnia 03/01/2011    Irritable bowel syndrome with diarrhea 10/03/2018    Leucocytoclastic vasculitis (H) 04/22/2020    Major depression, recurrent, full remission (H) 11/04/2014    Noted at PCP visit.    Menopause 04/2022    Myopia 11/14/2008    Myopia - LasEk both 2009    NO ACTIVE PROBLEMS     Restrictive lung disease 12/09/2008    Seasonal affective disorder (H) 10/04/2018    Social phobia 10/20/2016     Past Surgical History  Past Surgical History:   Procedure Laterality Date    LASIK Bilateral 2009    NO HISTORY OF SURGERY       Medications  Current Outpatient Medications   Medication    Calcium Citrate-Vitamin D (CALCIUM + D PO)    cetirizine (ZYRTEC) 10 MG tablet    chlorhexidine (PERIDEX) 0.12 % solution    cholecalciferol (VITAMIN D3) 5000 units (125 mcg) capsule    cycloSPORINE (RESTASIS) 0.05 % ophthalmic emulsion    DULoxetine (CYMBALTA) 20 MG capsule    hydroxychloroquine (PLAQUENIL) 200 MG tablet    ketoconazole (NIZORAL) 2 % external shampoo    meloxicam (MOBIC) 15 MG tablet    norethindrone-ethinyl estradiol (MICROGESTIN 1.5/30) 1.5-30 MG-MCG tablet    olopatadine (PATANOL) 0.1 % ophthalmic solution    Omega-3 Fatty Acids (FISH OIL PO)    spironolactone (ALDACTONE) 100 MG tablet    triamcinolone (KENALOG) 0.1 % external cream      No current facility-administered medications for this visit.     Allergies   Allergies   Allergen Reactions    Doxycycline     Penicillins Rash     rash     Family History: Mother with 'arthritis in her hands'. Mother with 'lung thing' 'sclerosis'. Second cousin with MS.    Social History: Not , no kids. No pregnancies. 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 No drug use/smoking/ETOH abuse.        Objective:   Physical exam:  Sitting on couch. NAD. Pleasant and interactive as always  No facial rash. Sclera appear clear  Speaking in full sentences. No use of accessory muscles. No audible wheeze. No cough.   Fluid movement of bilateral shoulders elbows wrists MCPs PIPs Dips throughout the encounter    WBC   Date Value Ref Range Status   03/30/2021 7.7 4.0 - 11.0 10^9/L Final     WBC Count   Date Value Ref Range Status   06/23/2023 8.6 4.0 - 11.0 10e3/uL Final     Hemoglobin   Date Value Ref Range Status   06/23/2023 13.4 11.7 - 15.7 g/dL Final   03/30/2021 13.7 11.7 - 15.7 g/dL Final     Platelet Count   Date Value Ref Range Status   06/23/2023 303 150 - 450 10e3/uL Final   03/30/2021 262 150 - 450 10^9/L Final   12/05/2019 281 150 - 450 10e9/L Final     Creatinine   Date Value Ref Range Status   06/23/2023 0.85 0.51 - 0.95 mg/dL Final   03/30/2021 0.87 0.55 - 1.02 mg/dL Final     Lab Results   Component Value Date    ALKPHOS 71 12/23/2022    ALKPHOS 96 03/30/2021     AST   Date Value Ref Range Status   06/23/2023 18 0 - 45 U/L Final     Comment:     Reference intervals for this test were updated on 6/12/2023 to more accurately reflect our healthy population. There may be differences in the flagging of prior results with similar values performed with this method. Interpretation of those prior results can be made in the context of the updated reference intervals.   03/30/2021 22 0 - 45 U/L Final     Lab Results   Component Value Date    ALT 21 12/23/2022    ALT 30 03/30/2021     Sed Rate   Date Value Ref Range Status    2019 16 0 - 20 mm/h Final     Erythrocyte Sedimentation Rate   Date Value Ref Range Status   2023 17 0 - 20 mm/hr Final     CRP Inflammation   Date Value Ref Range Status   2019 8.1 (H) 0.0 - 8.0 mg/L Final     CRP   Date Value Ref Range Status   2022 1.3 (H) 0.0-<0.8 mg/dL Final     UA RESULTS:  Recent Labs   Lab Test 22  1053   COLOR Yellow   APPEARANCE Clear   URINEGLC Negative   URINEBILI Negative   URINEKETONE Negative   SG 1.015   UBLD Negative   URINEPH 6.0   PROTEIN Negative   UROBILINOGEN 0.2   NITRITE Negative   LEUKEST Negative   RBCU 0-2   WBCU 0-5      DNA (ds) Antibody   Date Value Ref Range Status   2022 1.0 <10.0 IU/mL Final     Comment:     Negative     RNP Antibody IgG   Date Value Ref Range Status   2022 Negative Negative Final     Aldolase   Date Value Ref Range Status   2022 2.8 1.2 - 7.6 U/L Final     Comment:     REFERENCE INTERVAL: Aldolase    Access complete set of age- and/or gender-specific   reference intervals for this test in the [facility]   Test Directory ([email or web address]).  Performed By: [facility]  [address] [place] [id number]  : Angy Persaud MD     Rheumatoid Factor   Date Value Ref Range Status   2019 <20 <20 IU/mL Final     Cyclic Citrullinated Peptide Antibody IgG   Date Value Ref Range Status   2022 1.1 <=4.9 U/mL Final     2020  ESR 14  C3 155  CRP 0.4  C4 28  CH50 69    May 2020  IgA 611  IgE 38.1  ESR 16  CRP 0.5    MARIAA 1:80 speckled    Imagin2020  X-ray right hand unremarkable without changes consistent with erosive or degenerative disease.    Pathology:  3/19/2020  Right foot punch bx consistent with LCV

## 2023-10-13 NOTE — NURSING NOTE
Is the patient currently in the state of [place]? YES    Visit mode:VIDEO    If the visit is dropped, the patient can be reconnected by: VIDEO VISIT: Text to cell phone:   Telephone Information:   Mobile [phone]       Will anyone else be joining the visit? NO  (If patient encounters technical issues they should call [phone]    How would you like to obtain your AVS? MyChart    Are changes needed to the allergy or medication list? No    Reason for visit: RECHECK    Medications and allergies have been reviewed.     Reid WALTON

## 2023-10-27 ENCOUNTER — TELEPHONE (OUTPATIENT)
Dept: RHEUMATOLOGY | Facility: CLINIC | Age: 48
End: 2023-10-27
Payer: COMMERCIAL

## 2023-10-27 NOTE — TELEPHONE ENCOUNTER
Left Voicemail (1st Attempt) for the patient to call back and schedule the following:    Appointment type: Video return  Provider: Dr. Jacobson  Return date: April 2024  Specialty phone number: [phone]  Additional appointment(s) needed: Labs in December per checkout notes  Additonal Notes: NA

## 2023-10-31 ENCOUNTER — TELEPHONE (OUTPATIENT)
Dept: RHEUMATOLOGY | Facility: CLINIC | Age: 48
End: 2023-10-31
Payer: COMMERCIAL

## 2023-10-31 NOTE — TELEPHONE ENCOUNTER
Left Voicemail (2nd Attempt) for the patient to call back and schedule the following:    Appointment type: Return video visit  Provider: Dr. Jacobson  Return date: April 2024  Specialty phone number: [phone]  Additional appointment(s) needed: Labs end of December  Additonal Notes: NA

## 2023-11-08 ENCOUNTER — VIRTUAL VISIT (OUTPATIENT)
Dept: EDUCATION SERVICES | Facility: CLINIC | Age: 48
End: 2023-11-08
Payer: COMMERCIAL

## 2023-11-08 DIAGNOSIS — K58.0 IRRITABLE BOWEL SYNDROME WITH DIARRHEA: Primary | ICD-10-CM

## 2023-11-08 PROCEDURE — 97802 MEDICAL NUTRITION INDIV IN: CPT | Mod: VID | Performed by: DIETITIAN, REGISTERED

## 2023-11-08 NOTE — LETTER
2023         RE: Sondra Sorensen  [address]  [place] [address]        Dear Colleague,    Thank you for referring your patient, Sondra Sorensen, to the [facility] DIABETES EDUCATION. Please see a copy of my visit note below.    Medical Nutrition Therapy for Diabetes  Visit Type:Initial assessment and intervention  Type of service:  Video Visit  Originating Location (pt. Location): Home  Distant Location (provider location): Offsite  Mode of Communication:  Video Conference via Kior  Joined the call at 2023, 10:01:09 am.  Left the call at 2023, 10:41:42 am.  You were on the call for 40 minutes 33 seconds (30 mnt)  How would patient like to obtain AVS? MyChart  Sondra Sorensen presents today for MNT and education related to IBS . Secondary early pre Diabetes caught   She is accompanied by self.     ASSESSMENT:   Reviewed IBS basics with food journaling after an event, continuing on low fructose diet as this seems to be helping and increasing soluble fiber slowly.  Discussed pre Diabetes with A1c of 5.8 and habits around this that cross into IBS; increasing fiber, hydraiton, balanced meals, lower sugar foods etc.  Sondra eats a healthy well balanced diet.  Higher blood sugars could come from  movement and poor sleep.   Other notes with IBS  95% of the time it's fine 5% really bad   Had a colonoscopy - microscopic negative, celiac negative, lactose intolerance negative, Breath test for fructose positive.  Has been less bloated / less gassy with reducing fructose.  Still having issues with urgent bowel movements sporadically. Hard to find triggers - maybe spicy foods / peppers, Homng hot dish with peppers.  Knows that chicory root and inulin trigger it which is understandable.  Breakfast: changed from fruit smoothie to pain Greek yogurt, oats, nuts, quinoa,   (add tyler seeds for soluble fiber) + low fructose fruit Blackberries     EXERCISE:   Was  "doing really well during covid; Losing weight and walking   Fell off track over the summer   Trying to snap out of it and resume increasing activity   Youtube yoga  - stretching / flexibility  + walking at work   SOCIO/ECONOMIC:   Lives with: self    BLOOD GLUCOSE MONITORING:  Notes A1c of 5.8 taken elsewhere - contributing factors; decrease in exercise and poor sleep    MEDICATIONS:  Current Outpatient Medications   Medication     Calcium Citrate-Vitamin D (CALCIUM + D PO)     cetirizine (ZYRTEC) 10 MG tablet     chlorhexidine (PERIDEX) 0.12 % solution     cholecalciferol (VITAMIN D3) 5000 units (125 mcg) capsule     cycloSPORINE (RESTASIS) 0.05 % ophthalmic emulsion     DULoxetine (CYMBALTA) 20 MG capsule     hydroxychloroquine (PLAQUENIL) 200 MG tablet     ketoconazole (NIZORAL) 2 % external shampoo     meloxicam (MOBIC) 15 MG tablet     norethindrone-ethinyl estradiol (MICROGESTIN 1.5/30) 1.5-30 MG-MCG tablet     olopatadine (PATANOL) 0.1 % ophthalmic solution     Omega-3 Fatty Acids (FISH OIL PO)     spironolactone (ALDACTONE) 100 MG tablet     triamcinolone (KENALOG) 0.1 % external cream     No current facility-administered medications for this visit.       LABS:  No results found for: \"A1C\"  Lab Results   Component Value Date    GLC 94 06/23/2023    GLC 90 02/27/2022    GLC 92 03/30/2021     No results found for: \"LDL\"  No results found for: \"HDL\"]  GFR Estimate   Date Value Ref Range Status   06/23/2023 85 >60 mL/min/1.73m2 Final   03/30/2021 >60 >60 ml/min/1.73m2 Final     Lab Results   Component Value Date    CR 0.85 06/23/2023    CR 0.87 03/30/2021     No results found for: \"MICROALBUMIN\"    ANTHROPOMETRICS:  Vitals: There were no vitals taken for this visit.  There is no height or weight on file to calculate BMI.      Wt Readings from Last 5 Encounters:   10/13/23 66.7 kg (147 lb)   07/18/23 68.9 kg (152 lb)   04/11/23 63.5 kg (140 lb)   01/30/23 67.6 kg (149 lb)   10/12/22 69.9 kg (154 lb) "       NUTRITION INTERVENTION:  Education given to support: general nutrition guidelines, labeling, dining out/special occasions, and fiber    PATIENT'S BEHAVIOR CHANGE GOALS:   See Patient Instructions for patient stated behavior change goals. AVS was printed and given to patient at today's appointment.    FOLLOW-UP:  Referral to [place] [facility] for detailed nutrition education  is recommended.    Kaelyn Hicks RD, LD, [facility]  Diabetes Education    Time spent in minutes: 30mnt  Encounter: Individual

## 2023-11-08 NOTE — PROGRESS NOTES
Medical Nutrition Therapy for Diabetes  Visit Type:Initial assessment and intervention  Type of service:  Video Visit  Originating Location (pt. Location): Home  Distant Location (provider location): Offsite  Mode of Communication:  Video Conference via [employer]  Joined the call at 2023, 10:01:09 am.  Left the call at 2023, 10:41:42 am.  You were on the call for 40 minutes 33 seconds (30 mnt)  How would patient like to obtain AVS? [facility]  Sondra Sorensen presents today for MNT and education related to IBS . Secondary early pre Diabetes caught   She is accompanied by self.     ASSESSMENT:   Reviewed IBS basics with food journaling after an event, continuing on low fructose diet as this seems to be helping and increasing soluble fiber slowly.  Discussed pre Diabetes with A1c of 5.8 and habits around this that cross into IBS; increasing fiber, hydraiton, balanced meals, lower sugar foods etc.  Sondra eats a healthy well balanced diet.  Higher blood sugars could come from  movement and poor sleep.   Other notes with IBS  95% of the time it's fine 5% really bad   Had a colonoscopy - microscopic negative, celiac negative, lactose intolerance negative, Breath test for fructose positive.  Has been less bloated / less gassy with reducing fructose.  Still having issues with urgent bowel movements sporadically. Hard to find triggers - maybe spicy foods / peppers, Homng hot dish with peppers.  Knows that chicory root and inulin trigger it which is understandable.  Breakfast: changed from fruit smoothie to pain Greek yogurt, oats, nuts, quinoa,   (add tyler seeds for soluble fiber) + low fructose fruit Blackberries     EXERCISE:   Was doing really well during covid; Losing weight and walking   Fell off track over the summer   Trying to snap out of it and resume increasing activity   Youtube yoga  - stretching / flexibility  + walking at work   SOCIO/ECONOMIC:   Lives with: self    BLOOD GLUCOSE  "MONITORING:  Notes A1c of 5.8 taken elsewhere - contributing factors; decrease in exercise and poor sleep    MEDICATIONS:  Current Outpatient Medications   Medication    Calcium Citrate-Vitamin D (CALCIUM + D PO)    cetirizine (ZYRTEC) 10 MG tablet    chlorhexidine (PERIDEX) 0.12 % solution    cholecalciferol (VITAMIN D3) 5000 units (125 mcg) capsule    cycloSPORINE (RESTASIS) 0.05 % ophthalmic emulsion    DULoxetine (CYMBALTA) 20 MG capsule    hydroxychloroquine (PLAQUENIL) 200 MG tablet    ketoconazole (NIZORAL) 2 % external shampoo    meloxicam (MOBIC) 15 MG tablet    norethindrone-ethinyl estradiol (MICROGESTIN 1.5/30) 1.5-30 MG-MCG tablet    olopatadine (PATANOL) 0.1 % ophthalmic solution    Omega-3 Fatty Acids (FISH OIL PO)    spironolactone (ALDACTONE) 100 MG tablet    triamcinolone (KENALOG) 0.1 % external cream     No current facility-administered medications for this visit.       LABS:  No results found for: \"A1C\"  Lab Results   Component Value Date    GLC 94 06/23/2023    GLC 90 02/27/2022    GLC 92 03/30/2021     No results found for: \"LDL\"  No results found for: \"HDL\"]  GFR Estimate   Date Value Ref Range Status   06/23/2023 85 >60 mL/min/1.73m2 Final   03/30/2021 >60 >60 ml/min/1.73m2 Final     Lab Results   Component Value Date    CR 0.85 06/23/2023    CR 0.87 03/30/2021     No results found for: \"MICROALBUMIN\"    ANTHROPOMETRICS:  Vitals: There were no vitals taken for this visit.  There is no height or weight on file to calculate BMI.      Wt Readings from Last 5 Encounters:   10/13/23 66.7 kg (147 lb)   07/18/23 68.9 kg (152 lb)   04/11/23 63.5 kg (140 lb)   01/30/23 67.6 kg (149 lb)   10/12/22 69.9 kg (154 lb)       NUTRITION INTERVENTION:  Education given to support: general nutrition guidelines, labeling, dining out/special occasions, and fiber    PATIENT'S BEHAVIOR CHANGE GOALS:   See Patient Instructions for patient stated behavior change goals. AVS was printed and given to patient at today's " appointment.    FOLLOW-UP:  Referral to [facility] for detailed nutrition education  is recommended.    Kaelyn Hicks RD, LD, [facility]  Diabetes Education    Time spent in minutes: 30mnt  Encounter: Individual

## 2023-11-22 DIAGNOSIS — Z30.011 ORAL CONTRACEPTIVE PRESCRIBED: ICD-10-CM

## 2023-11-22 RX ORDER — NORETHINDRONE ACETATE AND ETHINYL ESTRADIOL 1.5; 3 MG/1; UG/1
TABLET ORAL
Qty: 84 TABLET | Refills: 2 | Status: SHIPPED | OUTPATIENT
Start: 2023-11-22 | End: 2024-07-29

## 2023-11-28 DIAGNOSIS — M06.09 RHEUMATOID ARTHRITIS, SERONEGATIVE, MULTIPLE SITES (H): Primary | ICD-10-CM

## 2023-11-28 DIAGNOSIS — Z79.899 HIGH RISK MEDICATION USE: ICD-10-CM

## 2023-11-28 NOTE — PROGRESS NOTES
Gerry PENA,     This patient is on the lab schedule for the [place] [facility] on 12/21 and may need some lab orders. Please advise / place orders.    Thanks.

## 2023-11-29 NOTE — PROGRESS NOTES
Orders for routine screening drug toxicity monitoring signed.     Thanks  Jesus Jacobson MD  Rheumatology

## 2023-12-11 ENCOUNTER — TELEPHONE (OUTPATIENT)
Dept: RHEUMATOLOGY | Facility: CLINIC | Age: 48
End: 2023-12-11
Payer: COMMERCIAL

## 2023-12-21 ENCOUNTER — LAB (OUTPATIENT)
Dept: LAB | Facility: CLINIC | Age: 48
End: 2023-12-21
Payer: COMMERCIAL

## 2023-12-21 DIAGNOSIS — M06.09 RHEUMATOID ARTHRITIS, SERONEGATIVE, MULTIPLE SITES (H): ICD-10-CM

## 2023-12-21 DIAGNOSIS — Z79.899 HIGH RISK MEDICATION USE: ICD-10-CM

## 2023-12-21 LAB
ALBUMIN SERPL BCG-MCNC: 4 G/DL (ref 3.5–5.2)
ALT SERPL W P-5'-P-CCNC: 10 U/L (ref 0–50)
AST SERPL W P-5'-P-CCNC: 15 U/L (ref 0–45)
BASOPHILS # BLD AUTO: 0 10E3/UL (ref 0–0.2)
BASOPHILS NFR BLD AUTO: 0 %
CREAT SERPL-MCNC: 0.75 MG/DL (ref 0.51–0.95)
CRP SERPL-MCNC: 21 MG/L
EGFRCR SERPLBLD CKD-EPI 2021: >90 ML/MIN/1.73M2
EOSINOPHIL # BLD AUTO: 0.1 10E3/UL (ref 0–0.7)
EOSINOPHIL NFR BLD AUTO: 1 %
ERYTHROCYTE [DISTWIDTH] IN BLOOD BY AUTOMATED COUNT: 12.4 % (ref 10–15)
ERYTHROCYTE [SEDIMENTATION RATE] IN BLOOD BY WESTERGREN METHOD: 20 MM/HR (ref 0–20)
HCT VFR BLD AUTO: 42.3 % (ref 35–47)
HGB BLD-MCNC: 13.7 G/DL (ref 11.7–15.7)
IMM GRANULOCYTES # BLD: 0 10E3/UL
IMM GRANULOCYTES NFR BLD: 0 %
LYMPHOCYTES # BLD AUTO: 2.2 10E3/UL (ref 0.8–5.3)
LYMPHOCYTES NFR BLD AUTO: 25 %
MCH RBC QN AUTO: 30.4 PG (ref 26.5–33)
MCHC RBC AUTO-ENTMCNC: 32.4 G/DL (ref 31.5–36.5)
MCV RBC AUTO: 94 FL (ref 78–100)
MONOCYTES # BLD AUTO: 0.7 10E3/UL (ref 0–1.3)
MONOCYTES NFR BLD AUTO: 8 %
NEUTROPHILS # BLD AUTO: 5.6 10E3/UL (ref 1.6–8.3)
NEUTROPHILS NFR BLD AUTO: 65 %
PLATELET # BLD AUTO: 314 10E3/UL (ref 150–450)
RBC # BLD AUTO: 4.5 10E6/UL (ref 3.8–5.2)
WBC # BLD AUTO: 8.6 10E3/UL (ref 4–11)

## 2023-12-21 PROCEDURE — 85025 COMPLETE CBC W/AUTO DIFF WBC: CPT

## 2023-12-21 PROCEDURE — 84450 TRANSFERASE (AST) (SGOT): CPT

## 2023-12-21 PROCEDURE — 85652 RBC SED RATE AUTOMATED: CPT

## 2023-12-21 PROCEDURE — 82040 ASSAY OF SERUM ALBUMIN: CPT

## 2023-12-21 PROCEDURE — 36415 COLL VENOUS BLD VENIPUNCTURE: CPT

## 2023-12-21 PROCEDURE — 84460 ALANINE AMINO (ALT) (SGPT): CPT

## 2023-12-21 PROCEDURE — 82565 ASSAY OF CREATININE: CPT

## 2023-12-21 PROCEDURE — 86140 C-REACTIVE PROTEIN: CPT

## 2024-01-12 ENCOUNTER — MEDICAL CORRESPONDENCE (OUTPATIENT)
Dept: HEALTH INFORMATION MANAGEMENT | Facility: CLINIC | Age: 49
End: 2024-01-12
Payer: COMMERCIAL

## 2024-01-19 ENCOUNTER — TRANSCRIBE ORDERS (OUTPATIENT)
Dept: OTHER | Age: 49
End: 2024-01-19

## 2024-01-19 DIAGNOSIS — M62.89 PELVIC FLOOR DYSFUNCTION: Primary | ICD-10-CM

## 2024-01-19 DIAGNOSIS — M06.09 RHEUMATOID ARTHRITIS, SERONEGATIVE, MULTIPLE SITES (H): Primary | ICD-10-CM

## 2024-01-22 ENCOUNTER — LAB (OUTPATIENT)
Dept: LAB | Facility: CLINIC | Age: 49
End: 2024-01-22
Payer: COMMERCIAL

## 2024-01-22 DIAGNOSIS — M06.09 RHEUMATOID ARTHRITIS, SERONEGATIVE, MULTIPLE SITES (H): ICD-10-CM

## 2024-01-22 PROCEDURE — 36415 COLL VENOUS BLD VENIPUNCTURE: CPT

## 2024-01-22 PROCEDURE — 82306 VITAMIN D 25 HYDROXY: CPT

## 2024-01-23 LAB — VIT D+METAB SERPL-MCNC: 33 NG/ML (ref 20–50)

## 2024-02-01 NOTE — TELEPHONE ENCOUNTER
Provider: Jesus Jacobson MD Department:  ADULT RHEUMATOLOGY     Visit Disposition    Check-out Note   1) MRI right hand   2) GI referral   3) She currently has an appointment with me on 2/10. Please reschedule this for 2023. Can use [employer]. Can be virtual.      Writer contacted patient to schedule follow up per check out notes above from provider. Patient's name and  verified during call to ensure right patient.  Patient scheduled appointment with writer. Patient in agreement with time/date/mode of appointment and did not verbalize any further questions at this time.  Appointment scheduled for 2023 and 2023 visit canceled per Dr. Jacobson's note above. Patient in agreement with this and did not have any further questions at this time. Patient notified someone will contact her regarding the MRI and GI referral as this is complex scheduling.  Norma Granado Visit Facilitator 10:43 AM 2022         Detail Level: Zone

## 2024-03-01 ENCOUNTER — THERAPY VISIT (OUTPATIENT)
Dept: PHYSICAL THERAPY | Facility: CLINIC | Age: 49
End: 2024-03-01
Payer: COMMERCIAL

## 2024-03-01 DIAGNOSIS — M62.89 PELVIC FLOOR DYSFUNCTION: ICD-10-CM

## 2024-03-01 PROCEDURE — 97112 NEUROMUSCULAR REEDUCATION: CPT | Mod: GP | Performed by: PHYSICAL THERAPIST

## 2024-03-01 PROCEDURE — 97530 THERAPEUTIC ACTIVITIES: CPT | Mod: GP | Performed by: PHYSICAL THERAPIST

## 2024-03-01 PROCEDURE — 97161 PT EVAL LOW COMPLEX 20 MIN: CPT | Mod: GP | Performed by: PHYSICAL THERAPIST

## 2024-03-01 NOTE — PROGRESS NOTES
PHYSICAL THERAPY EVALUATION  Type of Visit: Evaluation    See electronic medical record for Abuse and Falls Screening details.    Subjective       Presenting condition or subjective complaint: pelvic floor tightness, ? 2010  Date of onset: 01/19/24    Relevant medical history: Depression; Arthritis; Bladder or bowel problems; Menopause; Rheumatoid arthritis   Dates & types of surgery: [place] 2009    Prior diagnostic imaging/testing results:       Prior therapy history for the same diagnosis, illness or injury: Yes Spring 2020    Employment: Yes    Hobbies/Interests: walking, biking, music, theater, photography, out    Patient goals for therapy: have pain free intercourse    Sondra presents today with dyspareunia and urinary urgency. Occasionally she will have leaking with sneezing or jumping.    Sondra has been trying to figure out what her triggers are for her IBS. For the most time she feels as though she empties fully. On very rare occasions she will have an episode of stool incontinence with very strong sudden urges for a bowel movement.    Pain assessment: Pain denied     Objective      PELVIC EVALUATION  ADDITIONAL HISTORY:  Sex assigned at birth: Female  Gender identity: Female    Pronouns: She/Her Hers      Bladder History:  Feels bladder filling: Yes  Triggers for feeling of inability to wait to go to the bathroom: Yes cold, being near bathroom  How long can you wait to urinate: depends  Gets up at night to urinate: Yes 1-2x at night  Can stop the flow of urine when urinating: Yes  Volume of urine usually released: Average (sometime small)   Other issues:    Number of bladder infections in last 12 months:    Fluid intake per day: 40 0 0  Medications taken for bladder: No     Activities causing urine leak: Sneeze; Jump; Hurrying to the bathroom due to a strong urge to urinate (pee)    Amount of urine typically leaked: small  Pads used to help with leaking: No      Frequency of voids during the day: about  every 2-4 hours during the day  Sometimes anxiety will trigger more frequency to void.       Bowel History:  Frequency of bowel movement: 1-2x a day  Consistency of stool: Soft-formed (varies)    Ignores the urge to defecate: No  Other bowel issues: Loss of gas  Length of time spent trying to have a bowel movement:      Sexual Function History:  Sexual orientation: Straight    Sexually active: No  Lubrication used: Yes Yes  Pelvic pain: Deep penetration (rectal or vaginal); Initial penetration (rectal or vaginal); Pelvic exams    Pain or difficulty with orgasms/erection/ejaculation: Yes don't have them  State of menopause: Perimenopause (have not gone through menopause yet)  Hormone medications: Yes birht control  She has not had intercourse for years.   Are you currently pregnant: No, Number of previous pregnancies: 0, Number of deliveries: 0, Have you been diagnosed with pelvic prolapse or abdominal separation: No, Do you get regular exercise: Yes, I do this type of exercise: walking, Have you tried pelvic floor strengthening exercises for 4 weeks: Yes, Do you have any history of trauma that is relevant to your care that you d like to share: No    Discussed reason for referral regarding pelvic health needs and external/internal pelvic floor muscle examination with patient/guardian.  Opportunity provided to ask questions and verbal consent for assessment and intervention was given.    PELVIC EXAM  External Visual Inspection:  At rest: Normal  With voluntary pelvic floor contraction: Perineal elevation  Relaxation of PFM: Yes, Partial/delayed relaxation  With intra-abdominal pressure: Cough: No change    Integumentary:   Introitus: Unremarkable    External Digital Palpation per Perineum:   Ischiocavernosis: Tightness  Bulbo cavernosis: Tightness  Transverse perineal: Unremarkable  Levator ani: Unremarkable    Internal Digital Palpation:  Per Vagina:  Myofascial Resistance to Palpation: Taut  Digital Muscle  Performance: P (Power): 3/5  E (Endurance): 5 seconds  F (Fast Twitch): 10/10  Compensations: Abdominals, Breath holding  Relaxation Post-Contraction: Normal  Palpation: tension and tenderness to palpation bulbocavernosus bilaterally and iliococcygeus on left side    Assessment & Plan   CLINICAL IMPRESSIONS  Medical Diagnosis: dyspareunia    Treatment Diagnosis: pelvic floor dysfunction   Impression/Assessment: Patient is a 48 year old female with pelvic floor dysfunction complaints.  The following significant findings have been identified: Pain, Decreased strength, Impaired muscle performance, and Decreased activity tolerance. These impairments interfere with their ability to perform self care tasks, work tasks, recreational activities, household chores, driving , household mobility, and community mobility as compared to previous level of function.     Clinical Decision Making (Complexity):  Clinical Presentation: Stable/Uncomplicated  Clinical Presentation Rationale: based on medical and personal factors listed in PT evaluation  Clinical Decision Making (Complexity): Low complexity    PLAN OF CARE  Treatment Interventions:  Modalities: Biofeedback, Cryotherapy, Dry Needling, E-stim, Hot Pack  Interventions: Gait Training, Manual Therapy, Neuromuscular Re-education, Therapeutic Activity, Therapeutic Exercise, Self-Care/Home Management    Long Term Goals     PT Goal 1  Goal Description: Pt will have 0 episodes of incontinence in 1 month.  Rationale: to maximize safety and independence with performance of ADLs and functional tasks  Target Date: 05/24/24  PT Goal 2  Goal Description: Pt will be able to use second largest dilator size with 1/10 discomfort.  Rationale: to maximize safety and independence with performance of ADLs and functional tasks  Target Date: 05/24/24      Frequency of Treatment: 1x a week for the first 4 weeks followed by 2x a month for the following 8 weeks  Duration of Treatment:  w+12    Education Assessment:   Learner/Method: Patient;No Barriers to Learning    Risks and benefits of evaluation/treatment have been explained.   Patient/Family/caregiver agrees with Plan of Care.     Evaluation Time:     PT Eval, Low Complexity Minutes (67940): 35     Signing Clinician: Berta South PT

## 2024-03-06 ENCOUNTER — VIRTUAL VISIT (OUTPATIENT)
Dept: GASTROENTEROLOGY | Facility: CLINIC | Age: 49
End: 2024-03-06
Attending: DIETITIAN, REGISTERED
Payer: COMMERCIAL

## 2024-03-06 DIAGNOSIS — K21.9 GASTROESOPHAGEAL REFLUX DISEASE, UNSPECIFIED WHETHER ESOPHAGITIS PRESENT: ICD-10-CM

## 2024-03-06 DIAGNOSIS — K58.0 IRRITABLE BOWEL SYNDROME WITH DIARRHEA: ICD-10-CM

## 2024-03-06 DIAGNOSIS — E74.10 FRUCTOSE INTOLERANCE: ICD-10-CM

## 2024-03-06 DIAGNOSIS — R13.10 DYSPHAGIA, UNSPECIFIED TYPE: ICD-10-CM

## 2024-03-06 DIAGNOSIS — R19.7 DIARRHEA, UNSPECIFIED TYPE: Primary | ICD-10-CM

## 2024-03-06 PROCEDURE — 99417 PROLNG OP E/M EACH 15 MIN: CPT | Mod: 95 | Performed by: DIETITIAN, REGISTERED

## 2024-03-06 PROCEDURE — 99215 OFFICE O/P EST HI 40 MIN: CPT | Mod: 95 | Performed by: DIETITIAN, REGISTERED

## 2024-03-06 NOTE — NURSING NOTE
Is the patient currently in the state of [place]? YES    Visit mode:VIDEO    If the visit is dropped, the patient can be reconnected by: VIDEO VISIT: Send to e-mail at: [email or web address]    Will anyone else be joining the visit? NO  (If patient encounters technical issues they should call [phone]    How would you like to obtain your AVS? MyChart    Are changes needed to the allergy or medication list? No    Reason for visit: RECHECK    Raza WALTON       yes

## 2024-03-06 NOTE — PROGRESS NOTES
GI CLINIC VISIT    Virtual Visit Details    Type of service:  Video Visit     Originating Location (pt. Location): Home    Distant Location (provider location):  Off-site  Platform used for Video Visit: Adrianna MORRIS/REFERRING MD:  Jesus Jacobson  REASON FOR CONSULTATION: diarrhea    ASSESSMENT/PLAN:  #Chronic Diarrhea  Patient with 20+ year history of intermittent loose stools, worsening in past 5 to 10 years with episodes of urgent, explosive diarrhea with intermittent incontinence.  Colonoscopy in 2014 unremarkable endoscopically and histology negative for microscopic colitis at that time.  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  She reports that previous lactose intolerance testing was negative.   Fecal calprotectin, ova and parasite, Giardia/Cryptosporidium, fecal elastase and spot fecal fat, celiac serologies, and TSH with T4 reflex were all of which were negative/normal.     This continues to be an issue and impacting quality of life.  Differential for intermitent diarrhea includes exacerbation of fructose malabsorption with diet, IBS-D (consistent with abdominal cramping improved after defecation and change in stool form), constipation with overflow, less likely SIBO, bile acid diarrhea microscopic colitis, IBD. Given other rheumatologic conditions could consider other more rare rheumatologic cause.  She is due for colonoscopy for routine surveillance, we will proceed with this now and get biopsies for microscopic colitis and assess endoscopically for any other drivers of diarrhea.  She would also benefit from meeting with a dietitian to review low fructose diet with known component of fructose malabsorption and potentially any other dietary triggers/low FODMAP.  We will continue with fiber supplement and working on hydration.  She continues to meet with therapist and has been adjusting mental health medications.    uture considerations include SIBO breath testing, abdominal x-ray  for stool burden, low FODMAP retrial.    Plan:  -- Continue Citrucel fiber caplets - at least 1-2 caplets per day, can further increase up to 6 caplets pending stool consistency  -- Work on hydration, aim for 64oz daily  -- Referral to our registered dietitian Natalia Osman regarding low fructose diet education  --Complete colonoscopy with biopsies for microscopic colitis  -- Track symptoms in journal at least 1 to 2 days around episodes of diarrhea including stool pattern prior to and during episodes including frequency and consistency, diet, stress/life circumstances and we will see if a pattern emerges    #GERD  #Dysphagia  Patient reports long history of atypical GERD symptoms of hoarseness, throat pain, cough.  Previously evaluated by ENT.  She reports recent worsening of symptoms with burning in her throat, most overnight.  Does seem to improve with elevation of head.  She has started omeprazole and plans to complete 14-day course, this is appropriate.  Additionally discussed that she could take famotidine at night.  She also reports recently she has had difficulty with swallowing, seems to be oropharyngeal but also feeling like food is getting caught in her throat.  Will proceed with EGD for evaluation of dysphagia and GERD with her colonoscopy.  -- Omeprazole 20 mg x 14 days taken in the morning 30 to 60 minutes before eating drinking  -- Pepcid (famotidine) at bedtime as needed  -- Upper endoscopy    Colorectal cancer screening: last colonoscopy 2014 normal. Cologaurd 2021 years ago negative (per pt). Due for screening 2024. Discussed that we could order this now, would like to hold off scheduling for now.     RTC 6 months    Thank you for this consultation.  It was a pleasure to participate in the care of this patient; please contact us with any further questions.     60 Minutes was spent on the date of the encounter during chart review, history and exam, documentation, and further activities as noted  "      Mirna Roth PA-C, RD  Division of Gastroenterology, Hepatology & Nutrition  [facility]        HPI  Sondra Sorensen is a 47 year old female with a history of alopecia, arthralgia, leukoclastic vasculitis, restrictive lung disease, allergic rhinitis, MDD, RAYMOND, IBS-D, fructose malabsorption who presents for evaluation of diarrhea. They are new to the [facility] GI clinic and this is my first encounter with the patient.      She has been following with rheumatology for seronegative RA with response HCQ, recent hand MRI without signs of inflammatory arthritis. She also has history of biopsy proven leukoclastic disease. She was followed previously at outside facility diagnosed \"connective tisusue disease with sicca symtpoms.\" Pertinent rheumatologic labs include MARIAA 1:80 in a speckled pattern, negative/ normal RF, ANCA, Broad serologic work-up that showed negative/normal C3/C4/SSA/SSB/smith/RNP/CCP/dsDNA. She has had persistently elevated CRP.    She reports 20+ year history GI symptoms, namely urgent loose stools., worse in the last 5 to 10 years. Colonoscopy 2014 with normal appearing ileum and colon. On biopsy no evidence of microscopic colitis or other histopathologic abnormality.  She notes previous work-up with [facility], we do not have these records.  She did have positive fructose breath test 6/2018 with [facility].  She also reports negative lactose intolerance testing.    Labs and stool studies completed including fecal calprotectin, ova and parasite, Giardia/Cryptosporidium, fecal elastase and spot fecal fat, celiac serologies, and TSH with T4 reflex were all of which were negative/normal.  -----  Today 3/6/24:  She was last seen in our clinic April 2023.  Today she reports that overall symptoms remain similar to previous.  She continues to have daily stools with intermittent episodes of urgent loose stools and near incontinence.  On a typical day will have 1-3 formed stools, typically Gentry type IV but " with some tendency towards harder stool (Friendship 2) with straining about once per week.  Typically feels fully evacuated.  Rare day without stool.  About once a month she will have episode of significant diarrhea. Will start with abdominal pain/cramping and within 5 minutes will have urgent loose stools.  She reports that these episodes have caused significant disruption while out of the house and impacting her life.  Sometimes 1 stool and done, sometimes 3 over the course of an hour.  Abdominal discomfort relieved after.  She denies blood in her stool or melena, no nocturnal stools. No constipation/change in bowel pattern preceding that she can appreciate. She feels this is dietary related, has been tracking intake and is noted association with hot peppers.   Given previous diagnosis of fructose malabsorption, she has tried to follow a low fructose diet.  Though did not get formal dietary counseling on this, has handouts but in recent months has not followed quite as closely.  Feels like bloating improved with these changes but no real change in stool pattern.  She has met with a registered dietitian to review elimination diet (? Low FODMAP) which she tried but was not able to sustain due to restrictiveness of diet and lack of guidance.  Has tried gluten-free and lactose-free diets without significant improvement. Recently saw dietitian, but was specialized in DM rather than GI.    She is currently taking Citrucel fiber tablets, trying for 1 tablet twice daily, but often forgets 1 dose.  She is working on increasing fluid intake, drinks 20 ounces of water and occasional carbonated water.    Reports history of reflux with worsening over the past week.  She does not have burning in chest or regurgitation but feels burning up into her throat, hoarseness, and swelling in her throat/tonsils.  She does note that sleeping on an incline seems to help as the symptoms are worse overnight.  She does report that she feels like  she is having a hard time swallowing and getting food passed mouth but also that food is getting stuck in her throat.  This is worsened recently.   Has never had an EGD.  She was previously evaluated for hoarseness by ENT, recommended voice therapy.  She has started omeprazole in the last couple days, plans to do a 14-day course.  Was also tried Pepcid in the past with some relief.    GI review of systems completed and otherwise negative.      ROS:    No fevers or chills  No weight loss  No blurry vision, double vision or change in vision  No sore throat  No lymphadenopathy  No headache, paraesthesias, or weakness in a limb  No shortness of breath or wheezing  No chest pain or pressure  No arthralgias or myalgias  No rashes or skin changes  No odynophagia or dysphagia  No BRBPR, hematochezia, melena  No dysuria, frequency or urgency  No hot/cold intolerance or polyria  No anxiety or depression    PROBLEM LIST  Patient Active Problem List    Diagnosis Date Noted    Pelvic floor dysfunction 03/01/2024     Priority: Medium    Alopecia 09/14/2020     Priority: Medium    Arthralgia 09/14/2020     Priority: Medium    Leucocytoclastic vasculitis (H) 04/22/2020     Priority: Medium    Seasonal affective disorder (H24) 10/04/2018     Priority: Medium    Fructose intolerance 10/03/2018     Priority: Medium    CARDIOVASCULAR SCREENING; LDL GOAL LESS THAN 130 10/03/2018     Priority: Medium    Irritable bowel syndrome with diarrhea 10/03/2018     Priority: Medium    Social phobia 10/20/2016     Priority: Medium    Major depression, recurrent, full remission (H24) 11/04/2014     Priority: Medium     Noted at PCP visit.      Generalized anxiety disorder 02/07/2012     Priority: Medium    Insomnia 03/01/2011     Priority: Medium    Diarrhea 10/26/2009     Priority: Medium    Blepharitis 05/13/2009     Priority: Medium     Epic      Decreased diffusion capacity of lung 12/09/2008     Priority: Medium    Restrictive lung disease  12/09/2008     Priority: Medium    Myopia 11/14/2008     Priority: Medium     Myopia - LasEk both 2009      Episodic mood disorder (H24) 11/15/2004     Priority: Medium     Epic      Allergic rhinitis 04/24/2003     Priority: Medium     Epic         PERTINENT PAST MEDICAL HISTORY:  Past Medical History:   Diagnosis Date    Allergic rhinitis 04/24/2003    Epic    Alopecia 09/14/2020    Arthralgia 09/14/2020    Blepharitis 05/13/2009    Epic    Decreased diffusion capacity of lung 12/09/2008    Diarrhea 10/26/2009    Episodic mood disorder (H24) 11/15/2004    Epic    Fructose intolerance 10/03/2018    Generalized anxiety disorder 02/07/2012    Insomnia 03/01/2011    Irritable bowel syndrome with diarrhea 10/03/2018    Leucocytoclastic vasculitis (H) 04/22/2020    Major depression, recurrent, full remission (H24) 11/04/2014    Noted at PCP visit.    Menopause 04/2022    Myopia 11/14/2008    Myopia - LasEk both 2009    NO ACTIVE PROBLEMS     Restrictive lung disease 12/09/2008    Seasonal affective disorder (H24) 10/04/2018    Social phobia 10/20/2016       PREVIOUS SURGERIES:  Past Surgical History:   Procedure Laterality Date    LASIK Bilateral 2009    NO HISTORY OF SURGERY         PREVIOUS ENDOSCOPY:  Colonoscopy 05/12/2014:          ALLERGIES:  Allergies   Allergen Reactions    Doxycycline     Penicillins Rash     rash       PERTINENT MEDICATIONS:    Current Outpatient Medications:     Calcium Citrate-Vitamin D (CALCIUM + D PO), , Disp: , Rfl:     cetirizine (ZYRTEC) 10 MG tablet, Take 10 mg by mouth daily, Disp: , Rfl:     chlorhexidine (PERIDEX) 0.12 % solution, Swish and spit 15 mLs in mouth 2 times daily Can use for 2 weeks and should take a 2 week break before restarting., Disp: 473 mL, Rfl: 1    cholecalciferol (VITAMIN D3) 5000 units (125 mcg) capsule, Take by mouth daily, Disp: , Rfl:     cycloSPORINE (RESTASIS) 0.05 % ophthalmic emulsion, Place 1 drop into both eyes 2 times daily, Disp: 60 each, Rfl: 5     DULoxetine (CYMBALTA) 20 MG capsule, Take 20 mg by mouth daily, Disp: , Rfl:     hydroxychloroquine (PLAQUENIL) 200 MG tablet, Take 1 tablet (200 mg) by mouth 2 times daily Annual Plaquenil toxicity eye screening required. (Patient taking differently: Take 200 mg by mouth daily Annual Plaquenil toxicity eye screening required.), Disp: 60 tablet, Rfl: 10    JUNEL 1.5/30 1.5-30 MG-MCG tablet, TAKE 1 TABLET BY MOUTH DAILY ACTIVE TABLETS FOR PREVENTION OF MENSES, Disp: 84 tablet, Rfl: 2    ketoconazole (NIZORAL) 2 % external shampoo, Apply topically as needed for itching or irritation Shampoo/scalp once or twice weekly as needed for seborrhea symptoms, Disp: 120 mL, Rfl: 11    meloxicam (MOBIC) 15 MG tablet, Take 1 tablet (15 mg) by mouth daily, Disp: 90 tablet, Rfl: 1    olopatadine (PATANOL) 0.1 % ophthalmic solution, PLACE 1 DROP INTO BOTH EYES 2 TIMES DAILY AS NEEDED, Disp: 5 mL, Rfl: 3    Omega-3 Fatty Acids (FISH OIL PO), , Disp: , Rfl:     spironolactone (ALDACTONE) 100 MG tablet, Take 1 tablet by mouth daily at 2 pm, Disp: , Rfl:     triamcinolone (KENALOG) 0.1 % external cream, Apply topically 2 times daily As needed for rash (Patient not taking: Reported on 10/13/2023), Disp: 80 g, Rfl: 3  Ibuprofen 1-2 times per week  Biotin  Align probiotics on and off    SOCIAL HISTORY:  Tobacco: no  Alcohol: rare, up to two drinks every few months   Drugs Use: no  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, [employer] program    Social History     Socioeconomic History    Marital status: Single     Spouse name: Not on file    Number of children: Not on file    Years of education: Not on file    Highest education level: Not on file   Occupational History    Not on file   Tobacco Use    Smoking status: Never     Passive exposure: Never    Smokeless tobacco: Never   Vaping Use    Vaping Use: Never used   Substance and Sexual Activity    Alcohol use: Yes     Comment: rare    Drug use: No    Sexual activity: Not Currently     Partners: Male    Other Topics Concern    Parent/sibling w/ CABG, MI or angioplasty before 65F 55M? Not Asked   Social History Narrative    Not on file     Social Determinants of Health     Financial Resource Strain: Not on file   Food Insecurity: Not on file   Transportation Needs: Not on file   Physical Activity: Not on file   Stress: Not on file   Social Connections: Not on file   Interpersonal Safety: Not At Risk (8/16/2023)    Humiliation, Afraid, Rape, and Kick questionnaire     Fear of Current or Ex-Partner: No     Emotionally Abused: No     Physically Abused: No     Sexually Abused: No   Housing Stability: Not on file     FAMILY HISTORY:  FH of CRC: no  FH of IBD: no  No Colon/Panc/Esophageal/other GI CA. No IBD or Celiac Disease. No other Autoimmune, Liver, or Thyroid disease.    Family History   Problem Relation Age of Onset    Diabetes Father     Glaucoma No family hx of     Macular Degeneration No family hx of      Past/family/social history reviewed and no changes    PHYSICAL EXAMINATION:  Constitutional: AAOx3, cooperative, pleasant, not dyspneic/diaphoretic, no acute distress  Vitals reviewed: There were no vitals taken for this visit.  Wt:   General appearance: Healthy appearing adult, in no acute distress  Eyes: Sclera anicteric, Pupils round and reactive to light  Ears, nose, mouth and throat: No obvious external lesions of ears and nose, Hearing intact  Neck: Symmetric, No obvious external lesions  Respiratory: Normal respiration, no use of accessory muscles   MSK: Gait normal  Skin: No rashes or jaundice   Psychiatric: Oriented to person, place and time, Appropriate mood and affect.       PERTINENT STUDIES:  Lab on 12/23/2022   Component Date Value Ref Range Status    Sodium 12/23/2022 135 (L)  136 - 145 mmol/L Final    Potassium 12/23/2022 4.4  3.4 - 5.3 mmol/L Final    Chloride 12/23/2022 100  98 - 107 mmol/L Final    Carbon Dioxide (CO2) 12/23/2022 20 (L)  22 - 29 mmol/L Final    Anion Gap 12/23/2022 15  7 -  15 mmol/L Final    Urea Nitrogen 12/23/2022 16.0  6.0 - 20.0 mg/dL Final    Creatinine 12/23/2022 0.94  0.51 - 0.95 mg/dL Final    Calcium 12/23/2022 9.5  8.6 - 10.0 mg/dL Final    Glucose 12/23/2022 160 (H)  70 - 99 mg/dL Final    Alkaline Phosphatase 12/23/2022 71  35 - 104 U/L Final    AST 12/23/2022 27  10 - 35 U/L Final    ALT 12/23/2022 21  10 - 35 U/L Final    Protein Total 12/23/2022 8.4 (H)  6.4 - 8.3 g/dL Final    Albumin 12/23/2022 4.6  3.5 - 5.2 g/dL Final    Bilirubin Total 12/23/2022 0.4  <=1.2 mg/dL Final    GFR Estimate 12/23/2022 75  >60 mL/min/1.73m2 Final    Erythrocyte Sedimentation Rate 12/23/2022 11  0 - 20 mm/hr Final    CRP Inflammation 12/23/2022 20.50 (H)  <5.00 mg/L Final    WBC Count 12/23/2022 9.7  4.0 - 11.0 10e3/uL Final    RBC Count 12/23/2022 5.06  3.80 - 5.20 10e6/uL Final    Hemoglobin 12/23/2022 15.3  11.7 - 15.7 g/dL Final    Hematocrit 12/23/2022 45.9  35.0 - 47.0 % Final    MCV 12/23/2022 91  78 - 100 fL Final    MCH 12/23/2022 30.2  26.5 - 33.0 pg Final    MCHC 12/23/2022 33.3  31.5 - 36.5 g/dL Final    RDW 12/23/2022 11.7  10.0 - 15.0 % Final    Platelet Count 12/23/2022 309  150 - 450 10e3/uL Final    % Neutrophils 12/23/2022 69  % Final    % Lymphocytes 12/23/2022 23  % Final    % Monocytes 12/23/2022 7  % Final    % Eosinophils 12/23/2022 1  % Final    % Basophils 12/23/2022 0  % Final    % Immature Granulocytes 12/23/2022 0  % Final    Absolute Neutrophils 12/23/2022 6.7  1.6 - 8.3 10e3/uL Final    Absolute Lymphocytes 12/23/2022 2.2  0.8 - 5.3 10e3/uL Final    Absolute Monocytes 12/23/2022 0.7  0.0 - 1.3 10e3/uL Final    Absolute Eosinophils 12/23/2022 0.1  0.0 - 0.7 10e3/uL Final    Absolute Basophils 12/23/2022 0.0  0.0 - 0.2 10e3/uL Final    Absolute Immature Granulocytes 12/23/2022 0.0  <=0.4 10e3/uL Final     Fructose breath test 6/2018 ([facility]):

## 2024-03-06 NOTE — PATIENT INSTRUCTIONS
It was a pleasure taking care of you today.  I've included a brief summary of our discussion and care plan from today's visit below.  Please review this information with your primary care provider.  ______________________________________________________________________    My recommendations are summarized as follows:  -- Schedule colonoscopy and upper endoscopy  -- Continue Citrucel fiber caplets - at least 1-2 caplets per day, can further increase up to 6 caplets pending stool consistency  -- Work on hydration, aim for 64oz daily  -- Referral to our registered dietitian Natalia Osman regarding low fructose diet education  -- Track symptoms in journal at least 1 to 2 days around episodes of diarrhea including stool pattern prior to and during episodes including frequency and consistency, diet, stress/life circumstances and we will see if a pattern emerges  -- Omeprazole 20 mg x 14 days taken in the morning 30 to 60 minutes before eating drinking  -- Pepcid (famotidine) at bedtime as needed      -- please see scheduling information provided below     Return to GI Clinic in 6 months to review your progress.    ______________________________________________________________________    How do I schedule labs, imaging studies, or procedures that were ordered in clinic today?     Labs: To schedule lab appointment at the [facility], use my chart or call [phone]. If you have a [place] lab closer to home where you are regularly seen you can give them a call.     Procedures: If a colonoscopy, upper endoscopy, breath test, esophageal manometry, or pH impedence was ordered today, our endoscopy team will call you to schedule this. If you have not heard from our endoscopy team within a week, please call [phone] to schedule.     Imaging Studies: If you were scheduled for a CT scan, X-ray, MRI, ultrasound, HIDA scan or other imaging study, please call [phone] to have this scheduled.     Referral: If a  referral to another specialty was ordered, expect a phone call or follow instructions above. If you have not heard from anyone regarding your referral in a week, please call our clinic to check the status.     Who do I call with any questions after my visit?  Please be in touch if there are any further questions that arise following today's visit.  There are multiple ways to contact your gastroenterology care team.      During business hours, you may reach a Gastroenterology nurse at [phone]    To schedule or reschedule an appointment, please call [phone].     You can always send a secure message through [employer].  [employer] messages are answered by your nurse or doctor typically within 24 hours.  Please allow extra time on weekends and holidays.      For urgent/emergent questions after business hours, you may reach the on-call GI Fellow by contacting the [facility]  at [phone].     How will I get the results of any tests ordered?    You will receive all of your results.  If you have signed up for [employer], any tests ordered at your visit will be available to you after your provider reviews them.  Typically this takes 1-2 weeks.  If there are urgent results that require a change in your care plan, your provider or nurse will call you to discuss the next steps.      What is [employer]?  [employer] is a secure way for you to access all of your healthcare records from the [facility].  It is a web based computer program, so you can sign on to it from any location.  It also allows you to send secure messages to your care team.  I recommend signing up for [employer] access if you have not already done so and are comfortable with using a computer.      How to I schedule a follow-up visit?  If you did not schedule a follow-up visit today, please call [phone] to schedule a follow-up office visit.      Sincerely,    Mirna Roth PA-C  Division of Gastroenterology, Hepatology &  Nutrition  [facility]

## 2024-03-06 NOTE — LETTER
3/6/2024         RE: Sondra Sorensen  [address]  [place] [address]        Dear Colleague,    Thank you for referring your patient, Sondra Sorensen, to the [facility] GASTROENTEROLOGY [facility]INIC [place]. Please see a copy of my visit note below.    GI CLINIC VISIT      CC/REFERRING MD:  Jesus Jacobson  REASON FOR CONSULTATION: diarrhea    ASSESSMENT/PLAN:  #Chronic Diarrhea  Patient with 20+ year history of intermittent loose stools, worsening in past 5 to 10 years with episodes of urgent, explosive diarrhea with intermittent incontinence.  Colonoscopy in 2014 unremarkable endoscopically and histology negative for microscopic colitis at that time.  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  She reports that previous lactose intolerance testing was negative.   Fecal calprotectin, ova and parasite, Giardia/Cryptosporidium, fecal elastase and spot fecal fat, celiac serologies, and TSH with T4 reflex were all of which were negative/normal.     This continues to be an issue and impacting quality of life.  Differential for intermitent diarrhea includes exacerbation of fructose malabsorption with diet, IBS-D (consistent with abdominal cramping improved after defecation and change in stool form), constipation with overflow, less likely SIBO, bile acid diarrhea microscopic colitis, IBD. Given other rheumatologic conditions could consider other more rare rheumatologic cause.  She is due for colonoscopy for routine surveillance, we will proceed with this now and get biopsies for microscopic colitis and assess endoscopically for any other drivers of diarrhea.  She would also benefit from meeting with a dietitian to review low fructose diet with known component of fructose malabsorption and potentially any other dietary triggers/low FODMAP.  We will continue with fiber supplement and working on hydration.  She continues to meet with therapist and has been adjusting mental  health medications.    uture considerations include SIBO breath testing, abdominal x-ray for stool burden, low FODMAP retrial.    Plan:  -- Continue Citrucel fiber caplets - at least 1-2 caplets per day, can further increase up to 6 caplets pending stool consistency  -- Work on hydration, aim for 64oz daily  -- Referral to our registered dietitian Natalia Osman regarding low fructose diet education  --Complete colonoscopy with biopsies for microscopic colitis  -- Track symptoms in journal at least 1 to 2 days around episodes of diarrhea including stool pattern prior to and during episodes including frequency and consistency, diet, stress/life circumstances and we will see if a pattern emerges    #GERD  #Dysphagia  Patient reports long history of atypical GERD symptoms of hoarseness, throat pain, cough.  Previously evaluated by ENT.  She reports recent worsening of symptoms with burning in her throat, most overnight.  Does seem to improve with elevation of head.  She has started omeprazole and plans to complete 14-day course, this is appropriate.  Additionally discussed that she could take famotidine at night.  She also reports recently she has had difficulty with swallowing, seems to be oropharyngeal but also feeling like food is getting caught in her throat.  Will proceed with EGD for evaluation of dysphagia and GERD with her colonoscopy.  -- Omeprazole 20 mg x 14 days taken in the morning 30 to 60 minutes before eating drinking  -- Pepcid (famotidine) at bedtime as needed  -- Upper endoscopy    Colorectal cancer screening: last colonoscopy 2014 normal. Cologaurd 2021 years ago negative (per pt). Due for screening 2024. Discussed that we could order this now, would like to hold off scheduling for now.     RTC 6 months    Thank you for this consultation.  It was a pleasure to participate in the care of this patient; please contact us with any further questions.     60 Minutes was spent on the date of the encounter  "during chart review, history and exam, documentation, and further activities as noted       Mirna Roth PA-C, RD  Division of Gastroenterology, Hepatology & Nutrition  [facility]        HPI  Sondra Sorensen is a 47 year old female with a history of alopecia, arthralgia, leukoclastic vasculitis, restrictive lung disease, allergic rhinitis, MDD, RAYMOND, IBS-D, fructose malabsorption who presents for evaluation of diarrhea. They are new to the [facility] GI clinic and this is my first encounter with the patient.      She has been following with rheumatology for seronegative RA with response HCQ, recent hand MRI without signs of inflammatory arthritis. She also has history of biopsy proven leukoclastic disease. She was followed previously at outside facility diagnosed \"connective tisusue disease with sicca symtpoms.\" Pertinent rheumatologic labs include MARIAA 1:80 in a speckled pattern, negative/ normal RF, ANCA, Broad serologic work-up that showed negative/normal C3/C4/SSA/SSB/smith/RNP/CCP/dsDNA. She has had persistently elevated CRP.    She reports 20+ year history GI symptoms, namely urgent loose stools., worse in the last 5 to 10 years. Colonoscopy 2014 with normal appearing ileum and colon. On biopsy no evidence of microscopic colitis or other histopathologic abnormality.  She notes previous work-up with [facility], we do not have these records.  She did have positive fructose breath test 6/2018 with [facility].  She also reports negative lactose intolerance testing.    Labs and stool studies completed including fecal calprotectin, ova and parasite, Giardia/Cryptosporidium, fecal elastase and spot fecal fat, celiac serologies, and TSH with T4 reflex were all of which were negative/normal.  -----  Today 3/6/24:  She was last seen in our clinic April 2023.  Today she reports that overall symptoms remain similar to previous.  She continues to have daily stools with intermittent episodes of urgent loose stools and near " incontinence.  On a typical day will have 1-3 formed stools, typically [place] type IV but with some tendency towards harder stool ([place] 2) with straining about once per week.  Typically feels fully evacuated.  Rare day without stool.  About once a month she will have episode of significant diarrhea. Will start with abdominal pain/cramping and within 5 minutes will have urgent loose stools.  She reports that these episodes have caused significant disruption while out of the house and impacting her life.  Sometimes 1 stool and done, sometimes 3 over the course of an hour.  Abdominal discomfort relieved after.  She denies blood in her stool or melena, no nocturnal stools. No constipation/change in bowel pattern preceding that she can appreciate. She feels this is dietary related, has been tracking intake and is noted association with hot peppers.   Given previous diagnosis of fructose malabsorption, she has tried to follow a low fructose diet.  Though did not get formal dietary counseling on this, has handouts but in recent months has not followed quite as closely.  Feels like bloating improved with these changes but no real change in stool pattern.  She has met with a registered dietitian to review elimination diet (? Low FODMAP) which she tried but was not able to sustain due to restrictiveness of diet and lack of guidance.  Has tried gluten-free and lactose-free diets without significant improvement. Recently saw dietitian, but was specialized in DM rather than GI.    She is currently taking Citrucel fiber tablets, trying for 1 tablet twice daily, but often forgets 1 dose.  She is working on increasing fluid intake, drinks 20 ounces of water and occasional carbonated water.    Reports history of reflux with worsening over the past week.  She does not have burning in chest or regurgitation but feels burning up into her throat, hoarseness, and swelling in her throat/tonsils.  She does note that sleeping on an  incline seems to help as the symptoms are worse overnight.  She does report that she feels like she is having a hard time swallowing and getting food passed mouth but also that food is getting stuck in her throat.  This is worsened recently.   Has never had an EGD.  She was previously evaluated for hoarseness by ENT, recommended voice therapy.  She has started omeprazole in the last couple days, plans to do a 14-day course.  Was also tried Pepcid in the past with some relief.    GI review of systems completed and otherwise negative.      ROS:    No fevers or chills  No weight loss  No blurry vision, double vision or change in vision  No sore throat  No lymphadenopathy  No headache, paraesthesias, or weakness in a limb  No shortness of breath or wheezing  No chest pain or pressure  No arthralgias or myalgias  No rashes or skin changes  No odynophagia or dysphagia  No BRBPR, hematochezia, melena  No dysuria, frequency or urgency  No hot/cold intolerance or polyria  No anxiety or depression    PROBLEM LIST  Patient Active Problem List    Diagnosis Date Noted    Pelvic floor dysfunction 03/01/2024     Priority: Medium    Alopecia 09/14/2020     Priority: Medium    Arthralgia 09/14/2020     Priority: Medium    Leucocytoclastic vasculitis (H) 04/22/2020     Priority: Medium    Seasonal affective disorder (H24) 10/04/2018     Priority: Medium    Fructose intolerance 10/03/2018     Priority: Medium    CARDIOVASCULAR SCREENING; LDL GOAL LESS THAN 130 10/03/2018     Priority: Medium    Irritable bowel syndrome with diarrhea 10/03/2018     Priority: Medium    Social phobia 10/20/2016     Priority: Medium    Major depression, recurrent, full remission (H24) 11/04/2014     Priority: Medium     Noted at PCP visit.      Generalized anxiety disorder 02/07/2012     Priority: Medium    Insomnia 03/01/2011     Priority: Medium    Diarrhea 10/26/2009     Priority: Medium    Blepharitis 05/13/2009     Priority: Medium     Epic       Decreased diffusion capacity of lung 12/09/2008     Priority: Medium    Restrictive lung disease 12/09/2008     Priority: Medium    Myopia 11/14/2008     Priority: Medium     Myopia - LasEk both 2009      Episodic mood disorder (H24) 11/15/2004     Priority: Medium     Epic      Allergic rhinitis 04/24/2003     Priority: Medium     Epic         PERTINENT PAST MEDICAL HISTORY:  Past Medical History:   Diagnosis Date    Allergic rhinitis 04/24/2003    Epic    Alopecia 09/14/2020    Arthralgia 09/14/2020    Blepharitis 05/13/2009    Epic    Decreased diffusion capacity of lung 12/09/2008    Diarrhea 10/26/2009    Episodic mood disorder (H24) 11/15/2004    Epic    Fructose intolerance 10/03/2018    Generalized anxiety disorder 02/07/2012    Insomnia 03/01/2011    Irritable bowel syndrome with diarrhea 10/03/2018    Leucocytoclastic vasculitis (H) 04/22/2020    Major depression, recurrent, full remission (H24) 11/04/2014    Noted at PCP visit.    Menopause 04/2022    Myopia 11/14/2008    Myopia - LasEk both 2009    NO ACTIVE PROBLEMS     Restrictive lung disease 12/09/2008    Seasonal affective disorder (H24) 10/04/2018    Social phobia 10/20/2016       PREVIOUS SURGERIES:  Past Surgical History:   Procedure Laterality Date    LASIK Bilateral 2009    NO HISTORY OF SURGERY         PREVIOUS ENDOSCOPY:  Colonoscopy 05/12/2014:          ALLERGIES:  Allergies   Allergen Reactions    Doxycycline     Penicillins Rash     rash       PERTINENT MEDICATIONS:    Current Outpatient Medications:     Calcium Citrate-Vitamin D (CALCIUM + D PO), , Disp: , Rfl:     cetirizine (ZYRTEC) 10 MG tablet, Take 10 mg by mouth daily, Disp: , Rfl:     chlorhexidine (PERIDEX) 0.12 % solution, Swish and spit 15 mLs in mouth 2 times daily Can use for 2 weeks and should take a 2 week break before restarting., Disp: 473 mL, Rfl: 1    cholecalciferol (VITAMIN D3) 5000 units (125 mcg) capsule, Take by mouth daily, Disp: , Rfl:     cycloSPORINE (RESTASIS)  0.05 % ophthalmic emulsion, Place 1 drop into both eyes 2 times daily, Disp: 60 each, Rfl: 5    DULoxetine (CYMBALTA) 20 MG capsule, Take 20 mg by mouth daily, Disp: , Rfl:     hydroxychloroquine (PLAQUENIL) 200 MG tablet, Take 1 tablet (200 mg) by mouth 2 times daily Annual Plaquenil toxicity eye screening required. (Patient taking differently: Take 200 mg by mouth daily Annual Plaquenil toxicity eye screening required.), Disp: 60 tablet, Rfl: 10    JUNEL 1.5/30 1.5-30 MG-MCG tablet, TAKE 1 TABLET BY MOUTH DAILY ACTIVE TABLETS FOR PREVENTION OF MENSES, Disp: 84 tablet, Rfl: 2    ketoconazole (NIZORAL) 2 % external shampoo, Apply topically as needed for itching or irritation Shampoo/scalp once or twice weekly as needed for seborrhea symptoms, Disp: 120 mL, Rfl: 11    meloxicam (MOBIC) 15 MG tablet, Take 1 tablet (15 mg) by mouth daily, Disp: 90 tablet, Rfl: 1    olopatadine (PATANOL) 0.1 % ophthalmic solution, PLACE 1 DROP INTO BOTH EYES 2 TIMES DAILY AS NEEDED, Disp: 5 mL, Rfl: 3    Omega-3 Fatty Acids (FISH OIL PO), , Disp: , Rfl:     spironolactone (ALDACTONE) 100 MG tablet, Take 1 tablet by mouth daily at 2 pm, Disp: , Rfl:     triamcinolone (KENALOG) 0.1 % external cream, Apply topically 2 times daily As needed for rash (Patient not taking: Reported on 10/13/2023), Disp: 80 g, Rfl: 3  Ibuprofen 1-2 times per week  Biotin  Align probiotics on and off    SOCIAL HISTORY:  Tobacco: no  Alcohol: rare, up to two drinks every few months   Drugs Use: no  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, [employer]    Social History     Socioeconomic History    Marital status: Single     Spouse name: Not on file    Number of children: Not on file    Years of education: Not on file    Highest education level: Not on file   Occupational History    Not on file   Tobacco Use    Smoking status: Never     Passive exposure: Never    Smokeless tobacco: Never   Vaping Use    Vaping Use: Never used   Substance and Sexual Activity    Alcohol  use: Yes     Comment: rare    Drug use: No    Sexual activity: Not Currently     Partners: Male   Other Topics Concern    Parent/sibling w/ CABG, MI or angioplasty before 65F 55M? Not Asked   Social History Narrative    Not on file     Social Determinants of Health     Financial Resource Strain: Not on file   Food Insecurity: Not on file   Transportation Needs: Not on file   Physical Activity: Not on file   Stress: Not on file   Social Connections: Not on file   Interpersonal Safety: Not At Risk (8/16/2023)    Humiliation, Afraid, Rape, and Kick questionnaire     Fear of Current or Ex-Partner: No     Emotionally Abused: No     Physically Abused: No     Sexually Abused: No   Housing Stability: Not on file     FAMILY HISTORY:  FH of CRC: no  FH of IBD: no  No Colon/Panc/Esophageal/other GI CA. No IBD or Celiac Disease. No other Autoimmune, Liver, or Thyroid disease.    Family History   Problem Relation Age of Onset    Diabetes Father     Glaucoma No family hx of     Macular Degeneration No family hx of      Past/family/social history reviewed and no changes    PHYSICAL EXAMINATION:  Constitutional: AAOx3, cooperative, pleasant, not dyspneic/diaphoretic, no acute distress  Vitals reviewed: There were no vitals taken for this visit.  Wt:   General appearance: Healthy appearing adult, in no acute distress  Eyes: Sclera anicteric, Pupils round and reactive to light  Ears, nose, mouth and throat: No obvious external lesions of ears and nose, Hearing intact  Neck: Symmetric, No obvious external lesions  Respiratory: Normal respiration, no use of accessory muscles   MSK: Gait normal  Skin: No rashes or jaundice   Psychiatric: Oriented to person, place and time, Appropriate mood and affect.       PERTINENT STUDIES:  Lab on 12/23/2022   Component Date Value Ref Range Status    Sodium 12/23/2022 135 (L)  136 - 145 mmol/L Final    Potassium 12/23/2022 4.4  3.4 - 5.3 mmol/L Final    Chloride 12/23/2022 100  98 - 107 mmol/L Final     Carbon Dioxide (CO2) 12/23/2022 20 (L)  22 - 29 mmol/L Final    Anion Gap 12/23/2022 15  7 - 15 mmol/L Final    Urea Nitrogen 12/23/2022 16.0  6.0 - 20.0 mg/dL Final    Creatinine 12/23/2022 0.94  0.51 - 0.95 mg/dL Final    Calcium 12/23/2022 9.5  8.6 - 10.0 mg/dL Final    Glucose 12/23/2022 160 (H)  70 - 99 mg/dL Final    Alkaline Phosphatase 12/23/2022 71  35 - 104 U/L Final    AST 12/23/2022 27  10 - 35 U/L Final    ALT 12/23/2022 21  10 - 35 U/L Final    Protein Total 12/23/2022 8.4 (H)  6.4 - 8.3 g/dL Final    Albumin 12/23/2022 4.6  3.5 - 5.2 g/dL Final    Bilirubin Total 12/23/2022 0.4  <=1.2 mg/dL Final    GFR Estimate 12/23/2022 75  >60 mL/min/1.73m2 Final    Erythrocyte Sedimentation Rate 12/23/2022 11  0 - 20 mm/hr Final    CRP Inflammation 12/23/2022 20.50 (H)  <5.00 mg/L Final    WBC Count 12/23/2022 9.7  4.0 - 11.0 10e3/uL Final    RBC Count 12/23/2022 5.06  3.80 - 5.20 10e6/uL Final    Hemoglobin 12/23/2022 15.3  11.7 - 15.7 g/dL Final    Hematocrit 12/23/2022 45.9  35.0 - 47.0 % Final    MCV 12/23/2022 91  78 - 100 fL Final    MCH 12/23/2022 30.2  26.5 - 33.0 pg Final    MCHC 12/23/2022 33.3  31.5 - 36.5 g/dL Final    RDW 12/23/2022 11.7  10.0 - 15.0 % Final    Platelet Count 12/23/2022 309  150 - 450 10e3/uL Final    % Neutrophils 12/23/2022 69  % Final    % Lymphocytes 12/23/2022 23  % Final    % Monocytes 12/23/2022 7  % Final    % Eosinophils 12/23/2022 1  % Final    % Basophils 12/23/2022 0  % Final    % Immature Granulocytes 12/23/2022 0  % Final    Absolute Neutrophils 12/23/2022 6.7  1.6 - 8.3 10e3/uL Final    Absolute Lymphocytes 12/23/2022 2.2  0.8 - 5.3 10e3/uL Final    Absolute Monocytes 12/23/2022 0.7  0.0 - 1.3 10e3/uL Final    Absolute Eosinophils 12/23/2022 0.1  0.0 - 0.7 10e3/uL Final    Absolute Basophils 12/23/2022 0.0  0.0 - 0.2 10e3/uL Final    Absolute Immature Granulocytes 12/23/2022 0.0  <=0.4 10e3/uL Final     Fructose breath test 6/2018 ([facility]):        Again, thank you  for allowing me to participate in the care of your patient.      Sincerely,    Mirna Rtoh PA-C

## 2024-03-08 ENCOUNTER — TELEPHONE (OUTPATIENT)
Dept: GASTROENTEROLOGY | Facility: CLINIC | Age: 49
End: 2024-03-08
Payer: COMMERCIAL

## 2024-03-08 NOTE — TELEPHONE ENCOUNTER
"Endoscopy Scheduling Screen    Have you had a positive Covid test in the last 14 days?  No    Are you active on MyChart?   Yes    What insurance is in the chart?  Other:  med[employer]    Ordering/Referring Provider:  KIERRA KRISHNAN   (If ordering provider performs procedure, schedule with ordering provider unless otherwise instructed. )    BMI: Estimated body mass index is 23.37 kg/m  as calculated from the following:    Height as of 10/13/23: 1.689 m (5' 6.5\").    Weight as of 10/13/23: 66.7 kg (147 lb).     Sedation Ordered  MAC/deep sedation.   BMI<= 45 45 < BMI <= 48 48 < BMI < = 50  BMI > 50   No Restrictions No [facility]  No [facility]  [facility] with exceptions Hospital Only OR Only       Are you taking any prescription medications for pain 3 or more times per week?   NO - No RN review required.    Do you have a history of malignant hyperthermia or adverse reaction to anesthesia?  No    (Females) Are you currently pregnant?   No     Have you been diagnosed or told you have pulmonary hypertension?   No    Do you have an LVAD?  No    Have you been told you have moderate to severe sleep apnea?  No    Have you been told you have COPD, asthma, or any other lung disease?  No    Do you have any heart conditions?  No     Have you ever had an organ transplant?   No    Have you ever had or are you awaiting a heart or lung transplant?   No    Have you had a stroke or transient ischemic attack (TIA aka \"mini stroke\" in the last 6 months?   No    Have you been diagnosed with or been told you have cirrhosis of the liver?   No    Are you currently on dialysis?   No    Do you need assistance transferring?   No    BMI: Estimated body mass index is 23.37 kg/m  as calculated from the following:    Height as of 10/13/23: 1.689 m (5' 6.5\").    Weight as of 10/13/23: 66.7 kg (147 lb).     Is patients BMI > 40 and scheduling location [facility]?  No    Do you take an injectable medication for weight loss or diabetes (excluding insulin)?  No    Do you " take the medication Naltrexone?  No    Do you take blood thinners?  No       Prep   Are you currently on dialysis or do you have chronic kidney disease?  No    Do you have a diagnosis of diabetes?  No    Do you have a diagnosis of cystic fibrosis (CF)?  No    On a regular basis do you go 3 -5 days between bowel movements?  No    BMI > 40?  No    Preferred Pharmacy:    [id number] [address] [address] [facility] [address]  Phone: [phone] Fax: [phone]      Final Scheduling Details   Colonoscopy prep sent?  N/A    Procedure scheduled  Colonoscopy / Upper endoscopy (EGD)    Surgeon:  rosaline    Date of procedure:   7/2  Pre-OP / PAC:   No - Not required for this site.    Location  [facility] - [facility] - Per order.    Sedation   MAC/Deep Sedation - Per order.      Patient Reminders:   You will receive a call from a Nurse to review instructions and health history.  This assessment must be completed prior to your procedure.  Failure to complete the Nurse assessment may result in the procedure being cancelled.      On the day of your procedure, please designate an adult(s) who can drive you home stay with you for the next 24 hours. The medicines used in the exam will make you sleepy. You will not be able to drive.      You cannot take public transportation, ride share services, or non-medical taxi service without a responsible caregiver.  Medical transport services are allowed with the requirement that a responsible caregiver will receive you at your destination.  We require that drivers and caregivers are confirmed prior to your procedure.

## 2024-04-02 ENCOUNTER — OFFICE VISIT (OUTPATIENT)
Dept: RHEUMATOLOGY | Facility: CLINIC | Age: 49
End: 2024-04-02
Attending: STUDENT IN AN ORGANIZED HEALTH CARE EDUCATION/TRAINING PROGRAM
Payer: COMMERCIAL

## 2024-04-02 VITALS
BODY MASS INDEX: 24.48 KG/M2 | DIASTOLIC BLOOD PRESSURE: 85 MMHG | OXYGEN SATURATION: 99 % | WEIGHT: 154 LBS | HEART RATE: 80 BPM | SYSTOLIC BLOOD PRESSURE: 130 MMHG

## 2024-04-02 DIAGNOSIS — R76.8 POSITIVE ANA (ANTINUCLEAR ANTIBODY): ICD-10-CM

## 2024-04-02 DIAGNOSIS — R82.90 ABNORMAL URINALYSIS: ICD-10-CM

## 2024-04-02 DIAGNOSIS — I77.6 VASCULITIS (H): Primary | ICD-10-CM

## 2024-04-02 DIAGNOSIS — R19.7 DIARRHEA, UNSPECIFIED TYPE: ICD-10-CM

## 2024-04-02 DIAGNOSIS — M25.59 PAIN IN OTHER JOINT: ICD-10-CM

## 2024-04-02 PROCEDURE — 99215 OFFICE O/P EST HI 40 MIN: CPT | Performed by: STUDENT IN AN ORGANIZED HEALTH CARE EDUCATION/TRAINING PROGRAM

## 2024-04-02 PROCEDURE — 99213 OFFICE O/P EST LOW 20 MIN: CPT | Performed by: STUDENT IN AN ORGANIZED HEALTH CARE EDUCATION/TRAINING PROGRAM

## 2024-04-02 ASSESSMENT — ENCOUNTER SYMPTOMS
HEMATURIA: 0
HEMOPTYSIS: 0
WEIGHT LOSS: 0
COUGH: 0
FEVER: 0
SHORTNESS OF BREATH: 0
BLOOD IN STOOL: 0

## 2024-04-02 ASSESSMENT — PAIN SCALES - GENERAL: PAINLEVEL: NO PAIN (0)

## 2024-04-02 NOTE — PROGRESS NOTES
RHEUMATOLOGY OUTPATIENT CLINIC NOTE     Referring Provider: Jesus Jacobson MD     Rheumatology history:  Sondra has been seen by several rheumatologists previously and has been following with  [2020-] for concern for seronegative rheumatoid arthritis on hydroxychloroquine.  Other symptoms: History of hair loss [diagnosed as alopecia areata per dermatology, treated with steroid injection], lower extremity cutaneous vasculitis [biopsy showing leukocytoclastic vasculitis], arthralgia, dry eyes (uses eye drops twice daily), no oral dryness.   No personal or family history of skin psoriasis, no history of inflammatory eye disease or inflammatory bowel disease.  Comorbid conditions: Biopsy-proven leukocytoclastic vasculitis [], chronic diarrhea.  Treatment included: Hydroxychloroquine     Lab review  MARIAA positive, 1: 80, nuclear speckled  RNP: Negative  Block antibody: Negative  SSA: Negative  SSB: Negative  SCL 70: Negative  Centromere antibody: Negative  dsDNA: Negative  Complement C3/C4: Normal    CCP antibody: Negative  Rheumatoid factor: Negative    ANCA screen: Negative    IgA: 672, elevated, 3/2021  Ig, normal  IgM 44, slightly low    Cryoglobulin: Negative    QuantiFERON gold: Negative  Hepatitis B surface antigen: Negative  Hepatitis B core antibody: Negative  Hepatitis C antibody: Negative  HIV antibody: Negative    Imaging review  MRI right hand, : No evidence of inflammatory arthritis  X-ray right hand, : No evidence of erosive disease    Subjective     Visit date 2024    Sondra Sorensen is a 48 year old female who presents today for follow up.  Last appointment with  was in 2023.    Since last visit,    She noticed a red small rash over the chin of tibia, no prior trauma, not raised or itchy.   She denies chronic back pain or morning stiffness in the back.     Review of Systems   Constitutional:  Negative for fever and weight loss.    Respiratory:  Negative for cough, hemoptysis and shortness of breath.    Cardiovascular:  Negative for chest pain.   Gastrointestinal:  Negative for blood in stool.   Genitourinary:  Negative for hematuria.   Musculoskeletal:  Negative for joint pain.        Joint swelling: None visible  Morning stiffness: 10 minutes.  She is able to make a fist in the morning    Skin:  Positive for rash.     Current Medications   Hydroxychloroquine 200 mg once daily, last eye exam 8/2023    Objective   /85 (BP Location: Right arm, Patient Position: Sitting, Cuff Size: Adult Regular)   Pulse 80   Wt 69.9 kg (154 lb)   SpO2 99%   BMI 24.48 kg/m        PHYSICAL EXAMINATION  Physical Exam  HENT:      Head: Normocephalic.      Mouth/Throat:      Mouth: Mucous membranes are moist.   Eyes:      Conjunctiva/sclera: Conjunctivae normal.   Pulmonary:      Effort: Pulmonary effort is normal.      Breath sounds: Normal breath sounds.   Musculoskeletal:         General: No swelling or tenderness.      Comments: No synovitis in the hands (MCP, PIP, DIP), Wrists, ,Elbows, Knees, Ankles, Feet.  MARLENI test negative bilaterally  SJC:0/28  TJC: 0/28   Skin:     Findings: Rash present.      Comments: Small flat macular area on the chin of tibia on right side, not raised   Neurological:      Mental Status: She is alert.           Assessment & Plan   Sondra has been seen by several rheumatologists previously and has been following with  [2020 1-2023] for concern for seronegative rheumatoid arthritis on hydroxychloroquine.  Other symptoms: History of hair loss [diagnosed as alopecia areata per dermatology, treated with steroid injection], lower extremity cutaneous vasculitis [biopsy showing leukocytoclastic vasculitis], arthralgia, dry eyes (uses eye drops twice daily), no oral dryness.   No personal or family history of skin psoriasis, no history of inflammatory eye disease or inflammatory bowel disease.  Comorbid conditions:  Biopsy-proven leukocytoclastic vasculitis [2020], chronic diarrhea.  Treatment included: Hydroxychloroquine     # Concern for seronegative inflammatory arthritis  # History of cutaneous vasculitis  # chronic diarrhea  # history of elevated IgA       # Concern for seronegative inflammatory arthritis  She seems to be stable from arthritis standpoint on hydroxychloroquine 200 mg daily.    There is no active arthritis today on exam.  No new labs for review.  She however remains with persistent elevated C-reactive protein of unclear etiology, await gastroenterology evaluation for inflammatory bowel disease    Plan:  - Get labs today  - Continue hydroxychloroquine 200 mg daily  - Continue yearly eye exam while on hydroxychloroquine    # History of cutaneous vasculitis   Biopsy-proven leukocytoclastic vasculitis however DIF was not done.  History of elevated IgA.    Current rash looks nonspecific and is not consistent with leukocytoclastic vasculitis    Plan:   - Check ANCA serology MPO/ND-3 and serum immunoglobulins  - If rash recurs then we will consult dermatology for biopsy evaluate for IgA deposition    # Chronic diarrhea, concern for inflammatory bowel disease  Follows with gastroenterology, plan for EGD/Colonoscopy.   Plan:  - Gastroenterology follow up  - MR enterography     # Restrictive lung disease, diagnosed outside   No new symptoms  Plan:  Continue to watch for now. We will consider additional tests depending on results of the above workup      50 minutes spent by me on the date of the encounter doing chart review, history and exam, documentation and further activities per the note      Return in about 10 weeks (around 6/11/2024).    Luli Paul MD  [facility] RHEUMATOLOGY

## 2024-04-02 NOTE — LETTER
2024       RE: Sondra Sorensen  [address]  [place] [address]     Dear Colleague,    Thank you for referring your patient, Sondra Sorensen, to the [facility] RHEUMATOLOGY at [facility]. Please see a copy of my visit note below.    RHEUMATOLOGY OUTPATIENT CLINIC NOTE     Referring Provider: Jesus Jacobson MD     Rheumatology history:  Sondra has been seen by several rheumatologists previously and has been following with  [2020-] for concern for seronegative rheumatoid arthritis on hydroxychloroquine.  Other symptoms: History of hair loss [diagnosed as alopecia areata per dermatology, treated with steroid injection], lower extremity cutaneous vasculitis [biopsy showing leukocytoclastic vasculitis], arthralgia, dry eyes (uses eye drops twice daily), no oral dryness.   No personal or family history of skin psoriasis, no history of inflammatory eye disease or inflammatory bowel disease.  Comorbid conditions: Biopsy-proven leukocytoclastic vasculitis [], chronic diarrhea.  Treatment included: Hydroxychloroquine     Lab review  MARIAA positive, 1: 80, nuclear speckled  RNP: Negative  Block antibody: Negative  SSA: Negative  SSB: Negative  SCL 70: Negative  Centromere antibody: Negative  dsDNA: Negative  Complement C3/C4: Normal    CCP antibody: Negative  Rheumatoid factor: Negative    ANCA screen: Negative    IgA: 672, elevated, 3/2021  Ig, normal  IgM 44, slightly low    Cryoglobulin: Negative    QuantiFERON gold: Negative  Hepatitis B surface antigen: Negative  Hepatitis B core antibody: Negative  Hepatitis C antibody: Negative  HIV antibody: Negative    Imaging review  MRI right hand, : No evidence of inflammatory arthritis  X-ray right hand, : No evidence of erosive disease    Subjective    Visit date 2024    Sondra Sorensen is a 48 year old female who presents today for follow up.  Last  appointment with  was in October 2023.    Since last visit,    She noticed a red small rash over the chin of tibia, no prior trauma, not raised or itchy.   She denies chronic back pain or morning stiffness in the back.     Review of Systems   Constitutional:  Negative for fever and weight loss.   Respiratory:  Negative for cough, hemoptysis and shortness of breath.    Cardiovascular:  Negative for chest pain.   Gastrointestinal:  Negative for blood in stool.   Genitourinary:  Negative for hematuria.   Musculoskeletal:  Negative for joint pain.        Joint swelling: None visible  Morning stiffness: 10 minutes.  She is able to make a fist in the morning    Skin:  Positive for rash.     Current Medications   Hydroxychloroquine 200 mg once daily, last eye exam 8/2023    Objective  /85 (BP Location: Right arm, Patient Position: Sitting, Cuff Size: Adult Regular)   Pulse 80   Wt 69.9 kg (154 lb)   SpO2 99%   BMI 24.48 kg/m        PHYSICAL EXAMINATION  Physical Exam  HENT:      Head: Normocephalic.      Mouth/Throat:      Mouth: Mucous membranes are moist.   Eyes:      Conjunctiva/sclera: Conjunctivae normal.   Pulmonary:      Effort: Pulmonary effort is normal.      Breath sounds: Normal breath sounds.   Musculoskeletal:         General: No swelling or tenderness.      Comments: No synovitis in the hands (MCP, PIP, DIP), Wrists, ,Elbows, Knees, Ankles, Feet.  MARLENI test negative bilaterally  SJC:0/28  TJC: 0/28   Skin:     Findings: Rash present.      Comments: Small flat macular area on the chin of tibia on right side, not raised   Neurological:      Mental Status: She is alert.           Assessment & Plan  Sondra has been seen by several rheumatologists previously and has been following with  [2020 1-2023] for concern for seronegative rheumatoid arthritis on hydroxychloroquine.  Other symptoms: History of hair loss [diagnosed as alopecia areata per dermatology, treated with steroid  injection], lower extremity cutaneous vasculitis [biopsy showing leukocytoclastic vasculitis], arthralgia, dry eyes (uses eye drops twice daily), no oral dryness.   No personal or family history of skin psoriasis, no history of inflammatory eye disease or inflammatory bowel disease.  Comorbid conditions: Biopsy-proven leukocytoclastic vasculitis [2020], chronic diarrhea.  Treatment included: Hydroxychloroquine     # Concern for seronegative inflammatory arthritis  # History of cutaneous vasculitis  # chronic diarrhea  # history of elevated IgA       # Concern for seronegative inflammatory arthritis  She seems to be stable from arthritis standpoint on hydroxychloroquine 200 mg daily.    There is no active arthritis today on exam.  No new labs for review.  She however remains with persistent elevated C-reactive protein of unclear etiology, await gastroenterology evaluation for inflammatory bowel disease    Plan:  - Get labs today  - Continue hydroxychloroquine 200 mg daily  - Continue yearly eye exam while on hydroxychloroquine    # History of cutaneous vasculitis   Biopsy-proven leukocytoclastic vasculitis however DIF was not done.  History of elevated IgA.    Current rash looks nonspecific and is not consistent with leukocytoclastic vasculitis    Plan:   - Check ANCA serology MPO/SC-3 and serum immunoglobulins  - If rash recurs then we will consult dermatology for biopsy evaluate for IgA deposition    # Chronic diarrhea, concern for inflammatory bowel disease  Follows with gastroenterology, plan for EGD/Colonoscopy.   Plan:  - Gastroenterology follow up  - MR enterography     # Restrictive lung disease, diagnosed outside   No new symptoms  Plan:  Continue to watch for now. We will consider additional tests depending on results of the above workup      50 minutes spent by me on the date of the encounter doing chart review, history and exam, documentation and further activities per the note      Return in about 10  weeks (around 6/11/2024).    Luli Paul MD  [facility] RHEUMATOLOGY

## 2024-04-02 NOTE — PATIENT INSTRUCTIONS
Our plan:  - Dermatology referral if rash recurs  - Additional tests to rule out inflammatory bowel disease   - Additional tests for vasculitis evaluation  - Continue on hydroxychloroquine 200 mg daily

## 2024-04-03 ENCOUNTER — LAB (OUTPATIENT)
Dept: LAB | Facility: CLINIC | Age: 49
End: 2024-04-03
Payer: COMMERCIAL

## 2024-04-03 ENCOUNTER — APPOINTMENT (OUTPATIENT)
Dept: LAB | Facility: CLINIC | Age: 49
End: 2024-04-03
Payer: COMMERCIAL

## 2024-04-03 DIAGNOSIS — I77.6 VASCULITIS (H): ICD-10-CM

## 2024-04-03 LAB
ALBUMIN UR-MCNC: NEGATIVE MG/DL
APPEARANCE UR: CLEAR
AST SERPL W P-5'-P-CCNC: 20 U/L (ref 0–45)
BACTERIA #/AREA URNS HPF: ABNORMAL /HPF
BILIRUB UR QL STRIP: NEGATIVE
COLOR UR AUTO: YELLOW
CREAT SERPL-MCNC: 0.71 MG/DL (ref 0.51–0.95)
CRP SERPL-MCNC: 14.5 MG/L
EGFRCR SERPLBLD CKD-EPI 2021: >90 ML/MIN/1.73M2
ERYTHROCYTE [SEDIMENTATION RATE] IN BLOOD BY WESTERGREN METHOD: 16 MM/HR (ref 0–20)
GLUCOSE UR STRIP-MCNC: NEGATIVE MG/DL
HGB UR QL STRIP: NEGATIVE
KETONES UR STRIP-MCNC: NEGATIVE MG/DL
LEUKOCYTE ESTERASE UR QL STRIP: ABNORMAL
Lab: NORMAL
Lab: NORMAL
NITRATE UR QL: NEGATIVE
PERFORMING LABORATORY: NORMAL
PERFORMING LABORATORY: NORMAL
PH UR STRIP: 6 [PH] (ref 5–8)
RBC #/AREA URNS AUTO: ABNORMAL /HPF
SP GR UR STRIP: >=1.03 (ref 1–1.03)
SQUAMOUS #/AREA URNS AUTO: ABNORMAL /LPF
TEST NAME: NORMAL
TEST NAME: NORMAL
UROBILINOGEN UR STRIP-ACNC: 0.2 E.U./DL
WBC #/AREA URNS AUTO: ABNORMAL /HPF

## 2024-04-03 PROCEDURE — 84450 TRANSFERASE (AST) (SGOT): CPT

## 2024-04-03 PROCEDURE — 83516 IMMUNOASSAY NONANTIBODY: CPT | Mod: 59

## 2024-04-03 PROCEDURE — 36415 COLL VENOUS BLD VENIPUNCTURE: CPT

## 2024-04-03 PROCEDURE — 83516 IMMUNOASSAY NONANTIBODY: CPT

## 2024-04-03 PROCEDURE — 81001 URINALYSIS AUTO W/SCOPE: CPT

## 2024-04-03 PROCEDURE — 83876 ASSAY MYELOPEROXIDASE: CPT | Mod: 59

## 2024-04-03 PROCEDURE — 85652 RBC SED RATE AUTOMATED: CPT

## 2024-04-03 PROCEDURE — 83876 ASSAY MYELOPEROXIDASE: CPT

## 2024-04-03 PROCEDURE — 82784 ASSAY IGA/IGD/IGG/IGM EACH: CPT

## 2024-04-03 PROCEDURE — 82595 ASSAY OF CRYOGLOBULIN: CPT

## 2024-04-03 PROCEDURE — 82565 ASSAY OF CREATININE: CPT

## 2024-04-03 PROCEDURE — 86140 C-REACTIVE PROTEIN: CPT

## 2024-04-04 LAB
IGA SERPL-MCNC: 496 MG/DL (ref 84–499)
IGG SERPL-MCNC: 977 MG/DL (ref 610–1616)
IGM SERPL-MCNC: 33 MG/DL (ref 35–242)
MAYO MISC RESULT: NORMAL

## 2024-04-05 LAB — MAYO MISC RESULT: NORMAL

## 2024-04-08 NOTE — RESULT ENCOUNTER NOTE
Test results are stable. Negative ANCA, Cryoglobulin panel.  Urinalysis rasing probability of UTI, urine culture recommended   CRP remains elevated with normal ESR.  Await GI workup and MR enterography    Luli Pual MD

## 2024-04-09 LAB
MYELOPEROXIDASE AB SER IA-ACNC: <0.3 U/ML
MYELOPEROXIDASE AB SER IA-ACNC: NEGATIVE
PROTEINASE3 AB SER IA-ACNC: <1 U/ML
PROTEINASE3 AB SER IA-ACNC: NEGATIVE

## 2024-04-11 ENCOUNTER — LAB (OUTPATIENT)
Dept: LAB | Facility: CLINIC | Age: 49
End: 2024-04-11
Payer: COMMERCIAL

## 2024-04-11 DIAGNOSIS — R82.90 ABNORMAL URINALYSIS: ICD-10-CM

## 2024-04-11 PROCEDURE — 87086 URINE CULTURE/COLONY COUNT: CPT

## 2024-04-12 LAB — BACTERIA UR CULT: NORMAL

## 2024-04-25 ENCOUNTER — THERAPY VISIT (OUTPATIENT)
Dept: PHYSICAL THERAPY | Facility: CLINIC | Age: 49
End: 2024-04-25
Payer: COMMERCIAL

## 2024-04-25 DIAGNOSIS — M62.89 PELVIC FLOOR DYSFUNCTION: Primary | ICD-10-CM

## 2024-04-25 PROCEDURE — 97530 THERAPEUTIC ACTIVITIES: CPT | Mod: GP | Performed by: PHYSICAL THERAPIST

## 2024-04-25 PROCEDURE — 97112 NEUROMUSCULAR REEDUCATION: CPT | Mod: GP | Performed by: PHYSICAL THERAPIST

## 2024-04-26 ENCOUNTER — HOSPITAL ENCOUNTER (OUTPATIENT)
Dept: MRI IMAGING | Facility: CLINIC | Age: 49
Discharge: HOME | End: 2024-04-26
Attending: STUDENT IN AN ORGANIZED HEALTH CARE EDUCATION/TRAINING PROGRAM | Admitting: STUDENT IN AN ORGANIZED HEALTH CARE EDUCATION/TRAINING PROGRAM
Payer: COMMERCIAL

## 2024-04-26 DIAGNOSIS — M06.09 RHEUMATOID ARTHRITIS, SERONEGATIVE, MULTIPLE SITES (H): ICD-10-CM

## 2024-04-26 DIAGNOSIS — R19.7 DIARRHEA, UNSPECIFIED TYPE: ICD-10-CM

## 2024-04-26 DIAGNOSIS — M31.0 LEUCOCYTOCLASTIC VASCULITIS (H): ICD-10-CM

## 2024-04-26 PROCEDURE — A9585 GADOBUTROL INJECTION: HCPCS | Performed by: STUDENT IN AN ORGANIZED HEALTH CARE EDUCATION/TRAINING PROGRAM

## 2024-04-26 PROCEDURE — 255N000002 HC RX 255 OP 636: Performed by: STUDENT IN AN ORGANIZED HEALTH CARE EDUCATION/TRAINING PROGRAM

## 2024-04-26 PROCEDURE — 250N000011 HC RX IP 250 OP 636: Performed by: STUDENT IN AN ORGANIZED HEALTH CARE EDUCATION/TRAINING PROGRAM

## 2024-04-26 PROCEDURE — 72197 MRI PELVIS W/O & W/DYE: CPT | Mod: 26 | Performed by: RADIOLOGY

## 2024-04-26 PROCEDURE — 74183 MRI ABD W/O CNTR FLWD CNTR: CPT

## 2024-04-26 PROCEDURE — 74183 MRI ABD W/O CNTR FLWD CNTR: CPT | Mod: 26 | Performed by: RADIOLOGY

## 2024-04-26 RX ORDER — GADOBUTROL 604.72 MG/ML
7.5 INJECTION INTRAVENOUS ONCE
Status: COMPLETED | OUTPATIENT
Start: 2024-04-26 | End: 2024-04-26

## 2024-04-26 RX ADMIN — GADOBUTROL 7.5 ML: 604.72 INJECTION INTRAVENOUS at 12:47

## 2024-04-26 RX ADMIN — Medication 1 MG: at 12:05

## 2024-06-17 ENCOUNTER — LAB (OUTPATIENT)
Dept: LAB | Facility: CLINIC | Age: 49
End: 2024-06-17
Payer: COMMERCIAL

## 2024-06-17 DIAGNOSIS — I77.6 VASCULITIS (H): ICD-10-CM

## 2024-06-17 LAB
ALBUMIN UR-MCNC: NEGATIVE MG/DL
APPEARANCE UR: CLEAR
AST SERPL W P-5'-P-CCNC: 18 U/L (ref 0–45)
BACTERIA #/AREA URNS HPF: ABNORMAL /HPF
BASOPHILS # BLD AUTO: 0 10E3/UL (ref 0–0.2)
BASOPHILS NFR BLD AUTO: 1 %
BILIRUB UR QL STRIP: NEGATIVE
COLOR UR AUTO: YELLOW
CREAT SERPL-MCNC: 0.71 MG/DL (ref 0.51–0.95)
CRP SERPL-MCNC: 20 MG/L
EGFRCR SERPLBLD CKD-EPI 2021: >90 ML/MIN/1.73M2
EOSINOPHIL # BLD AUTO: 0.2 10E3/UL (ref 0–0.7)
EOSINOPHIL NFR BLD AUTO: 2 %
ERYTHROCYTE [DISTWIDTH] IN BLOOD BY AUTOMATED COUNT: 12.4 % (ref 10–15)
ERYTHROCYTE [SEDIMENTATION RATE] IN BLOOD BY WESTERGREN METHOD: 18 MM/HR (ref 0–20)
GLUCOSE UR STRIP-MCNC: NEGATIVE MG/DL
HCT VFR BLD AUTO: 40.8 % (ref 35–47)
HGB BLD-MCNC: 13.3 G/DL (ref 11.7–15.7)
HGB UR QL STRIP: NEGATIVE
IMM GRANULOCYTES # BLD: 0 10E3/UL
IMM GRANULOCYTES NFR BLD: 0 %
KETONES UR STRIP-MCNC: NEGATIVE MG/DL
LEUKOCYTE ESTERASE UR QL STRIP: ABNORMAL
LYMPHOCYTES # BLD AUTO: 2.9 10E3/UL (ref 0.8–5.3)
LYMPHOCYTES NFR BLD AUTO: 34 %
MCH RBC QN AUTO: 30.2 PG (ref 26.5–33)
MCHC RBC AUTO-ENTMCNC: 32.6 G/DL (ref 31.5–36.5)
MCV RBC AUTO: 93 FL (ref 78–100)
MONOCYTES # BLD AUTO: 0.7 10E3/UL (ref 0–1.3)
MONOCYTES NFR BLD AUTO: 9 %
NEUTROPHILS # BLD AUTO: 4.7 10E3/UL (ref 1.6–8.3)
NEUTROPHILS NFR BLD AUTO: 55 %
NITRATE UR QL: NEGATIVE
PH UR STRIP: 6 [PH] (ref 5–8)
PLATELET # BLD AUTO: 293 10E3/UL (ref 150–450)
RBC # BLD AUTO: 4.41 10E6/UL (ref 3.8–5.2)
RBC #/AREA URNS AUTO: ABNORMAL /HPF
SP GR UR STRIP: 1.01 (ref 1–1.03)
SQUAMOUS #/AREA URNS AUTO: ABNORMAL /LPF
UROBILINOGEN UR STRIP-ACNC: 0.2 E.U./DL
WBC # BLD AUTO: 8.6 10E3/UL (ref 4–11)
WBC #/AREA URNS AUTO: ABNORMAL /HPF

## 2024-06-17 PROCEDURE — 36415 COLL VENOUS BLD VENIPUNCTURE: CPT

## 2024-06-17 PROCEDURE — 81001 URINALYSIS AUTO W/SCOPE: CPT

## 2024-06-17 PROCEDURE — 86140 C-REACTIVE PROTEIN: CPT

## 2024-06-17 PROCEDURE — 84450 TRANSFERASE (AST) (SGOT): CPT

## 2024-06-17 PROCEDURE — 85652 RBC SED RATE AUTOMATED: CPT

## 2024-06-17 PROCEDURE — 85025 COMPLETE CBC W/AUTO DIFF WBC: CPT

## 2024-06-17 PROCEDURE — 82565 ASSAY OF CREATININE: CPT

## 2024-06-25 ENCOUNTER — TELEPHONE (OUTPATIENT)
Dept: GASTROENTEROLOGY | Facility: CLINIC | Age: 49
End: 2024-06-25
Payer: COMMERCIAL

## 2024-06-25 ENCOUNTER — LAB (OUTPATIENT)
Dept: LAB | Facility: CLINIC | Age: 49
End: 2024-06-25
Attending: STUDENT IN AN ORGANIZED HEALTH CARE EDUCATION/TRAINING PROGRAM
Payer: COMMERCIAL

## 2024-06-25 ENCOUNTER — OFFICE VISIT (OUTPATIENT)
Dept: RHEUMATOLOGY | Facility: CLINIC | Age: 49
End: 2024-06-25
Attending: STUDENT IN AN ORGANIZED HEALTH CARE EDUCATION/TRAINING PROGRAM
Payer: COMMERCIAL

## 2024-06-25 VITALS
HEART RATE: 90 BPM | WEIGHT: 154 LBS | OXYGEN SATURATION: 98 % | SYSTOLIC BLOOD PRESSURE: 130 MMHG | DIASTOLIC BLOOD PRESSURE: 81 MMHG | BODY MASS INDEX: 24.48 KG/M2

## 2024-06-25 DIAGNOSIS — R82.90 ABNORMAL URINALYSIS: ICD-10-CM

## 2024-06-25 DIAGNOSIS — M06.09 RHEUMATOID ARTHRITIS, SERONEGATIVE, MULTIPLE SITES (H): Primary | ICD-10-CM

## 2024-06-25 DIAGNOSIS — M31.0 LEUCOCYTOCLASTIC VASCULITIS (H): ICD-10-CM

## 2024-06-25 LAB
ALBUMIN UR-MCNC: 20 MG/DL
APPEARANCE UR: CLEAR
BILIRUB UR QL STRIP: NEGATIVE
COLOR UR AUTO: ABNORMAL
GLUCOSE UR STRIP-MCNC: NEGATIVE MG/DL
HGB UR QL STRIP: NEGATIVE
KETONES UR STRIP-MCNC: ABNORMAL MG/DL
LEUKOCYTE ESTERASE UR QL STRIP: ABNORMAL
MUCOUS THREADS #/AREA URNS LPF: PRESENT /LPF
NITRATE UR QL: NEGATIVE
PH UR STRIP: 5.5 [PH] (ref 5–7)
RBC URINE: 1 /HPF
SP GR UR STRIP: 1.03 (ref 1–1.03)
SQUAMOUS EPITHELIAL: 1 /HPF
UROBILINOGEN UR STRIP-MCNC: NORMAL MG/DL
WBC URINE: 12 /HPF

## 2024-06-25 PROCEDURE — 87086 URINE CULTURE/COLONY COUNT: CPT

## 2024-06-25 PROCEDURE — 81001 URINALYSIS AUTO W/SCOPE: CPT

## 2024-06-25 PROCEDURE — 99214 OFFICE O/P EST MOD 30 MIN: CPT | Performed by: STUDENT IN AN ORGANIZED HEALTH CARE EDUCATION/TRAINING PROGRAM

## 2024-06-25 PROCEDURE — 99213 OFFICE O/P EST LOW 20 MIN: CPT | Performed by: STUDENT IN AN ORGANIZED HEALTH CARE EDUCATION/TRAINING PROGRAM

## 2024-06-25 PROCEDURE — G2211 COMPLEX E/M VISIT ADD ON: HCPCS | Performed by: STUDENT IN AN ORGANIZED HEALTH CARE EDUCATION/TRAINING PROGRAM

## 2024-06-25 RX ORDER — HYDROXYCHLOROQUINE SULFATE 200 MG/1
200 TABLET, FILM COATED ORAL DAILY
Qty: 90 TABLET | Refills: 0 | Status: SHIPPED | OUTPATIENT
Start: 2024-06-25 | End: 2024-09-27

## 2024-06-25 ASSESSMENT — ENCOUNTER SYMPTOMS
SINUS PAIN: 0
WEIGHT LOSS: 0
DYSURIA: 0
EYE REDNESS: 0
HEMATURIA: 0
ABDOMINAL PAIN: 0
HEMOPTYSIS: 0
TINGLING: 0
BLOOD IN STOOL: 0
COUGH: 0
SHORTNESS OF BREATH: 0
FEVER: 0

## 2024-06-25 ASSESSMENT — PAIN SCALES - GENERAL: PAINLEVEL: NO PAIN (0)

## 2024-06-25 NOTE — PROGRESS NOTES
RHEUMATOLOGY OUTPATIENT CLINIC NOTE     Referring Provider: Luli Paul MD    Lab review  MARIAA positive, 1: 80, nuclear speckled  RNP: Negative  Block antibody: Negative  SSA: Negative  SSB: Negative  SCL 70: Negative  Centromere antibody: Negative  dsDNA: Negative  Complement C3/C4: Normal     CCP antibody: Negative  Rheumatoid factor: Negative     ANCA screen: Negative     IgA: 672, elevated, 3/2021  Ig, normal  IgM 44, slightly low     Cryoglobulin: Negative     Imaging review  MR enterography, :  Findings suggestive of mild nonspecific colitis involving the proximal sigmoid colon, though this finding is equivocal and could also be explained by underdistention. Recommend correlation with colonoscopy, as clinically indicated. No evidence of intra-abdominal fluid collection, fistula, or stricture.    MRI right hand, : No evidence of inflammatory arthritis.    X-ray right hand, : No evidence of erosive disease.      Subjective     Visit date 2024    Sondra is a 48 year old female who presents today for follow up.    She is overall doing well from arthritis standpoint.  She remains with bloating with food.  She had MR enterography as reported above, plan for additional workup by GI including EGD/colonoscopy.    - Workup showed: Elevated CRP, normal ESR, unremarkable CBC, AST, creatinine, urinalysis with WBCs, squamous cells, bacteria, leukocyte esterase, urine culture pending    Review of Systems   Constitutional:  Negative for fever and weight loss.   HENT:  Negative for congestion, hearing loss, nosebleeds and sinus pain.    Eyes:  Negative for redness.   Respiratory:  Negative for cough, hemoptysis and shortness of breath.    Cardiovascular:  Negative for chest pain.   Gastrointestinal:  Negative for abdominal pain, blood in stool and melena.        Bloating related to food   Genitourinary:  Negative for dysuria and hematuria.   Musculoskeletal:  Negative for joint pain.         Joint swelling: None visible  Morning stiffness lasts    Skin:  Negative for rash.   Neurological:  Negative for tingling.       Current Medications   Hydroxychloroquine 200 mg daily, last eye exam 8/2023.     Past Medical history   Past Medical History:   Diagnosis Date    Allergic rhinitis 04/24/2003    Epic    Alopecia 09/14/2020    Arthralgia 09/14/2020    Blepharitis 05/13/2009    Epic    Decreased diffusion capacity of lung 12/09/2008    Diarrhea 10/26/2009    Episodic mood disorder (H24) 11/15/2004    Epic    Fructose intolerance 10/03/2018    Generalized anxiety disorder 02/07/2012    Insomnia 03/01/2011    Irritable bowel syndrome with diarrhea 10/03/2018    Leucocytoclastic vasculitis (H) 04/22/2020    Major depression, recurrent, full remission (H24) 11/04/2014    Noted at PCP visit.    Menopause 04/2022    Myopia 11/14/2008    Myopia - LasEk both 2009    NO ACTIVE PROBLEMS     Restrictive lung disease 12/09/2008    Seasonal affective disorder (H24) 10/04/2018    Social phobia 10/20/2016       Objective   /81 (BP Location: Right arm, Patient Position: Sitting, Cuff Size: Adult Regular)   Pulse 90   Wt 69.9 kg (154 lb)   SpO2 98%   BMI 24.48 kg/m        PHYSICAL EXAMINATION  Physical Exam  HENT:      Head: Normocephalic.      Mouth/Throat:      Mouth: Mucous membranes are moist.   Eyes:      Conjunctiva/sclera: Conjunctivae normal.   Pulmonary:      Effort: Pulmonary effort is normal.      Breath sounds: Normal breath sounds.   Musculoskeletal:         General: No swelling or tenderness.      Comments: No noticeable swelling or significant tenderness in the hands (MCP, PIP, DIP), wrists, elbows, knees, ankles, feet. Range of motion in the shoulders and hips are within accepted range for age.        Skin:     Findings: No rash.   Neurological:      Mental Status: She is alert.       Assessment & Plan    Sondra has been seen by several rheumatologists previously and has been following with  rheumatology for concern for seronegative rheumatoid arthritis on hydroxychloroquine.    Other symptoms: History of hair loss [diagnosed as alopecia areata per dermatology, treated with steroid injection], lower extremity cutaneous vasculitis [biopsy showing leukocytoclastic vasculitis], arthralgia, dry eyes (uses eye drops twice daily), no oral dryness.     No personal or family history of skin psoriasis, no history of inflammatory eye disease or inflammatory bowel disease.  Comorbid conditions: Biopsy-proven leukocytoclastic vasculitis [2020], chronic diarrhea.    Treatment included: Hydroxychloroquine     # Concern for seronegative inflammatory arthritis  # History of cutaneous vasculitis  # chronic diarrhea  # history of elevated IgA     # Concern for seronegative inflammatory arthritis  She seems to be stable from arthritis standpoint on hydroxychloroquine 200 mg daily.    There is no active arthritis today on exam.  She remains with elevated C-reactive protein with normal ESR, unclear etiology, she is undergoing evaluation for inflammatory bowel disease.    Plan:  - Continue hydroxychloroquine 200 mg daily  - Continue yearly eye exam while on hydroxychloroquine    # Elevated CRP  Unclear etiology, currently being evaluated for inflammatory bowel disease which could explain elevated CRP.  Otherwise we will plan for urine culture to rule out urinary tract infection given abnormal urinalysis.    # History of cutaneous vasculitis   Biopsy-proven leukocytoclastic vasculitis however DIF was not done.  History of elevated IgA.    ANCA serology MPO/AZ-3 negative and cryoglobulin negative  No evidence of recurrence.    Plan:  - If rash recurs then we will consult dermatology for biopsy evaluate for IgA deposition       # Chronic diarrhea, concern for inflammatory bowel disease  Follows with gastroenterology, plan for EGD/Colonoscopy.   MR enterography negative for inflammatory bowel disease    Plan:  - Gastroenterology  follow up     # Restrictive lung disease, diagnosed outside   No new symptoms  Plan:  Continue to watch for now. We will consider additional tests depending on results of the above workup     # Screening for chronic infections  2021  QuantiFERON gold: Negative  Hepatitis B surface antigen: Negative  Hepatitis B core antibody: Negative  Hepatitis C antibody: Negative  HIV antibody: Negative    # Longitudinal care  The longitudinal plan of care for the diagnosis(es)/condition(s) as documented were addressed during this visit. Due to the added complexity in care, I will continue to support Sondra in the subsequent management and with ongoing continuity of care.        35 minutes spent by me on the date of the encounter doing chart review, history and exam, documentation and further activities per the note      Return in about 3 months (around 9/25/2024) for Follow up.    Luli Paul MD  [facility] RHEUMATOLOGY

## 2024-06-25 NOTE — TELEPHONE ENCOUNTER
Pre visit planning completed.      Procedure details:    Patient scheduled for Colonoscopy/Upper endoscopy (EGD) on 7/2/2024.     Arrival time: 0930. Procedure time 1030    Facility location: [facility]; [address] [place] [address], [address] Floor, [place] [address]. Check in location: 5th Floor.    Sedation type: MAC    Pre op exam needed? N/A    Indication for procedure:   Diarrhea, unspecified type [R19.7]  - Primary      Dysphagia, unspecified type [R13.10]      Gastroesophageal reflux disease, unspecified whether esophagitis present [K21.9]            Chart review:     Electronic implanted devices? No    Recent diagnosis of diverticulitis within the last 6 weeks? No    Diabetic? No      Medication review:    Anticoagulants? No    NSAIDS? Yes.  Ibuprofen (Advil, Motrin).  Holding interval of 1 day before procedure. and Meloxicam (Mobic).  Holding interval of 10 days. Verify pt is taking Meloxicam, unable to HOLD 10 days. May need to send staff message to scoping provider    Other medication HOLDING recommendations:  N/A      Prep for procedure:     Bowel prep recommendation: Standard Miralax  Due to: standard bowel prep.    Prep instructions sent via [employer]         Berna Carrillo RN  Endoscopy Procedure Pre Assessment RN  [phone] option 4

## 2024-06-25 NOTE — TELEPHONE ENCOUNTER
Pre assessment completed for upcoming procedure.   (Please see previous telephone encounter notes for complete details)    Patient  returned call.       Procedure details:    Arrival time and facility location reviewed.    Pre op exam needed? N/A    Designated  policy reviewed. Instructed to have someone stay 24  hours post procedure.       Medication review:    Medications reviewed. Please see supporting documentation below. Holding recommendations discussed (if applicable).       Prep for procedure:     Procedure prep instructions reviewed.        Any additional information needed:  N/A      Patient  verbalized understanding and had no questions or concerns at this time.      Corinne Kliber, RN  Endoscopy Procedure Pre Assessment   [phone] option 4

## 2024-06-25 NOTE — TELEPHONE ENCOUNTER
Attempted to contact patient in order to complete pre assessment questions.     No answer. Left message to return call to [phone] option 4    Callback required communication sent via [employer].    Berna Carrillo RN  Endoscopy Procedure Pre Assessment

## 2024-06-25 NOTE — LETTER
2024       RE: Sondra Sorensen  [address]  [place] [address]     Dear Colleague,    Thank you for referring your patient, Sondra Sorensen, to the [facility] RHEUMATOLOGY at [facility]. Please see a copy of my visit note below.    RHEUMATOLOGY OUTPATIENT CLINIC NOTE     Referring Provider: Luli Paul MD    Lab review  MARIAA positive, 1: 80, nuclear speckled  RNP: Negative  Block antibody: Negative  SSA: Negative  SSB: Negative  SCL 70: Negative  Centromere antibody: Negative  dsDNA: Negative  Complement C3/C4: Normal     CCP antibody: Negative  Rheumatoid factor: Negative     ANCA screen: Negative     IgA: 672, elevated, 3/2021  Ig, normal  IgM 44, slightly low     Cryoglobulin: Negative     Imaging review  MR enterography, :  Findings suggestive of mild nonspecific colitis involving the proximal sigmoid colon, though this finding is equivocal and could also be explained by underdistention. Recommend correlation with colonoscopy, as clinically indicated. No evidence of intra-abdominal fluid collection, fistula, or stricture.    MRI right hand, : No evidence of inflammatory arthritis.    X-ray right hand, : No evidence of erosive disease.      Subjective    Visit date 2024    Sondra is a 48 year old female who presents today for follow up.    She is overall doing well from arthritis standpoint.  She remains with bloating with food.  She had MR enterography as reported above, plan for additional workup by GI including EGD/colonoscopy.    - Workup showed: Elevated CRP, normal ESR, unremarkable CBC, AST, creatinine, urinalysis with WBCs, squamous cells, bacteria, leukocyte esterase, urine culture pending    Review of Systems   Constitutional:  Negative for fever and weight loss.   HENT:  Negative for congestion, hearing loss, nosebleeds and sinus pain.    Eyes:  Negative for redness.   Respiratory:   Negative for cough, hemoptysis and shortness of breath.    Cardiovascular:  Negative for chest pain.   Gastrointestinal:  Negative for abdominal pain, blood in stool and melena.        Bloating related to food   Genitourinary:  Negative for dysuria and hematuria.   Musculoskeletal:  Negative for joint pain.        Joint swelling: None visible  Morning stiffness lasts    Skin:  Negative for rash.   Neurological:  Negative for tingling.       Current Medications   Hydroxychloroquine 200 mg daily, last eye exam 8/2023.     Past Medical history   Past Medical History:   Diagnosis Date    Allergic rhinitis 04/24/2003    Epic    Alopecia 09/14/2020    Arthralgia 09/14/2020    Blepharitis 05/13/2009    Epic    Decreased diffusion capacity of lung 12/09/2008    Diarrhea 10/26/2009    Episodic mood disorder (H24) 11/15/2004    Epic    Fructose intolerance 10/03/2018    Generalized anxiety disorder 02/07/2012    Insomnia 03/01/2011    Irritable bowel syndrome with diarrhea 10/03/2018    Leucocytoclastic vasculitis (H) 04/22/2020    Major depression, recurrent, full remission (H24) 11/04/2014    Noted at PCP visit.    Menopause 04/2022    Myopia 11/14/2008    Myopia - LasEk both 2009    NO ACTIVE PROBLEMS     Restrictive lung disease 12/09/2008    Seasonal affective disorder (H24) 10/04/2018    Social phobia 10/20/2016       Objective  /81 (BP Location: Right arm, Patient Position: Sitting, Cuff Size: Adult Regular)   Pulse 90   Wt 69.9 kg (154 lb)   SpO2 98%   BMI 24.48 kg/m        PHYSICAL EXAMINATION  Physical Exam  HENT:      Head: Normocephalic.      Mouth/Throat:      Mouth: Mucous membranes are moist.   Eyes:      Conjunctiva/sclera: Conjunctivae normal.   Pulmonary:      Effort: Pulmonary effort is normal.      Breath sounds: Normal breath sounds.   Musculoskeletal:         General: No swelling or tenderness.      Comments: No noticeable swelling or significant tenderness in the hands (MCP, PIP, DIP), wrists,  elbows, knees, ankles, feet. Range of motion in the shoulders and hips are within accepted range for age.        Skin:     Findings: No rash.   Neurological:      Mental Status: She is alert.       Assessment & Plan   Sondra has been seen by several rheumatologists previously and has been following with rheumatology for concern for seronegative rheumatoid arthritis on hydroxychloroquine.    Other symptoms: History of hair loss [diagnosed as alopecia areata per dermatology, treated with steroid injection], lower extremity cutaneous vasculitis [biopsy showing leukocytoclastic vasculitis], arthralgia, dry eyes (uses eye drops twice daily), no oral dryness.     No personal or family history of skin psoriasis, no history of inflammatory eye disease or inflammatory bowel disease.  Comorbid conditions: Biopsy-proven leukocytoclastic vasculitis [2020], chronic diarrhea.    Treatment included: Hydroxychloroquine     # Concern for seronegative inflammatory arthritis  # History of cutaneous vasculitis  # chronic diarrhea  # history of elevated IgA     # Concern for seronegative inflammatory arthritis  She seems to be stable from arthritis standpoint on hydroxychloroquine 200 mg daily.    There is no active arthritis today on exam.  She remains with elevated C-reactive protein with normal ESR, unclear etiology, she is undergoing evaluation for inflammatory bowel disease.    Plan:  - Continue hydroxychloroquine 200 mg daily  - Continue yearly eye exam while on hydroxychloroquine    # Elevated CRP  Unclear etiology, currently being evaluated for inflammatory bowel disease which could explain elevated CRP.  Otherwise we will plan for urine culture to rule out urinary tract infection given abnormal urinalysis.    # History of cutaneous vasculitis   Biopsy-proven leukocytoclastic vasculitis however DIF was not done.  History of elevated IgA.    ANCA serology MPO/RI-3 negative and cryoglobulin negative  No evidence of  recurrence.    Plan:  - If rash recurs then we will consult dermatology for biopsy evaluate for IgA deposition       # Chronic diarrhea, concern for inflammatory bowel disease  Follows with gastroenterology, plan for EGD/Colonoscopy.   MR enterography negative for inflammatory bowel disease    Plan:  - Gastroenterology follow up     # Restrictive lung disease, diagnosed outside   No new symptoms  Plan:  Continue to watch for now. We will consider additional tests depending on results of the above workup     # Screening for chronic infections  2021  QuantiFERON gold: Negative  Hepatitis B surface antigen: Negative  Hepatitis B core antibody: Negative  Hepatitis C antibody: Negative  HIV antibody: Negative    # Longitudinal care  The longitudinal plan of care for the diagnosis(es)/condition(s) as documented were addressed during this visit. Due to the added complexity in care, I will continue to support Sondra in the subsequent management and with ongoing continuity of care.        35 minutes spent by me on the date of the encounter doing chart review, history and exam, documentation and further activities per the note      Return in about 3 months (around 9/25/2024) for Follow up.    Luli Paul MD  [facility] RHEUMATOLOGY

## 2024-06-25 NOTE — NURSING NOTE
Chief Complaint   Patient presents with    RECHECK     /81 (BP Location: Right arm, Patient Position: Sitting, Cuff Size: Adult Regular)   Pulse 90   Wt 69.9 kg (154 lb)   SpO2 98%   BMI 24.48 kg/m    Kaelyn NAYLOR

## 2024-06-26 ENCOUNTER — TELEPHONE (OUTPATIENT)
Dept: RHEUMATOLOGY | Facility: CLINIC | Age: 49
End: 2024-06-26
Payer: COMMERCIAL

## 2024-06-26 LAB — BACTERIA UR CULT: NORMAL

## 2024-06-27 DIAGNOSIS — R82.90 ABNORMAL URINALYSIS: Primary | ICD-10-CM

## 2024-06-27 RX ORDER — SULFAMETHOXAZOLE/TRIMETHOPRIM 800-160 MG
1 TABLET ORAL 2 TIMES DAILY
Qty: 6 TABLET | Refills: 0 | Status: SHIPPED | OUTPATIENT
Start: 2024-06-27 | End: 2024-06-30

## 2024-06-27 NOTE — RESULT ENCOUNTER NOTE
Repeat urinalysis leuk esterase, WBC cells, proteins.  Urine culture is polymicrobial.  Empiric treatment with Bactrim double strength for 3 days then repeat urinalysis in 2 weeks.  If repeat urinalysis continues to show cells and proteins then protein quantification is the next step and could involve nephrology given history of elevated IgA to rule out IgA nephropathy..    Luli Paul MD

## 2024-06-27 NOTE — TELEPHONE ENCOUNTER
The Pt called in to report she is currently taking antibiotics for a UTI, and will be finishing up those antibiotics late Sunday (bowel prep starts Monday, 7/1). Pt is wondering if there are any issues related to her procedure and the UTI.     Advised the Pt she will be done with her antibiotics before she starts the bowel prep. As long as she is feeling better, and no loose stools from the antibiotics, should be fine to proceed. Advised she can check with her doctor as well.     No further questions.     Kelly Carlos RN   Endoscopy Procedure Pre Assessment RN

## 2024-07-01 ENCOUNTER — ANESTHESIA EVENT (OUTPATIENT)
Dept: SURGERY | Facility: AMBULATORY SURGERY CENTER | Age: 49
End: 2024-07-01
Payer: COMMERCIAL

## 2024-07-02 ENCOUNTER — HOSPITAL ENCOUNTER (OUTPATIENT)
Facility: AMBULATORY SURGERY CENTER | Age: 49
Discharge: HOME | End: 2024-07-02
Attending: INTERNAL MEDICINE
Payer: COMMERCIAL

## 2024-07-02 ENCOUNTER — ANESTHESIA (OUTPATIENT)
Dept: SURGERY | Facility: AMBULATORY SURGERY CENTER | Age: 49
End: 2024-07-02
Payer: COMMERCIAL

## 2024-07-02 VITALS
OXYGEN SATURATION: 98 % | HEIGHT: 66 IN | HEART RATE: 77 BPM | RESPIRATION RATE: 16 BRPM | TEMPERATURE: 96.9 F | BODY MASS INDEX: 24.11 KG/M2 | DIASTOLIC BLOOD PRESSURE: 67 MMHG | SYSTOLIC BLOOD PRESSURE: 121 MMHG | WEIGHT: 150 LBS

## 2024-07-02 VITALS — HEART RATE: 87 BPM

## 2024-07-02 LAB
COLONOSCOPY: NORMAL
UPPER GI ENDOSCOPY: NORMAL

## 2024-07-02 PROCEDURE — 43239 EGD BIOPSY SINGLE/MULTIPLE: CPT | Performed by: STUDENT IN AN ORGANIZED HEALTH CARE EDUCATION/TRAINING PROGRAM

## 2024-07-02 PROCEDURE — 45378 DIAGNOSTIC COLONOSCOPY: CPT | Mod: 33 | Performed by: INTERNAL MEDICINE

## 2024-07-02 PROCEDURE — 88305 TISSUE EXAM BY PATHOLOGIST: CPT | Mod: TC | Performed by: INTERNAL MEDICINE

## 2024-07-02 PROCEDURE — 43239 EGD BIOPSY SINGLE/MULTIPLE: CPT | Performed by: INTERNAL MEDICINE

## 2024-07-02 PROCEDURE — 88305 TISSUE EXAM BY PATHOLOGIST: CPT | Mod: 26 | Performed by: PATHOLOGY

## 2024-07-02 PROCEDURE — 43239 EGD BIOPSY SINGLE/MULTIPLE: CPT | Performed by: NURSE ANESTHETIST, CERTIFIED REGISTERED

## 2024-07-02 RX ORDER — LIDOCAINE HYDROCHLORIDE 20 MG/ML
INJECTION, SOLUTION INFILTRATION; PERINEURAL PRN
Status: DISCONTINUED | OUTPATIENT
Start: 2024-07-02 | End: 2024-07-02

## 2024-07-02 RX ORDER — PROPOFOL 10 MG/ML
INJECTION, EMULSION INTRAVENOUS PRN
Status: DISCONTINUED | OUTPATIENT
Start: 2024-07-02 | End: 2024-07-02

## 2024-07-02 RX ORDER — LIDOCAINE 40 MG/G
CREAM TOPICAL
Status: DISCONTINUED | OUTPATIENT
Start: 2024-07-02 | End: 2024-07-02 | Stop reason: HOSPADM

## 2024-07-02 RX ORDER — PROCHLORPERAZINE MALEATE 10 MG
10 TABLET ORAL EVERY 6 HOURS PRN
Status: DISCONTINUED | OUTPATIENT
Start: 2024-07-02 | End: 2024-07-03 | Stop reason: HOSPADM

## 2024-07-02 RX ORDER — ONDANSETRON 2 MG/ML
4 INJECTION INTRAMUSCULAR; INTRAVENOUS
Status: DISCONTINUED | OUTPATIENT
Start: 2024-07-02 | End: 2024-07-02 | Stop reason: HOSPADM

## 2024-07-02 RX ORDER — NALOXONE HYDROCHLORIDE 0.4 MG/ML
0.4 INJECTION, SOLUTION INTRAMUSCULAR; INTRAVENOUS; SUBCUTANEOUS
Status: DISCONTINUED | OUTPATIENT
Start: 2024-07-02 | End: 2024-07-03 | Stop reason: HOSPADM

## 2024-07-02 RX ORDER — FLUMAZENIL 0.1 MG/ML
0.2 INJECTION, SOLUTION INTRAVENOUS
Status: DISCONTINUED | OUTPATIENT
Start: 2024-07-02 | End: 2024-07-03 | Stop reason: HOSPADM

## 2024-07-02 RX ORDER — ONDANSETRON 4 MG/1
4 TABLET, ORALLY DISINTEGRATING ORAL EVERY 6 HOURS PRN
Status: DISCONTINUED | OUTPATIENT
Start: 2024-07-02 | End: 2024-07-03 | Stop reason: HOSPADM

## 2024-07-02 RX ORDER — NALOXONE HYDROCHLORIDE 0.4 MG/ML
0.2 INJECTION, SOLUTION INTRAMUSCULAR; INTRAVENOUS; SUBCUTANEOUS
Status: DISCONTINUED | OUTPATIENT
Start: 2024-07-02 | End: 2024-07-03 | Stop reason: HOSPADM

## 2024-07-02 RX ORDER — ONDANSETRON 2 MG/ML
4 INJECTION INTRAMUSCULAR; INTRAVENOUS EVERY 6 HOURS PRN
Status: DISCONTINUED | OUTPATIENT
Start: 2024-07-02 | End: 2024-07-03 | Stop reason: HOSPADM

## 2024-07-02 RX ORDER — SODIUM CHLORIDE, SODIUM LACTATE, POTASSIUM CHLORIDE, CALCIUM CHLORIDE 600; 310; 30; 20 MG/100ML; MG/100ML; MG/100ML; MG/100ML
INJECTION, SOLUTION INTRAVENOUS CONTINUOUS
Status: DISCONTINUED | OUTPATIENT
Start: 2024-07-02 | End: 2024-07-02 | Stop reason: HOSPADM

## 2024-07-02 RX ORDER — PROPOFOL 10 MG/ML
INJECTION, EMULSION INTRAVENOUS CONTINUOUS PRN
Status: DISCONTINUED | OUTPATIENT
Start: 2024-07-02 | End: 2024-07-02

## 2024-07-02 RX ADMIN — PROPOFOL 60 MG: 10 INJECTION, EMULSION INTRAVENOUS at 11:24

## 2024-07-02 RX ADMIN — PROPOFOL 200 MCG/KG/MIN: 10 INJECTION, EMULSION INTRAVENOUS at 11:24

## 2024-07-02 RX ADMIN — SODIUM CHLORIDE, SODIUM LACTATE, POTASSIUM CHLORIDE, CALCIUM CHLORIDE: 600; 310; 30; 20 INJECTION, SOLUTION INTRAVENOUS at 10:16

## 2024-07-02 RX ADMIN — LIDOCAINE HYDROCHLORIDE 60 MG: 20 INJECTION, SOLUTION INFILTRATION; PERINEURAL at 11:24

## 2024-07-02 RX ADMIN — PROPOFOL 50 MG: 10 INJECTION, EMULSION INTRAVENOUS at 11:47

## 2024-07-02 NOTE — H&P
[place] [facility] [place]  [id number]  female  48 year old      Reason for procedure/surgery: reflux, colon cancer screening, intermittent loose stools, abnormal MRE    Patient Active Problem List   Diagnosis    Fructose intolerance    CARDIOVASCULAR SCREENING; LDL GOAL LESS THAN 130    Irritable bowel syndrome with diarrhea    Seasonal affective disorder (H24)    Allergic rhinitis    Alopecia    Arthralgia    Blepharitis    Decreased diffusion capacity of lung    Diarrhea    Insomnia    Episodic mood disorder (H24)    Generalized anxiety disorder    Leucocytoclastic vasculitis (H)    Major depression, recurrent, full remission (H24)    Myopia    Restrictive lung disease    Social phobia    Pelvic floor dysfunction       Past Surgical History:    Past Surgical History:   Procedure Laterality Date    LASIK Bilateral 2009    NO HISTORY OF SURGERY         Past Medical History:   Past Medical History:   Diagnosis Date    Allergic rhinitis 04/24/2003    Epic    Alopecia 09/14/2020    Arthralgia 09/14/2020    Blepharitis 05/13/2009    Epic    Decreased diffusion capacity of lung 12/09/2008    Diarrhea 10/26/2009    Episodic mood disorder (H24) 11/15/2004    Epic    Fructose intolerance 10/03/2018    Generalized anxiety disorder 02/07/2012    Insomnia 03/01/2011    Irritable bowel syndrome with diarrhea 10/03/2018    Leucocytoclastic vasculitis (H) 04/22/2020    Major depression, recurrent, full remission (H24) 11/04/2014    Noted at PCP visit.    Menopause 04/2022    Myopia 11/14/2008    Myopia - LasEk both 2009    NO ACTIVE PROBLEMS     Restrictive lung disease 12/09/2008    Seasonal affective disorder (H24) 10/04/2018    Social phobia 10/20/2016       Social History:   Social History     Tobacco Use    Smoking status: Never     Passive exposure: Never    Smokeless tobacco: Never   Substance Use Topics    Alcohol use: Yes     Comment: rare       Family History:   Family History   Problem Relation Age of Onset    Diabetes Father      Glaucoma No family hx of     Macular Degeneration No family hx of        Allergies:   Allergies   Allergen Reactions    Doxycycline Rash    Penicillins Rash       Active Medications:   Current Outpatient Medications   Medication Sig Dispense Refill    Calcium Citrate-Vitamin D (CALCIUM + D PO)       cetirizine (ZYRTEC) 10 MG tablet Take 10 mg by mouth daily      hydroxychloroquine (PLAQUENIL) 200 MG tablet Take 1 tablet (200 mg) by mouth daily for 90 days Annual Plaquenil toxicity eye screening required. 90 tablet 0    spironolactone (ALDACTONE) 100 MG tablet Take 1 tablet by mouth daily at 2 pm      chlorhexidine (PERIDEX) 0.12 % solution Swish and spit 15 mLs in mouth 2 times daily Can use for 2 weeks and should take a 2 week break before restarting. 473 mL 1    cholecalciferol (VITAMIN D3) 5000 units (125 mcg) capsule Take by mouth daily      cycloSPORINE (RESTASIS) 0.05 % ophthalmic emulsion Place 1 drop into both eyes 2 times daily 60 each 5    DULoxetine (CYMBALTA) 20 MG capsule Take 20 mg by mouth daily      JUNEL 1.5/30 1.5-30 MG-MCG tablet TAKE 1 TABLET BY MOUTH DAILY ACTIVE TABLETS FOR PREVENTION OF MENSES 84 tablet 2    ketoconazole (NIZORAL) 2 % external shampoo Apply topically as needed for itching or irritation Shampoo/scalp once or twice weekly as needed for seborrhea symptoms 120 mL 11    meloxicam (MOBIC) 15 MG tablet Take 1 tablet (15 mg) by mouth daily 90 tablet 1    olopatadine (PATANOL) 0.1 % ophthalmic solution PLACE 1 DROP INTO BOTH EYES 2 TIMES DAILY AS NEEDED 5 mL 3    Omega-3 Fatty Acids (FISH OIL PO)  (Patient not taking: Reported on 10/13/2023)      triamcinolone (KENALOG) 0.1 % external cream Apply topically 2 times daily As needed for rash (Patient not taking: Reported on 10/13/2023) 80 g 3       Systemic Review:   CONSTITUTIONAL: NEGATIVE for fever, chills, change in weight  ENT/MOUTH: NEGATIVE for ear, mouth and throat problems  RESP: NEGATIVE for significant cough or SOB  CV:  "NEGATIVE for chest pain, palpitations or peripheral edema    Physical Examination:   Vital Signs: BP (!) 147/90 (BP Location: Right arm)   Pulse 100   Temp 98.5  F (36.9  C) (Temporal)   Resp 18   Ht 1.676 m (5' 6\")   Wt 68 kg (150 lb)   LMP  (LMP Unknown)   SpO2 97%   BMI 24.21 kg/m    GENERAL: healthy, alert and no distress  NECK: no adenopathy, no asymmetry, masses, or scars  RESP: lungs clear to auscultation - no rales, rhonchi or wheezes  CV: regular rate and rhythm, normal S1 S2, no S3 or S4, no murmur, click or rub, no peripheral edema and peripheral pulses strong  ABDOMEN: soft, nontender, no hepatosplenomegaly, no masses and bowel sounds normal  MS: no gross musculoskeletal defects noted, no edema    Plan: Appropriate to proceed as scheduled.      Chapis Jarrell MD  7/2/2024    PCP:  Mena Bella    "

## 2024-07-02 NOTE — ANESTHESIA POSTPROCEDURE EVALUATION
Patient: Sondra Sorensen    Procedure: Procedure(s):  Colonoscopy  ESOPHAGOGASTRODUODENOSCOPY, WITH BIOPSY       Anesthesia Type:  MAC    Note:  Disposition: Outpatient   Postop Pain Control: Uneventful            Sign Out: Well controlled pain   PONV: No   Neuro/Psych: Uneventful            Sign Out: Acceptable/Baseline neuro status   Airway/Respiratory: Uneventful            Sign Out: Acceptable/Baseline resp. status   CV/Hemodynamics: Uneventful            Sign Out: Acceptable CV status; No obvious hypovolemia; No obvious fluid overload   Other NRE: NONE   DID A NON-ROUTINE EVENT OCCUR? No           Last vitals:  Vitals Value Taken Time   BP     Temp     Pulse     Resp     SpO2         Electronically Signed By: Tyler Lance MD  July 2, 2024  12:03 PM

## 2024-07-02 NOTE — ANESTHESIA CARE TRANSFER NOTE
Patient: Sondra ALEXANEDR Edu    Procedure: Procedure(s):  Colonoscopy  ESOPHAGOGASTRODUODENOSCOPY, WITH BIOPSY       Diagnosis: Gastroesophageal reflux disease, unspecified whether esophagitis present [K21.9]  Dysphagia, unspecified type [R13.10]  Diarrhea, unspecified type [R19.7]  Diagnosis Additional Information: No value filed.    Anesthesia Type:   MAC     Note:    Oropharynx: oropharynx clear of all foreign objects and spontaneously breathing  Level of Consciousness: awake  Oxygen Supplementation: room air    Independent Airway: airway patency satisfactory and stable  Dentition: dentition unchanged  Vital Signs Stable: post-procedure vital signs reviewed and stable  Report to RN Given: handoff report given  Patient transferred to: Phase II    Handoff Report: Identifed the Patient, Identified the Reponsible Provider, Reviewed the pertinent medical history, Discussed the surgical course, Reviewed Intra-OP anesthesia mangement and issues during anesthesia, Set expectations for post-procedure period and Allowed opportunity for questions and acknowledgement of understanding      Vitals:  Vitals Value Taken Time   /80    Temp 36    Pulse 85    Resp 16    SpO2 100        Electronically Signed By: JANINE Cao CRNA  July 2, 2024  12:01 PM

## 2024-07-03 LAB
PATH REPORT.COMMENTS IMP SPEC: NORMAL
PATH REPORT.COMMENTS IMP SPEC: NORMAL
PATH REPORT.FINAL DX SPEC: NORMAL
PATH REPORT.GROSS SPEC: NORMAL
PATH REPORT.RELEVANT HX SPEC: NORMAL
PHOTO IMAGE: NORMAL

## 2024-07-25 ENCOUNTER — LAB (OUTPATIENT)
Dept: LAB | Facility: CLINIC | Age: 49
End: 2024-07-25
Payer: COMMERCIAL

## 2024-07-25 DIAGNOSIS — R82.90 ABNORMAL URINALYSIS: ICD-10-CM

## 2024-07-25 LAB
ALBUMIN MFR UR ELPH: <6 MG/DL
ALBUMIN UR-MCNC: NEGATIVE MG/DL
APPEARANCE UR: CLEAR
BACTERIA #/AREA URNS HPF: ABNORMAL /HPF
BILIRUB UR QL STRIP: NEGATIVE
COLOR UR AUTO: YELLOW
CREAT UR-MCNC: 31.4 MG/DL
CREAT UR-MCNC: 31.4 MG/DL
GLUCOSE UR STRIP-MCNC: NEGATIVE MG/DL
HGB UR QL STRIP: NEGATIVE
KETONES UR STRIP-MCNC: NEGATIVE MG/DL
LEUKOCYTE ESTERASE UR QL STRIP: ABNORMAL
MICROALBUMIN UR-MCNC: <12 MG/L
MICROALBUMIN/CREAT UR: NORMAL MG/G{CREAT}
NITRATE UR QL: NEGATIVE
PH UR STRIP: 6 [PH] (ref 5–8)
PROT/CREAT 24H UR: NORMAL MG/G{CREAT}
RBC #/AREA URNS AUTO: ABNORMAL /HPF
SP GR UR STRIP: 1.01 (ref 1–1.03)
SQUAMOUS #/AREA URNS AUTO: ABNORMAL /LPF
TRANS CELLS #/AREA URNS HPF: ABNORMAL /HPF
UROBILINOGEN UR STRIP-ACNC: 0.2 E.U./DL
WBC #/AREA URNS AUTO: ABNORMAL /HPF

## 2024-07-25 PROCEDURE — 82043 UR ALBUMIN QUANTITATIVE: CPT

## 2024-07-25 PROCEDURE — 84156 ASSAY OF PROTEIN URINE: CPT

## 2024-07-25 PROCEDURE — 81001 URINALYSIS AUTO W/SCOPE: CPT

## 2024-07-25 PROCEDURE — 82570 ASSAY OF URINE CREATININE: CPT

## 2024-07-28 DIAGNOSIS — Z30.011 ORAL CONTRACEPTIVE PRESCRIBED: ICD-10-CM

## 2024-07-29 RX ORDER — NORETHINDRONE ACETATE AND ETHINYL ESTRADIOL 1.5; 3 MG/1; UG/1
TABLET ORAL
Qty: 84 TABLET | Refills: 0 | Status: SHIPPED | OUTPATIENT
Start: 2024-07-29

## 2024-07-30 NOTE — RESULT ENCOUNTER NOTE
Urinalysis without proteins, urine proteins/albumin is negative  Squamous cells, few bacteria and WBCs are present    The plan remain as discussed in the last visit.     Luli Paul MD

## 2024-08-05 ENCOUNTER — E-CONSULT (OUTPATIENT)
Dept: UROLOGY | Facility: CLINIC | Age: 49
End: 2024-08-05
Payer: COMMERCIAL

## 2024-08-05 NOTE — PROGRESS NOTES
8/5/2024     E-Consult has been accepted.    Interprofessional consultation requested by:  Luli Paul MD      Clinical Question/Purpose: MY CLINICAL QUESTION IS: 48 year old with persistent pyuria for 4 months and now with transitional cells      Patient assessment and information reviewed: Reviewed the UA and microscopy    Recommendations: Transitional cells are normal urothelial lining cells. I would not be very concerned by that finding. He urine sample seems to be contaminated as well due to the amount of the squamous cells seen.  You could do a straight cath specimen and send it for UA and micro and urine cytology to reassess.         The recommendations provided in this E-Consult are based on a review of clinical data pertinent to the clinical question presented, without a review of the patient's complete medical record or, the benefit of a comprehensive in-person or virtual patient evaluation. This consultation should not replace the clinical judgement and evaluation of the provider ordering this E-Consult. Any new clinical issues, or changes in patient status since the filing of this E-Consult will need to be taken into account when assessing these recommendations. Please contact me if you have further questions.    My total time spent reviewing clinical information and formulating assessment was 5 minutes.        Louis Oro MD

## 2024-08-06 ENCOUNTER — TRANSFERRED RECORDS (OUTPATIENT)
Dept: HEALTH INFORMATION MANAGEMENT | Facility: CLINIC | Age: 49
End: 2024-08-06

## 2024-08-06 ENCOUNTER — MEDICAL CORRESPONDENCE (OUTPATIENT)
Dept: HEALTH INFORMATION MANAGEMENT | Facility: CLINIC | Age: 49
End: 2024-08-06

## 2024-08-27 ENCOUNTER — TRANSFERRED RECORDS (OUTPATIENT)
Dept: HEALTH INFORMATION MANAGEMENT | Facility: CLINIC | Age: 49
End: 2024-08-27

## 2024-09-06 ENCOUNTER — TRANSFERRED RECORDS (OUTPATIENT)
Dept: HEALTH INFORMATION MANAGEMENT | Facility: CLINIC | Age: 49
End: 2024-09-06

## 2024-09-11 DIAGNOSIS — R82.90 ABNORMAL URINALYSIS: Primary | ICD-10-CM

## 2024-09-21 DIAGNOSIS — M06.09 RHEUMATOID ARTHRITIS, SERONEGATIVE, MULTIPLE SITES (H): ICD-10-CM

## 2024-09-27 RX ORDER — HYDROXYCHLOROQUINE SULFATE 200 MG/1
200 TABLET, FILM COATED ORAL DAILY
Qty: 90 TABLET | Refills: 0 | Status: SHIPPED | OUTPATIENT
Start: 2024-09-27 | End: 2024-12-26

## 2024-09-27 NOTE — TELEPHONE ENCOUNTER
HYDROXYCHLOROQUINE 200 MG TAB       Last Written Prescription Date:  6/25/24 end 9/23/24  Last Fill Quantity: 90,   # refills: 0  Last Office Visit : 6/25/24  Future Office visit:  10/23/24  Eye exam: next 10/30/24 Nacho  Last eye exam: 8/16/23 Reji: Fundus autofluorescence, OCT Macula, and 10-2 visual field testing   Routing refill request to provider for review/approval because:  Last eye exam ( [facility]) 8/16/23, is [facility]24 [facility]. Bailey refill seems reasonable: pended

## 2024-10-16 ENCOUNTER — VIRTUAL VISIT (OUTPATIENT)
Dept: GASTROENTEROLOGY | Facility: CLINIC | Age: 49
End: 2024-10-16
Attending: DIETITIAN, REGISTERED
Payer: COMMERCIAL

## 2024-10-16 VITALS — WEIGHT: 150 LBS | BODY MASS INDEX: 24.11 KG/M2 | HEIGHT: 66 IN

## 2024-10-16 DIAGNOSIS — K58.0 IRRITABLE BOWEL SYNDROME WITH DIARRHEA: ICD-10-CM

## 2024-10-16 DIAGNOSIS — K58.2 IRRITABLE BOWEL SYNDROME WITH BOTH CONSTIPATION AND DIARRHEA: ICD-10-CM

## 2024-10-16 DIAGNOSIS — K21.9 GASTROESOPHAGEAL REFLUX DISEASE, UNSPECIFIED WHETHER ESOPHAGITIS PRESENT: Primary | ICD-10-CM

## 2024-10-16 DIAGNOSIS — E74.10 FRUCTOSE INTOLERANCE: ICD-10-CM

## 2024-10-16 PROCEDURE — 99215 OFFICE O/P EST HI 40 MIN: CPT | Mod: 95 | Performed by: DIETITIAN, REGISTERED

## 2024-10-16 PROCEDURE — 99417 PROLNG OP E/M EACH 15 MIN: CPT | Mod: 95 | Performed by: DIETITIAN, REGISTERED

## 2024-10-16 RX ORDER — OMEPRAZOLE 40 MG/1
40 CAPSULE, DELAYED RELEASE ORAL DAILY
Qty: 90 CAPSULE | Refills: 0 | Status: SHIPPED | OUTPATIENT
Start: 2024-10-16

## 2024-10-16 ASSESSMENT — PAIN SCALES - GENERAL: PAINLEVEL: NO PAIN (0)

## 2024-10-16 NOTE — NURSING NOTE
Current patient location: [address] [place] [address] [address]  [place] [address]    Is the patient currently in the state of [place]? YES    Visit mode:VIDEO    If the visit is dropped, the patient can be reconnected by: VIDEO VISIT: Send to e-mail at: [email or web address]    Will anyone else be joining the visit? NO  (If patient encounters technical issues they should call [phone]    Are changes needed to the allergy or medication list? No    Are refills needed on medications prescribed by this physician? NO    Rooming Documentation:  Questionnaire(s) completed    Reason for visit: MICHAEL WALTON     No

## 2024-10-16 NOTE — PATIENT INSTRUCTIONS
It was a pleasure meeting with you today and discussing your healthcare plan. Below is a summary of what we covered:    -- Continue Citrucel fiber caplets -continue with 1 caplet per day for now  -- Work on hydration, aim for 64oz daily  -- Referral to our registered dietitian Natalia Osman regarding low fructose diet education  -- Start rifaximin 3 times daily x 14 days  -- Track symptoms in journal at least 1 to 2 days around episodes of diarrhea including stool pattern prior to and during episodes including frequency and consistency, diet, stress/life circumstances and we will see if a pattern emerges  -- Omeprazole 40 mg x 12 weeks taken30 to 60 minutes before eating drinking breakfast or dinner  -- Pepcid (famotidine) at bedtime as needed    Please see below for any additional questions and scheduling guidelines.    Sign up for [employer]: [employer] patient portal serves as a secure platform for accessing your medical records from the [facility]. Additionally, [employer] facilitates easy, timely, and secure messaging with your care team. If you have not signed up, you may do so by using the provided code or calling [phone].    Coordinating your care after your visit:  There are multiple options for scheduling your follow-up care based on your provider's recommendation.    How do I schedule a follow-up clinic appointment:   After your appointment, you may receive scheduling assistance with the Clinic Coordinators by having a seat in the waiting room and a Clinic Coordinator will call you up to schedule.  Virtual visits or after you leave the clinic:  Your provider has placed a follow-up order in the [employer] portal for scheduling your return appointment. A member of the scheduling team will contact you to schedule.  [facility] Scheduling: Timely scheduling through [employer] is advised to ensure appointment availability.   Call to schedule: You may schedule your follow-up appointment(s) by calling  [phone], option 1.    How do I schedule my endoscopy or colonoscopy procedure:  If a procedure, such as a colonoscopy or upper endoscopy was ordered by your provider, the scheduling team will contact you to schedule this procedure. Or you may choose to call to schedule at   [phone], option 2.  Please allow 20-30 minutes when scheduling a procedure.    How do I get my blood work done? To get your blood work done, you need to schedule a lab appointment at an [facility]. There are multiple ways to schedule:   At the clinic: The Clinic Coordinator you meet after your visit can help you schedule a lab appointment.   [employer] scheduling: [employer] offers online lab scheduling at all [facility] laboratory locations.   Call to schedule: You can call [phone] to schedule your lab appointment.    How do I schedule my imaging study: To schedule imaging studies, such as CT scans, ultrasounds, MRIs, or X-rays, contact Imaging Services at [phone].    How do I schedule a referral to another doctor: If your provider recommended a referral to another specialist(s), the referral order was placed by your provider. You will receive a phone call to schedule this referral, or you may choose to call the number attached to the referral to self-schedule.    For Post-Visit Question(s):  For any inquiries following today's visit:  Please utilize [employer] messaging and allow 48 hours for reply or contact the Call Center during normal business hours at [phone], option 3.  For Emergent After-hours questions, contact the On-Call GI Fellow through the [facility]  at [phone].  In addition, you may contact your Nurse directly using the provided contact information.    Test Results:  Test results will be accessible via [employer] in compliance with the 21st Century Cures Act. This means that your results will be available to you at the same time as your provider. Often you may see your  results before your provider does. Results are reviewed by staff within two weeks with communication follow-up. Results may be released in the patient portal prior to your care team review.    Prescription Refill(s):  Medication prescribed by your provider will be addressed during your visit. For future refills, please coordinate with your pharmacy. If you have not had a recent clinic visit or routine labs, for your safety, your provider may not be able to refill your prescription.

## 2024-10-16 NOTE — LETTER
10/16/2024      Sondra Harrellug  [address]  [place] [address]      Dear Colleague,    Thank you for referring your patient, Sondra Sorensen, to the [facility]. Please see a copy of my visit note below.    Virtual Visit Details    Type of service:  Video Visit     Originating Location (pt. Location): Home    Distant Location (provider location):  Off-site  Platform used for Video Visit: [facility]    GI CLINIC VISIT    CC/REFERRING MD:  Jesus Jacobson  REASON FOR CONSULTATION: diarrhea    ASSESSMENT/PLAN:  #Chronic Diarrhea  Patient with 20+ year history of intermittent loose stools, worsening in past 5 to 10 years with episodes of urgent, explosive diarrhea with intermittent incontinence.  More recently she has had variable stool consistency with harder stools and intermittent loose urgent stools with associated cramping, this causes her a lot of anxiety and impacting her day-to-day life. 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  She reports that previous lactose intolerance testing was negative.   Fecal calprotectin, ova and parasite, Giardia/Cryptosporidium, fecal elastase and spot fecal fat, celiac serologies, and TSH with T4 reflex were all of which were negative/normal.  MR enterography in April showed possible mild nonspecific colitis of the proximal sigmoid though possibly related to under distention.  Colonoscopy following this was unremarkable without any evidence of inflammation.     Differential for intermitent diarrhea includes exacerbation of fructose malabsorption with diet, IBS-D (consistent with abdominal cramping improved after defecation and change in stool form), constipation with overflow, less likely SIBO, bile acid diarrhea microscopic colitis, unlikely IBD with recent normal colonoscopy.     We will try treatment with rifaximin for IBS-D.  She would also benefit from meeting with a dietitian to review low  fructose diet with known component of fructose malabsorption and potentially any other dietary triggers/low FODMAP.  We will continue with fiber supplement and working on hydration.  She continues to meet with therapist and has been adjusting mental health medications.    Future considerations include abdominal x-ray for stool burden, adjustment of fiber supplement.    Plan:  -- Continue Citrucel fiber caplets -continue with 1 caplet per day for now  -- Work on hydration, aim for 64oz daily  -- Referral to our registered dietitian Natalia Osman regarding low fructose diet education  -- Start rifaximin 3 times daily x 14 days  -- Track symptoms in journal at least 1 to 2 days around episodes of diarrhea including stool pattern prior to and during episodes including frequency and consistency, diet, stress/life circumstances and we will see if a pattern emerges    #GERD  #Dysphagia  Patient reports long history of atypical GERD symptoms of hoarseness, throat pain, cough.  Recent EGD was normal.  Previously evaluated by ENT.  In the past year she has had worsening of symptoms with burning in her throat, most overnight.  Some improvement with elevation of head and changing of evening meal.  She is also had some response to Pepcid though still somewhat symptomatic.  We will retry omeprazole at 40 mg dose x 3 months.  She continue to use Pepcid as needed.    -- Omeprazole 40 mg x 12 weeks taken30 to 60 minutes before eating drinking breakfast or dinner  -- Pepcid (famotidine) at bedtime as needed    Colorectal cancer screening: last colonoscopy 2014 normal. Cologaurd 2021 years ago negative (per pt). Due for screening 2024. Discussed that we could order this now, would like to hold off scheduling for now.     RTC 4 months    Thank you for this consultation.  It was a pleasure to participate in the care of this patient; please contact us with any further questions.     0 Minutes was spent on the date of the encounter during  "chart review, history and exam, documentation, and further activities as noted 59      Mirna Roth PA-C, RD  Division of Gastroenterology, Hepatology & Nutrition  [facility]        HPI  Sondra Sorensen is a 47 year old female with a history of alopecia, arthralgia, leukoclastic vasculitis, restrictive lung disease, allergic rhinitis, MDD, RAYMOND, IBS-D, fructose malabsorption who presents for evaluation of diarrhea. They are new to the [facility] GI clinic and this is my first encounter with the patient.      She has been following with rheumatology for seronegative RA with response HCQ, recent hand MRI without signs of inflammatory arthritis. She also has history of biopsy proven leukoclastic disease. She was followed previously at outside facility diagnosed \"connective tisusue disease with sicca symtpoms.\" Pertinent rheumatologic labs include MARIAA 1:80 in a speckled pattern, negative/ normal RF, ANCA, Broad serologic work-up that showed negative/normal C3/C4/SSA/SSB/smith/RNP/CCP/dsDNA. She has had persistently elevated CRP.    She reports 20+ year history GI symptoms, namely urgent loose stools., worse in the last 5 to 10 years. Colonoscopy 2014 with normal appearing ileum and colon. On biopsy no evidence of microscopic colitis or other histopathologic abnormality.  She notes previous work-up with [facility], we do not have these records.  She did have positive fructose breath test 6/2018 with [facility].  She also reports negative lactose intolerance testing.    Labs and stool studies completed including fecal calprotectin, ova and parasite, Giardia/Cryptosporidium, fecal elastase and spot fecal fat, celiac serologies, and TSH with T4 reflex were all of which were negative/normal.  -----  Interval History 3/6/24:  She was last seen in our clinic April 2023.  Today she reports that overall symptoms remain similar to previous.  She continues to have daily stools with intermittent episodes of urgent loose stools and near " incontinence.  On a typical day will have 1-3 formed stools, typically Edgar type IV but with some tendency towards harder stool (Edgar 2) with straining about once per week.  Typically feels fully evacuated.  Rare day without stool.  About once a month she will have episode of significant diarrhea. Will start with abdominal pain/cramping and within 5 minutes will have urgent loose stools.  She reports that these episodes have caused significant disruption while out of the house and impacting her life.  Sometimes 1 stool and done, sometimes 3 over the course of an hour.  Abdominal discomfort relieved after.  She denies blood in her stool or melena, no nocturnal stools. No constipation/change in bowel pattern preceding that she can appreciate. She feels this is dietary related, has been tracking intake and is noted association with hot peppers.   Given previous diagnosis of fructose malabsorption, she has tried to follow a low fructose diet.  Though did not get formal dietary counseling on this, has handouts but in recent months has not followed quite as closely.  Feels like bloating improved with these changes but no real change in stool pattern.  She has met with a registered dietitian to review elimination diet (? Low FODMAP) which she tried but was not able to sustain due to restrictiveness of diet and lack of guidance.  Has tried gluten-free and lactose-free diets without significant improvement. Recently saw dietitian, but was specialized in DM rather than GI.    She is currently taking Citrucel fiber tablets, trying for 1 tablet twice daily, but often forgets 1 dose.  She is working on increasing fluid intake, drinks 20 ounces of water and occasional carbonated water.    Reports history of reflux with worsening over the past week.  She does not have burning in chest or regurgitation but feels burning up into her throat, hoarseness, and swelling in her throat/tonsils.  She does note that sleeping on an  incline seems to help as the symptoms are worse overnight.  She does report that she feels like she is having a hard time swallowing and getting food passed mouth but also that food is getting stuck in her throat.  This is worsened recently.   Has never had an EGD.  She was previously evaluated for hoarseness by ENT, recommended voice therapy.  She has started omeprazole in the last couple days, plans to do a 14-day course.  Was also tried Pepcid in the past with some relief.    GI review of systems completed and otherwise negative.    Interval History 10/16/24:  MRE in April showed possible mild nonspecific colitis of the sigmoid colon versus under distention.    Had EGD and colonoscopy in July. EGD normal, biopsies of stomach with reactive gastropathy, no h.pylori. Colonoscopy normal without evidence of any inflammation. No biopsies collected.    Today Sondra reports symptoms are variable-few years ago would have persistent diarrhea, now alternating between more hard stools and urgent loose stools with associated cramping.  Has noticed hot peppers might be a trigger, possibly coconut milk.    BM pattern-1-3 stools per day, occasional hard asia or hard logs but more typically soft formed or mushy.  About 1-2 times per week she will very urgent bowel movements with associated abdominal cramping that is relieved after defecation.  This causes her a lot of anxiety, has portable toilet and car in case of need.  No blood in stool, no melena, no stearrhea.  Currently taking 1 Citrucel fiber caplet per day, try to increase to 2 but got more constipated.  Currently drinking about 45 ounces of water per day.    Does endorse some bloating that seems to be triggered by eating.    GERD -some improvement with elevated HOB, 4 inches.Tries not to eat as late.and taking Pepcid at night.  However still does endorse some sore throat and tonsil stones, no longer waking up at night with burning in throat or dry heaves.  Overall  swallowing going well, does feel like food and pills sometimes get stuck in tonsils which are large.    Other health updates that she notes include she is started an antianxiety medication, working on finding the right dose.  Restarted birth control for perimenopause.      ROS:    No fevers or chills  No weight loss  No blurry vision, double vision or change in vision  No sore throat  No lymphadenopathy  No headache, paraesthesias, or weakness in a limb  No shortness of breath or wheezing  No chest pain or pressure  No arthralgias or myalgias  No rashes or skin changes  No odynophagia or dysphagia  No BRBPR, hematochezia, melena  No dysuria, frequency or urgency  No hot/cold intolerance or polyria  No anxiety or depression    PROBLEM LIST  Patient Active Problem List    Diagnosis Date Noted     Pelvic floor dysfunction 03/01/2024     Priority: Medium     Alopecia 09/14/2020     Priority: Medium     Arthralgia 09/14/2020     Priority: Medium     Leucocytoclastic vasculitis (H) 04/22/2020     Priority: Medium     Seasonal affective disorder (H) 10/04/2018     Priority: Medium     Fructose intolerance 10/03/2018     Priority: Medium     CARDIOVASCULAR SCREENING; LDL GOAL LESS THAN 130 10/03/2018     Priority: Medium     Irritable bowel syndrome with diarrhea 10/03/2018     Priority: Medium     Social phobia 10/20/2016     Priority: Medium     Major depression, recurrent, full remission (H) 11/04/2014     Priority: Medium     Noted at PCP visit.       Generalized anxiety disorder 02/07/2012     Priority: Medium     Insomnia 03/01/2011     Priority: Medium     Diarrhea 10/26/2009     Priority: Medium     Blepharitis 05/13/2009     Priority: Medium     Epic       Decreased diffusion capacity of lung 12/09/2008     Priority: Medium     Restrictive lung disease 12/09/2008     Priority: Medium     Myopia 11/14/2008     Priority: Medium     Myopia - LasEk both 2009       Episodic mood disorder (H) 11/15/2004     Priority:  Medium     Epic       Allergic rhinitis 04/24/2003     Priority: Medium     Epic         PERTINENT PAST MEDICAL HISTORY:  Past Medical History:   Diagnosis Date     Allergic rhinitis 04/24/2003    Epic     Alopecia 09/14/2020     Arthralgia 09/14/2020     Blepharitis 05/13/2009    Epic     Decreased diffusion capacity of lung 12/09/2008     Diarrhea 10/26/2009     Episodic mood disorder (H) 11/15/2004    Epic     Fructose intolerance 10/03/2018     Generalized anxiety disorder 02/07/2012     Insomnia 03/01/2011     Irritable bowel syndrome with diarrhea 10/03/2018     Leucocytoclastic vasculitis (H) 04/22/2020     Major depression, recurrent, full remission (H) 11/04/2014    Noted at PCP visit.     Menopause 04/2022     Myopia 11/14/2008    Myopia - LasEk both 2009     NO ACTIVE PROBLEMS      Restrictive lung disease 12/09/2008     Seasonal affective disorder (H) 10/04/2018     Social phobia 10/20/2016       PREVIOUS SURGERIES:  Past Surgical History:   Procedure Laterality Date     COLONOSCOPY N/A 7/2/2024    Procedure: Colonoscopy;  Surgeon: Chapis Jarrell MD;  [place] [facility] [place]     ESOPHAGOSCOPY, GASTROSCOPY, DUODENOSCOPY (EGD), COMBINED N/A 7/2/2024    Procedure: ESOPHAGOGASTRODUODENOSCOPY, WITH BIOPSY;  Surgeon: Chapis Jarrell MD;  [place] [facility] [place]     [facility] Bilateral 2009     NO HISTORY OF SURGERY         PREVIOUS ENDOSCOPY:  Colonoscopy 05/12/2014:          EGD 7/2/24:  Impression:            - Z-line regular, 36 cm from the incisors.                          - Normal stomach. Biopsied.                          - Normal duodenal bulb, first portion of the duodenum                          and second portion of the duodenum.     A. GASTRIC, ANTRUM/INCISURA, BIOPSY:  - Reactive gastropathy with mild chronic inactive gastritis  - No H. pylori-like organisms identified on routine staining  - No intestinal metaplasia identified     B. GASTRIC, BODY, BIOPSY:  - Unremarkable body-type mucosa  - No H.  pylori-like organisms identified on routine staining  - No intestinal metaplasia identified    Colonoscopy 7/2/24:  Impression:            - The examined portion of the ileum was normal.                          - Non-bleeding internal hemorrhoids.                          - No specimens collected.     ALLERGIES:  Allergies   Allergen Reactions     Doxycycline Rash     Penicillins Rash       PERTINENT MEDICATIONS:    Current Outpatient Medications:      Calcium Citrate-Vitamin D (CALCIUM + D PO), , Disp: , Rfl:      cetirizine (ZYRTEC) 10 MG tablet, Take 10 mg by mouth daily, Disp: , Rfl:      chlorhexidine (PERIDEX) 0.12 % solution, Swish and spit 15 mLs in mouth 2 times daily Can use for 2 weeks and should take a 2 week break before restarting., Disp: 473 mL, Rfl: 1     cholecalciferol (VITAMIN D3) 5000 units (125 mcg) capsule, Take by mouth daily, Disp: , Rfl:      cycloSPORINE (RESTASIS) 0.05 % ophthalmic emulsion, Place 1 drop into both eyes 2 times daily, Disp: 60 each, Rfl: 5     DULoxetine (CYMBALTA) 20 MG capsule, Take 20 mg by mouth daily, Disp: , Rfl:      hydroxychloroquine (PLAQUENIL) 200 MG tablet, TAKE 1 TABLET (200 MG) BY MOUTH DAILY FOR 90 DAYS ANNUAL PLAQUENIL TOXICITY EYE SCREENING REQUIRED., Disp: 90 tablet, Rfl: 0     ketoconazole (NIZORAL) 2 % external shampoo, Apply topically as needed for itching or irritation Shampoo/scalp once or twice weekly as needed for seborrhea symptoms, Disp: 120 mL, Rfl: 11     meloxicam (MOBIC) 15 MG tablet, Take 1 tablet (15 mg) by mouth daily, Disp: 90 tablet, Rfl: 1     norethindrone-ethinyl estradiol (JUNEL 1.5/30) 1.5-30 MG-MCG tablet, TAKE 1 TABLET BY MOUTH DAILY ACTIVE TABLETS FOR PREVENTION OF MENSES, Disp: 84 tablet, Rfl: 0     olopatadine (PATANOL) 0.1 % ophthalmic solution, PLACE 1 DROP INTO BOTH EYES 2 TIMES DAILY AS NEEDED, Disp: 5 mL, Rfl: 3     Omega-3 Fatty Acids (FISH OIL PO), , Disp: , Rfl:      spironolactone (ALDACTONE) 100 MG tablet, Take 1  tablet by mouth daily at 2 pm, Disp: , Rfl:      triamcinolone (KENALOG) 0.1 % external cream, Apply topically 2 times daily As needed for rash (Patient not taking: Reported on 10/13/2023), Disp: 80 g, Rfl: 3  Ibuprofen 1-2 times per week  Biotin  Align probiotics on and off    SOCIAL HISTORY:  Tobacco: no  Alcohol: rare, up to two drinks every few months   Drugs Use: no  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, [employer] program    Social History     Socioeconomic History     Marital status: Single     Spouse name: Not on file     Number of children: Not on file     Years of education: Not on file     Highest education level: Not on file   Occupational History     Not on file   Tobacco Use     Smoking status: Never     Passive exposure: Never     Smokeless tobacco: Never   Vaping Use     Vaping status: Never Used   Substance and Sexual Activity     Alcohol use: Yes     Comment: rare     Drug use: No     Sexual activity: Not Currently     Partners: Male   Other Topics Concern     Parent/sibling w/ CABG, MI or angioplasty before 65F 55M? Not Asked   Social History Narrative     Not on file     Social Determinants of Health     Financial Resource Strain: Not on file   Food Insecurity: Not on file   Transportation Needs: Not on file   Physical Activity: Not on file   Stress: Not on file   Social Connections: Not on file   Interpersonal Safety: Not At Risk (8/16/2023)    Humiliation, Afraid, Rape, and Kick questionnaire      Fear of Current or Ex-Partner: No      Emotionally Abused: No      Physically Abused: No      Sexually Abused: No   Housing Stability: Not on file     FAMILY HISTORY:  FH of CRC: no  FH of IBD: no  No Colon/Panc/Esophageal/other GI CA. No IBD or Celiac Disease. No other Autoimmune, Liver, or Thyroid disease.    Family History   Problem Relation Age of Onset     Diabetes Father      Glaucoma No family hx of      Macular Degeneration No family hx of      Past/family/social history reviewed and no  "changes    PHYSICAL EXAMINATION:  Constitutional: AAOx3, cooperative, pleasant, not dyspneic/diaphoretic, no acute distress  Vitals reviewed: Ht 1.676 m (5' 6\")   Wt 68 kg (150 lb)   BMI 24.21 kg/m    Wt:   General appearance: Healthy appearing adult, in no acute distress  Eyes: Sclera anicteric, Pupils round and reactive to light  Ears, nose, mouth and throat: No obvious external lesions of ears and nose, Hearing intact  Neck: Symmetric, No obvious external lesions  Respiratory: Normal respiration, no use of accessory muscles   MSK: Gait normal  Skin: No rashes or jaundice   Psychiatric: Oriented to person, place and time, Appropriate mood and affect.       PERTINENT STUDIES:  Lab on 12/23/2022   Component Date Value Ref Range Status     Sodium 12/23/2022 135 (L)  136 - 145 mmol/L Final     Potassium 12/23/2022 4.4  3.4 - 5.3 mmol/L Final     Chloride 12/23/2022 100  98 - 107 mmol/L Final     Carbon Dioxide (CO2) 12/23/2022 20 (L)  22 - 29 mmol/L Final     Anion Gap 12/23/2022 15  7 - 15 mmol/L Final     Urea Nitrogen 12/23/2022 16.0  6.0 - 20.0 mg/dL Final     Creatinine 12/23/2022 0.94  0.51 - 0.95 mg/dL Final     Calcium 12/23/2022 9.5  8.6 - 10.0 mg/dL Final     Glucose 12/23/2022 160 (H)  70 - 99 mg/dL Final     Alkaline Phosphatase 12/23/2022 71  35 - 104 U/L Final     AST 12/23/2022 27  10 - 35 U/L Final     ALT 12/23/2022 21  10 - 35 U/L Final     Protein Total 12/23/2022 8.4 (H)  6.4 - 8.3 g/dL Final     Albumin 12/23/2022 4.6  3.5 - 5.2 g/dL Final     Bilirubin Total 12/23/2022 0.4  <=1.2 mg/dL Final     GFR Estimate 12/23/2022 75  >60 mL/min/1.73m2 Final     Erythrocyte Sedimentation Rate 12/23/2022 11  0 - 20 mm/hr Final     CRP Inflammation 12/23/2022 20.50 (H)  <5.00 mg/L Final     WBC Count 12/23/2022 9.7  4.0 - 11.0 10e3/uL Final     RBC Count 12/23/2022 5.06  3.80 - 5.20 10e6/uL Final     Hemoglobin 12/23/2022 15.3  11.7 - 15.7 g/dL Final     Hematocrit 12/23/2022 45.9  35.0 - 47.0 % Final     " MCV 12/23/2022 91  78 - 100 fL Final     MCH 12/23/2022 30.2  26.5 - 33.0 pg Final     MCHC 12/23/2022 33.3  31.5 - 36.5 g/dL Final     RDW 12/23/2022 11.7  10.0 - 15.0 % Final     Platelet Count 12/23/2022 309  150 - 450 10e3/uL Final     % Neutrophils 12/23/2022 69  % Final     % Lymphocytes 12/23/2022 23  % Final     % Monocytes 12/23/2022 7  % Final     % Eosinophils 12/23/2022 1  % Final     % Basophils 12/23/2022 0  % Final     % Immature Granulocytes 12/23/2022 0  % Final     Absolute Neutrophils 12/23/2022 6.7  1.6 - 8.3 10e3/uL Final     Absolute Lymphocytes 12/23/2022 2.2  0.8 - 5.3 10e3/uL Final     Absolute Monocytes 12/23/2022 0.7  0.0 - 1.3 10e3/uL Final     Absolute Eosinophils 12/23/2022 0.1  0.0 - 0.7 10e3/uL Final     Absolute Basophils 12/23/2022 0.0  0.0 - 0.2 10e3/uL Final     Absolute Immature Granulocytes 12/23/2022 0.0  <=0.4 10e3/uL Final     Fructose breath test 6/2018 ([facility]):        Again, thank you for allowing me to participate in the care of your patient.        Sincerely,        Mirna Roth PA-C

## 2024-10-16 NOTE — PROGRESS NOTES
Virtual Visit Details    Type of service:  Video Visit     Originating Location (pt. Location): Home    Distant Location (provider location):  Off-site  Platform used for Video Visit: Well    GI CLINIC VISIT    CC/REFERRING MD:  Jesus Jacobson  REASON FOR CONSULTATION: diarrhea    ASSESSMENT/PLAN:  #Chronic Diarrhea  Patient with 20+ year history of intermittent loose stools, worsening in past 5 to 10 years with episodes of urgent, explosive diarrhea with intermittent incontinence.  More recently she has had variable stool consistency with harder stools and intermittent loose urgent stools with associated cramping, this causes her a lot of anxiety and impacting her day-to-day life. 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  She reports that previous lactose intolerance testing was negative.   Fecal calprotectin, ova and parasite, Giardia/Cryptosporidium, fecal elastase and spot fecal fat, celiac serologies, and TSH with T4 reflex were all of which were negative/normal.  MR enterography in April showed possible mild nonspecific colitis of the proximal sigmoid though possibly related to under distention.  Colonoscopy following this was unremarkable without any evidence of inflammation.     Differential for intermitent diarrhea includes exacerbation of fructose malabsorption with diet, IBS-D (consistent with abdominal cramping improved after defecation and change in stool form), constipation with overflow, less likely SIBO, bile acid diarrhea microscopic colitis, unlikely IBD with recent normal colonoscopy.     We will try treatment with rifaximin for IBS-D.  She would also benefit from meeting with a dietitian to review low fructose diet with known component of fructose malabsorption and potentially any other dietary triggers/low FODMAP.  We will continue with fiber supplement and working on hydration.  She continues to meet with therapist and has been adjusting mental health medications.     Future considerations include abdominal x-ray for stool burden, adjustment of fiber supplement.    Plan:  -- Continue Citrucel fiber caplets -continue with 1 caplet per day for now  -- Work on hydration, aim for 64oz daily  -- Referral to our registered dietitian Natalia Osman regarding low fructose diet education  -- Start rifaximin 3 times daily x 14 days  -- Track symptoms in journal at least 1 to 2 days around episodes of diarrhea including stool pattern prior to and during episodes including frequency and consistency, diet, stress/life circumstances and we will see if a pattern emerges    #GERD  #Dysphagia  Patient reports long history of atypical GERD symptoms of hoarseness, throat pain, cough.  Recent EGD was normal.  Previously evaluated by ENT.  In the past year she has had worsening of symptoms with burning in her throat, most overnight.  Some improvement with elevation of head and changing of evening meal.  She is also had some response to Pepcid though still somewhat symptomatic.  We will retry omeprazole at 40 mg dose x 3 months.  She continue to use Pepcid as needed.    -- Omeprazole 40 mg x 12 weeks taken30 to 60 minutes before eating drinking breakfast or dinner  -- Pepcid (famotidine) at bedtime as needed    Colorectal cancer screening: last colonoscopy 2014 normal. Cologaurd 2021 years ago negative (per pt). Due for screening 2024. Discussed that we could order this now, would like to hold off scheduling for now.     RTC 4 months    Thank you for this consultation.  It was a pleasure to participate in the care of this patient; please contact us with any further questions.     0 Minutes was spent on the date of the encounter during chart review, history and exam, documentation, and further activities as noted 59      Mirna Roth PA-C, RD  Division of Gastroenterology, Hepatology & Nutrition  [facility]        HPI  Sondra Sorensen is a 47 year old female with a history of alopecia,  "arthralgia, leukoclastic vasculitis, restrictive lung disease, allergic rhinitis, MDD, RAYMOND, IBS-D, fructose malabsorption who presents for evaluation of diarrhea. They are new to the [facility] and this is my first encounter with the patient.      She has been following with rheumatology for seronegative RA with response HCQ, recent hand MRI without signs of inflammatory arthritis. She also has history of biopsy proven leukoclastic disease. She was followed previously at outside facility diagnosed \"connective tisusue disease with sicca symtpoms.\" Pertinent rheumatologic labs include MARIAA 1:80 in a speckled pattern, negative/ normal RF, ANCA, Broad serologic work-up that showed negative/normal C3/C4/SSA/SSB/smith/RNP/CCP/dsDNA. She has had persistently elevated CRP.    She reports 20+ year history GI symptoms, namely urgent loose stools., worse in the last 5 to 10 years. Colonoscopy 2014 with normal appearing ileum and colon. On biopsy no evidence of microscopic colitis or other histopathologic abnormality.  She notes previous work-up with [facility], we do not have these records.  She did have positive fructose breath test 6/2018 with [facility].  She also reports negative lactose intolerance testing.    Labs and stool studies completed including fecal calprotectin, ova and parasite, Giardia/Cryptosporidium, fecal elastase and spot fecal fat, celiac serologies, and TSH with T4 reflex were all of which were negative/normal.  -----  Interval History 3/6/24:  She was last seen in our clinic April 2023.  Today she reports that overall symptoms remain similar to previous.  She continues to have daily stools with intermittent episodes of urgent loose stools and near incontinence.  On a typical day will have 1-3 formed stools, typically Tennille type IV but with some tendency towards harder stool (Tennille 2) with straining about once per week.  Typically feels fully evacuated.  Rare day without stool.  About once a month she will " have episode of significant diarrhea. Will start with abdominal pain/cramping and within 5 minutes will have urgent loose stools.  She reports that these episodes have caused significant disruption while out of the house and impacting her life.  Sometimes 1 stool and done, sometimes 3 over the course of an hour.  Abdominal discomfort relieved after.  She denies blood in her stool or melena, no nocturnal stools. No constipation/change in bowel pattern preceding that she can appreciate. She feels this is dietary related, has been tracking intake and is noted association with hot peppers.   Given previous diagnosis of fructose malabsorption, she has tried to follow a low fructose diet.  Though did not get formal dietary counseling on this, has handouts but in recent months has not followed quite as closely.  Feels like bloating improved with these changes but no real change in stool pattern.  She has met with a registered dietitian to review elimination diet (? Low FODMAP) which she tried but was not able to sustain due to restrictiveness of diet and lack of guidance.  Has tried gluten-free and lactose-free diets without significant improvement. Recently saw dietitian, but was specialized in DM rather than GI.    She is currently taking Citrucel fiber tablets, trying for 1 tablet twice daily, but often forgets 1 dose.  She is working on increasing fluid intake, drinks 20 ounces of water and occasional carbonated water.    Reports history of reflux with worsening over the past week.  She does not have burning in chest or regurgitation but feels burning up into her throat, hoarseness, and swelling in her throat/tonsils.  She does note that sleeping on an incline seems to help as the symptoms are worse overnight.  She does report that she feels like she is having a hard time swallowing and getting food passed mouth but also that food is getting stuck in her throat.  This is worsened recently.   Has never had an EGD.  She  was previously evaluated for hoarseness by ENT, recommended voice therapy.  She has started omeprazole in the last couple days, plans to do a 14-day course.  Was also tried Pepcid in the past with some relief.    GI review of systems completed and otherwise negative.    Interval History 10/16/24:  MRE in April showed possible mild nonspecific colitis of the sigmoid colon versus under distention.    Had EGD and colonoscopy in July. EGD normal, biopsies of stomach with reactive gastropathy, no h.pylori. Colonoscopy normal without evidence of any inflammation. No biopsies collected.    Today Sondra reports symptoms are variable-few years ago would have persistent diarrhea, now alternating between more hard stools and urgent loose stools with associated cramping.  Has noticed hot peppers might be a trigger, possibly coconut milk.    BM pattern-1-3 stools per day, occasional hard saia or hard logs but more typically soft formed or mushy.  About 1-2 times per week she will very urgent bowel movements with associated abdominal cramping that is relieved after defecation.  This causes her a lot of anxiety, has portable toilet and car in case of need.  No blood in stool, no melena, no stearrhea.  Currently taking 1 Citrucel fiber caplet per day, try to increase to 2 but got more constipated.  Currently drinking about 45 ounces of water per day.    Does endorse some bloating that seems to be triggered by eating.    GERD -some improvement with elevated HOB, 4 inches.Tries not to eat as late.and taking Pepcid at night.  However still does endorse some sore throat and tonsil stones, no longer waking up at night with burning in throat or dry heaves.  Overall swallowing going well, does feel like food and pills sometimes get stuck in tonsils which are large.    Other health updates that she notes include she is started an antianxiety medication, working on finding the right dose.  Restarted birth control for  perimenopause.      ROS:    No fevers or chills  No weight loss  No blurry vision, double vision or change in vision  No sore throat  No lymphadenopathy  No headache, paraesthesias, or weakness in a limb  No shortness of breath or wheezing  No chest pain or pressure  No arthralgias or myalgias  No rashes or skin changes  No odynophagia or dysphagia  No BRBPR, hematochezia, melena  No dysuria, frequency or urgency  No hot/cold intolerance or polyria  No anxiety or depression    PROBLEM LIST  Patient Active Problem List    Diagnosis Date Noted    Pelvic floor dysfunction 03/01/2024     Priority: Medium    Alopecia 09/14/2020     Priority: Medium    Arthralgia 09/14/2020     Priority: Medium    Leucocytoclastic vasculitis (H) 04/22/2020     Priority: Medium    Seasonal affective disorder (H) 10/04/2018     Priority: Medium    Fructose intolerance 10/03/2018     Priority: Medium    CARDIOVASCULAR SCREENING; LDL GOAL LESS THAN 130 10/03/2018     Priority: Medium    Irritable bowel syndrome with diarrhea 10/03/2018     Priority: Medium    Social phobia 10/20/2016     Priority: Medium    Major depression, recurrent, full remission (H) 11/04/2014     Priority: Medium     Noted at PCP visit.      Generalized anxiety disorder 02/07/2012     Priority: Medium    Insomnia 03/01/2011     Priority: Medium    Diarrhea 10/26/2009     Priority: Medium    Blepharitis 05/13/2009     Priority: Medium     Epic      Decreased diffusion capacity of lung 12/09/2008     Priority: Medium    Restrictive lung disease 12/09/2008     Priority: Medium    Myopia 11/14/2008     Priority: Medium     Myopia - LasEk both 2009      Episodic mood disorder (H) 11/15/2004     Priority: Medium     Epic      Allergic rhinitis 04/24/2003     Priority: Medium     Epic         PERTINENT PAST MEDICAL HISTORY:  Past Medical History:   Diagnosis Date    Allergic rhinitis 04/24/2003    Epic    Alopecia 09/14/2020    Arthralgia 09/14/2020    Blepharitis 05/13/2009     Epic    Decreased diffusion capacity of lung 12/09/2008    Diarrhea 10/26/2009    Episodic mood disorder (H) 11/15/2004    Epic    Fructose intolerance 10/03/2018    Generalized anxiety disorder 02/07/2012    Insomnia 03/01/2011    Irritable bowel syndrome with diarrhea 10/03/2018    Leucocytoclastic vasculitis (H) 04/22/2020    Major depression, recurrent, full remission (H) 11/04/2014    Noted at PCP visit.    Menopause 04/2022    Myopia 11/14/2008    Myopia - LasEk both 2009    NO ACTIVE PROBLEMS     Restrictive lung disease 12/09/2008    Seasonal affective disorder (H) 10/04/2018    Social phobia 10/20/2016       PREVIOUS SURGERIES:  Past Surgical History:   Procedure Laterality Date    COLONOSCOPY N/A 7/2/2024    Procedure: Colonoscopy;  Surgeon: Chapis Jarrell MD;  Location: [facility] OR    ESOPHAGOSCOPY, GASTROSCOPY, DUODENOSCOPY (EGD), COMBINED N/A 7/2/2024    Procedure: ESOPHAGOGASTRODUODENOSCOPY, WITH BIOPSY;  Surgeon: Chapis Jarrell MD;  Location: [facility] OR    LASIK Bilateral 2009    NO HISTORY OF SURGERY         PREVIOUS ENDOSCOPY:  Colonoscopy 05/12/2014:          EGD 7/2/24:  Impression:            - Z-line regular, 36 cm from the incisors.                          - Normal stomach. Biopsied.                          - Normal duodenal bulb, first portion of the duodenum                          and second portion of the duodenum.     A. GASTRIC, ANTRUM/INCISURA, BIOPSY:  - Reactive gastropathy with mild chronic inactive gastritis  - No H. pylori-like organisms identified on routine staining  - No intestinal metaplasia identified     B. GASTRIC, BODY, BIOPSY:  - Unremarkable body-type mucosa  - No H. pylori-like organisms identified on routine staining  - No intestinal metaplasia identified    Colonoscopy 7/2/24:  Impression:            - The examined portion of the ileum was normal.                          - Non-bleeding internal hemorrhoids.                          - No specimens collected.      ALLERGIES:  Allergies   Allergen Reactions    Doxycycline Rash    Penicillins Rash       PERTINENT MEDICATIONS:    Current Outpatient Medications:     Calcium Citrate-Vitamin D (CALCIUM + D PO), , Disp: , Rfl:     cetirizine (ZYRTEC) 10 MG tablet, Take 10 mg by mouth daily, Disp: , Rfl:     chlorhexidine (PERIDEX) 0.12 % solution, Swish and spit 15 mLs in mouth 2 times daily Can use for 2 weeks and should take a 2 week break before restarting., Disp: 473 mL, Rfl: 1    cholecalciferol (VITAMIN D3) 5000 units (125 mcg) capsule, Take by mouth daily, Disp: , Rfl:     cycloSPORINE (RESTASIS) 0.05 % ophthalmic emulsion, Place 1 drop into both eyes 2 times daily, Disp: 60 each, Rfl: 5    DULoxetine (CYMBALTA) 20 MG capsule, Take 20 mg by mouth daily, Disp: , Rfl:     hydroxychloroquine (PLAQUENIL) 200 MG tablet, TAKE 1 TABLET (200 MG) BY MOUTH DAILY FOR 90 DAYS ANNUAL PLAQUENIL TOXICITY EYE SCREENING REQUIRED., Disp: 90 tablet, Rfl: 0    ketoconazole (NIZORAL) 2 % external shampoo, Apply topically as needed for itching or irritation Shampoo/scalp once or twice weekly as needed for seborrhea symptoms, Disp: 120 mL, Rfl: 11    meloxicam (MOBIC) 15 MG tablet, Take 1 tablet (15 mg) by mouth daily, Disp: 90 tablet, Rfl: 1    norethindrone-ethinyl estradiol (JUNEL 1.5/30) 1.5-30 MG-MCG tablet, TAKE 1 TABLET BY MOUTH DAILY ACTIVE TABLETS FOR PREVENTION OF MENSES, Disp: 84 tablet, Rfl: 0    olopatadine (PATANOL) 0.1 % ophthalmic solution, PLACE 1 DROP INTO BOTH EYES 2 TIMES DAILY AS NEEDED, Disp: 5 mL, Rfl: 3    Omega-3 Fatty Acids (FISH OIL PO), , Disp: , Rfl:     spironolactone (ALDACTONE) 100 MG tablet, Take 1 tablet by mouth daily at 2 pm, Disp: , Rfl:     triamcinolone (KENALOG) 0.1 % external cream, Apply topically 2 times daily As needed for rash (Patient not taking: Reported on 10/13/2023), Disp: 80 g, Rfl: 3  Ibuprofen 1-2 times per week  Biotin  Align probiotics on and off    SOCIAL HISTORY:  Tobacco: no  Alcohol:  "rare, up to two drinks every few months   Drugs Use: no  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, undergrad program    Social History     Socioeconomic History    Marital status: Single     Spouse name: Not on file    Number of children: Not on file    Years of education: Not on file    Highest education level: Not on file   Occupational History    Not on file   Tobacco Use    Smoking status: Never     Passive exposure: Never    Smokeless tobacco: Never   Vaping Use    Vaping status: Never Used   Substance and Sexual Activity    Alcohol use: Yes     Comment: rare    Drug use: No    Sexual activity: Not Currently     Partners: Male   Other Topics Concern    Parent/sibling w/ CABG, MI or angioplasty before 65F 55M? Not Asked   Social History Narrative    Not on file     Social Determinants of Health     Financial Resource Strain: Not on file   Food Insecurity: Not on file   Transportation Needs: Not on file   Physical Activity: Not on file   Stress: Not on file   Social Connections: Not on file   Interpersonal Safety: Not At Risk (8/16/2023)    Humiliation, Afraid, Rape, and Kick questionnaire     Fear of Current or Ex-Partner: No     Emotionally Abused: No     Physically Abused: No     Sexually Abused: No   Housing Stability: Not on file     FAMILY HISTORY:  FH of CRC: no  FH of IBD: no  No Colon/Panc/Esophageal/other GI CA. No IBD or Celiac Disease. No other Autoimmune, Liver, or Thyroid disease.    Family History   Problem Relation Age of Onset    Diabetes Father     Glaucoma No family hx of     Macular Degeneration No family hx of      Past/family/social history reviewed and no changes    PHYSICAL EXAMINATION:  Constitutional: AAOx3, cooperative, pleasant, not dyspneic/diaphoretic, no acute distress  Vitals reviewed: Ht 1.676 m (5' 6\")   Wt 68 kg (150 lb)   BMI 24.21 kg/m    Wt:   General appearance: Healthy appearing adult, in no acute distress  Eyes: Sclera anicteric, Pupils round and reactive to light  Ears, nose, mouth " and throat: No obvious external lesions of ears and nose, Hearing intact  Neck: Symmetric, No obvious external lesions  Respiratory: Normal respiration, no use of accessory muscles   MSK: Gait normal  Skin: No rashes or jaundice   Psychiatric: Oriented to person, place and time, Appropriate mood and affect.       PERTINENT STUDIES:  Lab on 12/23/2022   Component Date Value Ref Range Status    Sodium 12/23/2022 135 (L)  136 - 145 mmol/L Final    Potassium 12/23/2022 4.4  3.4 - 5.3 mmol/L Final    Chloride 12/23/2022 100  98 - 107 mmol/L Final    Carbon Dioxide (CO2) 12/23/2022 20 (L)  22 - 29 mmol/L Final    Anion Gap 12/23/2022 15  7 - 15 mmol/L Final    Urea Nitrogen 12/23/2022 16.0  6.0 - 20.0 mg/dL Final    Creatinine 12/23/2022 0.94  0.51 - 0.95 mg/dL Final    Calcium 12/23/2022 9.5  8.6 - 10.0 mg/dL Final    Glucose 12/23/2022 160 (H)  70 - 99 mg/dL Final    Alkaline Phosphatase 12/23/2022 71  35 - 104 U/L Final    AST 12/23/2022 27  10 - 35 U/L Final    ALT 12/23/2022 21  10 - 35 U/L Final    Protein Total 12/23/2022 8.4 (H)  6.4 - 8.3 g/dL Final    Albumin 12/23/2022 4.6  3.5 - 5.2 g/dL Final    Bilirubin Total 12/23/2022 0.4  <=1.2 mg/dL Final    GFR Estimate 12/23/2022 75  >60 mL/min/1.73m2 Final    Erythrocyte Sedimentation Rate 12/23/2022 11  0 - 20 mm/hr Final    CRP Inflammation 12/23/2022 20.50 (H)  <5.00 mg/L Final    WBC Count 12/23/2022 9.7  4.0 - 11.0 10e3/uL Final    RBC Count 12/23/2022 5.06  3.80 - 5.20 10e6/uL Final    Hemoglobin 12/23/2022 15.3  11.7 - 15.7 g/dL Final    Hematocrit 12/23/2022 45.9  35.0 - 47.0 % Final    MCV 12/23/2022 91  78 - 100 fL Final    MCH 12/23/2022 30.2  26.5 - 33.0 pg Final    MCHC 12/23/2022 33.3  31.5 - 36.5 g/dL Final    RDW 12/23/2022 11.7  10.0 - 15.0 % Final    Platelet Count 12/23/2022 309  150 - 450 10e3/uL Final    % Neutrophils 12/23/2022 69  % Final    % Lymphocytes 12/23/2022 23  % Final    % Monocytes 12/23/2022 7  % Final    % Eosinophils 12/23/2022 1   % Final    % Basophils 12/23/2022 0  % Final    % Immature Granulocytes 12/23/2022 0  % Final    Absolute Neutrophils 12/23/2022 6.7  1.6 - 8.3 10e3/uL Final    Absolute Lymphocytes 12/23/2022 2.2  0.8 - 5.3 10e3/uL Final    Absolute Monocytes 12/23/2022 0.7  0.0 - 1.3 10e3/uL Final    Absolute Eosinophils 12/23/2022 0.1  0.0 - 0.7 10e3/uL Final    Absolute Basophils 12/23/2022 0.0  0.0 - 0.2 10e3/uL Final    Absolute Immature Granulocytes 12/23/2022 0.0  <=0.4 10e3/uL Final     Fructose breath test 6/2018 ([facility]):

## 2024-10-20 DIAGNOSIS — Z30.011 ORAL CONTRACEPTIVE PRESCRIBED: ICD-10-CM

## 2024-10-21 RX ORDER — NORETHINDRONE ACETATE AND ETHINYL ESTRADIOL 1.5; 3 MG/1; UG/1
TABLET ORAL
Qty: 84 TABLET | Refills: 0 | Status: SHIPPED | OUTPATIENT
Start: 2024-10-21

## 2024-10-29 DIAGNOSIS — Z79.899 HIGH RISK MEDICATION USE: Primary | ICD-10-CM

## 2024-10-30 ENCOUNTER — VIRTUAL VISIT (OUTPATIENT)
Dept: OBGYN | Facility: CLINIC | Age: 49
End: 2024-10-30
Payer: COMMERCIAL

## 2024-10-30 ENCOUNTER — OFFICE VISIT (OUTPATIENT)
Dept: OPHTHALMOLOGY | Facility: CLINIC | Age: 49
End: 2024-10-30
Attending: OPHTHALMOLOGY
Payer: COMMERCIAL

## 2024-10-30 DIAGNOSIS — Z79.899 LONG-TERM USE OF PLAQUENIL: Primary | ICD-10-CM

## 2024-10-30 DIAGNOSIS — H04.123 DRY EYE SYNDROME OF BOTH EYES: ICD-10-CM

## 2024-10-30 DIAGNOSIS — H02.88B MEIBOMIAN GLAND DYSFUNCTION (MGD), BILATERAL, BOTH UPPER AND LOWER LIDS: ICD-10-CM

## 2024-10-30 DIAGNOSIS — Z98.890 HX OF LASIK: ICD-10-CM

## 2024-10-30 DIAGNOSIS — Z30.011 ORAL CONTRACEPTIVE PRESCRIBED: Primary | ICD-10-CM

## 2024-10-30 DIAGNOSIS — Z79.899 HIGH RISK MEDICATION USE: ICD-10-CM

## 2024-10-30 DIAGNOSIS — H02.88A MEIBOMIAN GLAND DYSFUNCTION (MGD), BILATERAL, BOTH UPPER AND LOWER LIDS: ICD-10-CM

## 2024-10-30 DIAGNOSIS — H16.223 KERATOCONJUNCTIVITIS SICCA OF BOTH EYES NOT SPECIFIED AS SJOGREN'S: ICD-10-CM

## 2024-10-30 PROCEDURE — 92082 INTERMEDIATE VISUAL FIELD XM: CPT | Performed by: OPHTHALMOLOGY

## 2024-10-30 PROCEDURE — 92134 CPTRZ OPH DX IMG PST SGM RTA: CPT | Performed by: OPHTHALMOLOGY

## 2024-10-30 PROCEDURE — 99214 OFFICE O/P EST MOD 30 MIN: CPT | Mod: 95 | Performed by: OBSTETRICS & GYNECOLOGY

## 2024-10-30 PROCEDURE — 99213 OFFICE O/P EST LOW 20 MIN: CPT | Performed by: OPHTHALMOLOGY

## 2024-10-30 PROCEDURE — 92014 COMPRE OPH EXAM EST PT 1/>: CPT | Performed by: OPHTHALMOLOGY

## 2024-10-30 PROCEDURE — 92250 FUNDUS PHOTOGRAPHY W/I&R: CPT | Performed by: OPHTHALMOLOGY

## 2024-10-30 PROCEDURE — 99207 FUNDUS AUTOFLUORESCENCE IMAGE (FAF) OU (BOTH EYES): CPT | Performed by: OPHTHALMOLOGY

## 2024-10-30 RX ORDER — CYCLOSPORINE 0.5 MG/ML
1 EMULSION OPHTHALMIC 2 TIMES DAILY
Qty: 60 EACH | Refills: 5 | Status: SHIPPED | OUTPATIENT
Start: 2024-10-30

## 2024-10-30 ASSESSMENT — CONF VISUAL FIELD
OS_INFERIOR_TEMPORAL_RESTRICTION: 0
OS_SUPERIOR_TEMPORAL_RESTRICTION: 0
OS_SUPERIOR_NASAL_RESTRICTION: 0
OD_SUPERIOR_NASAL_RESTRICTION: 0
METHOD: COUNTING FINGERS
OD_SUPERIOR_TEMPORAL_RESTRICTION: 0
OD_INFERIOR_TEMPORAL_RESTRICTION: 0
OD_NORMAL: 1
OD_INFERIOR_NASAL_RESTRICTION: 0
OS_INFERIOR_NASAL_RESTRICTION: 0
OS_NORMAL: 1

## 2024-10-30 ASSESSMENT — CUP TO DISC RATIO
OS_RATIO: 0.4
OD_RATIO: 0.5

## 2024-10-30 ASSESSMENT — VISUAL ACUITY
METHOD: SNELLEN - LINEAR
OS_SC: 20/15
OD_SC+: -1
OS_SC+: -1
OD_SC: 20/15

## 2024-10-30 ASSESSMENT — EXTERNAL EXAM - LEFT EYE: OS_EXAM: NORMAL

## 2024-10-30 ASSESSMENT — TONOMETRY
OS_IOP_MMHG: 10
IOP_METHOD: ICARE
OD_IOP_MMHG: 11

## 2024-10-30 ASSESSMENT — EXTERNAL EXAM - RIGHT EYE: OD_EXAM: NORMAL

## 2024-10-30 NOTE — NURSING NOTE
Chief Complaints and History of Present Illnesses   Patient presents with    Monitor Current High Risk Meds     High risk medication use      Chief Complaint(s) and History of Present Illness(es)       Monitor Current High Risk Meds              Laterality: both eyes    Associated symptoms: dryness.  Negative for eye pain, tearing, flashes and floaters    Treatments tried: eye drops and artificial tears    Pain scale: 0/10    Comments: High risk medication use               Comments    Pt went off Plaquenil for about 2 weeks but will be restarting it within a week.  She is waiting to finish a round of anti biotics.  NVA is worse, DVA seems the same.  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  Pt ran out of her Restasis a month ago and needs a refill.  No flashes/floaters.  Also using AT's and Pataday PRN.    DEVANG Smith October 30, 2024 10:21 AM

## 2024-10-30 NOTE — PROGRESS NOTES
"Telemedicine Visit: The patient's condition can be safely assessed and treated via synchronous audio and visual telemedicine encounter.      Reason for Telemedicine Visit: Patient has requested telehealth visit    Originating Site (Patient Location): Patient's home      Distant Location (provider location):  On-site    Consent:  The patient/guardian has verbally consented to: the potential risks and benefits of telemedicine (video visit) versus in person care; bill my insurance or make self-payment for services provided; and responsibility for payment of non-covered services.     Mode of Communication:  Video Conference via [employer]    As the provider I attest to compliance with applicable laws and regulations related to telemedicine.   Start time: 08:48 am  End time: 09:00 am    Started OCP for perimenopause 2/2022 and was working great. Over last few months has noted increasing brain fog again and more sleep issues (worse).  Also increased body odor. Wondering if these sx are related to her hormone levels? Has been working on her mood already with medications    (Z30.011) Oral contraceptive prescribed  (primary encounter diagnosis)  Comment: was on 30 mcg pill  Plan: norethindrone-ethinyl estradiol (ORTHO-NOVUM)         1-35 MG-MCG tablet        Will increase to a 35 mcg pill in case is related to estrogen changes now that she is older. Discussed this is \"highest\" low dose pill we have and if not better, then should not be related to estrogen levels. Rec she stay on OCP until age 55 since really needing the estrogen replacement and discussed HRT is 1/4 dose of OCP. Should be noting a difference within a month if better. Answered questions.    Kaelyn Álvarez MD    "

## 2024-10-30 NOTE — PROGRESS NOTES
HPI       Monitor Current High Risk Meds    In both eyes.  Associated symptoms include dryness.  Negative for eye pain, tearing, flashes and floaters.  Treatments tried include eye drops and artificial tears.  Pain was noted as 0/10. Additional comments: High risk medication use              Comments    Pt went off Plaquenil for about 2 weeks but will be restarting it within a week.  She is waiting to finish a round of anti biotics.  NVA is worse, DVA seems the same.  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  Pt ran out of her Restasis a month ago and needs a refill.  No flashes/floaters.  Also using AT's and Pataday PRN.    DEVANG Smith October 30, 2024 10:21 AM    Previously on Restasis but ran out          Last edited by Mauricio Griffith MD on 10/30/2024 11:46 AM.         Review of systems for the eyes was negative other than the pertinent positives/negatives listed in the HPI.      Assessment & Plan    HPI:  Sondra Sorensen is a 48 year old female with history of seronegative arthritis, restrictive al disease, IBS, MDD, laser in situ keratomileusis (LASIK) presents for annual plaquenil screening. She has been on and off plaqeunil since 2000. Current dose 200mg but has not taken x 2 weeks. Noticing worsening joint pain as a result. Plans to restart. Previously on Restasis but ran out. Uses AT as needed and pataday prn    POHx: myopic lasik  PMHx: seronegative arthritis, restrictive al disease, IBS, MDD,   Current Medications:   Current Outpatient Medications   Medication Sig Dispense Refill    Calcium Citrate-Vitamin D (CALCIUM + D PO)       cetirizine (ZYRTEC) 10 MG tablet Take 10 mg by mouth daily      chlorhexidine (PERIDEX) 0.12 % solution Swish and spit 15 mLs in mouth 2 times daily Can use for 2 weeks and should take a 2 week break before restarting. 473 mL 1    cycloSPORINE (RESTASIS) 0.05 % ophthalmic emulsion Place 1 drop into both eyes 2 times daily. 60 each 5     JUNEL 1.5/30 1.5-30 MG-MCG tablet TAKE 1 TABLET BY MOUTH DAILY ACTIVE TABLETS FOR PREVENTION OF MENSES 84 tablet 0    ketoconazole (NIZORAL) 2 % external shampoo Apply topically as needed for itching or irritation Shampoo/scalp once or twice weekly as needed for seborrhea symptoms 120 mL 11    norethindrone-ethinyl estradiol (ORTHO-NOVUM) 1-35 MG-MCG tablet Take 1 tablet by mouth daily. Active tablets only for prevention of menses 112 tablet 4    olopatadine (PATANOL) 0.1 % ophthalmic solution PLACE 1 DROP INTO BOTH EYES 2 TIMES DAILY AS NEEDED 5 mL 3    omeprazole (PRILOSEC) 40 MG DR capsule Take 1 capsule (40 mg) by mouth daily. 90 capsule 0    rifaximin (XIFAXAN) 550 MG TABS tablet Take 1 tablet (550 mg) by mouth 3 times daily for 14 days. 42 tablet 0    spironolactone (ALDACTONE) 100 MG tablet Take 1 tablet by mouth daily at 2 pm      cholecalciferol (VITAMIN D3) 5000 units (125 mcg) capsule Take by mouth daily      DULoxetine (CYMBALTA) 20 MG capsule Take 20 mg by mouth daily      hydroxychloroquine (PLAQUENIL) 200 MG tablet TAKE 1 TABLET (200 MG) BY MOUTH DAILY FOR 90 DAYS ANNUAL PLAQUENIL TOXICITY EYE SCREENING REQUIRED. 90 tablet 0    meloxicam (MOBIC) 15 MG tablet Take 1 tablet (15 mg) by mouth daily 90 tablet 1    Omega-3 Fatty Acids (FISH OIL PO)  (Patient not taking: Reported on 10/13/2023)      triamcinolone (KENALOG) 0.1 % external cream Apply topically 2 times daily As needed for rash (Patient not taking: Reported on 10/13/2023) 80 g 3     No current facility-administered medications for this visit.     FHx: denies family history of ocular conditions   PSHx: laser in situ keratomileusis (LASIK) 2005      Current Eye Medications:      Assessment & Plan:  (Z79.899) Long-term use of Plaquenil  (primary encounter diagnosis)  (Z79.899) High risk medication use  Screening of ocular sequelae with Plaquenil use  Normal dilated fundus exam  Started plaquenil 2000  Current dose 200mg  Current weight 68kg  No  concurrent tamoxifen use  Oct, faf, visual field 10-2 normal  Annual screening    (Z98.890) Hx of LASIK  Doing great    (H02.88A,  H02.88B) Meibomian gland dysfunction (MGD), bilateral, both upper and lower lids  (H04.123) Dry eye syndrome of both eyes  (H16.223) Keratoconjunctivitis sicca of both eyes not specified as Sjogren's  Plan: cycloSPORINE (RESTASIS) 0.05 % ophthalmic emulsion refilled  Continue AT  Discussed warm compress and omega-3FA       Return in about 1 year (around 10/30/2025) for Annual Visit-v/t/d/MRx, 10-2 VF, OCT Macula, Fundus Autofluorescence.        Mauricio Griffith MD     Attending Physician Attestation:  Complete documentation of historical and exam elements from today's encounter can be found in the full encounter summary report (not reduplicated in this progress note).  I personally obtained the chief complaint(s) and history of present illness.  I confirmed and edited as necessary the review of systems, past medical/surgical history, family history, social history, and examination findings as documented by others; and I examined the patient myself.  I personally reviewed the relevant tests, images, and reports as documented above.  I formulated and edited as necessary the assessment and plan and discussed the findings and management plan with the patient and family. - Mauricio Griffith MD

## 2024-11-11 ENCOUNTER — LAB (OUTPATIENT)
Dept: LAB | Facility: CLINIC | Age: 49
End: 2024-11-11
Payer: COMMERCIAL

## 2024-11-11 DIAGNOSIS — M31.0 LEUCOCYTOCLASTIC VASCULITIS (H): ICD-10-CM

## 2024-11-11 DIAGNOSIS — M06.09 RHEUMATOID ARTHRITIS, SERONEGATIVE, MULTIPLE SITES (H): ICD-10-CM

## 2024-11-11 LAB
ALBUMIN UR-MCNC: NEGATIVE MG/DL
APPEARANCE UR: CLEAR
BACTERIA #/AREA URNS HPF: ABNORMAL /HPF
BASOPHILS # BLD AUTO: 0 10E3/UL (ref 0–0.2)
BASOPHILS NFR BLD AUTO: 1 %
BILIRUB UR QL STRIP: NEGATIVE
COLOR UR AUTO: YELLOW
EOSINOPHIL # BLD AUTO: 0.1 10E3/UL (ref 0–0.7)
EOSINOPHIL NFR BLD AUTO: 2 %
ERYTHROCYTE [DISTWIDTH] IN BLOOD BY AUTOMATED COUNT: 12.1 % (ref 10–15)
ERYTHROCYTE [SEDIMENTATION RATE] IN BLOOD BY WESTERGREN METHOD: 21 MM/HR (ref 0–20)
GLUCOSE UR STRIP-MCNC: NEGATIVE MG/DL
HCT VFR BLD AUTO: 42 % (ref 35–47)
HGB BLD-MCNC: 13.7 G/DL (ref 11.7–15.7)
HGB UR QL STRIP: NEGATIVE
IMM GRANULOCYTES # BLD: 0 10E3/UL
IMM GRANULOCYTES NFR BLD: 0 %
KETONES UR STRIP-MCNC: NEGATIVE MG/DL
LEUKOCYTE ESTERASE UR QL STRIP: NEGATIVE
LYMPHOCYTES # BLD AUTO: 2.3 10E3/UL (ref 0.8–5.3)
LYMPHOCYTES NFR BLD AUTO: 36 %
MCH RBC QN AUTO: 30.4 PG (ref 26.5–33)
MCHC RBC AUTO-ENTMCNC: 32.6 G/DL (ref 31.5–36.5)
MCV RBC AUTO: 93 FL (ref 78–100)
MONOCYTES # BLD AUTO: 0.7 10E3/UL (ref 0–1.3)
MONOCYTES NFR BLD AUTO: 11 %
NEUTROPHILS # BLD AUTO: 3.2 10E3/UL (ref 1.6–8.3)
NEUTROPHILS NFR BLD AUTO: 51 %
NITRATE UR QL: NEGATIVE
PH UR STRIP: 6 [PH] (ref 5–8)
PLATELET # BLD AUTO: 292 10E3/UL (ref 150–450)
RBC # BLD AUTO: 4.5 10E6/UL (ref 3.8–5.2)
RBC #/AREA URNS AUTO: ABNORMAL /HPF
SP GR UR STRIP: 1.01 (ref 1–1.03)
SQUAMOUS #/AREA URNS AUTO: ABNORMAL /LPF
UROBILINOGEN UR STRIP-ACNC: 0.2 E.U./DL
WBC # BLD AUTO: 6.4 10E3/UL (ref 4–11)
WBC #/AREA URNS AUTO: ABNORMAL /HPF

## 2024-11-11 PROCEDURE — 85025 COMPLETE CBC W/AUTO DIFF WBC: CPT

## 2024-11-11 PROCEDURE — 36415 COLL VENOUS BLD VENIPUNCTURE: CPT

## 2024-11-11 PROCEDURE — 85652 RBC SED RATE AUTOMATED: CPT

## 2024-11-11 PROCEDURE — 86140 C-REACTIVE PROTEIN: CPT

## 2024-11-11 PROCEDURE — 81001 URINALYSIS AUTO W/SCOPE: CPT

## 2024-11-11 PROCEDURE — 82565 ASSAY OF CREATININE: CPT

## 2024-11-11 PROCEDURE — 84450 TRANSFERASE (AST) (SGOT): CPT

## 2024-11-12 LAB
AST SERPL W P-5'-P-CCNC: 23 U/L (ref 0–45)
CREAT SERPL-MCNC: 0.75 MG/DL (ref 0.51–0.95)
CRP SERPL-MCNC: 24.8 MG/L
EGFRCR SERPLBLD CKD-EPI 2021: >90 ML/MIN/1.73M2

## 2024-11-13 ENCOUNTER — OFFICE VISIT (OUTPATIENT)
Dept: RHEUMATOLOGY | Facility: CLINIC | Age: 49
End: 2024-11-13
Attending: STUDENT IN AN ORGANIZED HEALTH CARE EDUCATION/TRAINING PROGRAM
Payer: COMMERCIAL

## 2024-11-13 VITALS
SYSTOLIC BLOOD PRESSURE: 129 MMHG | WEIGHT: 157 LBS | OXYGEN SATURATION: 96 % | DIASTOLIC BLOOD PRESSURE: 84 MMHG | BODY MASS INDEX: 25.34 KG/M2 | HEART RATE: 94 BPM

## 2024-11-13 DIAGNOSIS — M25.59 PAIN IN OTHER JOINT: Primary | ICD-10-CM

## 2024-11-13 PROCEDURE — 99213 OFFICE O/P EST LOW 20 MIN: CPT | Performed by: STUDENT IN AN ORGANIZED HEALTH CARE EDUCATION/TRAINING PROGRAM

## 2024-11-13 RX ORDER — PREDNISONE 5 MG/1
TABLET ORAL
Qty: 84 TABLET | Refills: 0 | Status: SHIPPED | OUTPATIENT
Start: 2024-11-13 | End: 2024-12-25

## 2024-11-13 RX ORDER — ASPIRIN 325 MG
TABLET ORAL
COMMUNITY

## 2024-11-13 ASSESSMENT — ENCOUNTER SYMPTOMS
HEMOPTYSIS: 0
HEMATURIA: 0
SINUS PAIN: 0
WEIGHT LOSS: 0
EYE REDNESS: 0
TINGLING: 0
DYSURIA: 0
ABDOMINAL PAIN: 0
SHORTNESS OF BREATH: 0
FEVER: 0
COUGH: 0
BLOOD IN STOOL: 0

## 2024-11-13 ASSESSMENT — PAIN SCALES - GENERAL: PAINLEVEL_OUTOF10: MILD PAIN (3)

## 2024-11-13 NOTE — NURSING NOTE
Chief Complaint   Patient presents with    RECHECK     follow up patient scheduled     /84 (BP Location: Right arm, Patient Position: Sitting, Cuff Size: Adult Regular)   Pulse 94   Wt 71.2 kg (157 lb)   SpO2 96%   BMI 25.34 kg/m

## 2024-11-13 NOTE — LETTER
2024       RE: Sondra Sorensen  [address]  [place] [address]     Dear Colleague,    Thank you for referring your patient, Sondra Sorensen, to the [facility] RHEUMATOLOGY at [facility]. Please see a copy of my visit note below.    RHEUMATOLOGY OUTPATIENT CLINIC NOTE     Referring Provider: Luli Paul MD    Lab review  MARIAA positive, 1: 80, nuclear speckled  RNP: Negative  Block antibody: Negative  SSA: Negative  SSB: Negative  SCL 70: Negative  Centromere antibody: Negative  dsDNA: Negative  Complement C3/C4: Normal     CCP antibody: Negative  Rheumatoid factor: Negative     ANCA screen: Negative     IgA: 672, elevated, 3/2021  Ig, normal  IgM 44, slightly low     Cryoglobulin: Negative     Imaging review    MR enterography, :  Findings suggestive of mild nonspecific colitis involving the proximal sigmoid colon, though this finding is equivocal and could also be explained by underdistention. Recommend correlation with colonoscopy, as clinically indicated. No evidence of intra-abdominal fluid collection, fistula, or stricture.    MRI right hand, : No evidence of inflammatory arthritis.    X-ray right hand, : No evidence of erosive disease.      Subjective    Visit date 2024    Sondra is a 48 year old female who presents today for follow up.    Since the last visit,    Workup on 2024 showed:    Hemoglobin within normal limits   WBC  within normal limits   Platelets  within normal limits   ESR  elevated  CRP  elevated  AST  within normal limits   Creatinine  within normal limits       URINE STUDIES  Recent Labs   Lab Test 24  1549 24  1304 24  1706 24  0852   UBLD Negative Negative Negative Negative   URINEPH 6.0 6.0 5.5 6.0   PROTEIN Negative Negative 20* Negative   NITRITE Negative Negative Negative Negative   LEUKEST Negative Small* Moderate* Moderate*   RBCU 0-2  0-2 1 None Seen   WBCU 0-5 5-10* 12* 5-10*     Recent Labs   Lab Test 02/27/22  1052   UTPG 0.10     Today Sondra mentioned the following:    She forgot to take hydroxychloroquine since early October then around October 20th, she felt stiffness in her hands more in the morning that improved with activity. She also had stiffness in the right knee, neck as well. She restarted hydroxychloroquine for few days, she remains with stiffness.     She denies joint swelling   She remains with bloating with food.  She is taking Rifaximin and has been helping. She is working with gastroenterology    Review of Systems   Constitutional:  Negative for fever and weight loss.   HENT:  Negative for congestion, hearing loss, nosebleeds and sinus pain.    Eyes:  Negative for redness.   Respiratory:  Negative for cough, hemoptysis and shortness of breath.    Cardiovascular:  Negative for chest pain.   Gastrointestinal:  Negative for abdominal pain and blood in stool.   Genitourinary:  Negative for dysuria and hematuria.   Musculoskeletal:  Negative for joint pain.   Skin:  Negative for rash.   Neurological:  Negative for tingling.       Current Medications   Hydroxychloroquine 200 mg daily, last eye exam 10/2024.     Past Medical history   Past Medical History:   Diagnosis Date     Allergic rhinitis 04/24/2003    Epic     Alopecia 09/14/2020     Arthralgia 09/14/2020     Blepharitis 05/13/2009    Epic     Decreased diffusion capacity of lung 12/09/2008     Diarrhea 10/26/2009     Episodic mood disorder (H) 11/15/2004    Epic     Fructose intolerance 10/03/2018     Generalized anxiety disorder 02/07/2012     Insomnia 03/01/2011     Irritable bowel syndrome with diarrhea 10/03/2018     Leucocytoclastic vasculitis (H) 04/22/2020     Major depression, recurrent, full remission (H) 11/04/2014    Noted at PCP visit.     Menopause 04/2022     Myopia 11/14/2008    Myopia - LasEk both 2009     NO ACTIVE PROBLEMS      Restrictive lung disease  12/09/2008     Seasonal affective disorder (H) 10/04/2018     Social phobia 10/20/2016       Objective  /84 (BP Location: Right arm, Patient Position: Sitting, Cuff Size: Adult Regular)   Pulse 94   Wt 71.2 kg (157 lb)   SpO2 96%   BMI 25.34 kg/m        PHYSICAL EXAMINATION  Physical Exam  HENT:      Head: Normocephalic.      Mouth/Throat:      Mouth: Mucous membranes are moist.   Eyes:      Conjunctiva/sclera: Conjunctivae normal.   Pulmonary:      Effort: Pulmonary effort is normal.      Breath sounds: Normal breath sounds.   Musculoskeletal:         General: No swelling or tenderness.      Comments: No noticeable swelling or significant tenderness in the hands (MCP, PIP, DIP), wrists, elbows, knees, ankles, feet. Range of motion in the shoulders and hips are within accepted range for age.   MARLENI test negative bilaterally     Skin:     Findings: No rash.   Neurological:      Mental Status: She is alert.       Assessment & Plan     # Concern for seronegative inflammatory arthritis  # History of cutaneous vasculitis  # chronic diarrhea  # history of elevated IgA     Sondra has been seen by several rheumatologists previously and has been following with rheumatology for concern for seronegative rheumatoid arthritis on hydroxychloroquine.  Other symptoms: History of hair loss [diagnosed as alopecia areata per dermatology, treated with steroid injection], lower extremity cutaneous vasculitis [biopsy showing leukocytoclastic vasculitis], arthralgia, dry eyes (uses eye drops twice daily), no oral dryness.   No personal or family history of skin psoriasis, no history of inflammatory eye disease or inflammatory bowel disease.  Comorbid conditions: Biopsy-proven leukocytoclastic vasculitis [2020], chronic diarrhea.    Treatment included: Hydroxychloroquine     # Concern for seronegative inflammatory arthritis    She had worsening joint symptoms when she was off hydroxychloroquine for about 1 month, she has  restarted hydroxychloroquine and is too early to say.  She is tolerating hydroxychloroquine and there are no signs of toxicity.    There is no evidence of active arthritis today on exam.    She remains with ESR and C-reactive protein, there was no evidence of inflammatory bowel disease based on gastroenterology evaluation.    Therefore we discussed initiation of prednisone trial to see if her inflammatory markers respond and if she noticed significant improvement in her joints.  If indeed she had improvement in her joints and normalization of inflammatory markers then additional therapy with sulfasalazine or increasing hydroxychloroquine can be pursued.    Plan:  - Prednisone trial, prednisone 15 mg daily for 2 weeks then prednisone 10 mg for 2 weeks then prednisone 5 mg for 2 weeks then stop  - Repeat ESR and CRP in 4 weeks  - MyChart update with symptoms in 4 weeks  - Continue hydroxychloroquine 200 mg daily for now  - Continue yearly eye exam while on hydroxychloroquine    # Elevated CRP  Continues to be elevated, unclear etiology.  Could be related to subtle inflammation in the joints.  Management as above    # History of cutaneous vasculitis   Biopsy-proven leukocytoclastic vasculitis however DIF was not done.    History of elevated IgA.    ANCA serology MPO/OH-3 negative and cryoglobulin negative  No evidence of recurrence.    Plan:  - If rash recurs then we will consult dermatology for biopsy evaluate for IgA deposition     # Chronic diarrhea, concern for inflammatory bowel disease  Follows with gastroenterology, underwent endoscopy with biopsy without evidence of inflammatory bowel disease  MR enterography negative for inflammatory bowel disease    Plan:  - Gastroenterology follow up     # Restrictive lung disease, diagnosed outside   No new symptoms  Plan:  Continue to watch for now.      # Screening for chronic infections  2021  QuantiFERON gold: Negative  Hepatitis B surface antigen: Negative  Hepatitis B  core antibody: Negative  Hepatitis C antibody: Negative  HIV antibody: Negative    # Longitudinal care  The longitudinal plan of care for the diagnosis(es)/condition(s) as documented were addressed during this visit. Due to the added complexity in care, I will continue to support Sondra in the subsequent management and with ongoing continuity of care.    Review of the result(s) of each unique test - as HPI  Ordering of each unique test  Prescription drug management          Return in about 4 months (around 3/13/2025) for Follow up.    Luli Paul MD  [facility] RHEUMATOLOGY      Again, thank you for allowing me to participate in the care of your patient.      Sincerely,    Luli Paul MD

## 2024-11-13 NOTE — PROGRESS NOTES
RHEUMATOLOGY OUTPATIENT CLINIC NOTE     Referring Provider: Luli Paul MD    Lab review  MARIAA positive, 1: 80, nuclear speckled  RNP: Negative  Block antibody: Negative  SSA: Negative  SSB: Negative  SCL 70: Negative  Centromere antibody: Negative  dsDNA: Negative  Complement C3/C4: Normal     CCP antibody: Negative  Rheumatoid factor: Negative     ANCA screen: Negative     IgA: 672, elevated, 3/2021  Ig, normal  IgM 44, slightly low     Cryoglobulin: Negative     Imaging review    MR enterography, :  Findings suggestive of mild nonspecific colitis involving the proximal sigmoid colon, though this finding is equivocal and could also be explained by underdistention. Recommend correlation with colonoscopy, as clinically indicated. No evidence of intra-abdominal fluid collection, fistula, or stricture.    MRI right hand, : No evidence of inflammatory arthritis.    X-ray right hand, : No evidence of erosive disease.      Subjective     Visit date 2024    Sondra is a 48 year old female who presents today for follow up.    Since the last visit,    Workup on 2024 showed:    Hemoglobin within normal limits   WBC  within normal limits   Platelets  within normal limits   ESR  elevated  CRP  elevated  AST  within normal limits   Creatinine  within normal limits       URINE STUDIES  Recent Labs   Lab Test 24  1549 24  1304 24  1706 24  0852   UBLD Negative Negative Negative Negative   URINEPH 6.0 6.0 5.5 6.0   PROTEIN Negative Negative 20* Negative   NITRITE Negative Negative Negative Negative   LEUKEST Negative Small* Moderate* Moderate*   RBCU 0-2 0-2 1 None Seen   WBCU 0-5 5-10* 12* 5-10*     Recent Labs   Lab Test 22  1052   UTPG 0.10     Today Sondra mentioned the following:    She forgot to take hydroxychloroquine since early October then around , she felt stiffness in her hands more in the morning that improved with activity. She  also had stiffness in the right knee, neck as well. She restarted hydroxychloroquine for few days, she remains with stiffness.     She denies joint swelling   She remains with bloating with food.  She is taking Rifaximin and has been helping. She is working with gastroenterology    Review of Systems   Constitutional:  Negative for fever and weight loss.   HENT:  Negative for congestion, hearing loss, nosebleeds and sinus pain.    Eyes:  Negative for redness.   Respiratory:  Negative for cough, hemoptysis and shortness of breath.    Cardiovascular:  Negative for chest pain.   Gastrointestinal:  Negative for abdominal pain and blood in stool.   Genitourinary:  Negative for dysuria and hematuria.   Musculoskeletal:  Negative for joint pain.   Skin:  Negative for rash.   Neurological:  Negative for tingling.       Current Medications   Hydroxychloroquine 200 mg daily, last eye exam 10/2024.     Past Medical history   Past Medical History:   Diagnosis Date    Allergic rhinitis 04/24/2003    Epic    Alopecia 09/14/2020    Arthralgia 09/14/2020    Blepharitis 05/13/2009    Epic    Decreased diffusion capacity of lung 12/09/2008    Diarrhea 10/26/2009    Episodic mood disorder (H) 11/15/2004    Epic    Fructose intolerance 10/03/2018    Generalized anxiety disorder 02/07/2012    Insomnia 03/01/2011    Irritable bowel syndrome with diarrhea 10/03/2018    Leucocytoclastic vasculitis (H) 04/22/2020    Major depression, recurrent, full remission (H) 11/04/2014    Noted at PCP visit.    Menopause 04/2022    Myopia 11/14/2008    Myopia - LasEk both 2009    NO ACTIVE PROBLEMS     Restrictive lung disease 12/09/2008    Seasonal affective disorder (H) 10/04/2018    Social phobia 10/20/2016       Objective   /84 (BP Location: Right arm, Patient Position: Sitting, Cuff Size: Adult Regular)   Pulse 94   Wt 71.2 kg (157 lb)   SpO2 96%   BMI 25.34 kg/m        PHYSICAL EXAMINATION  Physical Exam  HENT:      Head: Normocephalic.       Mouth/Throat:      Mouth: Mucous membranes are moist.   Eyes:      Conjunctiva/sclera: Conjunctivae normal.   Pulmonary:      Effort: Pulmonary effort is normal.      Breath sounds: Normal breath sounds.   Musculoskeletal:         General: No swelling or tenderness.      Comments: No noticeable swelling or significant tenderness in the hands (MCP, PIP, DIP), wrists, elbows, knees, ankles, feet. Range of motion in the shoulders and hips are within accepted range for age.   MARLENI test negative bilaterally     Skin:     Findings: No rash.   Neurological:      Mental Status: She is alert.       Assessment & Plan      # Concern for seronegative inflammatory arthritis  # History of cutaneous vasculitis  # chronic diarrhea  # history of elevated IgA     Sondra has been seen by several rheumatologists previously and has been following with rheumatology for concern for seronegative rheumatoid arthritis on hydroxychloroquine.  Other symptoms: History of hair loss [diagnosed as alopecia areata per dermatology, treated with steroid injection], lower extremity cutaneous vasculitis [biopsy showing leukocytoclastic vasculitis], arthralgia, dry eyes (uses eye drops twice daily), no oral dryness.   No personal or family history of skin psoriasis, no history of inflammatory eye disease or inflammatory bowel disease.  Comorbid conditions: Biopsy-proven leukocytoclastic vasculitis [2020], chronic diarrhea.    Treatment included: Hydroxychloroquine     # Concern for seronegative inflammatory arthritis    She had worsening joint symptoms when she was off hydroxychloroquine for about 1 month, she has restarted hydroxychloroquine and is too early to say.  She is tolerating hydroxychloroquine and there are no signs of toxicity.    There is no evidence of active arthritis today on exam.    She remains with ESR and C-reactive protein, there was no evidence of inflammatory bowel disease based on gastroenterology evaluation.    Therefore  we discussed initiation of prednisone trial to see if her inflammatory markers respond and if she noticed significant improvement in her joints.  If indeed she had improvement in her joints and normalization of inflammatory markers then additional therapy with sulfasalazine or increasing hydroxychloroquine can be pursued.    Plan:  - Prednisone trial, prednisone 15 mg daily for 2 weeks then prednisone 10 mg for 2 weeks then prednisone 5 mg for 2 weeks then stop  - Repeat ESR and CRP in 4 weeks  - MyChart update with symptoms in 4 weeks  - Continue hydroxychloroquine 200 mg daily for now  - Continue yearly eye exam while on hydroxychloroquine    # Elevated CRP  Continues to be elevated, unclear etiology.  Could be related to subtle inflammation in the joints.  Management as above    # History of cutaneous vasculitis   Biopsy-proven leukocytoclastic vasculitis however DIF was not done.    History of elevated IgA.    ANCA serology MPO/OR-3 negative and cryoglobulin negative  No evidence of recurrence.    Plan:  - If rash recurs then we will consult dermatology for biopsy evaluate for IgA deposition     # Chronic diarrhea, concern for inflammatory bowel disease  Follows with gastroenterology, underwent endoscopy with biopsy without evidence of inflammatory bowel disease  MR enterography negative for inflammatory bowel disease    Plan:  - Gastroenterology follow up     # Restrictive lung disease, diagnosed outside   No new symptoms  Plan:  Continue to watch for now.      # Screening for chronic infections  2021  QuantiFERON gold: Negative  Hepatitis B surface antigen: Negative  Hepatitis B core antibody: Negative  Hepatitis C antibody: Negative  HIV antibody: Negative    # Longitudinal care  The longitudinal plan of care for the diagnosis(es)/condition(s) as documented were addressed during this visit. Due to the added complexity in care, I will continue to support Sondra in the subsequent management and with ongoing  continuity of care.    Review of the result(s) of each unique test - as HPI  Ordering of each unique test  Prescription drug management          Return in about 4 months (around 3/13/2025) for Follow up.    Luli Paul MD  [facility] RHEUMATOLOGY

## 2024-11-13 NOTE — PATIENT INSTRUCTIONS
Our plan for today is:    Send me Minoo follow up in 1 month     - Tests:    ESR and CRP in 1 month     - Medications:    Prednisone trial, Start prednisone 15 mg daily for 2 weeks with taper by 5 mg every 2 weeks till off   Continue Hydroxychloroquine 200 mg daily     We will discuss additional changes either with increase in hydroxychloroquine or addition of sulfasalazine based on response to prednisone

## 2024-11-30 ENCOUNTER — HEALTH MAINTENANCE LETTER (OUTPATIENT)
Age: 49
End: 2024-11-30

## 2024-12-05 ENCOUNTER — VIRTUAL VISIT (OUTPATIENT)
Dept: GASTROENTEROLOGY | Facility: CLINIC | Age: 49
End: 2024-12-05
Attending: DIETITIAN, REGISTERED
Payer: COMMERCIAL

## 2024-12-05 VITALS — HEIGHT: 66 IN | BODY MASS INDEX: 24.27 KG/M2 | WEIGHT: 151 LBS

## 2024-12-05 DIAGNOSIS — K58.0 IRRITABLE BOWEL SYNDROME WITH DIARRHEA: ICD-10-CM

## 2024-12-05 DIAGNOSIS — E74.10 FRUCTOSE INTOLERANCE: ICD-10-CM

## 2024-12-05 ASSESSMENT — PAIN SCALES - GENERAL: PAINLEVEL_OUTOF10: NO PAIN (0)

## 2024-12-05 NOTE — PROGRESS NOTES
Virtual Visit Details    Type of service:  Video Visit     Originating Location (pt. Location): Home  Distant Location (provider location):  Off-site  Platform used for Video Visit: [facility] Outpatient Medical Nutrition Therapy      Time Spent:  65 minutes  Session Type:  Initial   Referring Physician:  Mirna Roth PA-C  Reason for RD Visit:   intermittent loose stools     Nutrition Assessment:  Patient is a 49 year old female with history that includes fructose malabsorption, diarrhea, RAYMOND, depression, IBS with diarrhea, pelvic floor dysfunction. She has hx of intermittent urgent loose stools, intermittent issues with incontinent stools and variable consistencies. Sometimes harder stools and intermittently loose, urgent, incontinence.    She stated that she completed a course of rifaximin, taking an increased dose of omeprazole and within a couple of days of taking the antibiotic, she felt much better and symptoms resolved. She has also been taking 1/2 daily dose of Citrucel daily. She is now having solid/formed stools. No longer having urgent loose/diarrhea stools. She is still having 3-4 BMs per day but they are formed and not urgent. In the past, she stated that she could have urgent loose/diarrhea but then she would them have bouts where she felt constipated. She stated that years ago at [facility], she was dx with fructose malabsorption. She doesn't follow the diet strictly because she loves fruit, but she is careful to eat less of the higher fructose options but will still have some and prefers not to follow it too strictly especially since she has been feeling better. She likes carbonated water and will drink 1-3 cans per day. She doesn't like plain water much so drinks 1-2 glasses per day. If she eats too late at night and lies down then she will have issues with GERD at night. She is careful about eating tomato sauce too late at night as well in case it will cause GERD. She hasn't  "specifically noticed tomato sauce causing issues but is careful. For awhile she felt peppers were an issues. She hasn't retried them so uncertain if they are an issues. She also noticed that some spicy peppers and spicy foods caused her urgent loose stools. See diet recall below.     Patient Active Problem List   Diagnosis    Fructose intolerance    CARDIOVASCULAR SCREENING; LDL GOAL LESS THAN 130    Irritable bowel syndrome with diarrhea    Seasonal affective disorder (H)    Allergic rhinitis    Alopecia    Arthralgia    Blepharitis    Decreased diffusion capacity of lung    Diarrhea    Insomnia    Episodic mood disorder (H)    Generalized anxiety disorder    Leucocytoclastic vasculitis (H)    Major depression, recurrent, full remission (H)    Myopia    Restrictive lung disease    Social phobia    Pelvic floor dysfunction     Height:   Ht Readings from Last 1 Encounters:   12/05/24 1.676 m (5' 6\")   ]  Weight:  Wt Readings from Last 10 Encounters:   12/05/24 68.5 kg (151 lb)   11/13/24 71.2 kg (157 lb)   10/16/24 68 kg (150 lb)   07/02/24 68 kg (150 lb)   06/25/24 69.9 kg (154 lb)   04/02/24 69.9 kg (154 lb)   10/13/23 66.7 kg (147 lb)   07/18/23 68.9 kg (152 lb)   04/11/23 63.5 kg (140 lb)   01/30/23 67.6 kg (149 lb)        BMI: Estimated body mass index is 24.37 kg/m  as calculated from the following:    Height as of this encounter: 1.676 m (5' 6\").    Weight as of this encounter: 68.5 kg (151 lb).    Diet Recall:  (some usual/recent meals/snacks/beverages): eats 3 meals and 1-2 snacks per day. On more of a schedule when in the office. Sometimes skips lunch. If eats too late and lies down than has an issue with GERD.   Meal Food    Breakfast Greek yogurt, berries and homemade granola or once in awhile eggs but not lately   Lunch Leftovers or ham and cheese on whole grain sandwich sometimes toasted or sometimes sliced cucumbers, tomatoes too or a little hackett/spicy sauce or occas goes out to lunch   Dinner " Sometimes take out or pizza or pasta/cheese ravioli with chip (likes tomato sauce but if too late than avoid to prevents GERD) or homemade chili or stew or meat, veggie (one pot) or tacos   Snacks Mid AM: granola bar or apple and 2:30-3 PM: chips or pretzels or hummus and carrots or apple   Beverages 1-3 cans Carbonated water, 1-2 glasses water, occas caffeine free regular soda with sugar not HFCS occas wendy in the morning. Mostly cut out caffeine it keeps her awake at night   Alcohol Intake Infrequent 1-2 drinks per month     Frequency of eating/taking out meals: 1-2x/week and doesn't eat the whole meal so has leftovers     Labs:    Last Comprehensive Metabolic Panel:  Sodium   Date Value Ref Range Status   06/23/2023 140 136 - 145 mmol/L Final   03/30/2021 137 133 - 144 mmol/L Final     Potassium   Date Value Ref Range Status   06/23/2023 4.2 3.4 - 5.3 mmol/L Final   02/27/2022 4.6 3.4 - 5.3 mmol/L Final   03/30/2021 4.8 3.4 - 5.3 mmol/L Final     Chloride   Date Value Ref Range Status   06/23/2023 105 98 - 107 mmol/L Final   02/27/2022 104 94 - 109 mmol/L Final   03/30/2021 98 94 - 109 mmol/L Final     Carbon Dioxide   Date Value Ref Range Status   03/30/2021 26 20 - 32 mmol/L Final     Carbon Dioxide (CO2)   Date Value Ref Range Status   06/23/2023 21 (L) 22 - 29 mmol/L Final   02/27/2022 24 20 - 32 mmol/L Final     Anion Gap   Date Value Ref Range Status   06/23/2023 14 7 - 15 mmol/L Final   02/27/2022 5 3 - 14 mmol/L Final   12/05/2019 7 3 - 14 mmol/L Final     Glucose   Date Value Ref Range Status   06/23/2023 94 70 - 99 mg/dL Final   02/27/2022 90 70 - 99 mg/dL Final   03/30/2021 92 70 - 124 mg/dL Final     Urea Nitrogen   Date Value Ref Range Status   06/23/2023 16.6 6.0 - 20.0 mg/dL Final   02/27/2022 17 7 - 30 mg/dL Final   03/30/2021 17 5 - 24 mg/dL Final     Creatinine   Date Value Ref Range Status   11/11/2024 0.75 0.51 - 0.95 mg/dL Final   03/30/2021 0.87 0.55 - 1.02 mg/dL Final     GFR Estimate    Date Value Ref Range Status   11/11/2024 >90 >60 mL/min/1.73m2 Final     Comment:     eGFR calculated using 2021 CKD-EPI equation.   03/30/2021 >60 >60 ml/min/1.73m2 Final     Calcium   Date Value Ref Range Status   06/23/2023 9.1 8.6 - 10.0 mg/dL Final   03/30/2021 8.9 8.2 - 10.1 mg/dL Final     CBC RESULTS:   Recent Labs   Lab Test 11/11/24  1546   WBC 6.4   RBC 4.50   HGB 13.7   HCT 42.0   MCV 93   MCH 30.4   MCHC 32.6   RDW 12.1        Pertinent Medications/vitamin and mineral supplements:      Current Outpatient Medications   Medication Sig Dispense Refill    Biotin 5000 MCG CAPS       Calcium Citrate-Vitamin D (CALCIUM + D PO)       cetirizine (ZYRTEC) 10 MG tablet Take 10 mg by mouth daily      chlorhexidine (PERIDEX) 0.12 % solution Swish and spit 15 mLs in mouth 2 times daily Can use for 2 weeks and should take a 2 week break before restarting. 473 mL 1    cyanocobalamin (VITAMIN B-12) 1000 MCG sublingual tablet       cycloSPORINE (RESTASIS) 0.05 % ophthalmic emulsion Place 1 drop into both eyes 2 times daily. 60 each 5    hydroxychloroquine (PLAQUENIL) 200 MG tablet TAKE 1 TABLET (200 MG) BY MOUTH DAILY FOR 90 DAYS ANNUAL PLAQUENIL TOXICITY EYE SCREENING REQUIRED. 90 tablet 0    JUNEL 1.5/30 1.5-30 MG-MCG tablet TAKE 1 TABLET BY MOUTH DAILY ACTIVE TABLETS FOR PREVENTION OF MENSES 84 tablet 0    ketoconazole (NIZORAL) 2 % external shampoo Apply topically as needed for itching or irritation Shampoo/scalp once or twice weekly as needed for seborrhea symptoms 120 mL 11    methylcellulose (CITRUCEL) 500 MG TABS tablet       norethindrone-ethinyl estradiol (ORTHO-NOVUM) 1-35 MG-MCG tablet Take 1 tablet by mouth daily. Active tablets only for prevention of menses 112 tablet 4    olopatadine (PATANOL) 0.1 % ophthalmic solution PLACE 1 DROP INTO BOTH EYES 2 TIMES DAILY AS NEEDED 5 mL 3    Omega-3 Fatty Acids (FISH OIL PO) Take by mouth.      omeprazole (PRILOSEC) 40 MG DR capsule Take 1 capsule (40 mg) by  mouth daily. 90 capsule 0    Pediatric Multivit-Minerals (GUMMY VITAMINS & MINERALS) chewable tablet       predniSONE (DELTASONE) 5 MG tablet Take 3 tablets (15 mg) by mouth daily for 14 days, THEN 2 tablets (10 mg) daily for 14 days, THEN 1 tablet (5 mg) daily for 14 days. 84 tablet 0    spironolactone (ALDACTONE) 100 MG tablet Take 100 mg by mouth daily at 2 pm. 1/2 tablet daily      triamcinolone (KENALOG) 0.1 % external cream Apply topically 2 times daily As needed for rash (Patient not taking: Reported on 10/13/2023) 80 g 3     No current facility-administered medications for this visit.     Food Allergies:  NKFA   Physical Activity:  walking but less with colder weather. Previously was doing yoga but got off track with this. Doesn't like going to gym. Plans to get  new bike in the spring.    MALNUTRITION:  % Weight Loss:  no significant weight loss  % Intake:  No decreased intake noted  Subcutaneous Fat Loss:  None observed  Muscle Loss:  None observed  Fluid Retention:  None noted    Malnutrition Diagnosis: Patient does not meet two of the above criteria necessary for diagnosing malnutrition    Nutrition Prescription: Nutrition Education     Nutrition Intervention      Provided diet education for IBS, intermittent loose stools, fructose malabsorption, reflux tips and intermittent constipation issues per her report.    Answered patient's questions. Patient verbalized understanding of education provided. See Goals below.     Goals:    1. Eating multiple smaller meals such as 4-6 smaller meals per day versus fewer larger meals per day as this may be better tolerated.    --continue to eat less of the higher fructose foods as you are currently doing.    2. Aim for eating meals and snacks/these smaller meals on a regular and consistent eating pattern.    3. Gradually increase more fiber in your diet. General goal is to consume 25 grams to 30 grams per day.    --Fiber is found in fruits, vegetables, nuts, seeds and  whole grain starches.    --When increasing fiber in diet, do so gradually and do not eat too much fiber all of a sudden and also try not to load up on a lot of fiber all at one meal.    --To increase fiber:   - choose whole grain/100% whole grain grain and bread.   - eat fruits and vegetables with skins.   - eat whole fresh or frozen vegetables and fruit not juices.   - can add some beans or peas into soup, salads, stews.   - can eat nuts as a snack or along with a meal.   - continue adding some tyler seeds into your yogurt in the morning.    4.  Especially incorporate some good sources of soluble fiber in your diet such as oats,  potato, brussels sprouts, barley, tyler seeds, flaxseeds, avocado, legumes/beans, figs, apples, banana, pear, berries, almonds.    5.  Eat 2 kiwi fruits per day (some research shows that eating 2 kiwi fruits per day may be helpful for constipation and may help give more complete bowel movements).    6. Keep daily food and beverage journals and track any symptoms you experience.     --If you don't want to keep daily journals, you could just do 24-hour recall journals on the days when having symptoms. After tracking for a while, then take a look back at the journals to see if you noticed any specific foods, patterns of eating, or anything in common that may be causing symptoms.    --And/or you could keep a monthly calendar and write in anything your doing (such as eating 2 kiwi/day or no carbonated or other thing you're trying and then track your bowel movements (consistencies and how many). This will help give you a visual of the month with any dietary changes you make to see if it's helping or not.    7. Consistently drink at least 48 oz-64 oz non carbonated water per day.    --you can add slices of fruit or vegetables to help naturally flavor water,    --you can use fruit, vegetables infused water to make some ice cube to add to your water so it will naturally flavor your water from the ice  cubes.    8. Avoid carbonated water/carbonated beverage for at least 2 weeks or more to see if this is helpful and causes less symptoms.    9.  Continue taking citrucel as you are currently doing.    10.  Here are some potential food and beverage triggers for reflux include caffeine, carbonated, alcoholic beverages, chocolate, peppermint, spearmint, black pepper, heavily spiced foods, high fats, eating large portions/large meals.    11. Continue to wait at least 3 hours after eating before lying down and going to sleep.    Nutrition Monitoring and Evaluation: Will monitor adherence to nutrition recommendations at future RD visits.     Further Medical Nutrition Therapy:  Follow-up prn. She will plan to follow up as needed.    Patient was encouraged to contact RD with any further questions.    Natalia Osman MS, RD, LD

## 2024-12-05 NOTE — NURSING NOTE
Current patient location: [address] [place] [address] [address]  [place] [address]    Is the patient currently in the state of [place]? YES    Visit mode:VIDEO    If the visit is dropped, the patient can be reconnected by:VIDEO VISIT: Text to cell phone:   Telephone Information:   Mobile [phone]       Will anyone else be joining the visit? NO  (If patient encounters technical issues they should call [phone]    Are changes needed to the allergy or medication list? No    Are refills needed on medications prescribed by this physician? NO    Rooming Documentation:  Patient will complete questionnaire(s) in [facility]    Reason for visit: Video Visit (Consult)    Seble WALTON

## 2024-12-05 NOTE — LETTER
12/5/2024      Sondra Sorensen  [address]  [place] [address]      Dear Colleague,    Thank you for referring your patient, Sondra Sorensen, to the [facility] [place]. Please see a copy of my visit note below.    Virtual Visit Details    Type of service:  Video Visit     Originating Location (pt. Location): Home  Distant Location (provider location):  Off-site  Platform used for Video Visit: [facility] Outpatient Medical Nutrition Therapy      Time Spent:  65 minutes  Session Type:  Initial   Referring Physician:  Mirna Roth PA-C  Reason for RD Visit:   intermittent loose stools     Nutrition Assessment:  Patient is a 49 year old female with history that includes fructose malabsorption, diarrhea, RAYMOND, depression, IBS with diarrhea, pelvic floor dysfunction. She has hx of intermittent urgent loose stools, intermittent issues with incontinent stools and variable consistencies. Sometimes harder stools and intermittently loose, urgent, incontinence.    She stated that she completed a course of rifaximin, taking an increased dose of omeprazole and within a couple of days of taking the antibiotic, she felt much better and symptoms resolved. She has also been taking 1/2 daily dose of Citrucel daily. She is now having solid/formed stools. No longer having urgent loose/diarrhea stools. She is still having 3-4 BMs per day but they are formed and not urgent. In the past, she stated that she could have urgent loose/diarrhea but then she would them have bouts where she felt constipated. She stated that years ago at [facility], she was dx with fructose malabsorption. She doesn't follow the diet strictly because she loves fruit, but she is careful to eat less of the higher fructose options but will still have some and prefers not to follow it too strictly especially since she has been feeling better. She likes carbonated water and will drink 1-3 cans per day. She  "doesn't like plain water much so drinks 1-2 glasses per day. If she eats too late at night and lies down then she will have issues with GERD at night. She is careful about eating tomato sauce too late at night as well in case it will cause GERD. She hasn't specifically noticed tomato sauce causing issues but is careful. For awhile she felt peppers were an issues. She hasn't retried them so uncertain if they are an issues. She also noticed that some spicy peppers and spicy foods caused her urgent loose stools. See diet recall below.     Patient Active Problem List   Diagnosis     Fructose intolerance     CARDIOVASCULAR SCREENING; LDL GOAL LESS THAN 130     Irritable bowel syndrome with diarrhea     Seasonal affective disorder (H)     Allergic rhinitis     Alopecia     Arthralgia     Blepharitis     Decreased diffusion capacity of lung     Diarrhea     Insomnia     Episodic mood disorder (H)     Generalized anxiety disorder     Leucocytoclastic vasculitis (H)     Major depression, recurrent, full remission (H)     Myopia     Restrictive lung disease     Social phobia     Pelvic floor dysfunction     Height:   Ht Readings from Last 1 Encounters:   12/05/24 1.676 m (5' 6\")   ]  Weight:  Wt Readings from Last 10 Encounters:   12/05/24 68.5 kg (151 lb)   11/13/24 71.2 kg (157 lb)   10/16/24 68 kg (150 lb)   07/02/24 68 kg (150 lb)   06/25/24 69.9 kg (154 lb)   04/02/24 69.9 kg (154 lb)   10/13/23 66.7 kg (147 lb)   07/18/23 68.9 kg (152 lb)   04/11/23 63.5 kg (140 lb)   01/30/23 67.6 kg (149 lb)        BMI: Estimated body mass index is 24.37 kg/m  as calculated from the following:    Height as of this encounter: 1.676 m (5' 6\").    Weight as of this encounter: 68.5 kg (151 lb).    Diet Recall:  (some usual/recent meals/snacks/beverages): eats 3 meals and 1-2 snacks per day. On more of a schedule when in the office. Sometimes skips lunch. If eats too late and lies down than has an issue with GERD.   Meal Food    Breakfast " Greek yogurt, berries and homemade granola or once in awhile eggs but not lately   Lunch Leftovers or ham and cheese on whole grain sandwich sometimes toasted or sometimes sliced cucumbers, tomatoes too or a little hackett/spicy sauce or occas goes out to lunch   Dinner Sometimes take out or pizza or pasta/cheese ravioli with chip (likes tomato sauce but if too late than avoid to prevents GERD) or homemade chili or stew or meat, veggie (one pot) or tacos   Snacks Mid AM: granola bar or apple and 2:30-3 PM: chips or pretzels or hummus and carrots or apple   Beverages 1-3 cans Carbonated water, 1-2 glasses water, occas caffeine free regular soda with sugar not HFCS occas wendy in the morning. Mostly cut out caffeine it keeps her awake at night   Alcohol Intake Infrequent 1-2 drinks per month     Frequency of eating/taking out meals: 1-2x/week and doesn't eat the whole meal so has leftovers     Labs:    Last Comprehensive Metabolic Panel:  Sodium   Date Value Ref Range Status   06/23/2023 140 136 - 145 mmol/L Final   03/30/2021 137 133 - 144 mmol/L Final     Potassium   Date Value Ref Range Status   06/23/2023 4.2 3.4 - 5.3 mmol/L Final   02/27/2022 4.6 3.4 - 5.3 mmol/L Final   03/30/2021 4.8 3.4 - 5.3 mmol/L Final     Chloride   Date Value Ref Range Status   06/23/2023 105 98 - 107 mmol/L Final   02/27/2022 104 94 - 109 mmol/L Final   03/30/2021 98 94 - 109 mmol/L Final     Carbon Dioxide   Date Value Ref Range Status   03/30/2021 26 20 - 32 mmol/L Final     Carbon Dioxide (CO2)   Date Value Ref Range Status   06/23/2023 21 (L) 22 - 29 mmol/L Final   02/27/2022 24 20 - 32 mmol/L Final     Anion Gap   Date Value Ref Range Status   06/23/2023 14 7 - 15 mmol/L Final   02/27/2022 5 3 - 14 mmol/L Final   12/05/2019 7 3 - 14 mmol/L Final     Glucose   Date Value Ref Range Status   06/23/2023 94 70 - 99 mg/dL Final   02/27/2022 90 70 - 99 mg/dL Final   03/30/2021 92 70 - 124 mg/dL Final     Urea Nitrogen   Date Value Ref  Range Status   06/23/2023 16.6 6.0 - 20.0 mg/dL Final   02/27/2022 17 7 - 30 mg/dL Final   03/30/2021 17 5 - 24 mg/dL Final     Creatinine   Date Value Ref Range Status   11/11/2024 0.75 0.51 - 0.95 mg/dL Final   03/30/2021 0.87 0.55 - 1.02 mg/dL Final     GFR Estimate   Date Value Ref Range Status   11/11/2024 >90 >60 mL/min/1.73m2 Final     Comment:     eGFR calculated using 2021 CKD-EPI equation.   03/30/2021 >60 >60 ml/min/1.73m2 Final     Calcium   Date Value Ref Range Status   06/23/2023 9.1 8.6 - 10.0 mg/dL Final   03/30/2021 8.9 8.2 - 10.1 mg/dL Final     CBC RESULTS:   Recent Labs   Lab Test 11/11/24  1546   WBC 6.4   RBC 4.50   HGB 13.7   HCT 42.0   MCV 93   MCH 30.4   MCHC 32.6   RDW 12.1        Pertinent Medications/vitamin and mineral supplements:      Current Outpatient Medications   Medication Sig Dispense Refill     Biotin 5000 MCG CAPS        Calcium Citrate-Vitamin D (CALCIUM + D PO)        cetirizine (ZYRTEC) 10 MG tablet Take 10 mg by mouth daily       chlorhexidine (PERIDEX) 0.12 % solution Swish and spit 15 mLs in mouth 2 times daily Can use for 2 weeks and should take a 2 week break before restarting. 473 mL 1     cyanocobalamin (VITAMIN B-12) 1000 MCG sublingual tablet        cycloSPORINE (RESTASIS) 0.05 % ophthalmic emulsion Place 1 drop into both eyes 2 times daily. 60 each 5     hydroxychloroquine (PLAQUENIL) 200 MG tablet TAKE 1 TABLET (200 MG) BY MOUTH DAILY FOR 90 DAYS ANNUAL PLAQUENIL TOXICITY EYE SCREENING REQUIRED. 90 tablet 0     JUNEL 1.5/30 1.5-30 MG-MCG tablet TAKE 1 TABLET BY MOUTH DAILY ACTIVE TABLETS FOR PREVENTION OF MENSES 84 tablet 0     ketoconazole (NIZORAL) 2 % external shampoo Apply topically as needed for itching or irritation Shampoo/scalp once or twice weekly as needed for seborrhea symptoms 120 mL 11     methylcellulose (CITRUCEL) 500 MG TABS tablet        norethindrone-ethinyl estradiol (ORTHO-NOVUM) 1-35 MG-MCG tablet Take 1 tablet by mouth daily. Active  tablets only for prevention of menses 112 tablet 4     olopatadine (PATANOL) 0.1 % ophthalmic solution PLACE 1 DROP INTO BOTH EYES 2 TIMES DAILY AS NEEDED 5 mL 3     Omega-3 Fatty Acids (FISH OIL PO) Take by mouth.       omeprazole (PRILOSEC) 40 MG DR capsule Take 1 capsule (40 mg) by mouth daily. 90 capsule 0     Pediatric Multivit-Minerals (GUMMY VITAMINS & MINERALS) chewable tablet        predniSONE (DELTASONE) 5 MG tablet Take 3 tablets (15 mg) by mouth daily for 14 days, THEN 2 tablets (10 mg) daily for 14 days, THEN 1 tablet (5 mg) daily for 14 days. 84 tablet 0     spironolactone (ALDACTONE) 100 MG tablet Take 100 mg by mouth daily at 2 pm. 1/2 tablet daily       triamcinolone (KENALOG) 0.1 % external cream Apply topically 2 times daily As needed for rash (Patient not taking: Reported on 10/13/2023) 80 g 3     No current facility-administered medications for this visit.     Food Allergies:  NKFA   Physical Activity:  walking but less with colder weather. Previously was doing yoga but got off track with this. Doesn't like going to gym. Plans to get  new bike in the spring.    MALNUTRITION:  % Weight Loss:  no significant weight loss  % Intake:  No decreased intake noted  Subcutaneous Fat Loss:  None observed  Muscle Loss:  None observed  Fluid Retention:  None noted    Malnutrition Diagnosis: Patient does not meet two of the above criteria necessary for diagnosing malnutrition    Nutrition Prescription: Nutrition Education     Nutrition Intervention      Provided diet education for IBS, intermittent loose stools, fructose malabsorption, reflux tips and intermittent constipation issues per her report.    Answered patient's questions. Patient verbalized understanding of education provided. See Goals below.     Goals:    1. Eating multiple smaller meals such as 4-6 smaller meals per day versus fewer larger meals per day as this may be better tolerated.    --continue to eat less of the higher fructose foods as you  are currently doing.    2. Aim for eating meals and snacks/these smaller meals on a regular and consistent eating pattern.    3. Gradually increase more fiber in your diet. General goal is to consume 25 grams to 30 grams per day.    --Fiber is found in fruits, vegetables, nuts, seeds and whole grain starches.    --When increasing fiber in diet, do so gradually and do not eat too much fiber all of a sudden and also try not to load up on a lot of fiber all at one meal.    --To increase fiber:   - choose whole grain/100% whole grain grain and bread.   - eat fruits and vegetables with skins.   - eat whole fresh or frozen vegetables and fruit not juices.   - can add some beans or peas into soup, salads, stews.   - can eat nuts as a snack or along with a meal.   - continue adding some tyler seeds into your yogurt in the morning.    4.  Especially incorporate some good sources of soluble fiber in your diet such as oats,  potato, brussels sprouts, barley, tyler seeds, flaxseeds, avocado, legumes/beans, figs, apples, banana, pear, berries, almonds.    5.  Eat 2 kiwi fruits per day (some research shows that eating 2 kiwi fruits per day may be helpful for constipation and may help give more complete bowel movements).    6. Keep daily food and beverage journals and track any symptoms you experience.     --If you don't want to keep daily journals, you could just do 24-hour recall journals on the days when having symptoms. After tracking for a while, then take a look back at the journals to see if you noticed any specific foods, patterns of eating, or anything in common that may be causing symptoms.    --And/or you could keep a monthly calendar and write in anything your doing (such as eating 2 kiwi/day or no carbonated or other thing you're trying and then track your bowel movements (consistencies and how many). This will help give you a visual of the month with any dietary changes you make to see if it's helping or not.    7.  Consistently drink at least 48 oz-64 oz non carbonated water per day.    --you can add slices of fruit or vegetables to help naturally flavor water,    --you can use fruit, vegetables infused water to make some ice cube to add to your water so it will naturally flavor your water from the ice cubes.    8. Avoid carbonated water/carbonated beverage for at least 2 weeks or more to see if this is helpful and causes less symptoms.    9.  Continue taking citrucel as you are currently doing.    10.  Here are some potential food and beverage triggers for reflux include caffeine, carbonated, alcoholic beverages, chocolate, peppermint, spearmint, black pepper, heavily spiced foods, high fats, eating large portions/large meals.    11. Continue to wait at least 3 hours after eating before lying down and going to sleep.    Nutrition Monitoring and Evaluation: Will monitor adherence to nutrition recommendations at future RD visits.     Further Medical Nutrition Therapy:  Follow-up prn. She will plan to follow up as needed.    Patient was encouraged to contact RD with any further questions.    Natalia Osman MS, RD, LD        Again, thank you for allowing me to participate in the care of your patient.        Sincerely,        Natalia Osman RD

## 2024-12-05 NOTE — PATIENT INSTRUCTIONS
It was nice meeting you today. Below are the nutrition recommendations we discussed at your visit.    Please let me know if you have any additional questions.    Nutrition Recommendations    1. Eating multiple smaller meals such as 4-6 smaller meals per day versus fewer larger meals per day as this may be better tolerated.    --continue to eat less of the higher fructose foods as you are currently doing.    2. Aim for eating meals and snacks/these smaller meals on a regular and consistent eating pattern.    3. Gradually increase more fiber in your diet. General goal is to consume 25 grams to 30 grams per day.    --Fiber is found in fruits, vegetables, nuts, seeds and whole grain starches.    --When increasing fiber in diet, do so gradually and do not eat too much fiber all of a sudden and also try not to load up on a lot of fiber all at one meal.    --To increase fiber:   - choose whole grain/100% whole grain grain and bread.   - eat fruits and vegetables with skins.   - eat whole fresh or frozen vegetables and fruit not juices.   - can add some beans or peas into soup, salads, stews.   - can eat nuts as a snack or along with a meal.   - continue adding some tyler seeds into your yogurt in the morning.    4.  Especially incorporate some good sources of soluble fiber in your diet such as oats,  potato, brussels sprouts, barley, tyler seeds, flaxseeds, avocado, legumes/beans, figs, apples, banana, pear, berries, almonds.    5.  Eat 2 kiwi fruits per day (some research shows that eating 2 kiwi fruits per day may be helpful for constipation and may help give more complete bowel movements).    6. Keep daily food and beverage journals and track any symptoms you experience.     --If you don't want to keep daily journals, you could just do 24-hour recall journals on the days when having symptoms. After tracking for a while, then take a look back at the journals to see if you noticed any specific foods, patterns of eating, or  anything in common that may be causing symptoms.    --And/or you could keep a monthly calendar and write in anything your doing (such as eating 2 kiwi/day or no carbonated or other thing you're trying and then track your bowel movements (consistencies and how many). This will help give you a visual of the month with any dietary changes you make to see if it's helping or not.    7. Consistently drink at least 48 oz-64 oz non carbonated water per day.    --you can add slices of fruit or vegetables to help naturally flavor water,    --you can use fruit, vegetables infused water to make some ice cube to add to your water so it will naturally flavor your water from the ice cubes.    8. Avoid carbonated water/carbonated beverage for at least 2 weeks or more to see if this is helpful and causes less symptoms.    9.  Continue taking citrucel as you are currently doing.    10.  Here are some potential food and beverage triggers for reflux include caffeine, carbonated, alcoholic beverages, chocolate, peppermint, spearmint, black pepper, heavily spiced foods, high fats, eating large portions/large meals.    11. Continue to wait at least 3 hours after eating before lying down and going to sleep.    Can follow up as needed.    If you would like to schedule a follow up appointment, please call [phone].    Thank you,    Natalia Osman, MS, RD, LD

## 2024-12-10 ENCOUNTER — LAB (OUTPATIENT)
Dept: LAB | Facility: CLINIC | Age: 49
End: 2024-12-10
Payer: COMMERCIAL

## 2024-12-10 DIAGNOSIS — M25.59 PAIN IN OTHER JOINT: ICD-10-CM

## 2024-12-10 LAB
CRP SERPL-MCNC: 16 MG/L
ERYTHROCYTE [SEDIMENTATION RATE] IN BLOOD BY WESTERGREN METHOD: 15 MM/HR (ref 0–20)

## 2024-12-10 PROCEDURE — 86140 C-REACTIVE PROTEIN: CPT

## 2024-12-10 PROCEDURE — 36415 COLL VENOUS BLD VENIPUNCTURE: CPT

## 2024-12-10 PROCEDURE — 85652 RBC SED RATE AUTOMATED: CPT

## 2024-12-17 ENCOUNTER — NURSE TRIAGE (OUTPATIENT)
Dept: FAMILY MEDICINE | Facility: CLINIC | Age: 49
End: 2024-12-17
Payer: COMMERCIAL

## 2024-12-17 NOTE — TELEPHONE ENCOUNTER
Nurse Triage SBAR    Is this a 2nd Level Triage? NO    Situation: Patient called unsure if she should be seen or can she just do home care    Background: Patient states she is menopausal. Have not have this before, have not changed detergent, was on prednisone for a few weeks (ended a couple of days ago), did change birth controls to up the dose for menopausal.     Assessment: Mild itching/irritation of the left inner labia with clear discharge---patient states more so of an uncomfortable feeling, does not really notice it throughout the day. It has been a week now since it started. Skin is sensitive in the genital area, no other symptoms.     Protocol Recommended Disposition: See in Office Within 2 Weeks    Recommendation: Informed patient should be assessed/evaluated in person within 2 weeks. Offer appt, but patient states she will call either her OB/GYN to schedule or she will call [facility] (her primary care).       Does the patient meet one of the following criteria for ADS visit consideration? 16+ years old, with an [facility] PCP     TIP  Providers, please consider if this condition is appropriate for management at one of our Acute and Diagnostic Services sites.     If patient is a good candidate, please use dotphrase <dot>triageresponse and select Refer to ADS to document.    Reason for Disposition   Vaginal dryness or itching and nearing menopause or after menopause    Additional Information   Negative: Followed a genital area injury (e.g., vagina, vulva)   Negative: Vaginal bleeding is main symptom   Negative: Vaginal discharge is main symptom   Negative: Pain or burning with passing urine (urination) is main symptom   Negative: Menstrual cramps is main symptom   Negative: Abdomen pain is main symptom   Negative: Pubic lice suspected   Negative: Itching or rash of female genital area (e.g., labia, vagina, vulva)   Negative: Pregnant and labor suspected   Negative: Patient sounds very sick or weak to the  "triager   Negative: SEVERE vaginal pain and not improved 2 hours after pain medicine   Negative: Genital area looks infected (e.g., draining sore, spreading redness) and fever   Negative: Something is hanging out of the vagina and can't easily be pushed back inside   Negative: MILD-MODERATE vaginal pain and present > 24 hours (Exception: Chronic pain.)   Negative: Genital area looks infected (e.g., draining sore, spreading redness)   Negative: Rash with painful tiny water blisters   Negative: MODERATE-SEVERE itching (i.e., interferes with school, work, or sleep)   Negative: Rash (e.g., redness, tiny bumps, sore) of genital area and present > 24 hours   Negative: Tender lump (swelling or \"ball\") at vaginal opening   Negative: Symptoms of a yeast infection (i.e., itchy, white discharge, not bad smelling) and not improved > 3 days following Care Advice   Negative: Vaginal itching and not improved > 3 days following Care Advice   Negative: Vaginal odor (bad smell) not improved > 3 days following Care Advice   Negative: Patient is worried they have a sexually transmitted infection (STI)   Negative: Patient wants to be seen   Negative: Feels like something inside is falling out of vagina (e.g., pressure, heaviness, fullness)   Negative: Sounds like a life-threatening emergency to the triager    Answer Assessment - Initial Assessment Questions  1. SYMPTOM: \"What's the main symptom you're concerned about?\" (e.g., pain, itching, dryness)      Clear discharge, a little itching/dryness   2. LOCATION: \"Where is the itching located?\" (e.g., inside/outside, left/right)      Left inner labia   3. ONSET: \"When did the itching  start?\"      A week ago   4. PAIN: \"Is there any pain?\" If Yes, ask: \"How bad is it?\" (Scale: 1-10; mild, moderate, severe)      No   5. ITCHING: \"Is there any itching?\" If Yes, ask: \"How bad is it?\" (Scale: 1-10; mild, moderate, severe)      Mild itching (more of an uncomfortable feeling)  6. CAUSE: \"What do " "you think is causing the discharge?\" \"Have you had the same problem before?\" \"What happened then?\"      Have not have this before, have not changed detergent, was on prednisone for a few weeks (ended a couple of days ago), did change birth controls to up the dose for menopausal   7. OTHER SYMPTOMS: \"Do you have any other symptoms?\" (e.g., fever, itching, vaginal bleeding, pain with urination, injury to genital area, vaginal foreign body)      Skin is sensitive in the genital area   8. PREGNANCY: \"Is there any chance you are pregnant?\" \"When was your last menstrual period?\"       No    Protocols used: Vaginal Symptoms-A-NAVEED Núñez MSN, RN   [facility]     "

## 2025-01-28 ENCOUNTER — TELEPHONE (OUTPATIENT)
Dept: OBGYN | Facility: CLINIC | Age: 50
End: 2025-01-28
Payer: COMMERCIAL

## 2025-01-28 NOTE — TELEPHONE ENCOUNTER
Prior Authorization Retail Medication Request    Medication/Dose: drospirenone (SLYND) 4 MG TABS tablet  Diagnosis and ICD code (if different than what is on RX):    New/renewal/insurance change PA/secondary ins. PA:  Previously Tried and Failed:    Rationale:      Insurance   Primary:   Insurance ID:      Secondary (if applicable):  Insurance ID:      Pharmacy Information (if different than what is on RX)  Name:  [id number] [place] [address]   Phone:  [phone]   Fax:  [phone]     Clinic Information  Preferred routing pool for dept communication: [facility]

## 2025-02-12 ENCOUNTER — MYC MEDICAL ADVICE (OUTPATIENT)
Dept: OBGYN | Facility: CLINIC | Age: 50
End: 2025-02-12
Payer: COMMERCIAL

## 2025-02-19 ENCOUNTER — TELEPHONE (OUTPATIENT)
Dept: OBGYN | Facility: CLINIC | Age: 50
End: 2025-02-19
Payer: COMMERCIAL

## 2025-03-03 ENCOUNTER — VIRTUAL VISIT (OUTPATIENT)
Dept: GASTROENTEROLOGY | Facility: CLINIC | Age: 50
End: 2025-03-03
Attending: DIETITIAN, REGISTERED
Payer: COMMERCIAL

## 2025-03-03 VITALS — WEIGHT: 155 LBS | BODY MASS INDEX: 24.33 KG/M2 | HEIGHT: 67 IN

## 2025-03-03 DIAGNOSIS — K58.0 IRRITABLE BOWEL SYNDROME WITH DIARRHEA: Primary | ICD-10-CM

## 2025-03-03 DIAGNOSIS — R19.7 DIARRHEA, UNSPECIFIED TYPE: ICD-10-CM

## 2025-03-03 DIAGNOSIS — K21.9 GASTROESOPHAGEAL REFLUX DISEASE, UNSPECIFIED WHETHER ESOPHAGITIS PRESENT: ICD-10-CM

## 2025-03-03 DIAGNOSIS — R13.10 DYSPHAGIA, UNSPECIFIED TYPE: ICD-10-CM

## 2025-03-03 DIAGNOSIS — E74.10 FRUCTOSE INTOLERANCE: ICD-10-CM

## 2025-03-03 PROCEDURE — 98007 SYNCH AUDIO-VIDEO EST HI 40: CPT | Performed by: DIETITIAN, REGISTERED

## 2025-03-03 PROCEDURE — 1126F AMNT PAIN NOTED NONE PRSNT: CPT | Mod: 95 | Performed by: DIETITIAN, REGISTERED

## 2025-03-03 ASSESSMENT — PAIN SCALES - GENERAL: PAINLEVEL_OUTOF10: NO PAIN (0)

## 2025-03-03 NOTE — LETTER
3/3/2025      Snodra Sorensen  [address]  [place] [address]      Dear Colleague,    Thank you for referring your patient, Sondra Sorensen, to the [facility] [place]. Please see a copy of my visit note below.    Virtual Visit Details    Type of service:  Video Visit     Originating Location (pt. Location): Home    Distant Location (provider location):  On-site  Platform used for Video Visit: [facility]    GI CLINIC VISIT    CC/REFERRING MD:  Jesus Jacobson  REASON FOR CONSULTATION: diarrhea    ASSESSMENT/PLAN:  #Chronic Diarrhea  #Fructose malabsorption  #IBS-D/M  #Bloating  #Abdominal Cramping  Patient with 20+ year history of intermittent loose stools, worsening in past 5 to 10 years with episodes of urgent, explosive diarrhea with intermittent incontinence.  More recently she has had variable stool consistency with harder stools and intermittent loose urgent stools with associated cramping, this causes her a lot of anxiety and impacting her day-to-day life. 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  She reports that previous lactose intolerance testing was negative.   Fecal calprotectin, ova and parasite, Giardia/Cryptosporidium, fecal elastase and spot fecal fat, celiac serologies, and TSH with T4 reflex were all of which were negative/normal.  MR enterography in April showed possible mild nonspecific colitis of the proximal sigmoid though possibly related to under distention.  Colonoscopy following this was unremarkable without any evidence of inflammation making IBD unlikely.    She has met with our dietitian regarding low fructose diet and dietary measures to decrease bloating.  She should continue low fructose diet.      She had a good response to rifaximin x 14 days, suspect that symptoms are largely driven by IBS given this improvement.  Given symptoms are starting to increase again we discussed options and will retrial 14-day course  of rifaximin.  If bloating persists after this, recommend she try enteric-coated peppermint such as IBgard versus Iberogast with meals.    She should continue dietary measures to decrease bloating and reduce carbonation.  Plan to continue Citrucel at 1 caplet per day and work on increasing hydration.    Plan:  -- Continue Citrucel fiber caplets -continue with 1 caplet per day for now  -- Work on hydration, aim for 64oz daily  -- Continue low fructose diet  -- Restart rifaximin 3 times daily x 14 days  ------- if bloating persists try enteric-coated peppermint/Ibgard/Iberogast with meals    #GERD  #Dysphagia  Patient reports long history of atypical GERD symptoms of hoarseness, throat pain, cough.  Recent EGD was normal.  Previously evaluated by ENT.  Overall symptoms improved following 12-week course of 40 mg omeprazole.  Feels she is at a good spot currently.  Can use Pepcid as needed.  If symptoms increase, can restart omeprazole.  -- Repeat omeprazole in the future if needed, can use OTC 2-week course of 20 mg.  If this is not helpful, repeat 40 mg x 12 weeks.  -- Pepcid (famotidine) at bedtime as needed    Colorectal cancer screening: Colonoscopy 7/2024 normal, repeat in 10 years.    RTC 3-4 months    Thank you for this consultation.  It was a pleasure to participate in the care of this patient; please contact us with any further questions.     40 Minutes was spent on the date of the encounter during chart review, history and exam, documentation, and further activities as noted       Mirna Roth PA-C, RD  Division of Gastroenterology, Hepatology & Nutrition  [facility]        HPI  Sondra Sorensen is a 47 year old female with a history of alopecia, arthralgia, leukoclastic vasculitis, restrictive lung disease, allergic rhinitis, MDD, RAYMOND, IBS-D, fructose malabsorption who presents for evaluation of diarrhea. They are new to the [facility] clinic and this is my first encounter with the patient.      She  "has been following with rheumatology for seronegative RA with response HCQ, recent hand MRI without signs of inflammatory arthritis. She also has history of biopsy proven leukoclastic disease. She was followed previously at outside facility diagnosed \"connective tisusue disease with sicca symtpoms.\" Pertinent rheumatologic labs include MARIAA 1:80 in a speckled pattern, negative/ normal RF, ANCA, Broad serologic work-up that showed negative/normal C3/C4/SSA/SSB/smith/RNP/CCP/dsDNA. She has had persistently elevated CRP.    She reports 20+ year history GI symptoms, namely urgent loose stools., worse in the last 5 to 10 years. Colonoscopy 2014 with normal appearing ileum and colon. On biopsy no evidence of microscopic colitis or other histopathologic abnormality.  She notes previous work-up with [facility], we do not have these records.  She did have positive fructose breath test 6/2018 with [facility].  She also reports negative lactose intolerance testing.    Labs and stool studies completed including fecal calprotectin, ova and parasite, Giardia/Cryptosporidium, fecal elastase and spot fecal fat, celiac serologies, and TSH with T4 reflex were all of which were negative/normal.    Interval History 3/6/24:  She was last seen in our clinic April 2023.  Today she reports that overall symptoms remain similar to previous.  She continues to have daily stools with intermittent episodes of urgent loose stools and near incontinence.  On a typical day will have 1-3 formed stools, typically Gentry type IV but with some tendency towards harder stool (Gentry 2) with straining about once per week.  Typically feels fully evacuated.  Rare day without stool.  About once a month she will have episode of significant diarrhea. Will start with abdominal pain/cramping and within 5 minutes will have urgent loose stools.  She reports that these episodes have caused significant disruption while out of the house and impacting her life.  Sometimes 1 " stool and done, sometimes 3 over the course of an hour.  Abdominal discomfort relieved after.  She denies blood in her stool or melena, no nocturnal stools. No constipation/change in bowel pattern preceding that she can appreciate. She feels this is dietary related, has been tracking intake and is noted association with hot peppers.   Given previous diagnosis of fructose malabsorption, she has tried to follow a low fructose diet.  Though did not get formal dietary counseling on this, has handouts but in recent months has not followed quite as closely.  Feels like bloating improved with these changes but no real change in stool pattern.  She has met with a registered dietitian to review elimination diet (? Low FODMAP) which she tried but was not able to sustain due to restrictiveness of diet and lack of guidance.  Has tried gluten-free and lactose-free diets without significant improvement. Recently saw dietitian, but was specialized in DM rather than GI.    She is currently taking Citrucel fiber tablets, trying for 1 tablet twice daily, but often forgets 1 dose.  She is working on increasing fluid intake, drinks 20 ounces of water and occasional carbonated water.    Reports history of reflux with worsening over the past week.  She does not have burning in chest or regurgitation but feels burning up into her throat, hoarseness, and swelling in her throat/tonsils.  She does note that sleeping on an incline seems to help as the symptoms are worse overnight.  She does report that she feels like she is having a hard time swallowing and getting food passed mouth but also that food is getting stuck in her throat.  This is worsened recently.   Has never had an EGD.  She was previously evaluated for hoarseness by ENT, recommended voice therapy.  She has started omeprazole in the last couple days, plans to do a 14-day course.  Was also tried Pepcid in the past with some relief.    GI review of systems completed and otherwise  negative.    Interval History 10/16/24:  MRE in April showed possible mild nonspecific colitis of the sigmoid colon versus under distention.    Had EGD and colonoscopy in July. EGD normal, biopsies of stomach with reactive gastropathy, no h.pylori. Colonoscopy normal without evidence of any inflammation. No biopsies collected.    Today Sondra reports symptoms are variable-few years ago would have persistent diarrhea, now alternating between more hard stools and urgent loose stools with associated cramping.  Has noticed hot peppers might be a trigger, possibly coconut milk.    BM pattern-1-3 stools per day, occasional hard asia or hard logs but more typically soft formed or mushy.  About 1-2 times per week she will very urgent bowel movements with associated abdominal cramping that is relieved after defecation.  This causes her a lot of anxiety, has portable toilet and car in case of need.  No blood in stool, no melena, no stearrhea.  Currently taking 1 Citrucel fiber caplet per day, try to increase to 2 but got more constipated.  Currently drinking about 45 ounces of water per day.    Does endorse some bloating that seems to be triggered by eating.    GERD -some improvement with elevated HOB, 4 inches.Tries not to eat as late.and taking Pepcid at night.  However still does endorse some sore throat and tonsil stones, no longer waking up at night with burning in throat or dry heaves.  Overall swallowing going well, does feel like food and pills sometimes get stuck in tonsils which are large.    Other health updates that she notes include she is started an antianxiety medication, working on finding the right dose.  Restarted birth control for perimenopause.    Interval History 3/3/25:  Reports overall feeling improved compared to October.  Finish rifaximin and reports that she noticed a difference within a week however does note that symptoms seem to be creeping back, increased gas and bloating recently, also  increased cramping.  Continues to have less urgency and diarrhea.    Bloating seems to be progressive throughout the day and worsened after eating.    Cramping sometimes improved after bowel movements, sometimes not.    Currently having 2-3 formed stools per day.  Occasional straining but more rare.  Overall feeling evacuated following stool but not always.  Continues on 1 caplets of Citrucel daily.  Trying to drink more water.    Met with dietitian after last visit regarding low fructose diet.  Also suggested decreasing carbonation which she is working on however difficulty replacing this with other foods.    Reflux has improved following 12-week course of 40 mg omeprazole daily.  Rare intermittent symptoms.  No dysphagia or feeling like foods getting stuck.  Tonsil stones have overall improved.      ROS:    No fevers or chills  No weight loss  No blurry vision, double vision or change in vision  No sore throat  No lymphadenopathy  No headache, paraesthesias, or weakness in a limb  No shortness of breath or wheezing  No chest pain or pressure  No arthralgias or myalgias  No rashes or skin changes  No odynophagia or dysphagia  No BRBPR, hematochezia, melena  No dysuria, frequency or urgency  No hot/cold intolerance or polyria  No anxiety or depression    PROBLEM LIST  Patient Active Problem List    Diagnosis Date Noted     Pelvic floor dysfunction 03/01/2024     Priority: Medium     Alopecia 09/14/2020     Priority: Medium     Arthralgia 09/14/2020     Priority: Medium     Leucocytoclastic vasculitis (H) 04/22/2020     Priority: Medium     Seasonal affective disorder 10/04/2018     Priority: Medium     Fructose intolerance 10/03/2018     Priority: Medium     CARDIOVASCULAR SCREENING; LDL GOAL LESS THAN 130 10/03/2018     Priority: Medium     Irritable bowel syndrome with diarrhea 10/03/2018     Priority: Medium     Social phobia 10/20/2016     Priority: Medium     Major depression, recurrent, full remission  11/04/2014     Priority: Medium     Noted at PCP visit.       Generalized anxiety disorder 02/07/2012     Priority: Medium     Insomnia 03/01/2011     Priority: Medium     Diarrhea 10/26/2009     Priority: Medium     Blepharitis 05/13/2009     Priority: Medium     Epic       Decreased diffusion capacity of lung 12/09/2008     Priority: Medium     Restrictive lung disease 12/09/2008     Priority: Medium     Myopia 11/14/2008     Priority: Medium     Myopia - LasEk both 2009       Episodic mood disorder 11/15/2004     Priority: Medium     Epic       Allergic rhinitis 04/24/2003     Priority: Medium     Epic         PERTINENT PAST MEDICAL HISTORY:  Past Medical History:   Diagnosis Date     Allergic rhinitis 04/24/2003    Epic     Alopecia 09/14/2020     Arthralgia 09/14/2020     Blepharitis 05/13/2009    Epic     Decreased diffusion capacity of lung 12/09/2008     Diarrhea 10/26/2009     Episodic mood disorder 11/15/2004    Epic     Fructose intolerance 10/03/2018     Generalized anxiety disorder 02/07/2012     Insomnia 03/01/2011     Irritable bowel syndrome with diarrhea 10/03/2018     Leucocytoclastic vasculitis (H) 04/22/2020     Major depression, recurrent, full remission 11/04/2014    Noted at PCP visit.     Menopause 04/2022     Myopia 11/14/2008    Myopia - LasEk both 2009     NO ACTIVE PROBLEMS      Restrictive lung disease 12/09/2008     Seasonal affective disorder 10/04/2018     Social phobia 10/20/2016       PREVIOUS SURGERIES:  Past Surgical History:   Procedure Laterality Date     COLONOSCOPY N/A 7/2/2024    Procedure: Colonoscopy;  Surgeon: Chapis Jarrell MD;  Location: [facility] [place] OR     ESOPHAGOSCOPY, GASTROSCOPY, DUODENOSCOPY (EGD), COMBINED N/A 7/2/2024    Procedure: ESOPHAGOGASTRODUODENOSCOPY, WITH BIOPSY;  Surgeon: Chapis Jarrell MD;  [facility]: [facility] [place] [facility]     [facility]     NO HISTORY OF SURGERY         PREVIOUS ENDOSCOPY:  Colonoscopy 05/12/2014:          EGD 7/2/24:  Impression:            -  Z-line regular, 36 cm from the incisors.                          - Normal stomach. Biopsied.                          - Normal duodenal bulb, first portion of the duodenum                          and second portion of the duodenum.     A. GASTRIC, ANTRUM/INCISURA, BIOPSY:  - Reactive gastropathy with mild chronic inactive gastritis  - No H. pylori-like organisms identified on routine staining  - No intestinal metaplasia identified     B. GASTRIC, BODY, BIOPSY:  - Unremarkable body-type mucosa  - No H. pylori-like organisms identified on routine staining  - No intestinal metaplasia identified    Colonoscopy 7/2/24:  Impression:            - The examined portion of the ileum was normal.                          - Non-bleeding internal hemorrhoids.                          - No specimens collected.     ALLERGIES:  Allergies   Allergen Reactions     Doxycycline Rash     Penicillins Rash       PERTINENT MEDICATIONS:    Current Outpatient Medications:      Biotin 5000 MCG CAPS, , Disp: , Rfl:      Calcium Citrate-Vitamin D (CALCIUM + D PO), , Disp: , Rfl:      cetirizine (ZYRTEC) 10 MG tablet, Take 10 mg by mouth daily, Disp: , Rfl:      chlorhexidine (PERIDEX) 0.12 % solution, Swish and spit 15 mLs in mouth 2 times daily Can use for 2 weeks and should take a 2 week break before restarting., Disp: 473 mL, Rfl: 1     cyanocobalamin (VITAMIN B-12) 1000 MCG sublingual tablet, , Disp: , Rfl:      cycloSPORINE (RESTASIS) 0.05 % ophthalmic emulsion, Place 1 drop into both eyes 2 times daily., Disp: 60 each, Rfl: 5     drospirenone (SLYND) 4 MG TABS tablet, Take 1 tablet (4 mg) by mouth daily., Disp: 90 tablet, Rfl: 4     drospirenone (SLYND) 4 MG TABS tablet, Take 1 tablet (4 mg) by mouth daily., Disp: 90 tablet, Rfl: 4     estradiol (VIVELLE-DOT) 0.1 MG/24HR bi-weekly patch, Place 1 patch over 96 hours onto the skin twice a week., Disp: 24 patch, Rfl: 4     JUNEL 1.5/30 1.5-30 MG-MCG tablet, TAKE 1 TABLET BY MOUTH DAILY  ACTIVE TABLETS FOR PREVENTION OF MENSES, Disp: 84 tablet, Rfl: 0     ketoconazole (NIZORAL) 2 % external shampoo, Apply topically as needed for itching or irritation Shampoo/scalp once or twice weekly as needed for seborrhea symptoms, Disp: 120 mL, Rfl: 11     methylcellulose (CITRUCEL) 500 MG TABS tablet, , Disp: , Rfl:      norethindrone-ethinyl estradiol (ORTHO-NOVUM) 1-35 MG-MCG tablet, Take 1 tablet by mouth daily. Active tablets only for prevention of menses, Disp: 112 tablet, Rfl: 4     olopatadine (PATANOL) 0.1 % ophthalmic solution, PLACE 1 DROP INTO BOTH EYES 2 TIMES DAILY AS NEEDED, Disp: 5 mL, Rfl: 3     Omega-3 Fatty Acids (FISH OIL PO), Take by mouth., Disp: , Rfl:      omeprazole (PRILOSEC) 40 MG DR capsule, Take 1 capsule (40 mg) by mouth daily., Disp: 90 capsule, Rfl: 0     Pediatric Multivit-Minerals (GUMMY VITAMINS & MINERALS) chewable tablet, , Disp: , Rfl:      spironolactone (ALDACTONE) 100 MG tablet, Take 100 mg by mouth daily at 2 pm. 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    Social History     Socioeconomic History     Marital status: Single     Spouse name: Not on file     Number of children: Not on file     Years of education: Not on file     Highest education level: Not on file   Occupational History     Not on file   Tobacco Use     Smoking status: Never     Passive exposure: Never     Smokeless tobacco: Never   Vaping Use     Vaping status: Never Used   Substance and Sexual Activity     Alcohol use: Yes     Comment: rare     Drug use: No     Sexual activity: Not Currently     Partners: Male   Other Topics Concern      "Parent/sibling w/ CABG, MI or angioplasty before 65F 55M? Not Asked   Social History Narrative     Not on file     Social Drivers of Health     Financial Resource Strain: Not on file   Food Insecurity: Not on file   Transportation Needs: Not on file   Physical Activity: Not on file   Stress: Not on file   Social Connections: Not on file   Interpersonal Safety: Not At Risk (8/16/2023)    Humiliation, Afraid, Rape, and Kick questionnaire      Fear of Current or Ex-Partner: No      Emotionally Abused: No      Physically Abused: No      Sexually Abused: No   Housing Stability: Not on file     FAMILY HISTORY:  FH of CRC: no  FH of IBD: no  No Colon/Panc/Esophageal/other GI CA. No IBD or Celiac Disease. No other Autoimmune, Liver, or Thyroid disease.    Family History   Problem Relation Age of Onset     Diabetes Father      Glaucoma No family hx of      Macular Degeneration No family hx of      Past/family/social history reviewed and no changes    PHYSICAL EXAMINATION:  Constitutional: AAOx3, cooperative, pleasant, not dyspneic/diaphoretic, no acute distress  Vitals reviewed: Ht 1.702 m (5' 7\")   Wt 70.3 kg (155 lb)   BMI 24.28 kg/m    Wt:   General appearance: Healthy appearing adult, in no acute distress  Eyes: Sclera anicteric, Pupils round and reactive to light  Ears, nose, mouth and throat: No obvious external lesions of ears and nose, Hearing intact  Neck: Symmetric, No obvious external lesions  Respiratory: Normal respiration, no use of accessory muscles   MSK: Gait normal  Skin: No rashes or jaundice   Psychiatric: Oriented to person, place and time, Appropriate mood and affect.       PERTINENT STUDIES:  Lab on 12/23/2022   Component Date Value Ref Range Status     Sodium 12/23/2022 135 (L)  136 - 145 mmol/L Final     Potassium 12/23/2022 4.4  3.4 - 5.3 mmol/L Final     Chloride 12/23/2022 100  98 - 107 mmol/L Final     Carbon Dioxide (CO2) 12/23/2022 20 (L)  22 - 29 mmol/L Final     Anion Gap 12/23/2022 15  7 - " 15 mmol/L Final     Urea Nitrogen 12/23/2022 16.0  6.0 - 20.0 mg/dL Final     Creatinine 12/23/2022 0.94  0.51 - 0.95 mg/dL Final     Calcium 12/23/2022 9.5  8.6 - 10.0 mg/dL Final     Glucose 12/23/2022 160 (H)  70 - 99 mg/dL Final     Alkaline Phosphatase 12/23/2022 71  35 - 104 U/L Final     AST 12/23/2022 27  10 - 35 U/L Final     ALT 12/23/2022 21  10 - 35 U/L Final     Protein Total 12/23/2022 8.4 (H)  6.4 - 8.3 g/dL Final     Albumin 12/23/2022 4.6  3.5 - 5.2 g/dL Final     Bilirubin Total 12/23/2022 0.4  <=1.2 mg/dL Final     GFR Estimate 12/23/2022 75  >60 mL/min/1.73m2 Final     Erythrocyte Sedimentation Rate 12/23/2022 11  0 - 20 mm/hr Final     CRP Inflammation 12/23/2022 20.50 (H)  <5.00 mg/L Final     WBC Count 12/23/2022 9.7  4.0 - 11.0 10e3/uL Final     RBC Count 12/23/2022 5.06  3.80 - 5.20 10e6/uL Final     Hemoglobin 12/23/2022 15.3  11.7 - 15.7 g/dL Final     Hematocrit 12/23/2022 45.9  35.0 - 47.0 % Final     MCV 12/23/2022 91  78 - 100 fL Final     MCH 12/23/2022 30.2  26.5 - 33.0 pg Final     MCHC 12/23/2022 33.3  31.5 - 36.5 g/dL Final     RDW 12/23/2022 11.7  10.0 - 15.0 % Final     Platelet Count 12/23/2022 309  150 - 450 10e3/uL Final     % Neutrophils 12/23/2022 69  % Final     % Lymphocytes 12/23/2022 23  % Final     % Monocytes 12/23/2022 7  % Final     % Eosinophils 12/23/2022 1  % Final     % Basophils 12/23/2022 0  % Final     % Immature Granulocytes 12/23/2022 0  % Final     Absolute Neutrophils 12/23/2022 6.7  1.6 - 8.3 10e3/uL Final     Absolute Lymphocytes 12/23/2022 2.2  0.8 - 5.3 10e3/uL Final     Absolute Monocytes 12/23/2022 0.7  0.0 - 1.3 10e3/uL Final     Absolute Eosinophils 12/23/2022 0.1  0.0 - 0.7 10e3/uL Final     Absolute Basophils 12/23/2022 0.0  0.0 - 0.2 10e3/uL Final     Absolute Immature Granulocytes 12/23/2022 0.0  <=0.4 10e3/uL Final     Fructose breath test 6/2018 ([facility]):        Again, thank you for allowing me to participate in the care of your patient.         Sincerely,        Mirna Roth PA-C    Electronically signed

## 2025-03-03 NOTE — PATIENT INSTRUCTIONS
It was a pleasure meeting with you today and discussing your healthcare plan. Below is a summary of what we covered:    -- Continue Citrucel fiber caplets -continue with 1 caplet per day for now  -- Work on hydration, aim for 64oz daily  -- Continue low fructose diet  -- Restart rifaximin 3 times daily x 14 days  ------- if bloating persists try enteric-coated peppermint/Ibgard/Iberogast with meals    Follow up in clinic in 3-4 months    Please see below for any additional questions and scheduling guidelines.    Sign up for [facility]: [facility] patient portal serves as a secure platform for accessing your medical records from the [facility]. Additionally, [facility] facilitates easy, timely, and secure messaging with your care team. If you have not signed up, you may do so by using the provided code or calling [phone].    Coordinating your care after your visit:  There are multiple options for scheduling your follow-up care based on your provider's recommendation.    How do I schedule a follow-up clinic appointment:   After your appointment, you may receive scheduling assistance with the Clinic Coordinators by having a seat in the waiting room and a Clinic Coordinator will call you up to schedule.  Virtual visits or after you leave the clinic:  Your provider has placed a follow-up order in the [facility] portal for scheduling your return appointment. A member of the scheduling team will contact you to schedule.  [employer] Scheduling: Timely scheduling through [facility] is advised to ensure appointment availability.   Call to schedule: You may schedule your follow-up appointment(s) by calling [phone], option 1.    How do I schedule my endoscopy or colonoscopy procedure:  If a procedure, such as a colonoscopy or upper endoscopy was ordered by your provider, the scheduling team will contact you to schedule this procedure. Or you may choose to call to schedule at   [phone], option 2.  Please allow 20-30  minutes when scheduling a procedure.    How do I get my blood work done? To get your blood work done, you need to schedule a lab appointment at an [facility]. There are multiple ways to schedule:   At the clinic: The Clinic Coordinator you meet after your visit can help you schedule a lab appointment.   [employer] scheduling: [employer] offers online lab scheduling at all [facility] laboratory locations.   Call to schedule: You can call [phone] to schedule your lab appointment.    How do I schedule my imaging study: To schedule imaging studies, such as CT scans, ultrasounds, MRIs, or X-rays, contact Imaging Services at [phone].    How do I schedule a referral to another doctor: If your provider recommended a referral to another specialist(s), the referral order was placed by your provider. You will receive a phone call to schedule this referral, or you may choose to call the number attached to the referral to self-schedule.    For Post-Visit Question(s):  For any inquiries following today's visit:  Please utilize [employer] messaging and allow 48 hours for reply or contact the Call Center during normal business hours at [phone], option 3.  For Emergent After-hours questions, contact the On-Call GI Fellow through the [facility]  at [phone].  In addition, you may contact your Nurse directly using the provided contact information.    Test Results:  Test results will be accessible via [employer] in compliance with the 21st Century Cures Act. This means that your results will be available to you at the same time as your provider. Often you may see your results before your provider does. Results are reviewed by staff within two weeks with communication follow-up. Results may be released in the patient portal prior to your care team review.    Prescription Refill(s):  Medication prescribed by your provider will be addressed during your visit. For future refills, please  coordinate with your pharmacy. If you have not had a recent clinic visit or routine labs, for your safety, your provider may not be able to refill your prescription.

## 2025-03-03 NOTE — PROGRESS NOTES
Virtual Visit Details    Type of service:  Video Visit     Originating Location (pt. Location): Home    Distant Location (provider location):  On-site  Platform used for Video Visit: [facility]    GI CLINIC VISIT    CC/REFERRING MD:  Jesus Jacobson  REASON FOR CONSULTATION: diarrhea    ASSESSMENT/PLAN:  #Chronic Diarrhea  #Fructose malabsorption  #IBS-D/M  #Bloating  #Abdominal Cramping  Patient with 20+ year history of intermittent loose stools, worsening in past 5 to 10 years with episodes of urgent, explosive diarrhea with intermittent incontinence.  More recently she has had variable stool consistency with harder stools and intermittent loose urgent stools with associated cramping, this causes her a lot of anxiety and impacting her day-to-day life. 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  She reports that previous lactose intolerance testing was negative.   Fecal calprotectin, ova and parasite, Giardia/Cryptosporidium, fecal elastase and spot fecal fat, celiac serologies, and TSH with T4 reflex were all of which were negative/normal.  MR enterography in April showed possible mild nonspecific colitis of the proximal sigmoid though possibly related to under distention.  Colonoscopy following this was unremarkable without any evidence of inflammation making IBD unlikely.    She has met with our dietitian regarding low fructose diet and dietary measures to decrease bloating.  She should continue low fructose diet.      She had a good response to rifaximin x 14 days, suspect that symptoms are largely driven by IBS given this improvement.  Given symptoms are starting to increase again we discussed options and will retrial 14-day course of rifaximin.  If bloating persists after this, recommend she try enteric-coated peppermint such as IBgard versus Iberogast with meals.    She should continue dietary measures to decrease bloating and reduce carbonation.  Plan to continue Citrucel at 1 caplet per  "day and work on increasing hydration.    Plan:  -- Continue Citrucel fiber caplets -continue with 1 caplet per day for now  -- Work on hydration, aim for 64oz daily  -- Continue low fructose diet  -- Restart rifaximin 3 times daily x 14 days  ------- if bloating persists try enteric-coated peppermint/Ibgard/Iberogast with meals    #GERD  #Dysphagia  Patient reports long history of atypical GERD symptoms of hoarseness, throat pain, cough.  Recent EGD was normal.  Previously evaluated by ENT.  Overall symptoms improved following 12-week course of 40 mg omeprazole.  Feels she is at a good spot currently.  Can use Pepcid as needed.  If symptoms increase, can restart omeprazole.  -- Repeat omeprazole in the future if needed, can use OTC 2-week course of 20 mg.  If this is not helpful, repeat 40 mg x 12 weeks.  -- Pepcid (famotidine) at bedtime as needed    Colorectal cancer screening: Colonoscopy 7/2024 normal, repeat in 10 years.    RTC 3-4 months    Thank you for this consultation.  It was a pleasure to participate in the care of this patient; please contact us with any further questions.     40 Minutes was spent on the date of the encounter during chart review, history and exam, documentation, and further activities as noted       Mirna Roth PA-C, JAIRO  Division of Gastroenterology, Hepatology & Nutrition  [facility]        HALEY Sorensen is a 47 year old female with a history of alopecia, arthralgia, leukoclastic vasculitis, restrictive lung disease, allergic rhinitis, MDD, RAYMOND, IBS-D, fructose malabsorption who presents for evaluation of diarrhea. They are new to the [facility] GI clinic and this is my first encounter with the patient.      She has been following with rheumatology for seronegative RA with response HCQ, recent hand MRI without signs of inflammatory arthritis. She also has history of biopsy proven leukoclastic disease. She was followed previously at outside facility diagnosed \"connective " "tisusue disease with sicca symtpoms.\" Pertinent rheumatologic labs include MARIAA 1:80 in a speckled pattern, negative/ normal RF, ANCA, Broad serologic work-up that showed negative/normal C3/C4/SSA/SSB/smith/RNP/CCP/dsDNA. She has had persistently elevated CRP.    She reports 20+ year history GI symptoms, namely urgent loose stools., worse in the last 5 to 10 years. Colonoscopy 2014 with normal appearing ileum and colon. On biopsy no evidence of microscopic colitis or other histopathologic abnormality.  She notes previous work-up with [facility], we do not have these records.  She did have positive fructose breath test 6/2018 with [facility].  She also reports negative lactose intolerance testing.    Labs and stool studies completed including fecal calprotectin, ova and parasite, Giardia/Cryptosporidium, fecal elastase and spot fecal fat, celiac serologies, and TSH with T4 reflex were all of which were negative/normal.    Interval History 3/6/24:  She was last seen in our clinic April 2023.  Today she reports that overall symptoms remain similar to previous.  She continues to have daily stools with intermittent episodes of urgent loose stools and near incontinence.  On a typical day will have 1-3 formed stools, typically Dolores type IV but with some tendency towards harder stool (Dolores 2) with straining about once per week.  Typically feels fully evacuated.  Rare day without stool.  About once a month she will have episode of significant diarrhea. Will start with abdominal pain/cramping and within 5 minutes will have urgent loose stools.  She reports that these episodes have caused significant disruption while out of the house and impacting her life.  Sometimes 1 stool and done, sometimes 3 over the course of an hour.  Abdominal discomfort relieved after.  She denies blood in her stool or melena, no nocturnal stools. No constipation/change in bowel pattern preceding that she can appreciate. She feels this is dietary related, " has been tracking intake and is noted association with hot peppers.   Given previous diagnosis of fructose malabsorption, she has tried to follow a low fructose diet.  Though did not get formal dietary counseling on this, has handouts but in recent months has not followed quite as closely.  Feels like bloating improved with these changes but no real change in stool pattern.  She has met with a registered dietitian to review elimination diet (? Low FODMAP) which she tried but was not able to sustain due to restrictiveness of diet and lack of guidance.  Has tried gluten-free and lactose-free diets without significant improvement. Recently saw dietitian, but was specialized in DM rather than GI.    She is currently taking Citrucel fiber tablets, trying for 1 tablet twice daily, but often forgets 1 dose.  She is working on increasing fluid intake, drinks 20 ounces of water and occasional carbonated water.    Reports history of reflux with worsening over the past week.  She does not have burning in chest or regurgitation but feels burning up into her throat, hoarseness, and swelling in her throat/tonsils.  She does note that sleeping on an incline seems to help as the symptoms are worse overnight.  She does report that she feels like she is having a hard time swallowing and getting food passed mouth but also that food is getting stuck in her throat.  This is worsened recently.   Has never had an EGD.  She was previously evaluated for hoarseness by ENT, recommended voice therapy.  She has started omeprazole in the last couple days, plans to do a 14-day course.  Was also tried Pepcid in the past with some relief.    GI review of systems completed and otherwise negative.    Interval History 10/16/24:  MRE in April showed possible mild nonspecific colitis of the sigmoid colon versus under distention.    Had EGD and colonoscopy in July. EGD normal, biopsies of stomach with reactive gastropathy, no h.pylori. Colonoscopy normal  without evidence of any inflammation. No biopsies collected.    Today Sondra reports symptoms are variable-few years ago would have persistent diarrhea, now alternating between more hard stools and urgent loose stools with associated cramping.  Has noticed hot peppers might be a trigger, possibly coconut milk.    BM pattern-1-3 stools per day, occasional hard asia or hard logs but more typically soft formed or mushy.  About 1-2 times per week she will very urgent bowel movements with associated abdominal cramping that is relieved after defecation.  This causes her a lot of anxiety, has portable toilet and car in case of need.  No blood in stool, no melena, no stearrhea.  Currently taking 1 Citrucel fiber caplet per day, try to increase to 2 but got more constipated.  Currently drinking about 45 ounces of water per day.    Does endorse some bloating that seems to be triggered by eating.    GERD -some improvement with elevated HOB, 4 inches.Tries not to eat as late.and taking Pepcid at night.  However still does endorse some sore throat and tonsil stones, no longer waking up at night with burning in throat or dry heaves.  Overall swallowing going well, does feel like food and pills sometimes get stuck in tonsils which are large.    Other health updates that she notes include she is started an antianxiety medication, working on finding the right dose.  Restarted birth control for perimenopause.    Interval History 3/3/25:  Reports overall feeling improved compared to October.  Finish rifaximin and reports that she noticed a difference within a week however does note that symptoms seem to be creeping back, increased gas and bloating recently, also increased cramping.  Continues to have less urgency and diarrhea.    Bloating seems to be progressive throughout the day and worsened after eating.    Cramping sometimes improved after bowel movements, sometimes not.    Currently having 2-3 formed stools per day.   Occasional straining but more rare.  Overall feeling evacuated following stool but not always.  Continues on 1 caplets of Citrucel daily.  Trying to drink more water.    Met with dietitian after last visit regarding low fructose diet.  Also suggested decreasing carbonation which she is working on however difficulty replacing this with other foods.    Reflux has improved following 12-week course of 40 mg omeprazole daily.  Rare intermittent symptoms.  No dysphagia or feeling like foods getting stuck.  Tonsil stones have overall improved.      ROS:    No fevers or chills  No weight loss  No blurry vision, double vision or change in vision  No sore throat  No lymphadenopathy  No headache, paraesthesias, or weakness in a limb  No shortness of breath or wheezing  No chest pain or pressure  No arthralgias or myalgias  No rashes or skin changes  No odynophagia or dysphagia  No BRBPR, hematochezia, melena  No dysuria, frequency or urgency  No hot/cold intolerance or polyria  No anxiety or depression    PROBLEM LIST  Patient Active Problem List    Diagnosis Date Noted    Pelvic floor dysfunction 03/01/2024     Priority: Medium    Alopecia 09/14/2020     Priority: Medium    Arthralgia 09/14/2020     Priority: Medium    Leucocytoclastic vasculitis (H) 04/22/2020     Priority: Medium    Seasonal affective disorder 10/04/2018     Priority: Medium    Fructose intolerance 10/03/2018     Priority: Medium    CARDIOVASCULAR SCREENING; LDL GOAL LESS THAN 130 10/03/2018     Priority: Medium    Irritable bowel syndrome with diarrhea 10/03/2018     Priority: Medium    Social phobia 10/20/2016     Priority: Medium    Major depression, recurrent, full remission 11/04/2014     Priority: Medium     Noted at PCP visit.      Generalized anxiety disorder 02/07/2012     Priority: Medium    Insomnia 03/01/2011     Priority: Medium    Diarrhea 10/26/2009     Priority: Medium    Blepharitis 05/13/2009     Priority: Medium     Epic      Decreased  diffusion capacity of lung 12/09/2008     Priority: Medium    Restrictive lung disease 12/09/2008     Priority: Medium    Myopia 11/14/2008     Priority: Medium     Myopia - LasEk both 2009      Episodic mood disorder 11/15/2004     Priority: Medium     Epic      Allergic rhinitis 04/24/2003     Priority: Medium     Epic         PERTINENT PAST MEDICAL HISTORY:  Past Medical History:   Diagnosis Date    Allergic rhinitis 04/24/2003    Epic    Alopecia 09/14/2020    Arthralgia 09/14/2020    Blepharitis 05/13/2009    Epic    Decreased diffusion capacity of lung 12/09/2008    Diarrhea 10/26/2009    Episodic mood disorder 11/15/2004    Epic    Fructose intolerance 10/03/2018    Generalized anxiety disorder 02/07/2012    Insomnia 03/01/2011    Irritable bowel syndrome with diarrhea 10/03/2018    Leucocytoclastic vasculitis (H) 04/22/2020    Major depression, recurrent, full remission 11/04/2014    Noted at PCP visit.    Menopause 04/2022    Myopia 11/14/2008    Myopia - LasEk both 2009    NO ACTIVE PROBLEMS     Restrictive lung disease 12/09/2008    Seasonal affective disorder 10/04/2018    Social phobia 10/20/2016       PREVIOUS SURGERIES:  Past Surgical History:   Procedure Laterality Date    COLONOSCOPY N/A 7/2/2024    Procedure: Colonoscopy;  Surgeon: Chapis Jarrell MD;  Location: [facility] OR    ESOPHAGOSCOPY, GASTROSCOPY, DUODENOSCOPY (EGD), COMBINED N/A 7/2/2024    Procedure: ESOPHAGOGASTRODUODENOSCOPY, WITH BIOPSY;  Surgeon: Chapis Jarrell MD;  Location: [facility] OR    LASIK Bilateral 2009    NO HISTORY OF SURGERY         PREVIOUS ENDOSCOPY:  Colonoscopy 05/12/2014:          EGD 7/2/24:  Impression:            - Z-line regular, 36 cm from the incisors.                          - Normal stomach. Biopsied.                          - Normal duodenal bulb, first portion of the duodenum                          and second portion of the duodenum.     A. GASTRIC, ANTRUM/INCISURA, BIOPSY:  - Reactive gastropathy with mild  chronic inactive gastritis  - No H. pylori-like organisms identified on routine staining  - No intestinal metaplasia identified     B. GASTRIC, BODY, BIOPSY:  - Unremarkable body-type mucosa  - No H. pylori-like organisms identified on routine staining  - No intestinal metaplasia identified    Colonoscopy 7/2/24:  Impression:            - The examined portion of the ileum was normal.                          - Non-bleeding internal hemorrhoids.                          - No specimens collected.     ALLERGIES:  Allergies   Allergen Reactions    Doxycycline Rash    Penicillins Rash       PERTINENT MEDICATIONS:    Current Outpatient Medications:     Biotin 5000 MCG CAPS, , Disp: , Rfl:     Calcium Citrate-Vitamin D (CALCIUM + D PO), , Disp: , Rfl:     cetirizine (ZYRTEC) 10 MG tablet, Take 10 mg by mouth daily, Disp: , Rfl:     chlorhexidine (PERIDEX) 0.12 % solution, Swish and spit 15 mLs in mouth 2 times daily Can use for 2 weeks and should take a 2 week break before restarting., Disp: 473 mL, Rfl: 1    cyanocobalamin (VITAMIN B-12) 1000 MCG sublingual tablet, , Disp: , Rfl:     cycloSPORINE (RESTASIS) 0.05 % ophthalmic emulsion, Place 1 drop into both eyes 2 times daily., Disp: 60 each, Rfl: 5    drospirenone (SLYND) 4 MG TABS tablet, Take 1 tablet (4 mg) by mouth daily., Disp: 90 tablet, Rfl: 4    drospirenone (SLYND) 4 MG TABS tablet, Take 1 tablet (4 mg) by mouth daily., Disp: 90 tablet, Rfl: 4    estradiol (VIVELLE-DOT) 0.1 MG/24HR bi-weekly patch, Place 1 patch over 96 hours onto the skin twice a week., Disp: 24 patch, Rfl: 4    JUNEL 1.5/30 1.5-30 MG-MCG tablet, TAKE 1 TABLET BY MOUTH DAILY ACTIVE TABLETS FOR PREVENTION OF MENSES, Disp: 84 tablet, Rfl: 0    ketoconazole (NIZORAL) 2 % external shampoo, Apply topically as needed for itching or irritation Shampoo/scalp once or twice weekly as needed for seborrhea symptoms, Disp: 120 mL, Rfl: 11    methylcellulose (CITRUCEL) 500 MG TABS tablet, , Disp: , Rfl:      norethindrone-ethinyl estradiol (ORTHO-NOVUM) 1-35 MG-MCG tablet, Take 1 tablet by mouth daily. Active tablets only for prevention of menses, Disp: 112 tablet, Rfl: 4    olopatadine (PATANOL) 0.1 % ophthalmic solution, PLACE 1 DROP INTO BOTH EYES 2 TIMES DAILY AS NEEDED, Disp: 5 mL, Rfl: 3    Omega-3 Fatty Acids (FISH OIL PO), Take by mouth., Disp: , Rfl:     omeprazole (PRILOSEC) 40 MG DR capsule, Take 1 capsule (40 mg) by mouth daily., Disp: 90 capsule, Rfl: 0    Pediatric Multivit-Minerals (GUMMY VITAMINS & MINERALS) chewable tablet, , Disp: , Rfl:     spironolactone (ALDACTONE) 100 MG tablet, Take 100 mg by mouth daily at 2 pm. 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, [employer] program    Social History     Socioeconomic History    Marital status: Single     Spouse name: Not on file    Number of children: Not on file    Years of education: Not on file    Highest education level: Not on file   Occupational History    Not on file   Tobacco Use    Smoking status: Never     Passive exposure: Never    Smokeless tobacco: Never   Vaping Use    Vaping status: Never Used   Substance and Sexual Activity    Alcohol use: Yes     Comment: rare    Drug use: No    Sexual activity: Not Currently     Partners: Male   Other Topics Concern    Parent/sibling w/ CABG, MI or angioplasty before 65F 55M? Not Asked   Social History Narrative    Not on file     Social Drivers of Health     Financial Resource Strain: Not on file   Food Insecurity: Not on file   Transportation Needs: Not on file   Physical Activity: Not on file   Stress: Not on file   Social Connections: Not on file  "  Interpersonal Safety: Not At Risk (8/16/2023)    Humiliation, Afraid, Rape, and Kick questionnaire     Fear of Current or Ex-Partner: No     Emotionally Abused: No     Physically Abused: No     Sexually Abused: No   Housing Stability: Not on file     FAMILY HISTORY:  FH of CRC: no  FH of IBD: no  No Colon/Panc/Esophageal/other GI CA. No IBD or Celiac Disease. No other Autoimmune, Liver, or Thyroid disease.    Family History   Problem Relation Age of Onset    Diabetes Father     Glaucoma No family hx of     Macular Degeneration No family hx of      Past/family/social history reviewed and no changes    PHYSICAL EXAMINATION:  Constitutional: AAOx3, cooperative, pleasant, not dyspneic/diaphoretic, no acute distress  Vitals reviewed: Ht 1.702 m (5' 7\")   Wt 70.3 kg (155 lb)   BMI 24.28 kg/m    Wt:   General appearance: Healthy appearing adult, in no acute distress  Eyes: Sclera anicteric, Pupils round and reactive to light  Ears, nose, mouth and throat: No obvious external lesions of ears and nose, Hearing intact  Neck: Symmetric, No obvious external lesions  Respiratory: Normal respiration, no use of accessory muscles   MSK: Gait normal  Skin: No rashes or jaundice   Psychiatric: Oriented to person, place and time, Appropriate mood and affect.       PERTINENT STUDIES:  Lab on 12/23/2022   Component Date Value Ref Range Status    Sodium 12/23/2022 135 (L)  136 - 145 mmol/L Final    Potassium 12/23/2022 4.4  3.4 - 5.3 mmol/L Final    Chloride 12/23/2022 100  98 - 107 mmol/L Final    Carbon Dioxide (CO2) 12/23/2022 20 (L)  22 - 29 mmol/L Final    Anion Gap 12/23/2022 15  7 - 15 mmol/L Final    Urea Nitrogen 12/23/2022 16.0  6.0 - 20.0 mg/dL Final    Creatinine 12/23/2022 0.94  0.51 - 0.95 mg/dL Final    Calcium 12/23/2022 9.5  8.6 - 10.0 mg/dL Final    Glucose 12/23/2022 160 (H)  70 - 99 mg/dL Final    Alkaline Phosphatase 12/23/2022 71  35 - 104 U/L Final    AST 12/23/2022 27  10 - 35 U/L Final    ALT 12/23/2022 21  " 10 - 35 U/L Final    Protein Total 12/23/2022 8.4 (H)  6.4 - 8.3 g/dL Final    Albumin 12/23/2022 4.6  3.5 - 5.2 g/dL Final    Bilirubin Total 12/23/2022 0.4  <=1.2 mg/dL Final    GFR Estimate 12/23/2022 75  >60 mL/min/1.73m2 Final    Erythrocyte Sedimentation Rate 12/23/2022 11  0 - 20 mm/hr Final    CRP Inflammation 12/23/2022 20.50 (H)  <5.00 mg/L Final    WBC Count 12/23/2022 9.7  4.0 - 11.0 10e3/uL Final    RBC Count 12/23/2022 5.06  3.80 - 5.20 10e6/uL Final    Hemoglobin 12/23/2022 15.3  11.7 - 15.7 g/dL Final    Hematocrit 12/23/2022 45.9  35.0 - 47.0 % Final    MCV 12/23/2022 91  78 - 100 fL Final    MCH 12/23/2022 30.2  26.5 - 33.0 pg Final    MCHC 12/23/2022 33.3  31.5 - 36.5 g/dL Final    RDW 12/23/2022 11.7  10.0 - 15.0 % Final    Platelet Count 12/23/2022 309  150 - 450 10e3/uL Final    % Neutrophils 12/23/2022 69  % Final    % Lymphocytes 12/23/2022 23  % Final    % Monocytes 12/23/2022 7  % Final    % Eosinophils 12/23/2022 1  % Final    % Basophils 12/23/2022 0  % Final    % Immature Granulocytes 12/23/2022 0  % Final    Absolute Neutrophils 12/23/2022 6.7  1.6 - 8.3 10e3/uL Final    Absolute Lymphocytes 12/23/2022 2.2  0.8 - 5.3 10e3/uL Final    Absolute Monocytes 12/23/2022 0.7  0.0 - 1.3 10e3/uL Final    Absolute Eosinophils 12/23/2022 0.1  0.0 - 0.7 10e3/uL Final    Absolute Basophils 12/23/2022 0.0  0.0 - 0.2 10e3/uL Final    Absolute Immature Granulocytes 12/23/2022 0.0  <=0.4 10e3/uL Final     Fructose breath test 6/2018 ([facility]):

## 2025-03-03 NOTE — NURSING NOTE
Current patient location: Patient declined to provide     Is the patient currently in the state of [place]? YES    Visit mode: VIDEO    If the visit is dropped, the patient can be reconnected by:VIDEO VISIT: Text to cell phone:   Telephone Information:   Mobile [phone]       Will anyone else be joining the visit? NO  (If patient encounters technical issues they should call [phone])    Are changes needed to the allergy or medication list? No    Are refills needed on medications prescribed by this physician? NO    Rooming Documentation:  Questionnaire(s) completed    Reason for visit: RECHECK (F/U)    Seble WALTON

## 2025-03-06 ENCOUNTER — LAB (OUTPATIENT)
Dept: LAB | Facility: CLINIC | Age: 50
End: 2025-03-06
Payer: COMMERCIAL

## 2025-03-06 DIAGNOSIS — M06.09 RHEUMATOID ARTHRITIS, SERONEGATIVE, MULTIPLE SITES (H): ICD-10-CM

## 2025-03-06 DIAGNOSIS — M31.0 LEUCOCYTOCLASTIC VASCULITIS (H): ICD-10-CM

## 2025-03-06 LAB
ALBUMIN UR-MCNC: NEGATIVE MG/DL
APPEARANCE UR: CLEAR
AST SERPL W P-5'-P-CCNC: 36 U/L (ref 0–45)
BACTERIA #/AREA URNS HPF: ABNORMAL /HPF
BASOPHILS # BLD AUTO: 0 10E3/UL (ref 0–0.2)
BASOPHILS NFR BLD AUTO: 0 %
BILIRUB UR QL STRIP: NEGATIVE
COLOR UR AUTO: YELLOW
CREAT SERPL-MCNC: 0.79 MG/DL (ref 0.51–0.95)
CRP SERPL-MCNC: 6.64 MG/L
EGFRCR SERPLBLD CKD-EPI 2021: >90 ML/MIN/1.73M2
EOSINOPHIL # BLD AUTO: 0.1 10E3/UL (ref 0–0.7)
EOSINOPHIL NFR BLD AUTO: 1 %
ERYTHROCYTE [DISTWIDTH] IN BLOOD BY AUTOMATED COUNT: 12 % (ref 10–15)
ERYTHROCYTE [SEDIMENTATION RATE] IN BLOOD BY WESTERGREN METHOD: 10 MM/HR (ref 0–20)
GLUCOSE UR STRIP-MCNC: NEGATIVE MG/DL
HCT VFR BLD AUTO: 40.7 % (ref 35–47)
HGB BLD-MCNC: 13.6 G/DL (ref 11.7–15.7)
HGB UR QL STRIP: NEGATIVE
IMM GRANULOCYTES # BLD: 0 10E3/UL
IMM GRANULOCYTES NFR BLD: 0 %
KETONES UR STRIP-MCNC: NEGATIVE MG/DL
LEUKOCYTE ESTERASE UR QL STRIP: NEGATIVE
LYMPHOCYTES # BLD AUTO: 2 10E3/UL (ref 0.8–5.3)
LYMPHOCYTES NFR BLD AUTO: 24 %
MCH RBC QN AUTO: 30.8 PG (ref 26.5–33)
MCHC RBC AUTO-ENTMCNC: 33.4 G/DL (ref 31.5–36.5)
MCV RBC AUTO: 92 FL (ref 78–100)
MONOCYTES # BLD AUTO: 0.8 10E3/UL (ref 0–1.3)
MONOCYTES NFR BLD AUTO: 10 %
NEUTROPHILS # BLD AUTO: 5.4 10E3/UL (ref 1.6–8.3)
NEUTROPHILS NFR BLD AUTO: 65 %
NITRATE UR QL: NEGATIVE
PH UR STRIP: 6 [PH] (ref 5–8)
PLATELET # BLD AUTO: 282 10E3/UL (ref 150–450)
RBC # BLD AUTO: 4.42 10E6/UL (ref 3.8–5.2)
RBC #/AREA URNS AUTO: ABNORMAL /HPF
SP GR UR STRIP: 1.01 (ref 1–1.03)
SQUAMOUS #/AREA URNS AUTO: ABNORMAL /LPF
UROBILINOGEN UR STRIP-ACNC: 0.2 E.U./DL
WBC # BLD AUTO: 8.3 10E3/UL (ref 4–11)
WBC #/AREA URNS AUTO: ABNORMAL /HPF

## 2025-03-12 ENCOUNTER — OFFICE VISIT (OUTPATIENT)
Dept: RHEUMATOLOGY | Facility: CLINIC | Age: 50
End: 2025-03-12
Attending: STUDENT IN AN ORGANIZED HEALTH CARE EDUCATION/TRAINING PROGRAM
Payer: COMMERCIAL

## 2025-03-12 VITALS
BODY MASS INDEX: 25.06 KG/M2 | HEART RATE: 90 BPM | SYSTOLIC BLOOD PRESSURE: 113 MMHG | DIASTOLIC BLOOD PRESSURE: 74 MMHG | WEIGHT: 160 LBS | OXYGEN SATURATION: 97 %

## 2025-03-12 DIAGNOSIS — Z79.899 HIGH RISK MEDICATION USE: ICD-10-CM

## 2025-03-12 DIAGNOSIS — M31.0 LEUCOCYTOCLASTIC VASCULITIS (H): ICD-10-CM

## 2025-03-12 DIAGNOSIS — M06.09 RHEUMATOID ARTHRITIS, SERONEGATIVE, MULTIPLE SITES (H): Primary | ICD-10-CM

## 2025-03-12 DIAGNOSIS — R76.8 POSITIVE ANA (ANTINUCLEAR ANTIBODY): ICD-10-CM

## 2025-03-12 PROCEDURE — 1126F AMNT PAIN NOTED NONE PRSNT: CPT | Performed by: STUDENT IN AN ORGANIZED HEALTH CARE EDUCATION/TRAINING PROGRAM

## 2025-03-12 PROCEDURE — 3078F DIAST BP <80 MM HG: CPT | Performed by: STUDENT IN AN ORGANIZED HEALTH CARE EDUCATION/TRAINING PROGRAM

## 2025-03-12 PROCEDURE — G2211 COMPLEX E/M VISIT ADD ON: HCPCS | Performed by: STUDENT IN AN ORGANIZED HEALTH CARE EDUCATION/TRAINING PROGRAM

## 2025-03-12 PROCEDURE — 3074F SYST BP LT 130 MM HG: CPT | Performed by: STUDENT IN AN ORGANIZED HEALTH CARE EDUCATION/TRAINING PROGRAM

## 2025-03-12 PROCEDURE — 99213 OFFICE O/P EST LOW 20 MIN: CPT | Performed by: STUDENT IN AN ORGANIZED HEALTH CARE EDUCATION/TRAINING PROGRAM

## 2025-03-12 PROCEDURE — 99214 OFFICE O/P EST MOD 30 MIN: CPT | Performed by: STUDENT IN AN ORGANIZED HEALTH CARE EDUCATION/TRAINING PROGRAM

## 2025-03-12 RX ORDER — HYDROXYCHLOROQUINE SULFATE 200 MG/1
200 TABLET, FILM COATED ORAL DAILY
Qty: 90 TABLET | Refills: 1 | Status: SHIPPED | OUTPATIENT
Start: 2025-03-12 | End: 2025-09-08

## 2025-03-12 ASSESSMENT — ENCOUNTER SYMPTOMS
EYE REDNESS: 0
ABDOMINAL PAIN: 0
SHORTNESS OF BREATH: 0
WEIGHT LOSS: 0
FEVER: 0
HEMOPTYSIS: 0
TINGLING: 0
HEMATURIA: 0
BLOOD IN STOOL: 0
DYSURIA: 0
SINUS PAIN: 0
COUGH: 0

## 2025-03-12 ASSESSMENT — PAIN SCALES - GENERAL: PAINLEVEL_OUTOF10: NO PAIN (0)

## 2025-03-12 NOTE — PROGRESS NOTES
RHEUMATOLOGY OUTPATIENT CLINIC NOTE     Referring Provider: Jesus Jacobson MD    Lab review  MARIAA positive, 1: 80, nuclear speckled  RNP: Negative  Block antibody: Negative  SSA: Negative  SSB: Negative  SCL 70: Negative  Centromere antibody: Negative  dsDNA: Negative  Complement C3/C4: Normal     CCP antibody: Negative  Rheumatoid factor: Negative     ANCA screen: Negative     IgA: 672, elevated, 3/2021  Ig, normal  IgM 44, slightly low     Cryoglobulin: Negative     Imaging review    MR enterography, :    Findings suggestive of mild nonspecific colitis involving the proximal sigmoid colon, though this finding is equivocal and could also be explained by underdistention. Recommend correlation with colonoscopy, as clinically indicated. No evidence of intra-abdominal fluid collection, fistula, or stricture.    MRI right hand, :   No evidence of inflammatory arthritis.    X-ray right hand, :   No evidence of erosive disease.      Subjective     Visit date 2025    Sondra is a 49 year old female who presents today for follow up.    Since the last visit,    Workup on  3/6/2025 showed:    Hemoglobin within normal limits   WBC  within normal limits   Platelets  within normal limits   ESR  within normal limits   CRP  mildly elevated   AST  within normal limits   Creatinine  within normal limits      URINE STUDIES  Recent Labs   Lab Test 25  1325 24  1549 24  1304 24  1706   UBLD Negative Negative Negative Negative   URINEPH 6.0 6.0 6.0 5.5   PROTEIN Negative Negative Negative 20*   NITRITE Negative Negative Negative Negative   LEUKEST Negative Negative Small* Moderate*   RBCU 0-2 0-2 0-2 1   WBCU 0-5 0-5 5-10* 12*        Today Sondra mentioned the following:    She has pain and stiffness in her hands  She does not feel prednisone made a big difference however she could not sleep much while on it.   She denies arthritis in the elbows, knees, feet.   She denies  joint swelling     She remains with bloating with food.  She is still on Rifaximin     Review of Systems   Constitutional:  Negative for fever and weight loss.   HENT:  Negative for congestion, hearing loss, nosebleeds and sinus pain.    Eyes:  Negative for redness.   Respiratory:  Negative for cough, hemoptysis and shortness of breath.    Cardiovascular:  Negative for chest pain.   Gastrointestinal:  Negative for abdominal pain and blood in stool.   Genitourinary:  Negative for dysuria and hematuria.   Musculoskeletal:  Negative for joint pain.   Skin:  Negative for rash.   Neurological:  Negative for tingling.       Current Medications   Hydroxychloroquine 200 mg daily, last eye exam 10/2024.     Past Medical history   Past Medical History:   Diagnosis Date    Allergic rhinitis 04/24/2003    Epic    Alopecia 09/14/2020    Arthralgia 09/14/2020    Blepharitis 05/13/2009    Epic    Decreased diffusion capacity of lung 12/09/2008    Diarrhea 10/26/2009    Episodic mood disorder 11/15/2004    Epic    Fructose intolerance 10/03/2018    Generalized anxiety disorder 02/07/2012    Insomnia 03/01/2011    Irritable bowel syndrome with diarrhea 10/03/2018    Leucocytoclastic vasculitis (H) 04/22/2020    Major depression, recurrent, full remission 11/04/2014    Noted at PCP visit.    Menopause 04/2022    Myopia 11/14/2008    Myopia - LasEk both 2009    NO ACTIVE PROBLEMS     Restrictive lung disease 12/09/2008    Seasonal affective disorder 10/04/2018    Social phobia 10/20/2016       Objective   /74 (BP Location: Right arm, Patient Position: Sitting, Cuff Size: Adult Regular)   Pulse 90   Wt 72.6 kg (160 lb)   SpO2 97%   BMI 25.06 kg/m        PHYSICAL EXAMINATION  Physical Exam  HENT:      Head: Normocephalic.      Mouth/Throat:      Mouth: Mucous membranes are moist.   Eyes:      Conjunctiva/sclera: Conjunctivae normal.   Pulmonary:      Effort: Pulmonary effort is normal.      Breath sounds: Normal breath sounds.    Musculoskeletal:         General: No swelling or tenderness.      Comments: No noticeable swelling or significant tenderness in the hands (MCP, PIP, DIP), wrists, elbows, knees, ankles, feet. Range of motion in the shoulders and hips are within accepted range for age.   MARLENI test negative bilaterally  Pain is 2/10  SJC: 0/28  TJC: 0/28   Skin:     Findings: No rash.   Neurological:      Mental Status: She is alert.       Assessment & Plan      # Concern for seronegative inflammatory arthritis  # History of cutaneous vasculitis  # chronic diarrhea  # history of elevated IgA     Sondra has been seen by several rheumatologists previously and has been following with rheumatology for concern for seronegative rheumatoid arthritis on hydroxychloroquine.    Other symptoms: History of hair loss [diagnosed as alopecia areata per dermatology, treated with steroid injection], lower extremity cutaneous vasculitis [biopsy showing leukocytoclastic vasculitis], arthralgia, dry eyes (uses eye drops twice daily), no oral dryness.     No personal or family history of skin psoriasis, no history of inflammatory eye disease or inflammatory bowel disease.  Comorbid conditions: Biopsy-proven leukocytoclastic vasculitis [2020], chronic diarrhea.    Treatment included: Hydroxychloroquine     # Concern for seronegative inflammatory arthritis    She is having some arthralgia mainly in the hands, there is no noticeable active arthritis today on examination.   She is tolerating hydroxychloroquine and did not have much improvement with prednisone.   Labs overall stable showing normal ESR and improved CRP.     Her disease seems to be low disease activity therefore we discussed continuing hydroxychloroquine and continue to observe     Plan:    - Continue hydroxychloroquine 200 mg daily for now    - Continue yearly eye exam while on hydroxychloroquine      # History of cutaneous vasculitis   Biopsy-proven leukocytoclastic vasculitis however DIF  was not done.    History of elevated IgA.    ANCA serology MPO/DE-3 negative and cryoglobulin negative  No evidence of recurrence.    Plan:  - If rash recurs then we will consult dermatology for biopsy evaluate for IgA deposition     # Chronic diarrhea, concern for inflammatory bowel disease  Follows with gastroenterology, underwent endoscopy with biopsy without evidence of inflammatory bowel disease  MR enterography negative for inflammatory bowel disease    Plan:  - Gastroenterology follow up     # Restrictive lung disease, diagnosed outside   No new symptoms  Plan:  Continue to watch for now.      # Screening for chronic infections  2021  QuantiFERON gold: Negative  Hepatitis B surface antigen: Negative  Hepatitis B core antibody: Negative  Hepatitis C antibody: Negative  HIV antibody: Negative    # Longitudinal care  The longitudinal plan of care for the diagnosis(es)/condition(s) as documented were addressed during this visit. Due to the added complexity in care, I will continue to support Sondra in the subsequent management and with ongoing continuity of care.    Review of the result(s) of each unique test - as HPI  Ordering of each unique test  Prescription drug management          Return in about 6 months (around 9/12/2025) for Follow up.    Luli Paul MD  [facility] RHEUMATOLOGY

## 2025-03-12 NOTE — NURSING NOTE
Chief Complaint   Patient presents with    RECHECK     4 mo f/u     /74 (BP Location: Right arm, Patient Position: Sitting, Cuff Size: Adult Regular)   Pulse 90   Wt 72.6 kg (160 lb)   SpO2 97%   BMI 25.06 kg/m

## 2025-03-12 NOTE — PATIENT INSTRUCTIONS
Our plan for today is:    - Tests:    Labs 1 week before the next appointment     - Medications:    Continue hydroxychloroquine 200 mg daily

## 2025-03-12 NOTE — LETTER
3/12/2025       RE: Sondra Sorensen  [address]  [place] [address]     Dear Colleague,    Thank you for referring your patient, Sondra Sorensen, to the [facility] RHEUMATOLOGY at [facility]. Please see a copy of my visit note below.    RHEUMATOLOGY OUTPATIENT CLINIC NOTE     Referring Provider: Jesus Jacobson MD    Lab review  MARIAA positive, 1: 80, nuclear speckled  RNP: Negative  Block antibody: Negative  SSA: Negative  SSB: Negative  SCL 70: Negative  Centromere antibody: Negative  dsDNA: Negative  Complement C3/C4: Normal     CCP antibody: Negative  Rheumatoid factor: Negative     ANCA screen: Negative     IgA: 672, elevated, 3/2021  Ig, normal  IgM 44, slightly low     Cryoglobulin: Negative     Imaging review    MR enterography, :    Findings suggestive of mild nonspecific colitis involving the proximal sigmoid colon, though this finding is equivocal and could also be explained by underdistention. Recommend correlation with colonoscopy, as clinically indicated. No evidence of intra-abdominal fluid collection, fistula, or stricture.    MRI right hand, :   No evidence of inflammatory arthritis.    X-ray right hand, :   No evidence of erosive disease.      Subjective    Visit date 2025    Sondra is a 49 year old female who presents today for follow up.    Since the last visit,    Workup on  3/6/2025 showed:    Hemoglobin within normal limits   WBC  within normal limits   Platelets  within normal limits   ESR  within normal limits   CRP  mildly elevated   AST  within normal limits   Creatinine  within normal limits      URINE STUDIES  Recent Labs   Lab Test 25  1325 24  1549 24  1304 24  1706   UBLD Negative Negative Negative Negative   URINEPH 6.0 6.0 6.0 5.5   PROTEIN Negative Negative Negative 20*   NITRITE Negative Negative Negative Negative   LEUKEST Negative Negative Small*  Moderate*   RBCU 0-2 0-2 0-2 1   WBCU 0-5 0-5 5-10* 12*        Today Sondra mentioned the following:    She has pain and stiffness in her hands  She does not feel prednisone made a big difference however she could not sleep much while on it.   She denies arthritis in the elbows, knees, feet.   She denies joint swelling     She remains with bloating with food.  She is still on Rifaximin     Review of Systems   Constitutional:  Negative for fever and weight loss.   HENT:  Negative for congestion, hearing loss, nosebleeds and sinus pain.    Eyes:  Negative for redness.   Respiratory:  Negative for cough, hemoptysis and shortness of breath.    Cardiovascular:  Negative for chest pain.   Gastrointestinal:  Negative for abdominal pain and blood in stool.   Genitourinary:  Negative for dysuria and hematuria.   Musculoskeletal:  Negative for joint pain.   Skin:  Negative for rash.   Neurological:  Negative for tingling.       Current Medications   Hydroxychloroquine 200 mg daily, last eye exam 10/2024.     Past Medical history   Past Medical History:   Diagnosis Date     Allergic rhinitis 04/24/2003    Epic     Alopecia 09/14/2020     Arthralgia 09/14/2020     Blepharitis 05/13/2009    Epic     Decreased diffusion capacity of lung 12/09/2008     Diarrhea 10/26/2009     Episodic mood disorder 11/15/2004    Epic     Fructose intolerance 10/03/2018     Generalized anxiety disorder 02/07/2012     Insomnia 03/01/2011     Irritable bowel syndrome with diarrhea 10/03/2018     Leucocytoclastic vasculitis (H) 04/22/2020     Major depression, recurrent, full remission 11/04/2014    Noted at PCP visit.     Menopause 04/2022     Myopia 11/14/2008    Myopia - LasEk both 2009     NO ACTIVE PROBLEMS      Restrictive lung disease 12/09/2008     Seasonal affective disorder 10/04/2018     Social phobia 10/20/2016       Objective  /74 (BP Location: Right arm, Patient Position: Sitting, Cuff Size: Adult Regular)   Pulse 90   Wt 72.6  kg (160 lb)   SpO2 97%   BMI 25.06 kg/m        PHYSICAL EXAMINATION  Physical Exam  HENT:      Head: Normocephalic.      Mouth/Throat:      Mouth: Mucous membranes are moist.   Eyes:      Conjunctiva/sclera: Conjunctivae normal.   Pulmonary:      Effort: Pulmonary effort is normal.      Breath sounds: Normal breath sounds.   Musculoskeletal:         General: No swelling or tenderness.      Comments: No noticeable swelling or significant tenderness in the hands (MCP, PIP, DIP), wrists, elbows, knees, ankles, feet. Range of motion in the shoulders and hips are within accepted range for age.   MARLENI test negative bilaterally  Pain is 2/10  SJC: 0/28  TJC: 0/28   Skin:     Findings: No rash.   Neurological:      Mental Status: She is alert.       Assessment & Plan     # Concern for seronegative inflammatory arthritis  # History of cutaneous vasculitis  # chronic diarrhea  # history of elevated IgA     Sondra has been seen by several rheumatologists previously and has been following with rheumatology for concern for seronegative rheumatoid arthritis on hydroxychloroquine.    Other symptoms: History of hair loss [diagnosed as alopecia areata per dermatology, treated with steroid injection], lower extremity cutaneous vasculitis [biopsy showing leukocytoclastic vasculitis], arthralgia, dry eyes (uses eye drops twice daily), no oral dryness.     No personal or family history of skin psoriasis, no history of inflammatory eye disease or inflammatory bowel disease.  Comorbid conditions: Biopsy-proven leukocytoclastic vasculitis [2020], chronic diarrhea.    Treatment included: Hydroxychloroquine     # Concern for seronegative inflammatory arthritis    She is having some arthralgia mainly in the hands, there is no noticeable active arthritis today on examination.   She is tolerating hydroxychloroquine and did not have much improvement with prednisone.   Labs overall stable showing normal ESR and improved CRP.     Her disease  seems to be low disease activity therefore we discussed continuing hydroxychloroquine and continue to observe     Plan:    - Continue hydroxychloroquine 200 mg daily for now    - Continue yearly eye exam while on hydroxychloroquine      # History of cutaneous vasculitis   Biopsy-proven leukocytoclastic vasculitis however DIF was not done.    History of elevated IgA.    ANCA serology MPO/NC-3 negative and cryoglobulin negative  No evidence of recurrence.    Plan:  - If rash recurs then we will consult dermatology for biopsy evaluate for IgA deposition     # Chronic diarrhea, concern for inflammatory bowel disease  Follows with gastroenterology, underwent endoscopy with biopsy without evidence of inflammatory bowel disease  MR enterography negative for inflammatory bowel disease    Plan:  - Gastroenterology follow up     # Restrictive lung disease, diagnosed outside   No new symptoms  Plan:  Continue to watch for now.      # Screening for chronic infections  2021  QuantiFERON gold: Negative  Hepatitis B surface antigen: Negative  Hepatitis B core antibody: Negative  Hepatitis C antibody: Negative  HIV antibody: Negative    # Longitudinal care  The longitudinal plan of care for the diagnosis(es)/condition(s) as documented were addressed during this visit. Due to the added complexity in care, I will continue to support Sondra in the subsequent management and with ongoing continuity of care.    Review of the result(s) of each unique test - as HPI  Ordering of each unique test  Prescription drug management          Return in about 6 months (around 9/12/2025) for Follow up.    Luli Paul MD  [facility] RHEUMATOLOGY      Again, thank you for allowing me to participate in the care of your patient.      Sincerely,    Luli Paul MD

## 2025-03-13 DIAGNOSIS — N95.1 PERIMENOPAUSE: ICD-10-CM

## 2025-03-13 RX ORDER — ESTRADIOL 0.1 MG/D
1 FILM, EXTENDED RELEASE TRANSDERMAL
Qty: 24 PATCH | Refills: 5 | Status: SHIPPED | OUTPATIENT
Start: 2025-03-13

## 2025-04-22 ASSESSMENT — PATIENT HEALTH QUESTIONNAIRE - PHQ9
10. IF YOU CHECKED OFF ANY PROBLEMS, HOW DIFFICULT HAVE THESE PROBLEMS MADE IT FOR YOU TO DO YOUR WORK, TAKE CARE OF THINGS AT HOME, OR GET ALONG WITH OTHER PEOPLE: SOMEWHAT DIFFICULT
SUM OF ALL RESPONSES TO PHQ QUESTIONS 1-9: 11
SUM OF ALL RESPONSES TO PHQ QUESTIONS 1-9: 11

## 2025-04-23 ENCOUNTER — OFFICE VISIT (OUTPATIENT)
Dept: FAMILY MEDICINE | Facility: CLINIC | Age: 50
End: 2025-04-23
Payer: COMMERCIAL

## 2025-04-23 VITALS
HEART RATE: 84 BPM | BODY MASS INDEX: 24.61 KG/M2 | SYSTOLIC BLOOD PRESSURE: 110 MMHG | WEIGHT: 156.8 LBS | HEIGHT: 67 IN | TEMPERATURE: 97.9 F | DIASTOLIC BLOOD PRESSURE: 60 MMHG | RESPIRATION RATE: 20 BRPM | OXYGEN SATURATION: 98 %

## 2025-04-23 DIAGNOSIS — N95.1 PERIMENOPAUSE: Primary | ICD-10-CM

## 2025-04-23 DIAGNOSIS — Z12.31 ENCOUNTER FOR SCREENING MAMMOGRAM FOR BREAST CANCER: ICD-10-CM

## 2025-04-23 DIAGNOSIS — M31.0 LEUCOCYTOCLASTIC VASCULITIS (H): ICD-10-CM

## 2025-04-23 DIAGNOSIS — R94.2 DECREASED DIFFUSION CAPACITY OF LUNG: ICD-10-CM

## 2025-04-23 DIAGNOSIS — J98.4 RESTRICTIVE LUNG DISEASE: ICD-10-CM

## 2025-04-23 DIAGNOSIS — K58.0 IRRITABLE BOWEL SYNDROME WITH DIARRHEA: ICD-10-CM

## 2025-04-23 PROBLEM — F88 SENSORY INTEGRATION DISORDER: Status: ACTIVE | Noted: 2024-01-21

## 2025-04-23 PROBLEM — F34.1 PERSISTENT DEPRESSIVE DISORDER: Status: ACTIVE | Noted: 2024-01-21

## 2025-04-23 PROBLEM — Z13.6 CARDIOVASCULAR SCREENING; LDL GOAL LESS THAN 130: Status: RESOLVED | Noted: 2018-10-03 | Resolved: 2025-04-23

## 2025-04-23 PROCEDURE — 99204 OFFICE O/P NEW MOD 45 MIN: CPT | Performed by: INTERNAL MEDICINE

## 2025-04-23 PROCEDURE — 3078F DIAST BP <80 MM HG: CPT | Performed by: INTERNAL MEDICINE

## 2025-04-23 PROCEDURE — 3074F SYST BP LT 130 MM HG: CPT | Performed by: INTERNAL MEDICINE

## 2025-04-23 PROCEDURE — 1126F AMNT PAIN NOTED NONE PRSNT: CPT | Performed by: INTERNAL MEDICINE

## 2025-04-23 RX ORDER — BUPROPION HYDROCHLORIDE 75 MG/1
TABLET ORAL
COMMUNITY

## 2025-04-23 RX ORDER — AZELAIC ACID 0.15 G/G
GEL TOPICAL
COMMUNITY
Start: 2024-12-09

## 2025-04-23 RX ORDER — CHOLECALCIFEROL (VITAMIN D3) 50 MCG
TABLET ORAL
COMMUNITY
Start: 2024-01-23

## 2025-04-23 RX ORDER — CLONIDINE HYDROCHLORIDE 0.1 MG/1
0.1 TABLET ORAL
COMMUNITY
Start: 2024-04-01

## 2025-04-23 RX ORDER — FLUTICASONE PROPIONATE 50 MCG
SPRAY, SUSPENSION (ML) NASAL
COMMUNITY
Start: 2020-08-25

## 2025-04-23 RX ORDER — PROGESTERONE 200 MG/1
200 CAPSULE ORAL DAILY
Qty: 90 CAPSULE | Refills: 4 | Status: SHIPPED | OUTPATIENT
Start: 2025-04-23

## 2025-04-23 RX ORDER — ETHYNODIOL DIACETATE AND ETHINYL ESTRADIOL 1 MG-35MCG
1 KIT ORAL
COMMUNITY
Start: 2024-11-03 | End: 2025-04-23

## 2025-04-23 ASSESSMENT — PAIN SCALES - GENERAL: PAINLEVEL_OUTOF10: NO PAIN (0)

## 2025-04-23 NOTE — PATIENT INSTRUCTIONS
- Perhaps talk about bupropion twice daily dosing and if there's a different way to help with sleep.    - Try Prometrium 200 mg nightly and see if that makes any positive effect on brain fog, mood, or sleep compared to the Slynd    -     -

## 2025-04-23 NOTE — PROGRESS NOTES
Assessment & Plan     (N95.1) Perimenopause  (primary encounter diagnosis)  Comment: Sees BURTON Álvarez- cyclical bleeding on Slynd, sleep disturbance.  Hotflashes better- interested in trying Prometrium; switch to that and see me in 3 mos.  Plan: progesterone (PROMETRIUM) 200 MG capsule    (Z12.31) Encounter for screening mammogram for breast cancer  Comment: Ordered.  Plan: [place] Screen Bilateral w/[facility]    (K58.0) Irritable bowel syndrome with diarrhea  Comment: Sees GI, fructose intolerance.  Improved with diet adherence; still GERD and diarrhea; check gastrin with next labs.    (R94.2) Decreased diffusion capacity of lung  Comment: Grandmother with pulm fibrosis; saw Pulm several years ago; no CT chest.  No progressive symptoms, but still some AYALA.    (M31.0) Leucocytoclastic vasculitis (H)  Comment: Remotely (no IF testing, if recurs should test for IgA deposits per Rheum), now sees Rheum, saw Derm.    (J98.4) Restrictive lung disease  Comment: Grandmother with pulm fibrosis; saw Pulm several years ago; no CT chest. No progressive symptoms, but still some AYALA.                   Subjective   Sondra is a 49 year old, presenting for the following health issues:  Establish Care (Ongoing health issues)        4/23/2025     2:03 PM   Additional Questions   Roomed by Anjelica Rouse   Accompanied by alone         4/23/2025     2:03 PM   Patient Reported Additional Medications   Patient reports taking the following new medications none     History of Present Illness       Reason for visit:  New patient   She is taking medications regularly.        Feb 2020 had weird leukocytoclastic vasculitis, alopecia areata.  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    TMD clinic at the [facility]- saw physical therapy and dentist there.    IBS  Menopause  Was taking birth control pills and they were working pretty well but then was getting  "some brain fog.  Wonders if had some allergy- because was itchy everywhere.  Now on estrogen patch and progesterone only birth control.  Patch helped with hotflashes and insomnia- still not sleeping that well, but it's better.    Seroneg RA,history of cutaneous vasculitis, alopecia areata- Rheum- Plaquenil  IBS-D- [facility]  MRE 2024- colitis? Had endoscopy with bx- neg?  Restrictive lung ds??- was feeling short of breath, mentioned it to allergist who referred to pulmonary- did not find anything but was told her lungs were stiffer than usual.  Had expansive work up by pulmonary and then was referred to cardiology. Saw Laura Awan ~2008.  \"Pulmonary function tests were performed today in the clinic and show an  FEV-1 of 2.52 liters, 75% of predicted and an FVC of 2.67 liters, 66% of  predicted. Albuterol was given as a bronchodilator and there was no  change. Her total lung capacity is 4.03 liters, 74% of predicted and her  adjusted DLCO is 17.2, 68% of predicted. Her residual volume is 1.2, 84%  of predicted and her RV-TLC ratio is slightly elevated at 30% with a  predicted value of 25%. Her peak inspiratory respiratory muscle force was  55, 63% of predicted and her peak expiratory muscle force was 60, 39% of  predicted. These pulmonary function tests are consistent with:  1. No obstructive defect noted.  2. Normal methacholine challenge, therefore no asthma.  3. Mild decreased lung volumes consistent with mild restrictive lung  disease.  4. Mild decreased diffusion capacity.\"    Had CT chest 1/12/09:  CONCLUSION:   1.  Negative for interstitial lung disease.   2.  3 mm indeterminant nodule in the left lower lobe most likely benign.   Recommend follow up CT in one year.       Sibo test- started rifaximin and omeprazole in the fall which helped diarrhea; then recurred but rifaximin did not help again.    No chronic cough.  Hands and feet are most stiff and painful.    Does have GERD- got better in the fall after double " "dose of omeprazole- diarrhea might have gotten better.    Bupropion XR made her ears ring.      Objective    /60 (BP Location: Right arm, Patient Position: Sitting, Cuff Size: Adult Regular)   Pulse 84   Temp 97.9  F (36.6  C) (Temporal)   Resp 20   Ht 1.695 m (5' 6.73\")   Wt 71.1 kg (156 lb 12.8 oz)   LMP  (LMP Unknown)   SpO2 98%   BMI 24.76 kg/m    Body mass index is 24.76 kg/m .  Physical Exam   GENERAL: alert and no distress  RESP: lungs clear to auscultation - no rales, rhonchi or wheezes  CV: regular rate and rhythm, normal S1 S2, no S3 or S4, no murmur, click or rub, no peripheral edema  MS: no gross musculoskeletal defects noted, no edema  PSYCH: mentation appears normal, affect normal/bright            Signed Electronically by: Juanita Bryant, DO    "

## 2025-04-25 ENCOUNTER — ANCILLARY PROCEDURE (OUTPATIENT)
Dept: MAMMOGRAPHY | Facility: CLINIC | Age: 50
End: 2025-04-25
Attending: INTERNAL MEDICINE
Payer: COMMERCIAL

## 2025-04-25 DIAGNOSIS — Z12.31 ENCOUNTER FOR SCREENING MAMMOGRAM FOR BREAST CANCER: ICD-10-CM

## 2025-04-25 PROCEDURE — 77063 BREAST TOMOSYNTHESIS BI: CPT | Performed by: STUDENT IN AN ORGANIZED HEALTH CARE EDUCATION/TRAINING PROGRAM

## 2025-04-25 PROCEDURE — 77067 SCR MAMMO BI INCL CAD: CPT | Performed by: STUDENT IN AN ORGANIZED HEALTH CARE EDUCATION/TRAINING PROGRAM

## 2025-06-22 ENCOUNTER — OFFICE VISIT (OUTPATIENT)
Dept: URGENT CARE | Facility: URGENT CARE | Age: 50
End: 2025-06-22
Payer: COMMERCIAL

## 2025-06-22 VITALS
HEIGHT: 67 IN | SYSTOLIC BLOOD PRESSURE: 118 MMHG | HEART RATE: 76 BPM | RESPIRATION RATE: 16 BRPM | BODY MASS INDEX: 24.17 KG/M2 | WEIGHT: 154 LBS | OXYGEN SATURATION: 97 % | DIASTOLIC BLOOD PRESSURE: 74 MMHG | TEMPERATURE: 97.1 F

## 2025-06-22 DIAGNOSIS — L71.9 ROSACEA: Primary | ICD-10-CM

## 2025-06-22 PROCEDURE — 3074F SYST BP LT 130 MM HG: CPT | Performed by: INTERNAL MEDICINE

## 2025-06-22 PROCEDURE — 99213 OFFICE O/P EST LOW 20 MIN: CPT | Performed by: INTERNAL MEDICINE

## 2025-06-22 PROCEDURE — 3078F DIAST BP <80 MM HG: CPT | Performed by: INTERNAL MEDICINE

## 2025-06-22 RX ORDER — METRONIDAZOLE TOPICAL 7.5 MG/G
GEL TOPICAL 2 TIMES DAILY
Qty: 45 G | Refills: 0 | Status: SHIPPED | OUTPATIENT
Start: 2025-06-22

## 2025-06-22 RX ORDER — SPIRONOLACTONE 50 MG/1
TABLET, FILM COATED ORAL
COMMUNITY
Start: 2025-04-24

## 2025-06-22 NOTE — PROGRESS NOTES
"SUBJECTIVE:  Chief complaint of a red spot on the nose for the past week or so that seems to be increasing.  This is dry and flaky.  Tried some antibiotic ointment, some hydrocortisone.  Sees dermatologist for rosacea. She did try some of her azelaic acid on this spot as well.     OBJECTIVE:  /74 (BP Location: Right arm, Patient Position: Chair, Cuff Size: Adult Regular)   Pulse 76   Temp 97.1  F (36.2  C) (Temporal)   Resp 16   Ht 1.689 m (5' 6.5\")   Wt 69.9 kg (154 lb)   LMP  (LMP Unknown)   SpO2 97%   BMI 24.48 kg/m    GEN: alert and interactive  SKIN: there is a telangiectatic macule with some epidermal flaking on the right nasal ala and abutting the nostril    ASSESSMENT/PLAN:    ICD-10-CM    1. Rosacea  L71.9 metroNIDAZOLE (METROGEL) 0.75 % external gel          Emery Tilley MD   "

## 2025-06-22 NOTE — PROGRESS NOTES
Urgent Care Clinic Visit    Chief Complaint   Patient presents with    Derm Problem     Skin infection right side of nose               6/22/2025     5:48 PM   Additional Questions   Roomed by Maira Wing/LENY  [place]---[facility]

## 2025-07-21 ENCOUNTER — OFFICE VISIT (OUTPATIENT)
Dept: FAMILY MEDICINE | Facility: CLINIC | Age: 50
End: 2025-07-21
Payer: COMMERCIAL

## 2025-07-21 VITALS
HEIGHT: 67 IN | SYSTOLIC BLOOD PRESSURE: 112 MMHG | BODY MASS INDEX: 24.2 KG/M2 | TEMPERATURE: 97.4 F | DIASTOLIC BLOOD PRESSURE: 70 MMHG | WEIGHT: 154.2 LBS | RESPIRATION RATE: 13 BRPM | HEART RATE: 78 BPM | OXYGEN SATURATION: 98 %

## 2025-07-21 DIAGNOSIS — Z78.0 ENCOUNTER FOR OSTEOPOROSIS SCREENING IN ASYMPTOMATIC POSTMENOPAUSAL PATIENT: ICD-10-CM

## 2025-07-21 DIAGNOSIS — N95.1 PERIMENOPAUSE: ICD-10-CM

## 2025-07-21 DIAGNOSIS — Z12.4 SCREENING FOR CERVICAL CANCER: ICD-10-CM

## 2025-07-21 DIAGNOSIS — L65.9 ALOPECIA: ICD-10-CM

## 2025-07-21 DIAGNOSIS — Z00.00 ROUTINE GENERAL MEDICAL EXAMINATION AT A HEALTH CARE FACILITY: Primary | ICD-10-CM

## 2025-07-21 DIAGNOSIS — L21.9 SEBORRHEIC DERMATITIS: ICD-10-CM

## 2025-07-21 DIAGNOSIS — Z13.1 SCREENING FOR DIABETES MELLITUS: ICD-10-CM

## 2025-07-21 DIAGNOSIS — Z13.820 ENCOUNTER FOR OSTEOPOROSIS SCREENING IN ASYMPTOMATIC POSTMENOPAUSAL PATIENT: ICD-10-CM

## 2025-07-21 LAB
ANION GAP SERPL CALCULATED.3IONS-SCNC: 12 MMOL/L (ref 7–15)
BUN SERPL-MCNC: 14.2 MG/DL (ref 6–20)
CALCIUM SERPL-MCNC: 9.6 MG/DL (ref 8.8–10.4)
CHLORIDE SERPL-SCNC: 104 MMOL/L (ref 98–107)
CHOLEST SERPL-MCNC: 163 MG/DL
CREAT SERPL-MCNC: 0.73 MG/DL (ref 0.51–0.95)
EGFRCR SERPLBLD CKD-EPI 2021: >90 ML/MIN/1.73M2
EST. AVERAGE GLUCOSE BLD GHB EST-MCNC: 117 MG/DL
FASTING STATUS PATIENT QL REPORTED: NO
FASTING STATUS PATIENT QL REPORTED: NO
GLUCOSE SERPL-MCNC: 93 MG/DL (ref 70–99)
HBA1C MFR BLD: 5.7 % (ref 0–5.6)
HCO3 SERPL-SCNC: 24 MMOL/L (ref 22–29)
HDLC SERPL-MCNC: 67 MG/DL
LDLC SERPL CALC-MCNC: 72 MG/DL
NONHDLC SERPL-MCNC: 96 MG/DL
POTASSIUM SERPL-SCNC: 4.3 MMOL/L (ref 3.4–5.3)
SODIUM SERPL-SCNC: 140 MMOL/L (ref 135–145)
TRIGL SERPL-MCNC: 119 MG/DL

## 2025-07-21 PROCEDURE — 36415 COLL VENOUS BLD VENIPUNCTURE: CPT | Performed by: INTERNAL MEDICINE

## 2025-07-21 PROCEDURE — 99396 PREV VISIT EST AGE 40-64: CPT | Performed by: INTERNAL MEDICINE

## 2025-07-21 PROCEDURE — 80061 LIPID PANEL: CPT | Performed by: INTERNAL MEDICINE

## 2025-07-21 PROCEDURE — G0145 SCR C/V CYTO,THINLAYER,RESCR: HCPCS | Performed by: INTERNAL MEDICINE

## 2025-07-21 PROCEDURE — 3074F SYST BP LT 130 MM HG: CPT | Performed by: INTERNAL MEDICINE

## 2025-07-21 PROCEDURE — 80048 BASIC METABOLIC PNL TOTAL CA: CPT | Performed by: INTERNAL MEDICINE

## 2025-07-21 PROCEDURE — 83036 HEMOGLOBIN GLYCOSYLATED A1C: CPT | Performed by: INTERNAL MEDICINE

## 2025-07-21 PROCEDURE — 3044F HG A1C LEVEL LT 7.0%: CPT | Performed by: INTERNAL MEDICINE

## 2025-07-21 PROCEDURE — 87624 HPV HI-RISK TYP POOLED RSLT: CPT | Performed by: INTERNAL MEDICINE

## 2025-07-21 PROCEDURE — 1126F AMNT PAIN NOTED NONE PRSNT: CPT | Performed by: INTERNAL MEDICINE

## 2025-07-21 PROCEDURE — 99213 OFFICE O/P EST LOW 20 MIN: CPT | Mod: 25 | Performed by: INTERNAL MEDICINE

## 2025-07-21 PROCEDURE — 3078F DIAST BP <80 MM HG: CPT | Performed by: INTERNAL MEDICINE

## 2025-07-21 RX ORDER — KETOCONAZOLE 20 MG/ML
SHAMPOO, SUSPENSION TOPICAL PRN
Qty: 120 ML | Refills: 11 | Status: SHIPPED | OUTPATIENT
Start: 2025-07-21

## 2025-07-21 RX ORDER — SPIRONOLACTONE 50 MG/1
50 TABLET, FILM COATED ORAL DAILY
Qty: 90 TABLET | Refills: 4 | Status: SHIPPED | OUTPATIENT
Start: 2025-07-21

## 2025-07-21 SDOH — HEALTH STABILITY: PHYSICAL HEALTH: ON AVERAGE, HOW MANY MINUTES DO YOU ENGAGE IN EXERCISE AT THIS LEVEL?: 30 MIN

## 2025-07-21 SDOH — HEALTH STABILITY: PHYSICAL HEALTH: ON AVERAGE, HOW MANY DAYS PER WEEK DO YOU ENGAGE IN MODERATE TO STRENUOUS EXERCISE (LIKE A BRISK WALK)?: 3 DAYS

## 2025-07-21 ASSESSMENT — PAIN SCALES - GENERAL: PAINLEVEL_OUTOF10: NO PAIN (0)

## 2025-07-21 ASSESSMENT — PATIENT HEALTH QUESTIONNAIRE - PHQ9: SUM OF ALL RESPONSES TO PHQ QUESTIONS 1-9: 3

## 2025-07-21 ASSESSMENT — SOCIAL DETERMINANTS OF HEALTH (SDOH): HOW OFTEN DO YOU GET TOGETHER WITH FRIENDS OR RELATIVES?: TWICE A WEEK

## 2025-07-21 NOTE — PROGRESS NOTES
Preventive Care Visit  [facility]  Juanita Bryant DO, Internal Medicine  Jul 21, 2025      Assessment & Plan     (Z00.00) Routine general medical examination at a health care facility  (primary encounter diagnosis)  Comment:   Plan: Lipid panel reflex to direct LDL Non-fasting  Mammo: 4/25/25  Pap: 12/5/19  DEXA: Mom with osteoporosis- start age 50  Colon (45-75): 7/2/24- repeat in 10 years  Immunizations: UTD  Labs: Lipids, diabetes screen  Discussed healthy lifestyle and aging recommendations including regular exercise, 5+ fruits and veggies daily.    (L65.9) Alopecia  Comment: Would like me to take over spironolactone- check labs today.  Plan: Basic metabolic panel  (Ca, Cl, CO2, Creat,         Gluc, K, Na, BUN), spironolactone (ALDACTONE)         50 MG tablet    (N95.1) Perimenopause  Comment: Prometrium working better- hotflashes gone and sleep better.  Cont estradiol and progesterone.    (L21.9) Seborrheic dermatitis  Comment: Cont current mgmt- ketocon shampoo.  Plan: ketoconazole (NIZORAL) 2 % external shampoo    (Z13.1) Screening for diabetes mellitus  Comment:   Plan: Hemoglobin A1c    (Z12.4) Screening for cervical cancer  Comment:   Plan: HPV and Gynecologic Cytology Panel -         Recommended Age 30 - 65 Years    (Z13.820,  Z78.0) Encounter for osteoporosis screening in asymptomatic postmenopausal patient  Comment:   Plan: DX Bone Density     Patient has been advised of split billing requirements and indicates understanding: Yes    Counseling  Appropriate preventive services were addressed with this patient via screening, questionnaire, or discussion as appropriate for fall prevention, nutrition, physical activity, Tobacco-use cessation, social engagement, weight loss and cognition.  Checklist reviewing preventive services available has been given to the patient.  Reviewed patient's diet, addressing concerns and/or questions.   She is at risk for lack of exercise  and has been provided with information to increase physical activity for the benefit of her well-being.   She is at risk for psychosocial distress and has been provided with information to reduce risk.   Reviewed preventive health counseling, as reflected in patient instructions        Subjective   Sondra is a 49 year old, presenting for the following:  Annual Visit        7/21/2025     9:56 AM   Additional Questions   Roomed by AY          HALEY    I last saw 4/23/25- perimenopause; switched to Prometrium due to cyclical bleeding on Slynd and sleep disturbance; IBS; leukocytoclastic vasculitis       Advance Care Planning    Discussed advance care planning with patient; however, patient declined at this time.        7/21/2025   General Health   How would you rate your overall physical health? Good   Feel stress (tense, anxious, or unable to sleep) Rather much   (!) STRESS CONCERN      7/21/2025   Nutrition   Three or more servings of calcium each day? (!) NO   Diet: Other   If other, please elaborate: Low fructose that im not good at sticking with.   How many servings of fruit and vegetables per day? (!) 2-3   How many sweetened beverages each day? 0-1         7/21/2025   Exercise   Days per week of moderate/strenous exercise 3 days   Average minutes spent exercising at this level 30 min         7/21/2025   Social Factors   Frequency of gathering with friends or relatives Twice a week   Worry food won't last until get money to buy more No   Food not last or not have enough money for food? No   Do you have housing? (Housing is defined as stable permanent housing and does not include staying outside in a car, in a tent, in an abandoned building, in an overnight shelter, or couch-surfing.) Yes   Are you worried about losing your housing? No   Lack of transportation? No   Unable to get utilities (heat,electricity)? No         7/21/2025   Dental   Dentist two times every year? Yes       Today's PHQ-9 Score:        7/21/2025    10:00 AM   PHQ-9 SCORE   PHQ-9 Total Score 3         7/21/2025   Substance Use   Alcohol more than 3/day or more than 7/wk No   Do you use any other substances recreationally? No     Social History     Tobacco Use    Smoking status: Never     Passive exposure: Never    Smokeless tobacco: Never   Vaping Use    Vaping status: Never Used   Substance Use Topics    Alcohol use: Yes     Comment: Very light- 2 drinks every 2-3 months    Drug use: No           4/25/2025   LAST FHS-7 RESULTS   1st degree relative breast or ovarian cancer No   Any relative bilateral breast cancer No   Any male have breast cancer No   Any ONE woman have BOTH breast AND ovarian cancer No   Any woman with breast cancer before 50yrs No   2 or more relatives with breast AND/OR ovarian cancer No        Mammogram Screening - Mammogram every 1-2 years updated in Health Maintenance based on mutual decision making        7/21/2025   STI Screening   New sexual partner(s) since last STI/HIV test? No     History of abnormal Pap smear: No - age 30- 64 PAP with HPV every 5 years recommended        Latest Ref Rng & Units 12/5/2019     3:00 PM 12/5/2019    12:55 PM 7/7/2015    12:00 AM   PAP / HPV   PAP (Historical)  NIL      HPV 16 DNA NEG^Negative  Negative     HPV 18 DNA NEG^Negative  Negative     Other HR HPV NEG^Negative  Negative     PAP-ABSTRACT    See Scanned Document      ASCVD Risk   The ASCVD Risk score (Kay SHARP, et al., 2019) failed to calculate for the following reasons:    Cannot find a previous HDL lab    Cannot find a previous total cholesterol lab        7/21/2025   Contraception/Family Planning   Questions about contraception or family planning No   What are your periods like? Not currently having periods        Reviewed and updated as needed this visit by Provider                    Past Medical History:   Diagnosis Date    Allergic rhinitis 04/24/2003    Epic    Alopecia 09/14/2020    Arthralgia 09/14/2020     "Arthritis     Blepharitis 05/13/2009    Epic    Decreased diffusion capacity of lung 12/09/2008    Depressive disorder     Diarrhea 10/26/2009    Dysphonia 5/28/2020    Episodic mood disorder 11/15/2004    Epic    Fructose intolerance 10/03/2018    Gastroesophageal reflux disease 5/28/2020    Generalized anxiety disorder 02/07/2012    Insomnia 03/01/2011    Irritable bowel syndrome with diarrhea 10/03/2018    Leucocytoclastic vasculitis (H) 04/22/2020    Major depression, recurrent, full remission 11/04/2014    Noted at PCP visit.    Menopause 04/2022    Myopia 11/14/2008    Myopia - LasEk both 2009    Restrictive lung disease 12/09/2008    Seasonal affective disorder 10/04/2018    Social phobia 10/20/2016    Temporomandibular disorder     Tinnitus 5/24/2017    I don't notice this much anymore--possible med side effect    Vasculitis Spring 2020    I had Petechiae on my lower legs. It has only happened once.     Past Surgical History:   Procedure Laterality Date    COLONOSCOPY N/A 07/02/2024    Procedure: Colonoscopy;  Surgeon: Chapis Jarrell MD;  [place] [facility] [place]    ESOPHAGOSCOPY, GASTROSCOPY, DUODENOSCOPY (EGD), COMBINED N/A 07/02/2024    Procedure: ESOPHAGOGASTRODUODENOSCOPY, WITH BIOPSY;  Surgeon: Chapis Jarrell MD;  Location: [facility] [place]    [facility] Bilateral 2009     Lab work is in process         Objective    Exam  /70 (BP Location: Right arm, Patient Position: Sitting, Cuff Size: Adult Regular)   Pulse 78   Temp 97.4  F (36.3  C) (Temporal)   Resp 13   Ht 1.7 m (5' 6.93\")   Wt 69.9 kg (154 lb 3.2 oz)   SpO2 98%   BMI 24.20 kg/m     Estimated body mass index is 24.2 kg/m  as calculated from the following:    Height as of this encounter: 1.7 m (5' 6.93\").    Weight as of this encounter: 69.9 kg (154 lb 3.2 oz).    Physical Exam  GENERAL: alert and no distress  EYES: Eyes grossly normal to inspection, PERRL and conjunctivae and sclerae normal  HENT: ear canals and TM's normal, nose and mouth without " ulcers or lesions  NECK: no adenopathy, no asymmetry, masses, or scars  RESP: lungs clear to auscultation - no rales, rhonchi or wheezes  BREAST: normal without masses, tenderness or nipple discharge and no palpable axillary masses or adenopathy  CV: regular rate and rhythm, normal S1 S2, no S3 or S4, no murmur, click or rub, no peripheral edema   (female): normal female external genitalia, normal urethral meatus, normal vaginal mucosa  MS: no gross musculoskeletal defects noted, no edema  SKIN: no suspicious lesions or rashes  NEURO: Normal strength and tone, mentation intact and speech normal  PSYCH: mentation appears normal, affect normal/bright        Signed Electronically by: Juanita Bryant, DO

## 2025-07-21 NOTE — PATIENT INSTRUCTIONS
Patient Education   Preventive Care Advice   This is general advice given by our system to help you stay healthy. However, your care team may have specific advice just for you. Please talk to your care team about your preventive care needs.  Nutrition  Eat 5 or more servings of fruits and vegetables each day.  Try wheat bread, brown rice and whole grain pasta (instead of white bread, rice, and pasta).  Get enough calcium and vitamin D. Check the label on foods and aim for 100% of the RDA (recommended daily allowance).  Lifestyle  Exercise at least 150 minutes each week  (30 minutes a day, 5 days a week).  Do muscle strengthening activities 2 days a week. These help control your weight and prevent disease.  No smoking.  Wear sunscreen to prevent skin cancer.  Have a dental exam and cleaning every 6 months.  Yearly exams  See your health care team every year to talk about:  Any changes in your health.  Any medicines your care team has prescribed.  Preventive care, family planning, and ways to prevent chronic diseases.  Shots (vaccines)   HPV shots (up to age 26), if you've never had them before.  Hepatitis B shots (up to age 59), if you've never had them before.  COVID-19 shot: Get this shot when it's due.  Flu shot: Get a flu shot every year.  Tetanus shot: Get a tetanus shot every 10 years.  Pneumococcal, hepatitis A, and RSV shots: Ask your care team if you need these based on your risk.  Shingles shot (for age 50 and up)  General health tests  Diabetes screening:  Starting at age 35, Get screened for diabetes at least every 3 years.  If you are younger than age 35, ask your care team if you should be screened for diabetes.  Cholesterol test: At age 39, start having a cholesterol test every 5 years, or more often if advised.  Bone density scan (DEXA): At age 50, ask your care team if you should have this scan for osteoporosis (brittle bones).  Hepatitis C: Get tested at least once in your life.  STIs (sexually  transmitted infections)  Before age 24: Ask your care team if you should be screened for STIs.  After age 24: Get screened for STIs if you're at risk. You are at risk for STIs (including HIV) if:  You are sexually active with more than one person.  You don't use condoms every time.  You or a partner was diagnosed with a sexually transmitted infection.  If you are at risk for HIV, ask about PrEP medicine to prevent HIV.  Get tested for HIV at least once in your life, whether you are at risk for HIV or not.  Cancer screening tests  Cervical cancer screening: If you have a cervix, begin getting regular cervical cancer screening tests starting at age 21.  Breast cancer scan (mammogram): If you've ever had breasts, begin having regular mammograms starting at age 40. This is a scan to check for breast cancer.  Colon cancer screening: It is important to start screening for colon cancer at age 45.  Have a colonoscopy test every 10 years (or more often if you're at risk) Or, ask your provider about stool tests like a FIT test every year or Cologuard test every 3 years.  To learn more about your testing options, visit:   .  For help making a decision, visit:   https://bit.ly/ge48731.  Prostate cancer screening test: If you have a prostate, ask your care team if a prostate cancer screening test (PSA) at age 55 is right for you.  Lung cancer screening: If you are a current or former smoker ages 50 to 80, ask your care team if ongoing lung cancer screenings are right for you.  For informational purposes only. Not to replace the advice of your health care provider. Copyright   2023 [facility]. All rights reserved. Clinically reviewed by the [facility]. [employer] [id number] - REV 01/24.  Learning About Stress  What is stress?     Stress is your body's response to a hard situation. Your body can have a physical, emotional, or mental response. Stress is a fact of life for most people, and it  affects everyone differently. What causes stress for you may not be stressful for someone else.  A lot of things can cause stress. You may feel stress when you go on a job interview, take a test, or run a race. This kind of short-term stress is normal and even useful. It can help you if you need to work hard or react quickly. For example, stress can help you finish an important job on time.  Long-term stress is caused by ongoing stressful situations or events. Examples of long-term stress include long-term health problems, ongoing problems at work, or conflicts in your family. Long-term stress can harm your health.  How does stress affect your health?  When you are stressed, your body responds as though you are in danger. It makes hormones that speed up your heart, make you breathe faster, and give you a burst of energy. This is called the fight-or-flight stress response. If the stress is over quickly, your body goes back to normal and no harm is done.  But if stress happens too often or lasts too long, it can have bad effects. Long-term stress can make you more likely to get sick, and it can make symptoms of some diseases worse. If you tense up when you are stressed, you may develop neck, shoulder, or low back pain. Stress is linked to high blood pressure and heart disease.  Stress also harms your emotional health. It can make you gutierrez, tense, or depressed. Your relationships may suffer, and you may not do well at work or school.  What can you do to manage stress?  You can try these things to help manage stress:   Do something active. Exercise or activity can help reduce stress. Walking is a great way to get started. Even everyday activities such as housecleaning or yard work can help.  Try yoga or álvaro chi. These techniques combine exercise and meditation. You may need some training at first to learn them.  Do something you enjoy. For example, listen to music or go to a movie. Practice your hobby or do volunteer  "work.  Meditate. This can help you relax, because you are not worrying about what happened before or what may happen in the future.  Do guided imagery. Imagine yourself in any setting that helps you feel calm. You can use online videos, books, or a teacher to guide you.  Do breathing exercises. For example:  From a standing position, bend forward from the waist with your knees slightly bent. Let your arms dangle close to the floor.  Breathe in slowly and deeply as you return to a standing position. Roll up slowly and lift your head last.  Hold your breath for just a few seconds in the standing position.  Breathe out slowly and bend forward from the waist.  Let your feelings out. Talk, laugh, cry, and express anger when you need to. Talking with supportive friends or family, a counselor, or a bladimir leader about your feelings is a healthy way to relieve stress. Avoid discussing your feelings with people who make you feel worse.  Write. It may help to write about things that are bothering you. This helps you find out how much stress you feel and what is causing it. When you know this, you can find better ways to cope.  What can you do to prevent stress?  You might try some of these things to help prevent stress:  Manage your time. This helps you find time to do the things you want and need to do.  Get enough sleep. Your body recovers from the stresses of the day while you are sleeping.  Get support. Your family, friends, and community can make a difference in how you experience stress.  Limit your news feed. Avoid or limit time on social media or news that may make you feel stressed.  Do something active. Exercise or activity can help reduce stress. Walking is a great way to get started.  Where can you learn more?  Go to https://www.[employer].net/patiented  Enter N032 in the search box to learn more about \"Learning About Stress.\"  Current as of: October 24, 2024  Content Version: 14.5 2024-2025 [employer]   Care instructions adapted under license by your healthcare professional. If you have questions about a medical condition or this instruction, always ask your healthcare professional. "[employer] disclaims any warranty or liability for your use of this information.

## 2025-07-23 LAB
HPV HR 12 DNA CVX QL NAA+PROBE: NEGATIVE
HPV16 DNA CVX QL NAA+PROBE: NEGATIVE
HPV18 DNA CVX QL NAA+PROBE: NEGATIVE
HUMAN PAPILLOMA VIRUS FINAL DIAGNOSIS: NORMAL

## 2025-07-24 LAB
BKR AP ASSOCIATED HPV REPORT: NORMAL
BKR LAB AP GYN ADEQUACY: NORMAL
BKR LAB AP GYN INTERPRETATION: NORMAL
BKR LAB AP PREVIOUS ABNORMAL: NORMAL
PATH REPORT.COMMENTS IMP SPEC: NORMAL
PATH REPORT.COMMENTS IMP SPEC: NORMAL
PATH REPORT.RELEVANT HX SPEC: NORMAL

## 2025-07-28 NOTE — NURSING NOTE
Chief Complaint   Patient presents with    RECHECK     /85 (BP Location: Right arm, Patient Position: Sitting, Cuff Size: Adult Regular)   Pulse 80   Wt 69.9 kg (154 lb)   SpO2 99%   BMI 24.48 kg/m      
- - -

## 2025-08-24 ENCOUNTER — ANCILLARY PROCEDURE (OUTPATIENT)
Dept: GENERAL RADIOLOGY | Facility: CLINIC | Age: 50
End: 2025-08-24
Attending: PHYSICIAN ASSISTANT
Payer: COMMERCIAL

## 2025-08-24 ENCOUNTER — OFFICE VISIT (OUTPATIENT)
Dept: URGENT CARE | Facility: URGENT CARE | Age: 50
End: 2025-08-24
Payer: COMMERCIAL

## 2025-08-24 VITALS
BODY MASS INDEX: 24.8 KG/M2 | RESPIRATION RATE: 16 BRPM | OXYGEN SATURATION: 97 % | TEMPERATURE: 99.1 F | SYSTOLIC BLOOD PRESSURE: 114 MMHG | HEART RATE: 85 BPM | DIASTOLIC BLOOD PRESSURE: 78 MMHG | WEIGHT: 158 LBS

## 2025-08-24 DIAGNOSIS — R07.81 RIB PAIN ON RIGHT SIDE: ICD-10-CM

## 2025-08-24 DIAGNOSIS — R07.81 RIB PAIN ON RIGHT SIDE: Primary | ICD-10-CM

## 2025-08-24 PROCEDURE — 3074F SYST BP LT 130 MM HG: CPT | Performed by: PHYSICIAN ASSISTANT

## 2025-08-24 PROCEDURE — 3078F DIAST BP <80 MM HG: CPT | Performed by: PHYSICIAN ASSISTANT

## 2025-08-24 PROCEDURE — 99213 OFFICE O/P EST LOW 20 MIN: CPT | Performed by: PHYSICIAN ASSISTANT

## 2025-08-24 PROCEDURE — 71101 X-RAY EXAM UNILAT RIBS/CHEST: CPT | Mod: TC | Performed by: RADIOLOGY

## 2025-08-29 ENCOUNTER — TELEPHONE (OUTPATIENT)
Dept: RHEUMATOLOGY | Facility: CLINIC | Age: 50
End: 2025-08-29
Payer: COMMERCIAL

## (undated) DEVICE — ENDO FORCEP BX CAPTURA PRO SPIKE G50696

## (undated) DEVICE — SPECIMEN CONTAINER 3OZ W/FORMALIN 59901

## (undated) DEVICE — SYR 50ML SLIP TIP W/O NDL 309654

## (undated) DEVICE — TUBING SUCTION MEDI-VAC 1/4"X20' N620A

## (undated) DEVICE — ENDO TRAP POLYP E-TRAP 00711099

## (undated) DEVICE — SUCTION MANIFOLD NEPTUNE 2 SYS 1 PORT 702-025-000

## (undated) DEVICE — GOWN IMPERVIOUS 2XL BLUE

## (undated) DEVICE — ENDO BITE BLOCK ADULT OMNI-BLOC

## (undated) DEVICE — KIT ENDO TURNOVER/PROCEDURE CARRY-ON 101822

## (undated) DEVICE — GLOVE EXAM NITRILE LG PF LATEX FREE 5064

## (undated) DEVICE — SOL WATER IRRIG 500ML BOTTLE 2F7113

## (undated) RX ORDER — LIDOCAINE HYDROCHLORIDE AND EPINEPHRINE 10; 10 MG/ML; UG/ML
INJECTION, SOLUTION INFILTRATION; PERINEURAL
Status: DISPENSED
Start: 2020-09-25

## (undated) RX ORDER — LIDOCAINE HYDROCHLORIDE 20 MG/ML
SOLUTION OROPHARYNGEAL
Status: DISPENSED
Start: 2020-09-25